# Patient Record
Sex: MALE | Race: WHITE | Employment: UNEMPLOYED | URBAN - METROPOLITAN AREA
[De-identification: names, ages, dates, MRNs, and addresses within clinical notes are randomized per-mention and may not be internally consistent; named-entity substitution may affect disease eponyms.]

---

## 2018-07-08 ENCOUNTER — APPOINTMENT (OUTPATIENT)
Dept: CT IMAGING | Age: 73
End: 2018-07-08
Payer: MEDICARE

## 2018-07-08 ENCOUNTER — HOSPITAL ENCOUNTER (EMERGENCY)
Age: 73
Discharge: HOME | End: 2018-07-08
Payer: MEDICARE

## 2018-07-08 VITALS
DIASTOLIC BLOOD PRESSURE: 75 MMHG | SYSTOLIC BLOOD PRESSURE: 159 MMHG | HEART RATE: 65 BPM | HEIGHT: 73 IN | BODY MASS INDEX: 35.12 KG/M2 | WEIGHT: 265 LBS | TEMPERATURE: 97.7 F | OXYGEN SATURATION: 97 % | RESPIRATION RATE: 16 BRPM

## 2018-07-08 DIAGNOSIS — K86.2 PANCREAS CYST: ICD-10-CM

## 2018-07-08 DIAGNOSIS — N20.0 KIDNEY STONE: Primary | ICD-10-CM

## 2018-07-08 LAB
ALBUMIN SERPL-MCNC: 4.5 G/DL (ref 3.9–4.9)
ALP BLD-CCNC: 85 U/L (ref 35–104)
ALT SERPL-CCNC: 11 U/L (ref 0–41)
ANION GAP SERPL CALCULATED.3IONS-SCNC: 10 MEQ/L (ref 7–13)
AST SERPL-CCNC: 10 U/L (ref 0–40)
BACTERIA: ABNORMAL /HPF
BASOPHILS ABSOLUTE: 0.1 K/UL (ref 0–0.2)
BASOPHILS RELATIVE PERCENT: 1 %
BILIRUB SERPL-MCNC: 0.5 MG/DL (ref 0–1.2)
BILIRUBIN URINE: NEGATIVE
BLOOD, URINE: ABNORMAL
BUN BLDV-MCNC: 14 MG/DL (ref 8–23)
CALCIUM SERPL-MCNC: 9.2 MG/DL (ref 8.6–10.2)
CHLORIDE BLD-SCNC: 105 MEQ/L (ref 98–107)
CLARITY: ABNORMAL
CO2: 27 MEQ/L (ref 22–29)
COLOR: YELLOW
CREAT SERPL-MCNC: 0.69 MG/DL (ref 0.7–1.2)
CRYSTALS, UA: ABNORMAL
EOSINOPHILS ABSOLUTE: 0.3 K/UL (ref 0–0.7)
EOSINOPHILS RELATIVE PERCENT: 4.4 %
EPITHELIAL CELLS, UA: ABNORMAL /HPF
GFR AFRICAN AMERICAN: >60
GFR NON-AFRICAN AMERICAN: >60
GLOBULIN: 3.3 G/DL (ref 2.3–3.5)
GLUCOSE BLD-MCNC: 153 MG/DL (ref 74–109)
GLUCOSE URINE: NEGATIVE MG/DL
HCT VFR BLD CALC: 42.6 % (ref 42–52)
HEMOGLOBIN: 14.5 G/DL (ref 14–18)
KETONES, URINE: NEGATIVE MG/DL
LACTIC ACID: 1 MMOL/L (ref 0.5–2.2)
LEUKOCYTE ESTERASE, URINE: ABNORMAL
LIPASE: 73 U/L (ref 13–60)
LYMPHOCYTES ABSOLUTE: 1.7 K/UL (ref 1–4.8)
LYMPHOCYTES RELATIVE PERCENT: 29.8 %
MCH RBC QN AUTO: 30.5 PG (ref 27–31.3)
MCHC RBC AUTO-ENTMCNC: 33.9 % (ref 33–37)
MCV RBC AUTO: 90 FL (ref 80–100)
MONOCYTES ABSOLUTE: 0.4 K/UL (ref 0.2–0.8)
MONOCYTES RELATIVE PERCENT: 7.1 %
MUCUS: PRESENT
NEUTROPHILS ABSOLUTE: 3.4 K/UL (ref 1.4–6.5)
NEUTROPHILS RELATIVE PERCENT: 57.7 %
NITRITE, URINE: NEGATIVE
PDW BLD-RTO: 13.5 % (ref 11.5–14.5)
PH UA: 7 (ref 5–9)
PLATELET # BLD: 335 K/UL (ref 130–400)
POTASSIUM SERPL-SCNC: 4.3 MEQ/L (ref 3.5–5.1)
PROTEIN UA: NEGATIVE MG/DL
RBC # BLD: 4.73 M/UL (ref 4.7–6.1)
RBC UA: ABNORMAL /HPF (ref 0–2)
SODIUM BLD-SCNC: 142 MEQ/L (ref 132–144)
SPECIFIC GRAVITY UA: 1.01 (ref 1–1.03)
TOTAL PROTEIN: 7.8 G/DL (ref 6.4–8.1)
URINE REFLEX TO CULTURE: YES
UROBILINOGEN, URINE: 0.2 E.U./DL
WBC # BLD: 5.8 K/UL (ref 4.8–10.8)
WBC UA: ABNORMAL /HPF (ref 0–5)

## 2018-07-08 PROCEDURE — 36415 COLL VENOUS BLD VENIPUNCTURE: CPT

## 2018-07-08 PROCEDURE — 96375 TX/PRO/DX INJ NEW DRUG ADDON: CPT

## 2018-07-08 PROCEDURE — 83690 ASSAY OF LIPASE: CPT

## 2018-07-08 PROCEDURE — 74150 CT ABDOMEN W/O CONTRAST: CPT

## 2018-07-08 PROCEDURE — 96374 THER/PROPH/DIAG INJ IV PUSH: CPT

## 2018-07-08 PROCEDURE — 6360000002 HC RX W HCPCS: Performed by: PHYSICIAN ASSISTANT

## 2018-07-08 PROCEDURE — 85025 COMPLETE CBC W/AUTO DIFF WBC: CPT

## 2018-07-08 PROCEDURE — 80053 COMPREHEN METABOLIC PANEL: CPT

## 2018-07-08 PROCEDURE — 83605 ASSAY OF LACTIC ACID: CPT

## 2018-07-08 PROCEDURE — 81001 URINALYSIS AUTO W/SCOPE: CPT

## 2018-07-08 PROCEDURE — 96376 TX/PRO/DX INJ SAME DRUG ADON: CPT

## 2018-07-08 PROCEDURE — 2580000003 HC RX 258: Performed by: PHYSICIAN ASSISTANT

## 2018-07-08 PROCEDURE — 99284 EMERGENCY DEPT VISIT MOD MDM: CPT

## 2018-07-08 PROCEDURE — 87086 URINE CULTURE/COLONY COUNT: CPT

## 2018-07-08 RX ORDER — 0.9 % SODIUM CHLORIDE 0.9 %
1000 INTRAVENOUS SOLUTION INTRAVENOUS ONCE
Status: COMPLETED | OUTPATIENT
Start: 2018-07-08 | End: 2018-07-08

## 2018-07-08 RX ORDER — OXYCODONE HYDROCHLORIDE AND ACETAMINOPHEN 5; 325 MG/1; MG/1
1 TABLET ORAL EVERY 6 HOURS PRN
Qty: 12 TABLET | Refills: 0 | Status: SHIPPED | OUTPATIENT
Start: 2018-07-08 | End: 2018-07-11

## 2018-07-08 RX ORDER — ONDANSETRON 2 MG/ML
4 INJECTION INTRAMUSCULAR; INTRAVENOUS ONCE
Status: COMPLETED | OUTPATIENT
Start: 2018-07-08 | End: 2018-07-08

## 2018-07-08 RX ORDER — MORPHINE SULFATE 4 MG/ML
4 INJECTION, SOLUTION INTRAMUSCULAR; INTRAVENOUS ONCE
Status: COMPLETED | OUTPATIENT
Start: 2018-07-08 | End: 2018-07-08

## 2018-07-08 RX ORDER — KETOROLAC TROMETHAMINE 30 MG/ML
30 INJECTION, SOLUTION INTRAMUSCULAR; INTRAVENOUS ONCE
Status: COMPLETED | OUTPATIENT
Start: 2018-07-08 | End: 2018-07-08

## 2018-07-08 RX ORDER — ETODOLAC 400 MG/1
400 TABLET, FILM COATED ORAL 2 TIMES DAILY
Qty: 14 TABLET | Refills: 0 | Status: SHIPPED | OUTPATIENT
Start: 2018-07-08 | End: 2018-09-04

## 2018-07-08 RX ORDER — ONDANSETRON 4 MG/1
4 TABLET, ORALLY DISINTEGRATING ORAL EVERY 8 HOURS PRN
Qty: 20 TABLET | Refills: 0 | Status: SHIPPED | OUTPATIENT
Start: 2018-07-08 | End: 2018-09-04

## 2018-07-08 RX ADMIN — SODIUM CHLORIDE 1000 ML: 9 INJECTION, SOLUTION INTRAVENOUS at 15:05

## 2018-07-08 RX ADMIN — MORPHINE SULFATE 4 MG: 4 INJECTION, SOLUTION INTRAMUSCULAR; INTRAVENOUS at 16:14

## 2018-07-08 RX ADMIN — KETOROLAC TROMETHAMINE 30 MG: 30 INJECTION, SOLUTION INTRAMUSCULAR; INTRAVENOUS at 15:06

## 2018-07-08 RX ADMIN — ONDANSETRON 4 MG: 2 INJECTION, SOLUTION INTRAMUSCULAR; INTRAVENOUS at 15:06

## 2018-07-08 RX ADMIN — ONDANSETRON 4 MG: 2 INJECTION, SOLUTION INTRAMUSCULAR; INTRAVENOUS at 16:14

## 2018-07-08 ASSESSMENT — PAIN SCALES - GENERAL
PAINLEVEL_OUTOF10: 2
PAINLEVEL_OUTOF10: 8
PAINLEVEL_OUTOF10: 8

## 2018-07-08 ASSESSMENT — ENCOUNTER SYMPTOMS
VOMITING: 0
BLOOD IN STOOL: 0
ANAL BLEEDING: 0
WHEEZING: 0
SHORTNESS OF BREATH: 0
BACK PAIN: 1
RECTAL PAIN: 0
COUGH: 0
CHEST TIGHTNESS: 0
CONSTIPATION: 0
NAUSEA: 1
COLOR CHANGE: 0
DIARRHEA: 0
ABDOMINAL PAIN: 1

## 2018-07-08 ASSESSMENT — PAIN DESCRIPTION - LOCATION
LOCATION: ABDOMEN
LOCATION: ABDOMEN;FLANK

## 2018-07-08 ASSESSMENT — PAIN DESCRIPTION - ORIENTATION: ORIENTATION: LEFT

## 2018-07-08 ASSESSMENT — PAIN DESCRIPTION - PAIN TYPE: TYPE: ACUTE PAIN

## 2018-07-08 NOTE — ED NOTES
Pt medicated per orders. No s/sym of distress. No further questions or concerns. Tech called to transport pt to CT. Call light in reach. Will con't to monitor.       Linda Johnson, MIRTA  07/08/18 5650

## 2018-07-08 NOTE — ED PROVIDER NOTES
[facility] ED  eMERGENCY dEPARTMENT eNCOUnter      Pt Name: Magdalene Gu  MRN: [id number]  [place] 1945  Date of evaluation: 7/8/2018  Provider: Jyothi Villanueva PA-C    CHIEF COMPLAINT       Chief Complaint   Patient presents with    Abdominal Pain     left sided abd pain. started this am.  last bm this am and was normal.  hx of kidney stone years ago. hx of enlarged prostate         HISTORY OF PRESENT ILLNESS   (Location/Symptom, Timing/Onset, Context/Setting, Quality, Duration, Modifying Factors, Severity)  Note limiting factors. Magdalene Gu is a 67 y.o. male who presents to the emergency department     Mr. Nola Mcclendon is a 66 yo male, PMH not limited to kidney stone and enlarged prostate, presents with complaints of LLQ abdominal pain that started spontaneously about 7 hrs ago. He describes his pain as constant, progressively gotten worse, pressure-like, radiates to left lower back, no exacerbating factors, mild relief with walking, associated with nausea, abdominal bloating, and burping/belching. He stated that when he had his kidney stone 20 yrs ago, he passed the stone, never experienced it since then, and that the presentation was different compared to what he experiences today. Patient denies any CP, palpitations, SOB/EL, fever/chills, urinary/bowel changes, light headedness/syncope. Other than this complaint, patient feels well. Nursing Notes were reviewed. REVIEW OF SYSTEMS    (2-9 systems for level 4, 10 or more for level 5)     Review of Systems   Constitutional: Positive for activity change. Negative for appetite change, chills, diaphoresis, fatigue and fever. Eyes: Negative for visual disturbance. Respiratory: Negative for cough, chest tightness, shortness of breath and wheezing. Cardiovascular: Negative for chest pain and palpitations. Gastrointestinal: Positive for abdominal pain and nausea.  Negative for anal bleeding, blood in stool, constipation, diarrhea, Abdominal: Soft. Bowel sounds are normal. He exhibits no distension and no mass. There is tenderness (TTP left lower quadrant). There is no rebound and no guarding. Genitourinary:   Genitourinary Comments: No CVAT   Neurological: He is alert and oriented to person, place, and time. Skin: Skin is warm and dry. He is not diaphoretic. DIAGNOSTIC RESULTS     EKG: All EKG's are interpreted by the Emergency Department Physician who either signs or Co-signs this chart in the absence of a cardiologist.      RADIOLOGY:   Non-plain film images such as CT, Ultrasound and MRI are read by the radiologist. Plain radiographic images are visualized and preliminarily interpreted by the emergency physician with the below findings:    3-4mm left ureteral stone, multiple organ cysts    Interpretation per the Radiologist below, if available at the time of this note:    [address]   Final Result   1. THERE IS MILD TO MODERATE LEFT-SIDED HYDRONEPHROSIS AND HYDROURETER TO THE MIDDLE THIRD OF THE LEFT URETER WHERE THERE IS A CALCULI MEASURING APPROXIMATELY 3 TO 4 MM. 2. THERE IS BILATERAL NEPHROLITHIASIS. 3. THERE ARE SEVERAL BLADDER CALCULI AS DESCRIBED ABOVE. 4. THERE IS A CYSTIC MASS MEASURING 2.2 CM AT THE POSTERIOR ASPECT OF THE HEAD UNCINATE PROCESS OF THE PANCREAS. UNDERLYING MALIGNANCY IS NOT EXCLUDED. WILL NEED FURTHER EVALUATION. 4. THERE IS AREAS LOW-ATTENUATION BOTH KIDNEYS. THESE ARE NOT FULLY CHARACTERIZED IN THIS NONCONTRAST STUDY. CONSIDER FOLLOW-UP CT WITH CONTRAST OR ULTRASOUND TO FURTHER EVALUATE. 5. THERE IS AN AREA OF LOW-ATTENUATION IN THE RIGHT LOBE OF LIVER AND A PARTIALLY VISUALIZED CYSTIC AREA IN THE LEFT LOBE. Urbano Constantino NOT FULLY CHARACTERIZED IN THIS NONCONTRAST STUDY.  WILL NEED FURTHER EVALUATION FOLLOW-UP.      OTHER FINDINGS DETAILED ABOVE         All CT scans at this facility use dose modulation, iterative reconstruction, and/or weight based dosing when appropriate to reduce radiation

## 2018-07-10 LAB — URINE CULTURE, ROUTINE: NORMAL

## 2018-07-18 ENCOUNTER — OFFICE VISIT (OUTPATIENT)
Dept: UROLOGY | Age: 73
End: 2018-07-18
Payer: MEDICARE

## 2018-07-18 ENCOUNTER — HOSPITAL ENCOUNTER (OUTPATIENT)
Dept: GENERAL RADIOLOGY | Age: 73
Discharge: HOME | End: 2018-07-20
Payer: OTHER GOVERNMENT

## 2018-07-18 ENCOUNTER — TELEPHONE (OUTPATIENT)
Dept: UROLOGY | Age: 73
End: 2018-07-18

## 2018-07-18 VITALS
HEIGHT: 73 IN | HEART RATE: 84 BPM | SYSTOLIC BLOOD PRESSURE: 138 MMHG | WEIGHT: 255 LBS | DIASTOLIC BLOOD PRESSURE: 66 MMHG | BODY MASS INDEX: 33.8 KG/M2

## 2018-07-18 DIAGNOSIS — N20.0 RENAL CALCULI: ICD-10-CM

## 2018-07-18 DIAGNOSIS — N20.0 KIDNEY STONE: Primary | ICD-10-CM

## 2018-07-18 DIAGNOSIS — N20.0 KIDNEY STONE: ICD-10-CM

## 2018-07-18 DIAGNOSIS — Q45.3 PANCREATIC ABNORMALITY: Primary | ICD-10-CM

## 2018-07-18 PROCEDURE — G8427 DOCREV CUR MEDS BY ELIG CLIN: HCPCS | Performed by: UROLOGY

## 2018-07-18 PROCEDURE — 1123F ACP DISCUSS/DSCN MKR DOCD: CPT | Performed by: UROLOGY

## 2018-07-18 PROCEDURE — 74018 RADEX ABDOMEN 1 VIEW: CPT

## 2018-07-18 PROCEDURE — G8417 CALC BMI ABV UP PARAM F/U: HCPCS | Performed by: UROLOGY

## 2018-07-18 PROCEDURE — 1036F TOBACCO NON-USER: CPT | Performed by: UROLOGY

## 2018-07-18 PROCEDURE — 4040F PNEUMOC VAC/ADMIN/RCVD: CPT | Performed by: UROLOGY

## 2018-07-18 PROCEDURE — 1101F PT FALLS ASSESS-DOCD LE1/YR: CPT | Performed by: UROLOGY

## 2018-07-18 PROCEDURE — 99203 OFFICE O/P NEW LOW 30 MIN: CPT | Performed by: UROLOGY

## 2018-07-18 PROCEDURE — 3017F COLORECTAL CA SCREEN DOC REV: CPT | Performed by: UROLOGY

## 2018-07-18 RX ORDER — AMLODIPINE BESYLATE 10 MG/1
TABLET ORAL
Refills: 3 | COMMUNITY
Start: 2018-07-04

## 2018-07-18 RX ORDER — DUTASTERIDE 0.5 MG/1
0.5 CAPSULE, LIQUID FILLED ORAL DAILY
COMMUNITY
Start: 2012-10-22

## 2018-07-18 RX ORDER — TAMSULOSIN HYDROCHLORIDE 0.4 MG/1
CAPSULE ORAL
Refills: 3 | COMMUNITY
Start: 2018-05-01

## 2018-07-18 RX ORDER — ALBUTEROL SULFATE 90 UG/1
AEROSOL, METERED RESPIRATORY (INHALATION)
COMMUNITY
Start: 2012-09-10

## 2018-07-18 RX ORDER — CARVEDILOL PHOSPHATE 40 MG/1
CAPSULE, EXTENDED RELEASE ORAL
Refills: 3 | COMMUNITY
Start: 2018-07-04

## 2018-09-04 ENCOUNTER — HOSPITAL ENCOUNTER (OUTPATIENT)
Dept: GENERAL RADIOLOGY | Age: 73
Discharge: HOME | End: 2018-09-06
Payer: MEDICARE

## 2018-09-04 ENCOUNTER — OFFICE VISIT (OUTPATIENT)
Dept: UROLOGY | Age: 73
End: 2018-09-04
Payer: MEDICARE

## 2018-09-04 VITALS
HEART RATE: 77 BPM | HEIGHT: 73 IN | SYSTOLIC BLOOD PRESSURE: 146 MMHG | WEIGHT: 250 LBS | DIASTOLIC BLOOD PRESSURE: 62 MMHG | BODY MASS INDEX: 33.13 KG/M2

## 2018-09-04 DIAGNOSIS — Q45.3 PANCREATIC ABNORMALITY: ICD-10-CM

## 2018-09-04 DIAGNOSIS — N20.0 KIDNEY STONE: Primary | ICD-10-CM

## 2018-09-04 PROCEDURE — 1101F PT FALLS ASSESS-DOCD LE1/YR: CPT | Performed by: UROLOGY

## 2018-09-04 PROCEDURE — G8417 CALC BMI ABV UP PARAM F/U: HCPCS | Performed by: UROLOGY

## 2018-09-04 PROCEDURE — 99213 OFFICE O/P EST LOW 20 MIN: CPT | Performed by: UROLOGY

## 2018-09-04 PROCEDURE — 3017F COLORECTAL CA SCREEN DOC REV: CPT | Performed by: UROLOGY

## 2018-09-04 PROCEDURE — 1036F TOBACCO NON-USER: CPT | Performed by: UROLOGY

## 2018-09-04 PROCEDURE — 4040F PNEUMOC VAC/ADMIN/RCVD: CPT | Performed by: UROLOGY

## 2018-09-04 PROCEDURE — 1123F ACP DISCUSS/DSCN MKR DOCD: CPT | Performed by: UROLOGY

## 2018-09-04 PROCEDURE — G8427 DOCREV CUR MEDS BY ELIG CLIN: HCPCS | Performed by: UROLOGY

## 2018-09-04 PROCEDURE — 74018 RADEX ABDOMEN 1 VIEW: CPT

## 2018-09-04 NOTE — PROGRESS NOTES
reviewed. KUB reviewed. No other findings . Musculoskeletal: Normal range of motion. Normal strength. Extremities: No edema   Neurological: No deficits   Skin: Skin is warm and dry. No lesions. No rashes   Psychiatric:  Normal affect. Assessment/Medical Necessity-Decision Making  Bladder stones multiple  Left ureteral calculus which is passed  Left lower pole calculus stable and in position  No further renal colic  Pancreatic mass being worked up  Plan  Follow up after patient sees gastroenterology to arrange for cystolitholapaxy  Gastroenterologist contacted earlier appointment made  Greater than 50% of 20 minutes spent consulting patient face-to-face  Orders Placed This Encounter   Procedures    [address]     No orders of the defined types were placed in this encounter. Tequila Santoro MD       Please note this report has been partially produced using speech recognition software  And may cause contain errors related to that system including grammar, punctuation and spelling as well as words and phrases that may seem inappropriate. If there are questions or concerns please feel free to contact me to clarify.

## 2018-09-24 ENCOUNTER — OFFICE VISIT (OUTPATIENT)
Dept: GASTROENTEROLOGY | Age: 73
End: 2018-09-24
Payer: MEDICARE

## 2018-09-24 VITALS
HEIGHT: 73 IN | BODY MASS INDEX: 34.85 KG/M2 | OXYGEN SATURATION: 97 % | TEMPERATURE: 97.9 F | HEART RATE: 64 BPM | WEIGHT: 263 LBS

## 2018-09-24 DIAGNOSIS — K86.2 CYSTIC MASS OF PANCREAS: Primary | ICD-10-CM

## 2018-09-24 PROCEDURE — G8427 DOCREV CUR MEDS BY ELIG CLIN: HCPCS | Performed by: INTERNAL MEDICINE

## 2018-09-24 PROCEDURE — 1123F ACP DISCUSS/DSCN MKR DOCD: CPT | Performed by: INTERNAL MEDICINE

## 2018-09-24 PROCEDURE — 1036F TOBACCO NON-USER: CPT | Performed by: INTERNAL MEDICINE

## 2018-09-24 PROCEDURE — 4040F PNEUMOC VAC/ADMIN/RCVD: CPT | Performed by: INTERNAL MEDICINE

## 2018-09-24 PROCEDURE — 99204 OFFICE O/P NEW MOD 45 MIN: CPT | Performed by: INTERNAL MEDICINE

## 2018-09-24 PROCEDURE — G8417 CALC BMI ABV UP PARAM F/U: HCPCS | Performed by: INTERNAL MEDICINE

## 2018-09-24 PROCEDURE — 1101F PT FALLS ASSESS-DOCD LE1/YR: CPT | Performed by: INTERNAL MEDICINE

## 2018-09-24 PROCEDURE — 3017F COLORECTAL CA SCREEN DOC REV: CPT | Performed by: INTERNAL MEDICINE

## 2018-10-04 ENCOUNTER — TELEPHONE (OUTPATIENT)
Dept: GASTROENTEROLOGY | Age: 73
End: 2018-10-04

## 2018-10-04 NOTE — TELEPHONE ENCOUNTER
CT images reviewed with radiologist.  The lesion is not in the pancreas and very likely a retroperitoneal lesion, cystis in nature, it has benign appearance and an incidental finding. Will cancel the scheduled EUS, as the EUS is going to be unhelpful and not able to access the lesion, being retroperitoneal.    Given benign nature and appearance of the lesion, the consensus is that we could follow this up with a CAT scan in 12 months from now. I have called the patient and informed him of this, patient also not keen on EUS.     Ramirez Sanchez

## 2018-10-19 ENCOUNTER — OFFICE VISIT (OUTPATIENT)
Dept: UROLOGY | Age: 73
End: 2018-10-19
Payer: MEDICARE

## 2018-10-19 VITALS
SYSTOLIC BLOOD PRESSURE: 138 MMHG | HEART RATE: 69 BPM | DIASTOLIC BLOOD PRESSURE: 68 MMHG | WEIGHT: 253 LBS | BODY MASS INDEX: 33.53 KG/M2 | HEIGHT: 73 IN

## 2018-10-19 DIAGNOSIS — N20.0 KIDNEY STONE: Primary | ICD-10-CM

## 2018-10-19 PROCEDURE — G8427 DOCREV CUR MEDS BY ELIG CLIN: HCPCS | Performed by: UROLOGY

## 2018-10-19 PROCEDURE — G8484 FLU IMMUNIZE NO ADMIN: HCPCS | Performed by: UROLOGY

## 2018-10-19 PROCEDURE — 1101F PT FALLS ASSESS-DOCD LE1/YR: CPT | Performed by: UROLOGY

## 2018-10-19 PROCEDURE — 1036F TOBACCO NON-USER: CPT | Performed by: UROLOGY

## 2018-10-19 PROCEDURE — 99213 OFFICE O/P EST LOW 20 MIN: CPT | Performed by: UROLOGY

## 2018-10-19 PROCEDURE — 4040F PNEUMOC VAC/ADMIN/RCVD: CPT | Performed by: UROLOGY

## 2018-10-19 PROCEDURE — G8417 CALC BMI ABV UP PARAM F/U: HCPCS | Performed by: UROLOGY

## 2018-10-19 PROCEDURE — 3017F COLORECTAL CA SCREEN DOC REV: CPT | Performed by: UROLOGY

## 2018-10-19 PROCEDURE — 1123F ACP DISCUSS/DSCN MKR DOCD: CPT | Performed by: UROLOGY

## 2018-12-10 ENCOUNTER — HOSPITAL ENCOUNTER (OUTPATIENT)
Dept: PREADMISSION TESTING | Age: 73
Discharge: HOME | End: 2018-12-14
Payer: MEDICARE

## 2018-12-10 VITALS
BODY MASS INDEX: 35.09 KG/M2 | HEART RATE: 69 BPM | SYSTOLIC BLOOD PRESSURE: 161 MMHG | RESPIRATION RATE: 16 BRPM | TEMPERATURE: 97.3 F | HEIGHT: 73 IN | WEIGHT: 264.8 LBS | OXYGEN SATURATION: 98 % | DIASTOLIC BLOOD PRESSURE: 75 MMHG

## 2018-12-10 DIAGNOSIS — N21.0 BLADDER STONES: ICD-10-CM

## 2018-12-10 LAB
ANION GAP SERPL CALCULATED.3IONS-SCNC: 14 MEQ/L (ref 7–13)
BUN BLDV-MCNC: 14 MG/DL (ref 8–23)
CALCIUM SERPL-MCNC: 8.7 MG/DL (ref 8.6–10.2)
CHLORIDE BLD-SCNC: 104 MEQ/L (ref 98–107)
CO2: 23 MEQ/L (ref 22–29)
CREAT SERPL-MCNC: 0.81 MG/DL (ref 0.7–1.2)
EKG ATRIAL RATE: 65 BPM
EKG P AXIS: 37 DEGREES
EKG P-R INTERVAL: 192 MS
EKG Q-T INTERVAL: 398 MS
EKG QRS DURATION: 100 MS
EKG QTC CALCULATION (BAZETT): 413 MS
EKG R AXIS: -16 DEGREES
EKG T AXIS: 22 DEGREES
EKG VENTRICULAR RATE: 65 BPM
GFR AFRICAN AMERICAN: >60
GFR NON-AFRICAN AMERICAN: >60
GLUCOSE BLD-MCNC: 156 MG/DL (ref 74–109)
HCT VFR BLD CALC: 39.9 % (ref 42–52)
HEMOGLOBIN: 13.9 G/DL (ref 14–18)
MCH RBC QN AUTO: 31.8 PG (ref 27–31.3)
MCHC RBC AUTO-ENTMCNC: 34.8 % (ref 33–37)
MCV RBC AUTO: 91.2 FL (ref 80–100)
PDW BLD-RTO: 13.7 % (ref 11.5–14.5)
PLATELET # BLD: 301 K/UL (ref 130–400)
POTASSIUM SERPL-SCNC: 3.6 MEQ/L (ref 3.5–5.1)
RBC # BLD: 4.37 M/UL (ref 4.7–6.1)
SODIUM BLD-SCNC: 141 MEQ/L (ref 132–144)
WBC # BLD: 4.5 K/UL (ref 4.8–10.8)

## 2018-12-10 PROCEDURE — 86301 IMMUNOASSAY TUMOR CA 19-9: CPT

## 2018-12-10 PROCEDURE — 93010 ELECTROCARDIOGRAM REPORT: CPT | Performed by: INTERNAL MEDICINE

## 2018-12-10 PROCEDURE — 85027 COMPLETE CBC AUTOMATED: CPT

## 2018-12-10 PROCEDURE — 80048 BASIC METABOLIC PNL TOTAL CA: CPT

## 2018-12-10 PROCEDURE — 93005 ELECTROCARDIOGRAM TRACING: CPT

## 2018-12-10 RX ORDER — SODIUM CHLORIDE 0.9 % (FLUSH) 0.9 %
10 SYRINGE (ML) INJECTION EVERY 12 HOURS SCHEDULED
Status: CANCELLED | OUTPATIENT
Start: 2018-12-10

## 2018-12-10 RX ORDER — SODIUM CHLORIDE 0.9 % (FLUSH) 0.9 %
10 SYRINGE (ML) INJECTION PRN
Status: CANCELLED | OUTPATIENT
Start: 2018-12-10

## 2018-12-10 RX ORDER — LIDOCAINE HYDROCHLORIDE 10 MG/ML
1 INJECTION, SOLUTION EPIDURAL; INFILTRATION; INTRACAUDAL; PERINEURAL
Status: CANCELLED | OUTPATIENT
Start: 2018-12-10 | End: 2018-12-10

## 2018-12-10 RX ORDER — SODIUM CHLORIDE, SODIUM LACTATE, POTASSIUM CHLORIDE, CALCIUM CHLORIDE 600; 310; 30; 20 MG/100ML; MG/100ML; MG/100ML; MG/100ML
INJECTION, SOLUTION INTRAVENOUS CONTINUOUS
Status: CANCELLED | OUTPATIENT
Start: 2018-12-10

## 2018-12-10 ASSESSMENT — ENCOUNTER SYMPTOMS
EYE PAIN: 0
NAUSEA: 0
VOMITING: 0
ABDOMINAL PAIN: 0
SORE THROAT: 0
BLOOD IN STOOL: 0
SINUS PRESSURE: 0
EYE DISCHARGE: 0
SHORTNESS OF BREATH: 0
CONSTIPATION: 0
WHEEZING: 0
COUGH: 0
DIARRHEA: 0
RHINORRHEA: 0
SINUS PAIN: 0

## 2018-12-12 ENCOUNTER — ANESTHESIA EVENT (OUTPATIENT)
Dept: OPERATING ROOM | Age: 73
End: 2018-12-12
Payer: MEDICARE

## 2018-12-12 ENCOUNTER — HOSPITAL ENCOUNTER (OUTPATIENT)
Age: 73
Setting detail: OUTPATIENT SURGERY
Discharge: HOME | End: 2018-12-12
Attending: UROLOGY | Admitting: UROLOGY
Payer: MEDICARE

## 2018-12-12 ENCOUNTER — ANESTHESIA (OUTPATIENT)
Dept: OPERATING ROOM | Age: 73
End: 2018-12-12
Payer: MEDICARE

## 2018-12-12 VITALS
RESPIRATION RATE: 14 BRPM | DIASTOLIC BLOOD PRESSURE: 67 MMHG | HEART RATE: 50 BPM | SYSTOLIC BLOOD PRESSURE: 146 MMHG | OXYGEN SATURATION: 98 % | TEMPERATURE: 98 F

## 2018-12-12 VITALS — DIASTOLIC BLOOD PRESSURE: 56 MMHG | TEMPERATURE: 96.8 F | SYSTOLIC BLOOD PRESSURE: 101 MMHG | OXYGEN SATURATION: 99 %

## 2018-12-12 DIAGNOSIS — Z48.816 SURGICAL AFTERCARE, GENITOURINARY SYSTEM: Primary | ICD-10-CM

## 2018-12-12 LAB
GLUCOSE BLD-MCNC: 127 MG/DL (ref 60–115)
GLUCOSE BLD-MCNC: 154 MG/DL (ref 60–115)
PERFORMED ON: ABNORMAL
PERFORMED ON: ABNORMAL

## 2018-12-12 PROCEDURE — 6360000002 HC RX W HCPCS: Performed by: NURSE ANESTHETIST, CERTIFIED REGISTERED

## 2018-12-12 PROCEDURE — 2500000003 HC RX 250 WO HCPCS: Performed by: NURSE ANESTHETIST, CERTIFIED REGISTERED

## 2018-12-12 PROCEDURE — 52318 REMOVE BLADDER STONE: CPT | Performed by: UROLOGY

## 2018-12-12 PROCEDURE — 7100000011 HC PHASE II RECOVERY - ADDTL 15 MIN: Performed by: UROLOGY

## 2018-12-12 PROCEDURE — 2500000003 HC RX 250 WO HCPCS: Performed by: NURSE PRACTITIONER

## 2018-12-12 PROCEDURE — 3700000001 HC ADD 15 MINUTES (ANESTHESIA): Performed by: UROLOGY

## 2018-12-12 PROCEDURE — 7100000001 HC PACU RECOVERY - ADDTL 15 MIN: Performed by: UROLOGY

## 2018-12-12 PROCEDURE — 6360000002 HC RX W HCPCS: Performed by: NURSE PRACTITIONER

## 2018-12-12 PROCEDURE — 3600000014 HC SURGERY LEVEL 4 ADDTL 15MIN: Performed by: UROLOGY

## 2018-12-12 PROCEDURE — 82365 CALCULUS SPECTROSCOPY: CPT

## 2018-12-12 PROCEDURE — 7100000010 HC PHASE II RECOVERY - FIRST 15 MIN: Performed by: UROLOGY

## 2018-12-12 PROCEDURE — 3700000000 HC ANESTHESIA ATTENDED CARE: Performed by: UROLOGY

## 2018-12-12 PROCEDURE — C1758 CATHETER, URETERAL: HCPCS | Performed by: UROLOGY

## 2018-12-12 PROCEDURE — 3600000004 HC SURGERY LEVEL 4 BASE: Performed by: UROLOGY

## 2018-12-12 PROCEDURE — 2720000010 HC SURG SUPPLY STERILE: Performed by: UROLOGY

## 2018-12-12 PROCEDURE — 7100000000 HC PACU RECOVERY - FIRST 15 MIN: Performed by: UROLOGY

## 2018-12-12 PROCEDURE — 2709999900 HC NON-CHARGEABLE SUPPLY: Performed by: UROLOGY

## 2018-12-12 PROCEDURE — 2580000003 HC RX 258: Performed by: NURSE PRACTITIONER

## 2018-12-12 PROCEDURE — 52214 CYSTOSCOPY AND TREATMENT: CPT | Performed by: UROLOGY

## 2018-12-12 PROCEDURE — C1769 GUIDE WIRE: HCPCS | Performed by: UROLOGY

## 2018-12-12 PROCEDURE — 6370000000 HC RX 637 (ALT 250 FOR IP): Performed by: UROLOGY

## 2018-12-12 PROCEDURE — 2580000003 HC RX 258: Performed by: UROLOGY

## 2018-12-12 PROCEDURE — 2580000003 HC RX 258: Performed by: NURSE ANESTHETIST, CERTIFIED REGISTERED

## 2018-12-12 RX ORDER — DIPHENHYDRAMINE HYDROCHLORIDE 50 MG/ML
12.5 INJECTION INTRAMUSCULAR; INTRAVENOUS
Status: DISCONTINUED | OUTPATIENT
Start: 2018-12-12 | End: 2018-12-12 | Stop reason: HOSPADM

## 2018-12-12 RX ORDER — ONDANSETRON 2 MG/ML
4 INJECTION INTRAMUSCULAR; INTRAVENOUS
Status: DISCONTINUED | OUTPATIENT
Start: 2018-12-12 | End: 2018-12-12 | Stop reason: HOSPADM

## 2018-12-12 RX ORDER — METOCLOPRAMIDE HYDROCHLORIDE 5 MG/ML
10 INJECTION INTRAMUSCULAR; INTRAVENOUS
Status: DISCONTINUED | OUTPATIENT
Start: 2018-12-12 | End: 2018-12-12 | Stop reason: HOSPADM

## 2018-12-12 RX ORDER — CHLORHEXIDINE GLUCONATE 4 G/100ML
SOLUTION TOPICAL PRN
Status: DISCONTINUED | OUTPATIENT
Start: 2018-12-12 | End: 2018-12-12 | Stop reason: HOSPADM

## 2018-12-12 RX ORDER — LIDOCAINE HYDROCHLORIDE 20 MG/ML
INJECTION, SOLUTION EPIDURAL; INFILTRATION; INTRACAUDAL; PERINEURAL PRN
Status: DISCONTINUED | OUTPATIENT
Start: 2018-12-12 | End: 2018-12-12 | Stop reason: SDUPTHER

## 2018-12-12 RX ORDER — SODIUM CHLORIDE, SODIUM LACTATE, POTASSIUM CHLORIDE, CALCIUM CHLORIDE 600; 310; 30; 20 MG/100ML; MG/100ML; MG/100ML; MG/100ML
INJECTION, SOLUTION INTRAVENOUS CONTINUOUS
Status: DISCONTINUED | OUTPATIENT
Start: 2018-12-12 | End: 2018-12-12 | Stop reason: HOSPADM

## 2018-12-12 RX ORDER — ATROPA BELLADONNA AND OPIUM 16.2; 6 MG/1; MG/1
SUPPOSITORY RECTAL PRN
Status: DISCONTINUED | OUTPATIENT
Start: 2018-12-12 | End: 2018-12-12 | Stop reason: HOSPADM

## 2018-12-12 RX ORDER — HYDROCODONE BITARTRATE AND ACETAMINOPHEN 5; 325 MG/1; MG/1
1 TABLET ORAL EVERY 6 HOURS PRN
Qty: 12 TABLET | Refills: 0 | Status: SHIPPED | OUTPATIENT
Start: 2018-12-12 | End: 2018-12-15

## 2018-12-12 RX ORDER — CEPHALEXIN 500 MG/1
500 CAPSULE ORAL EVERY 12 HOURS
Qty: 14 CAPSULE | Refills: 0 | Status: SHIPPED | OUTPATIENT
Start: 2018-12-12 | End: 2019-06-25

## 2018-12-12 RX ORDER — MIDAZOLAM HYDROCHLORIDE 1 MG/ML
INJECTION INTRAMUSCULAR; INTRAVENOUS PRN
Status: DISCONTINUED | OUTPATIENT
Start: 2018-12-12 | End: 2018-12-12 | Stop reason: SDUPTHER

## 2018-12-12 RX ORDER — SODIUM CHLORIDE, SODIUM LACTATE, POTASSIUM CHLORIDE, CALCIUM CHLORIDE 600; 310; 30; 20 MG/100ML; MG/100ML; MG/100ML; MG/100ML
INJECTION, SOLUTION INTRAVENOUS
Status: COMPLETED
Start: 2018-12-12 | End: 2018-12-12

## 2018-12-12 RX ORDER — HYDROCODONE BITARTRATE AND ACETAMINOPHEN 5; 325 MG/1; MG/1
2 TABLET ORAL PRN
Status: DISCONTINUED | OUTPATIENT
Start: 2018-12-12 | End: 2018-12-12 | Stop reason: HOSPADM

## 2018-12-12 RX ORDER — SODIUM CHLORIDE 0.9 % (FLUSH) 0.9 %
10 SYRINGE (ML) INJECTION EVERY 12 HOURS SCHEDULED
Status: DISCONTINUED | OUTPATIENT
Start: 2018-12-12 | End: 2018-12-12 | Stop reason: HOSPADM

## 2018-12-12 RX ORDER — SODIUM CHLORIDE 0.9 % (FLUSH) 0.9 %
10 SYRINGE (ML) INJECTION PRN
Status: DISCONTINUED | OUTPATIENT
Start: 2018-12-12 | End: 2018-12-12 | Stop reason: HOSPADM

## 2018-12-12 RX ORDER — FENTANYL CITRATE 50 UG/ML
INJECTION, SOLUTION INTRAMUSCULAR; INTRAVENOUS PRN
Status: DISCONTINUED | OUTPATIENT
Start: 2018-12-12 | End: 2018-12-12 | Stop reason: SDUPTHER

## 2018-12-12 RX ORDER — ONDANSETRON 2 MG/ML
INJECTION INTRAMUSCULAR; INTRAVENOUS PRN
Status: DISCONTINUED | OUTPATIENT
Start: 2018-12-12 | End: 2018-12-12 | Stop reason: SDUPTHER

## 2018-12-12 RX ORDER — HYDROCODONE BITARTRATE AND ACETAMINOPHEN 5; 325 MG/1; MG/1
1 TABLET ORAL PRN
Status: DISCONTINUED | OUTPATIENT
Start: 2018-12-12 | End: 2018-12-12 | Stop reason: HOSPADM

## 2018-12-12 RX ORDER — LIDOCAINE HYDROCHLORIDE 10 MG/ML
1 INJECTION, SOLUTION EPIDURAL; INFILTRATION; INTRACAUDAL; PERINEURAL
Status: COMPLETED | OUTPATIENT
Start: 2018-12-12 | End: 2018-12-12

## 2018-12-12 RX ORDER — MEPERIDINE HYDROCHLORIDE 25 MG/ML
12.5 INJECTION INTRAMUSCULAR; INTRAVENOUS; SUBCUTANEOUS EVERY 5 MIN PRN
Status: DISCONTINUED | OUTPATIENT
Start: 2018-12-12 | End: 2018-12-12 | Stop reason: HOSPADM

## 2018-12-12 RX ORDER — LIDOCAINE HYDROCHLORIDE 10 MG/ML
INJECTION, SOLUTION EPIDURAL; INFILTRATION; INTRACAUDAL; PERINEURAL
Status: DISCONTINUED
Start: 2018-12-12 | End: 2018-12-12 | Stop reason: HOSPADM

## 2018-12-12 RX ORDER — SODIUM CHLORIDE, SODIUM LACTATE, POTASSIUM CHLORIDE, CALCIUM CHLORIDE 600; 310; 30; 20 MG/100ML; MG/100ML; MG/100ML; MG/100ML
INJECTION, SOLUTION INTRAVENOUS CONTINUOUS PRN
Status: DISCONTINUED | OUTPATIENT
Start: 2018-12-12 | End: 2018-12-12 | Stop reason: SDUPTHER

## 2018-12-12 RX ORDER — PROPOFOL 10 MG/ML
INJECTION, EMULSION INTRAVENOUS PRN
Status: DISCONTINUED | OUTPATIENT
Start: 2018-12-12 | End: 2018-12-12 | Stop reason: SDUPTHER

## 2018-12-12 RX ORDER — MAGNESIUM HYDROXIDE 1200 MG/15ML
LIQUID ORAL PRN
Status: DISCONTINUED | OUTPATIENT
Start: 2018-12-12 | End: 2018-12-12 | Stop reason: HOSPADM

## 2018-12-12 RX ORDER — FENTANYL CITRATE 50 UG/ML
50 INJECTION, SOLUTION INTRAMUSCULAR; INTRAVENOUS EVERY 10 MIN PRN
Status: DISCONTINUED | OUTPATIENT
Start: 2018-12-12 | End: 2018-12-12 | Stop reason: HOSPADM

## 2018-12-12 RX ADMIN — LIDOCAINE HYDROCHLORIDE 0.1 ML: 10 INJECTION, SOLUTION EPIDURAL; INFILTRATION; INTRACAUDAL; PERINEURAL at 08:05

## 2018-12-12 RX ADMIN — FENTANYL CITRATE 50 MCG: 50 INJECTION, SOLUTION INTRAMUSCULAR; INTRAVENOUS at 09:55

## 2018-12-12 RX ADMIN — PROPOFOL 200 MG: 10 INJECTION, EMULSION INTRAVENOUS at 09:21

## 2018-12-12 RX ADMIN — FENTANYL CITRATE 50 MCG: 50 INJECTION, SOLUTION INTRAMUSCULAR; INTRAVENOUS at 10:40

## 2018-12-12 RX ADMIN — SODIUM CHLORIDE, POTASSIUM CHLORIDE, SODIUM LACTATE AND CALCIUM CHLORIDE 125 ML/HR: 600; 310; 30; 20 INJECTION, SOLUTION INTRAVENOUS at 08:06

## 2018-12-12 RX ADMIN — ONDANSETRON 4 MG: 2 INJECTION INTRAMUSCULAR; INTRAVENOUS at 09:27

## 2018-12-12 RX ADMIN — LIDOCAINE HYDROCHLORIDE 50 MG: 20 INJECTION, SOLUTION EPIDURAL; INFILTRATION; INTRACAUDAL; PERINEURAL at 09:21

## 2018-12-12 RX ADMIN — FENTANYL CITRATE 50 MCG: 50 INJECTION, SOLUTION INTRAMUSCULAR; INTRAVENOUS at 10:05

## 2018-12-12 RX ADMIN — GENTAMICIN SULFATE 120 MG: 40 INJECTION, SOLUTION INTRAMUSCULAR; INTRAVENOUS at 09:18

## 2018-12-12 RX ADMIN — FENTANYL CITRATE 50 MCG: 50 INJECTION, SOLUTION INTRAMUSCULAR; INTRAVENOUS at 09:21

## 2018-12-12 RX ADMIN — MIDAZOLAM HYDROCHLORIDE 2 MG: 1 INJECTION, SOLUTION INTRAMUSCULAR; INTRAVENOUS at 09:16

## 2018-12-12 RX ADMIN — SODIUM CHLORIDE, POTASSIUM CHLORIDE, SODIUM LACTATE AND CALCIUM CHLORIDE: 600; 310; 30; 20 INJECTION, SOLUTION INTRAVENOUS at 08:56

## 2018-12-12 RX ADMIN — SODIUM CHLORIDE, POTASSIUM CHLORIDE, SODIUM LACTATE AND CALCIUM CHLORIDE: 600; 310; 30; 20 INJECTION, SOLUTION INTRAVENOUS at 09:50

## 2018-12-12 RX ADMIN — FENTANYL CITRATE 50 MCG: 50 INJECTION, SOLUTION INTRAMUSCULAR; INTRAVENOUS at 09:45

## 2018-12-12 ASSESSMENT — PULMONARY FUNCTION TESTS
PIF_VALUE: 0
PIF_VALUE: 3
PIF_VALUE: 2
PIF_VALUE: 2
PIF_VALUE: 4
PIF_VALUE: 3
PIF_VALUE: 3
PIF_VALUE: 0
PIF_VALUE: 3
PIF_VALUE: 0
PIF_VALUE: 2
PIF_VALUE: 2
PIF_VALUE: 3
PIF_VALUE: 3
PIF_VALUE: 2
PIF_VALUE: 3
PIF_VALUE: 3
PIF_VALUE: 2
PIF_VALUE: 3
PIF_VALUE: 2
PIF_VALUE: 0
PIF_VALUE: 2
PIF_VALUE: 3
PIF_VALUE: 2
PIF_VALUE: 3
PIF_VALUE: 2
PIF_VALUE: 3
PIF_VALUE: 2
PIF_VALUE: 3
PIF_VALUE: 2
PIF_VALUE: 3
PIF_VALUE: 3
PIF_VALUE: 4
PIF_VALUE: 3
PIF_VALUE: 1
PIF_VALUE: 2
PIF_VALUE: 3
PIF_VALUE: 1
PIF_VALUE: 3
PIF_VALUE: 2
PIF_VALUE: 2
PIF_VALUE: 3
PIF_VALUE: 2
PIF_VALUE: 0
PIF_VALUE: 3
PIF_VALUE: 3
PIF_VALUE: 2
PIF_VALUE: 3
PIF_VALUE: 3
PIF_VALUE: 2
PIF_VALUE: 3
PIF_VALUE: 2
PIF_VALUE: 2
PIF_VALUE: 3
PIF_VALUE: 2
PIF_VALUE: 3
PIF_VALUE: 3
PIF_VALUE: 2
PIF_VALUE: 3
PIF_VALUE: 3
PIF_VALUE: 2
PIF_VALUE: 3
PIF_VALUE: 3
PIF_VALUE: 0
PIF_VALUE: 3
PIF_VALUE: 3
PIF_VALUE: 2
PIF_VALUE: 3
PIF_VALUE: 2
PIF_VALUE: 3

## 2018-12-12 ASSESSMENT — PAIN - FUNCTIONAL ASSESSMENT: PAIN_FUNCTIONAL_ASSESSMENT: 0-10

## 2018-12-12 NOTE — BRIEF OP NOTE
Brief Postoperative Note  ______________________________________________________________    Patient: Nolan Peabody  YOB: 1945  MRN: [id number]  Date of Procedure: 12/12/2018    Pre-Op Diagnosis: BLADDER STONES    Post-Op Diagnosis: Same       Procedure(s):  CYSTOSCOPY LITHOLAPAXY OF BLADDER STONES / AND FULGERATION  WITH HOLMIUM LASER    Anesthesia: General    Surgeon(s):  Sergei Strange MD    Assistant:       Estimated Blood Loss (mL): less than 50     Complications: None    Specimens:   ID Type Source Tests Collected by Time Destination   1 : BLADDER STONES Stone (Calculus) Bladder STONE ANALYSIS Sergei Strange MD 12/12/2018 0060        Implants:  * No implants in log *      Drains:   Urethral Catheter Straight-tip 20 fr (Active)       Findings: large bladder stones    Sergei Strange MD  Date: 12/12/2018  Time: 10:51 AM

## 2018-12-12 NOTE — ANESTHESIA PRE PROCEDURE
Department of Anesthesiology  Preprocedure Note       Name:  Bhavin Prince   Age:  75 y.o.  :  1945                                          MRN:  [id number]         Date:  2018      Surgeon: Ute Meneses):  Francisca Mcnulty MD    Procedure: CYSTOSCOPY LITHOLAPAXY OF BLADDER STONES (N/A )  WITH HOLMIUM LASER  REQUESTS BABATUNDE/JULEE/ ERIC WILHELM  TO BE LATEX FREE (N/A )    Medications prior to admission:   Prior to Admission medications    Medication Sig Start Date End Date Taking? Authorizing Provider   carvedilol (COREG CR) 40 MG CP24 extended release capsule TAKE ONE CAPSULE BY MOUTH EVERY DAY 18  Yes Historical Provider, MD   dutasteride (AVODART) 0.5 MG capsule 0.5 mg daily  10/22/12  Yes Historical Provider, MD   amLODIPine (NORVASC) 10 MG tablet TABLET BY MOUTH EVERY DAY 18  Yes Historical Provider, MD   tamsulosin (FLOMAX) 0.4 MG capsule TAKE ONE CAPSULE BY MOUTH EVERY DAY 18  Yes Historical Provider, MD   albuterol sulfate HFA (PROAIR HFA) 108 (90 Base) MCG/ACT inhaler  9/10/12   Historical Provider, MD   metFORMIN (GLUCOPHAGE) 500 MG tablet TAKE 1 TABLET BY MOUTH TWICE A DAY WITH FOOD 18   Historical Provider, MD       Current medications:    Current Facility-Administered Medications   Medication Dose Route Frequency Provider Last Rate Last Dose    lactated ringers infusion   Intravenous Continuous HAI Bal - CNP        lidocaine PF 1 % injection 1 mL  1 mL Intradermal Once PRN Jamaica Davis APRN - CNP        sodium chloride flush 0.9 % injection 10 mL  10 mL Intravenous 2 times per day SANJAY DodsonN - CNP        sodium chloride flush 0.9 % injection 10 mL  10 mL Intravenous PRN Jamaica Davis APRN - CNP        gentamicin (GARAMYCIN) 120 mg in dextrose 5 % 100 mL IVPB  120 mg Intravenous Once DeniseuseSANJAY CollinsN - CNP        lidocaine PF 1 % injection             lactated ringers infusion                Allergies:     Allergies

## 2018-12-13 LAB — CA 19-9: 11 U/ML (ref 0–35)

## 2018-12-14 ENCOUNTER — OFFICE VISIT (OUTPATIENT)
Dept: UROLOGY | Age: 73
End: 2018-12-14

## 2018-12-14 VITALS
SYSTOLIC BLOOD PRESSURE: 138 MMHG | HEART RATE: 72 BPM | DIASTOLIC BLOOD PRESSURE: 64 MMHG | HEIGHT: 73 IN | BODY MASS INDEX: 34.46 KG/M2 | WEIGHT: 260 LBS

## 2018-12-14 DIAGNOSIS — N21.0 BLADDER STONES: Primary | ICD-10-CM

## 2018-12-14 PROCEDURE — 99024 POSTOP FOLLOW-UP VISIT: CPT | Performed by: UROLOGY

## 2018-12-18 ENCOUNTER — OFFICE VISIT (OUTPATIENT)
Dept: UROLOGY | Age: 73
End: 2018-12-18
Payer: MEDICARE

## 2018-12-18 VITALS
HEART RATE: 73 BPM | HEIGHT: 73 IN | BODY MASS INDEX: 33.8 KG/M2 | DIASTOLIC BLOOD PRESSURE: 76 MMHG | SYSTOLIC BLOOD PRESSURE: 134 MMHG | WEIGHT: 255 LBS

## 2018-12-18 DIAGNOSIS — R31.9 HEMATURIA, UNSPECIFIED TYPE: ICD-10-CM

## 2018-12-18 DIAGNOSIS — R33.9 URINARY RETENTION: Primary | ICD-10-CM

## 2018-12-18 LAB
BILIRUBIN, POC: ABNORMAL
BLOOD URINE, POC: ABNORMAL
CALCULI COMPOSITION: NORMAL
CLARITY, POC: CLEAR
COLOR, POC: YELLOW
GLUCOSE URINE, POC: ABNORMAL
KETONES, POC: ABNORMAL
LEUKOCYTE EST, POC: ABNORMAL
MASS: NORMAL MG
NITRITE, POC: ABNORMAL
PH, POC: 6
POST VOID RESIDUAL (PVR): NORMAL ML
PROTEIN, POC: ABNORMAL
SPECIFIC GRAVITY, POC: >=1.03
STONE DESCRIPTION: NORMAL
STONE NUMBER: NORMAL
STONE SIZE: NORMAL MM
UROBILINOGEN, POC: 0.2

## 2018-12-18 PROCEDURE — 81003 URINALYSIS AUTO W/O SCOPE: CPT | Performed by: UROLOGY

## 2018-12-18 PROCEDURE — 51798 US URINE CAPACITY MEASURE: CPT | Performed by: UROLOGY

## 2018-12-18 PROCEDURE — 99024 POSTOP FOLLOW-UP VISIT: CPT | Performed by: UROLOGY

## 2018-12-20 LAB — URINE CULTURE, ROUTINE: NORMAL

## 2019-06-19 ENCOUNTER — TELEPHONE (OUTPATIENT)
Dept: UROLOGY | Age: 74
End: 2019-06-19

## 2019-06-19 DIAGNOSIS — N13.8 BPH WITH URINARY OBSTRUCTION: Primary | ICD-10-CM

## 2019-06-19 DIAGNOSIS — N40.1 BPH WITH URINARY OBSTRUCTION: Primary | ICD-10-CM

## 2019-06-21 DIAGNOSIS — N13.8 BPH WITH URINARY OBSTRUCTION: ICD-10-CM

## 2019-06-21 DIAGNOSIS — N40.1 BPH WITH URINARY OBSTRUCTION: ICD-10-CM

## 2019-06-21 LAB — PROSTATE SPECIFIC ANTIGEN: 2.34 NG/ML (ref 0–6.22)

## 2019-06-25 ENCOUNTER — OFFICE VISIT (OUTPATIENT)
Dept: UROLOGY | Age: 74
End: 2019-06-25
Payer: MEDICARE

## 2019-06-25 VITALS
HEART RATE: 81 BPM | HEIGHT: 73 IN | SYSTOLIC BLOOD PRESSURE: 138 MMHG | DIASTOLIC BLOOD PRESSURE: 70 MMHG | WEIGHT: 260 LBS | BODY MASS INDEX: 34.46 KG/M2

## 2019-06-25 DIAGNOSIS — N13.8 BPH WITH URINARY OBSTRUCTION: ICD-10-CM

## 2019-06-25 DIAGNOSIS — N40.1 BPH WITH URINARY OBSTRUCTION: ICD-10-CM

## 2019-06-25 DIAGNOSIS — R33.9 URINARY RETENTION: Primary | ICD-10-CM

## 2019-06-25 LAB
BILIRUBIN, POC: ABNORMAL
BLOOD URINE, POC: ABNORMAL
CLARITY, POC: CLEAR
COLOR, POC: YELLOW
GLUCOSE URINE, POC: ABNORMAL
KETONES, POC: ABNORMAL
LEUKOCYTE EST, POC: ABNORMAL
NITRITE, POC: ABNORMAL
PH, POC: 6.5
POST VOID RESIDUAL (PVR): 28 ML
PROTEIN, POC: ABNORMAL
SPECIFIC GRAVITY, POC: 1.01
UROBILINOGEN, POC: 0.2

## 2019-06-25 PROCEDURE — 3017F COLORECTAL CA SCREEN DOC REV: CPT | Performed by: UROLOGY

## 2019-06-25 PROCEDURE — G8417 CALC BMI ABV UP PARAM F/U: HCPCS | Performed by: UROLOGY

## 2019-06-25 PROCEDURE — 99213 OFFICE O/P EST LOW 20 MIN: CPT | Performed by: UROLOGY

## 2019-06-25 PROCEDURE — 4040F PNEUMOC VAC/ADMIN/RCVD: CPT | Performed by: UROLOGY

## 2019-06-25 PROCEDURE — 81003 URINALYSIS AUTO W/O SCOPE: CPT | Performed by: UROLOGY

## 2019-06-25 PROCEDURE — 1036F TOBACCO NON-USER: CPT | Performed by: UROLOGY

## 2019-06-25 PROCEDURE — 1123F ACP DISCUSS/DSCN MKR DOCD: CPT | Performed by: UROLOGY

## 2019-06-25 PROCEDURE — 51798 US URINE CAPACITY MEASURE: CPT | Performed by: UROLOGY

## 2019-06-25 PROCEDURE — G8427 DOCREV CUR MEDS BY ELIG CLIN: HCPCS | Performed by: UROLOGY

## 2019-06-25 NOTE — PROGRESS NOTES
[facility] EVALUATION NOTE                                                 H&P                                                                                                                                                 Reason for Visit  Urinary stasis secondary to BPH with bladder stone      History of Present Illness  Patient underwent cystoscopy cystolitholapaxy with fulguration of bladder neck for prostatic varices and was started on tamsulosin and finasteride with excellent control of voiding symptoms  Patient denies any further bleeding and/or obstructive voiding symptoms      Urologic Review of Systems/Symptoms  Denies hematuria  Denies dysuria  Denies incontinence  Denies flank pain  Other Urologic: Nocturia 1 time per night    Review of Systems  Head and neck: No issues/reviewed  Cardiac: No recent issues/reviewed  Pulmonary: No issues/reviewed  Gastrointestinal: No issues/reviewed  Neurologic: No recent issues/reviewed  Extremities: No issues/reviewed  Lymphatics: No lymphadenopathy no change  Genitourinary: See above  Skin: No issues/reviewed  Hospitalization: None recent  Denies hematuria  All 14 categories of Review of Systems otherwise reviewed no other findings reported.     Past Medical History:   Diagnosis Date    Asthma     seasonally, uses inhaler as needed    Charcot ankle, left     wears brace    Diabetes mellitus ([address]     takes metformin    Hypertension     on meds > 10 yrs     Past Surgical History:   Procedure Laterality Date    COLONOSCOPY      > 30 yrs ago    LITHOTRIPSY N/A 12/12/2018    WITH HOLMIUM LASER performed by Mela Floyd MD at [address] [place] [address] History     Socioeconomic History    Marital status:      Spouse name: None    Number of children: None    Years of education: None    Highest education level: None   Occupational History    None   Social Needs    Financial resource strain: None    Food insecurity:     Worry: None     Inability: None  Transportation needs:     Medical: None     Non-medical: None   Tobacco Use    Smoking status: Former Smoker     Last attempt to quit: 1990     Years since quittin.4    Smokeless tobacco: Never Used   Substance and Sexual Activity    Alcohol use: Yes     Comment: weekly    Drug use: No    Sexual activity: None   Lifestyle    Physical activity:     Days per week: None     Minutes per session: None    Stress: None   Relationships    Social connections:     Talks on phone: None     Gets together: None     Attends Scientologist service: None     Active member of club or organization: None     Attends meetings of clubs or organizations: None     Relationship status: None    Intimate partner violence:     Fear of current or ex partner: None     Emotionally abused: None     Physically abused: None     Forced sexual activity: None   Other Topics Concern    None   Social History Narrative    None     Family History   Problem Relation Age of Onset    No Known Problems Mother     Heart Disease Father     No Known Problems Sister     Other Brother         enlarged prostate, cataracts    Diabetes Brother         borderline     Current Outpatient Medications   Medication Sig Dispense Refill    carvedilol (COREG CR) 40 MG CP24 extended release capsule TAKE ONE CAPSULE BY MOUTH EVERY DAY  3    dutasteride (AVODART) 0.5 MG capsule 0.5 mg daily       albuterol sulfate HFA (PROAIR HFA) 108 (90 Base) MCG/ACT inhaler       amLODIPine (NORVASC) 10 MG tablet TABLET BY MOUTH EVERY DAY  3    metFORMIN (GLUCOPHAGE) 500 MG tablet TAKE 1 TABLET BY MOUTH TWICE A DAY WITH FOOD  3    tamsulosin (FLOMAX) 0.4 MG capsule TAKE ONE CAPSULE BY MOUTH EVERY DAY  3     No current facility-administered medications for this visit.       Celecoxib and Naproxen  All reviewed and verified by Dr Roverto Goldman on today's visit    PSA   Date Value Ref Range Status   2019 2.34 0.00 - 6.22 ng/mL Final   2018 2.60 0.00 - 6.22 ng/mL Final   02/12/2016 1.82 0.00 - 6.22 ng/mL Final     Comment:     When the Total PSA is between 3.00 and 10.00 ng/mL, consider  requesting a Free PSA to aid in diagnosis. 03/06/2014 1.56 0.00 - 6.22 ng/mL Final     Comment:     When the Total PSA is between 3.00 and 10.00 ng/mL, consider  requesting a Free PSA to aid in diagnosis. 02/17/2012 1.65 0.00 - 4.00 ng/mL Final     Results for POC orders placed in visit on 06/25/19   POCT Urinalysis No Micro (Auto)   Result Value Ref Range    Color, UA yellow     Clarity, UA clear     Glucose, UA POC neg     Bilirubin, UA neg     Ketones, UA neg     Spec Grav, UA 1.010     Blood, UA POC trace-trace     pH, UA 6.5     Protein, UA POC neg     Urobilinogen, UA 0.2     Leukocytes, UA small     Nitrite, UA neg    poct post void residual   Result Value Ref Range    post void residual 28 ml    Narrative    A point of care test   Post Void Residual was completed by performing  ultrasound scan of the bladder and  reviewed by Dr Rosita Monsalve       Physical Exam  Vitals:    06/25/19 0810 06/25/19 0833   BP: (!) 148/68 138/70   Pulse: 81    Weight: 260 lb (117.9 kg)    Height: 6' 1\" (1.854 m)      Constitutional: patient is oriented to person, place, and time. patient appears well-developed. Not in distress. Ears: Adequate hearing/no hearing loss  Head: Normocephalic. Atraumatic  Neck: Normal range of motion. Cardiovascular: Normal rate, BP reviewed. Normal rhythm  Pulmonary/Chest: Normal respiratory effort No shortness of breath  Abdominal: Not distended. No suprapubic distention  Urologic Exam  Urinalysis clear. Postvoid residual 20 cc. Prostate exam not indicated. PSA 2.34  Musculoskeletal: Normal range of motion. Normal strength. Extremities: No edema  Neurological: Intact   Skin: Skin is warm and dry. No lesions. No rashes   Psychiatric: Normal affect.   Assessment/Medical Necessity-Decision Making  Stable urologically  No further symptoms of stones  Continue tamsulosin

## 2019-12-11 DIAGNOSIS — N40.1 BPH WITH URINARY OBSTRUCTION: ICD-10-CM

## 2019-12-11 DIAGNOSIS — N13.8 BPH WITH URINARY OBSTRUCTION: ICD-10-CM

## 2019-12-11 LAB — PROSTATE SPECIFIC ANTIGEN: 2.75 NG/ML (ref 0–6.22)

## 2019-12-13 ENCOUNTER — TELEPHONE (OUTPATIENT)
Dept: UROLOGY | Age: 74
End: 2019-12-13

## 2019-12-13 DIAGNOSIS — N40.1 BPH WITH URINARY OBSTRUCTION: Primary | ICD-10-CM

## 2019-12-13 DIAGNOSIS — N13.8 BPH WITH URINARY OBSTRUCTION: Primary | ICD-10-CM

## 2020-11-30 ENCOUNTER — NURSE ONLY (OUTPATIENT)
Dept: PRIMARY CARE CLINIC | Age: 75
End: 2020-11-30

## 2020-12-03 LAB
SARS-COV-2: NOT DETECTED
SOURCE: NORMAL

## 2022-02-09 ENCOUNTER — OFFICE VISIT (OUTPATIENT)
Dept: PULMONOLOGY | Age: 77
End: 2022-02-09
Payer: MEDICARE

## 2022-02-09 VITALS
WEIGHT: 250.8 LBS | HEIGHT: 73 IN | DIASTOLIC BLOOD PRESSURE: 86 MMHG | SYSTOLIC BLOOD PRESSURE: 138 MMHG | HEART RATE: 76 BPM | RESPIRATION RATE: 18 BRPM | BODY MASS INDEX: 33.24 KG/M2 | OXYGEN SATURATION: 98 %

## 2022-02-09 DIAGNOSIS — R09.81 NASAL CONGESTION: ICD-10-CM

## 2022-02-09 DIAGNOSIS — F17.218 CIGARETTE NICOTINE DEPENDENCE WITH OTHER NICOTINE-INDUCED DISORDER: ICD-10-CM

## 2022-02-09 DIAGNOSIS — R06.02 SOB (SHORTNESS OF BREATH): Primary | ICD-10-CM

## 2022-02-09 PROCEDURE — G8484 FLU IMMUNIZE NO ADMIN: HCPCS | Performed by: INTERNAL MEDICINE

## 2022-02-09 PROCEDURE — 4004F PT TOBACCO SCREEN RCVD TLK: CPT | Performed by: INTERNAL MEDICINE

## 2022-02-09 PROCEDURE — 99204 OFFICE O/P NEW MOD 45 MIN: CPT | Performed by: INTERNAL MEDICINE

## 2022-02-09 PROCEDURE — G8427 DOCREV CUR MEDS BY ELIG CLIN: HCPCS | Performed by: INTERNAL MEDICINE

## 2022-02-09 PROCEDURE — G8417 CALC BMI ABV UP PARAM F/U: HCPCS | Performed by: INTERNAL MEDICINE

## 2022-02-09 PROCEDURE — 1123F ACP DISCUSS/DSCN MKR DOCD: CPT | Performed by: INTERNAL MEDICINE

## 2022-02-09 PROCEDURE — 4040F PNEUMOC VAC/ADMIN/RCVD: CPT | Performed by: INTERNAL MEDICINE

## 2022-02-09 RX ORDER — AZELASTINE HCL 205.5 UG/1
1 SPRAY NASAL 2 TIMES DAILY
Qty: 30 ML | Refills: 2 | Status: SHIPPED | OUTPATIENT
Start: 2022-02-09

## 2022-02-09 NOTE — PROGRESS NOTES
Subjective:     Kar Bishop is a 67 y.o. male who complains today of:     Chief Complaint   Patient presents with    New Patient     Jadeo ref for Bronchio Spasms        HPI  Patient presents for shortness of breath and bronchospasm      Patient presents for evaluation of shortness of breath, symptoms started almost 1 year ago, he has multiple episodes of bronchospasm with congestion, and wheezing, and dyspnea with exertion. Patient reports also nasal congestion and postnasal drip, this is frequent, he wakes up in the morning congested, he  uses tap water for irrigation every morning, he was started on Flonase but this did not help, he is also on albuterol as needed which is helping in regard to his symptoms of congestion and bronchospasm, no chest pain, no fever no chills, no lower extremity edema, he has no history of snoring or choking while asleep, no daytime sleepiness or tiredness. He smoked from age 15 up until age 47, up to 2 packs/day, he did office work mainly, with no significant occupational exposure. No family history of asthma or COPD, no family history of sleep apnea.             Allergies:  Celecoxib and Naproxen  Past Medical History:   Diagnosis Date    Asthma     seasonally, uses inhaler as needed    Charcot ankle, left     wears brace    Diabetes mellitus ([address]     takes metformin    Hypertension     on meds > 10 yrs     Past Surgical History:   Procedure Laterality Date    COLONOSCOPY      > 30 yrs ago    LITHOTRIPSY N/A 12/12/2018    WITH HOLMIUM LASER performed by [facility]j [facility]     Family History   Problem Relation Age of Onset    No Known Problems Mother     Heart Disease Father     No Known Problems Sister     Other Brother         enlarged prostate, cataracts    Diabetes Brother         borderline     Social History     Socioeconomic History    Marital status:      Spouse name: Not on file    Number of children: Not on file    Years of education: Not on file    Highest education level: Not on file   Occupational History    Not on file   Tobacco Use    Smoking status: Former Smoker     Quit date:      Years since quittin.1    Smokeless tobacco: Never Used   Vaping Use    Vaping Use: Never used   Substance and Sexual Activity    Alcohol use: Yes     Comment: weekly    Drug use: No    Sexual activity: Not on file   Other Topics Concern    Not on file   Social History Narrative    Not on file     Social Determinants of Health     Financial Resource Strain:     Difficulty of Paying Living Expenses: Not on file   Food Insecurity:     Worried About [address] [employer] in the Last Year: Not on file    James of Food in the Last Year: Not on file   Transportation Needs:     Lack of Transportation (Medical): Not on file    Lack of Transportation (Non-Medical):  Not on file   Physical Activity:     Days of Exercise per Week: Not on file    Minutes of Exercise per Session: Not on file   Stress:     Feeling of Stress : Not on file   Social Connections:     Frequency of Communication with Friends and Family: Not on file    Frequency of Social Gatherings with Friends and Family: Not on file    Attends Church Services: Not on file    Active Member of [address] [place] [address] or Organizations: Not on file    Attends Club or Organization Meetings: Not on file    Marital Status: Not on file   Intimate Partner Violence:     Fear of Current or Ex-Partner: Not on file    Emotionally Abused: Not on file    Physically Abused: Not on file    Sexually Abused: Not on file   Housing Stability:     Unable to Pay for Housing in the Last Year: Not on file    Number of [place] in the Last Year: Not on file    Unstable Housing in the Last Year: Not on file         Review of Systems      ROS: 10 organs review of system is done including general, psychological, ENT, hematological, endocrine, respiratory, cardiovascular, gastrointestinal,musculoskeletal, neurological,  allergy and Immunology is done and is otherwise negative. Current Outpatient Medications   Medication Sig Dispense Refill    azelastine HCl 0.15 % SOLN 1 spray by Nasal route 2 times daily 30 mL 2    carvedilol (COREG CR) 40 MG CP24 extended release capsule TAKE ONE CAPSULE BY MOUTH EVERY DAY  3    dutasteride (AVODART) 0.5 MG capsule 0.5 mg daily       albuterol sulfate HFA (PROAIR HFA) 108 (90 Base) MCG/ACT inhaler       amLODIPine (NORVASC) 10 MG tablet TABLET BY MOUTH EVERY DAY  3    metFORMIN (GLUCOPHAGE) 500 MG tablet TAKE 1 TABLET BY MOUTH TWICE A DAY WITH FOOD  3    tamsulosin (FLOMAX) 0.4 MG capsule TAKE ONE CAPSULE BY MOUTH EVERY DAY  3     No current facility-administered medications for this visit. Objective:     Vitals:    02/09/22 0919   BP: 138/86   Site: Left Upper Arm   Position: Sitting   Cuff Size: Medium Adult   Pulse: 76   Resp: 18   SpO2: 98%   Weight: 250 lb 12.8 oz (113.8 kg)   Height: 6' 1\" (1.854 m)         Physical Exam  Constitutional:       Appearance: He is well-developed. HENT:      Head: Normocephalic and atraumatic. Eyes:      General:         Left eye: No discharge. Conjunctiva/sclera: Conjunctivae normal.      Pupils: Pupils are equal, round, and reactive to light. Neck:      Vascular: No JVD. Cardiovascular:      Rate and Rhythm: Normal rate and regular rhythm. Heart sounds: Normal heart sounds. No murmur heard. No friction rub. No gallop. Pulmonary:      Effort: Pulmonary effort is normal. No respiratory distress. Breath sounds: Wheezing (mainly on forced exhalation) present. No rales. Chest:      Chest wall: No tenderness. Abdominal:      General: Bowel sounds are normal.      Palpations: Abdomen is soft. Musculoskeletal:         General: No tenderness or deformity. Cervical back: Normal range of motion and neck supple. Right lower leg: No edema. Left lower leg: No edema.    Skin:     General: Skin is warm and dry. Neurological:      Mental Status: He is alert and oriented to person, place, and time. Psychiatric:         Judgment: Judgment normal.         Imaging studies reviewed by me CT kidney 2018 base of the lung shows no mass or nodule  Lab results reviewed in chart  PFT none  ECHO: None    Assessment and Plan       Diagnosis Orders   1. SOB (shortness of breath)  Full PFT Study With Bronchodilator   2. Cigarette nicotine dependence with other nicotine-induced disorder  CT LUNG SCREENING   3. Nasal congestion  azelastine HCl 0.15 % SOLN     · Shortness of breath, wheezing and chest tightness, likely asthma versus asthma COPD overlap syndrome in light of patient history of smoking. Will obtain PFT, and evaluate on follow-up. Will need imaging studies patient would benefit from CT lung cancer screening if approved this will help significantly for lung cancer screening and lung parenchyma evaluation. If not then will obtain chest x-ray. Will discuss treatment plan on follow-up after patient is done with above testing  · Nasal congestion, continue Flonase, start azelastine, also patient instructed to start over-the-counter Claritin I will reevaluate on follow-up. Orders Placed This Encounter   Procedures    CT LUNG SCREENING     Standing Status:   Future     Standing Expiration Date:   2/9/2023     Order Specific Question:   Is there documentation of shared decision making? Answer:   Yes     Order Specific Question:   Does the patient show any signs or symptoms of lung cancer? Answer:   No     Order Specific Question:   Is this the first (baseline) CT or an annual exam?     Answer:   Baseline [1]     Order Specific Question:   Is this a low dose CT or a routine CT? Answer:   Low Dose CT [1]     Order Specific Question:   Smoking Status? Answer:    Former Smoker [4]     Order Specific Question:   Date quit smoking? (must be within 15 years)     Answer:   1/1/1990     Order Specific Question:   Smoking packs per day? Answer:   2     Order Specific Question:   Years smoking? Answer:   35    Full PFT Study With Bronchodilator     Standing Status:   Future     Standing Expiration Date:   2023     Orders Placed This Encounter   Medications    azelastine HCl 0.15 % SOLN     Si spray by Nasal route 2 times daily     Dispense:  30 mL     Refill:  2            Discussed with patient the importance of exercise and weight control and  overall health and well-being. Reviewed with the patient: current clinical status, medications, activities and diet. Side effects, adverse effects of the medication prescribed today, as well as treatment plan and result expectations have been discussed with the patient who expresses understanding and desires to proceed. Return in about 4 weeks (around 3/9/2022).       David Siddiqui MD

## 2022-02-17 ENCOUNTER — TELEPHONE (OUTPATIENT)
Dept: CASE MANAGEMENT | Age: 77
End: 2022-02-17

## 2022-02-17 NOTE — TELEPHONE ENCOUNTER
According to CMS Guidelines, the patient does not meet the requirements for a CT Lung Screening. Waiting for insurance authorization to see if patient will be covered for CT Lung Screening.      Pt quit smoking ov er 15 years ago

## 2022-03-03 ENCOUNTER — TELEPHONE (OUTPATIENT)
Dept: CASE MANAGEMENT | Age: 77
End: 2022-03-03

## 2022-03-03 NOTE — TELEPHONE ENCOUNTER
According to CMS Guidelines, the patient does not meet the requirements for a CT Lung Screening. Waiting for insurance authorization to see if patient will be covered for CT Lung Screening. Pt quit smoking over 15 years ago.

## 2022-03-15 ENCOUNTER — HOSPITAL ENCOUNTER (OUTPATIENT)
Dept: PULMONOLOGY | Age: 77
Discharge: HOME | End: 2022-03-15
Payer: MEDICARE

## 2022-03-15 ENCOUNTER — HOSPITAL ENCOUNTER (OUTPATIENT)
Dept: CT IMAGING | Age: 77
Discharge: HOME | End: 2022-03-17
Payer: MEDICARE

## 2022-03-15 ENCOUNTER — OFFICE VISIT (OUTPATIENT)
Dept: PULMONOLOGY | Age: 77
End: 2022-03-15
Payer: MEDICARE

## 2022-03-15 VITALS
HEART RATE: 70 BPM | WEIGHT: 261.2 LBS | OXYGEN SATURATION: 98 % | SYSTOLIC BLOOD PRESSURE: 138 MMHG | HEIGHT: 73 IN | BODY MASS INDEX: 34.62 KG/M2 | TEMPERATURE: 97.2 F | RESPIRATION RATE: 16 BRPM | DIASTOLIC BLOOD PRESSURE: 72 MMHG

## 2022-03-15 DIAGNOSIS — I71.20 THORACIC AORTIC ANEURYSM WITHOUT RUPTURE: ICD-10-CM

## 2022-03-15 DIAGNOSIS — F17.218 CIGARETTE NICOTINE DEPENDENCE WITH OTHER NICOTINE-INDUCED DISORDER: ICD-10-CM

## 2022-03-15 DIAGNOSIS — R06.02 SOB (SHORTNESS OF BREATH): ICD-10-CM

## 2022-03-15 DIAGNOSIS — R09.81 NASAL CONGESTION: ICD-10-CM

## 2022-03-15 DIAGNOSIS — J44.9 ASTHMA-COPD OVERLAP SYNDROME (HCC): ICD-10-CM

## 2022-03-15 DIAGNOSIS — J44.9 CHRONIC OBSTRUCTIVE PULMONARY DISEASE, UNSPECIFIED COPD TYPE (HCC): Primary | ICD-10-CM

## 2022-03-15 DIAGNOSIS — Z87.891 HISTORY OF SMOKING: ICD-10-CM

## 2022-03-15 DIAGNOSIS — E66.09 CLASS 1 OBESITY DUE TO EXCESS CALORIES WITHOUT SERIOUS COMORBIDITY WITH BODY MASS INDEX (BMI) OF 34.0 TO 34.9 IN ADULT: ICD-10-CM

## 2022-03-15 PROCEDURE — 3023F SPIROM DOC REV: CPT | Performed by: INTERNAL MEDICINE

## 2022-03-15 PROCEDURE — 99214 OFFICE O/P EST MOD 30 MIN: CPT | Performed by: INTERNAL MEDICINE

## 2022-03-15 PROCEDURE — 71271 CT THORAX LUNG CANCER SCR C-: CPT

## 2022-03-15 PROCEDURE — G8427 DOCREV CUR MEDS BY ELIG CLIN: HCPCS | Performed by: INTERNAL MEDICINE

## 2022-03-15 PROCEDURE — G8484 FLU IMMUNIZE NO ADMIN: HCPCS | Performed by: INTERNAL MEDICINE

## 2022-03-15 PROCEDURE — 94729 DIFFUSING CAPACITY: CPT

## 2022-03-15 PROCEDURE — G8417 CALC BMI ABV UP PARAM F/U: HCPCS | Performed by: INTERNAL MEDICINE

## 2022-03-15 PROCEDURE — 94726 PLETHYSMOGRAPHY LUNG VOLUMES: CPT

## 2022-03-15 PROCEDURE — 94060 EVALUATION OF WHEEZING: CPT

## 2022-03-15 PROCEDURE — 1036F TOBACCO NON-USER: CPT | Performed by: INTERNAL MEDICINE

## 2022-03-15 PROCEDURE — 4040F PNEUMOC VAC/ADMIN/RCVD: CPT | Performed by: INTERNAL MEDICINE

## 2022-03-15 PROCEDURE — 6360000002 HC RX W HCPCS: Performed by: INTERNAL MEDICINE

## 2022-03-15 PROCEDURE — 1123F ACP DISCUSS/DSCN MKR DOCD: CPT | Performed by: INTERNAL MEDICINE

## 2022-03-15 RX ORDER — ALBUTEROL SULFATE 2.5 MG/3ML
2.5 SOLUTION RESPIRATORY (INHALATION) ONCE
Status: COMPLETED | OUTPATIENT
Start: 2022-03-15 | End: 2022-03-15

## 2022-03-15 RX ORDER — BUDESONIDE AND FORMOTEROL FUMARATE DIHYDRATE 160; 4.5 UG/1; UG/1
2 AEROSOL RESPIRATORY (INHALATION) 2 TIMES DAILY
Qty: 10.2 G | Refills: 3 | Status: SHIPPED | OUTPATIENT
Start: 2022-03-15 | End: 2022-05-19 | Stop reason: SDUPTHER

## 2022-03-15 RX ADMIN — ALBUTEROL SULFATE 2.5 MG: 2.5 SOLUTION RESPIRATORY (INHALATION) at 09:16

## 2022-03-15 NOTE — PROGRESS NOTES
Subjective:     Kar Bishop is a 67 y.o. male who complains today of:     Chief Complaint   Patient presents with    Follow-up     4 Week F/U for Shortness of Breath and Cigarette nicotine dependence     Results     PFT and CT Lung Screening       HPI  Patient presents for shortness of breath      Patient continues to have dyspnea on minimal exertion, with wheezing, cough mainly at night, with clear phlegm,, no chest pain, no lower extremity edema, no fever no chills, his weight is stable, no nasal congestion or postnasal drip and no heartburn. No snoring while asleep, no choking or witnessed apnea.             Allergies:  Celecoxib and Naproxen  Past Medical History:   Diagnosis Date    Asthma     seasonally, uses inhaler as needed    Charcot ankle, left     wears brace    Diabetes mellitus ([address]     takes metformin    Hypertension     on meds > 10 yrs     Past Surgical History:   Procedure Laterality Date    COLONOSCOPY      > 30 yrs ago    LITHOTRIPSY N/A 2018    WITH HOLMIUM LASER performed by Ej Gamboa MD at [address] History   Problem Relation Age of Onset    No Known Problems Mother     Heart Disease Father     No Known Problems Sister     Other Brother         enlarged prostate, cataracts    Diabetes Brother         borderline     Social History     Socioeconomic History    Marital status:      Spouse name: Not on file    Number of children: Not on file    Years of education: Not on file    Highest education level: Not on file   Occupational History    Not on file   Tobacco Use    Smoking status: Former Smoker     Quit date:      Years since quittin.2    Smokeless tobacco: Never Used   Vaping Use    Vaping Use: Never used   Substance and Sexual Activity    Alcohol use: Yes     Comment: weekly    Drug use: No    Sexual activity: Not on file   Other Topics Concern    Not on file   Social History Narrative    Not on file     Social Determinants of Health     Financial Resource Strain:     Difficulty of Paying Living Expenses: Not on file   Food Insecurity:     Worried About Running Out of Food in the Last Year: Not on file    James of Food in the Last Year: Not on file   Transportation Needs:     Lack of Transportation (Medical): Not on file    Lack of Transportation (Non-Medical): Not on file   Physical Activity:     Days of Exercise per Week: Not on file    Minutes of Exercise per Session: Not on file   Stress:     Feeling of Stress : Not on file   Social Connections:     Frequency of Communication with Friends and Family: Not on file    Frequency of Social Gatherings with Friends and Family: Not on file    Attends Mu-ism Services: Not on file    Active Member of [address] [place] [address] [employer] or Organizations: Not on file    Attends Club or Organization Meetings: Not on file    Marital Status: Not on file   Intimate Partner Violence:     Fear of Current or Ex-Partner: Not on file    Emotionally Abused: Not on file    Physically Abused: Not on file    Sexually Abused: Not on file   Housing Stability:     Unable to Pay for Housing in the Last Year: Not on file    Number of [place] in the Last Year: Not on file    Unstable Housing in the Last Year: Not on file         Review of Systems      ROS: 10 organs review of system is done including general, psychological, ENT, hematological, endocrine, respiratory, cardiovascular, gastrointestinal,musculoskeletal, neurological,  allergy and Immunology is done and is otherwise negative.     Current Outpatient Medications   Medication Sig Dispense Refill    budesonide-formoterol (SYMBICORT) 160-4.5 MCG/ACT AERO Inhale 2 puffs into the lungs 2 times daily 10.2 g 3    azelastine HCl 0.15 % SOLN 1 spray by Nasal route 2 times daily 30 mL 2    carvedilol (COREG CR) 40 MG CP24 extended release capsule TAKE ONE CAPSULE BY MOUTH EVERY DAY  3    dutasteride (AVODART) 0.5 MG capsule 0.5 mg daily       albuterol sulfate HFA (PROAIR HFA) 108 (90 Base) MCG/ACT inhaler       amLODIPine (NORVASC) 10 MG tablet TABLET BY MOUTH EVERY DAY  3    metFORMIN (GLUCOPHAGE) 500 MG tablet TAKE 1 TABLET BY MOUTH TWICE A DAY WITH FOOD  3    tamsulosin (FLOMAX) 0.4 MG capsule TAKE ONE CAPSULE BY MOUTH EVERY DAY  3     No current facility-administered medications for this visit. Objective:     Vitals:    03/15/22 1316   BP: 138/72   Site: Right Upper Arm   Position: Sitting   Cuff Size: Large Adult   Pulse: 70   Resp: 16   Temp: 97.2 °F (36.2 °C)   TempSrc: Infrared   SpO2: 98%   Weight: 261 lb 3.2 oz (118.5 kg)   Height: 6' 1\" (1.854 m)         Physical Exam  Constitutional:       Appearance: He is well-developed. HENT:      Head: Normocephalic and atraumatic. Eyes:      General:         Left eye: No discharge. Conjunctiva/sclera: Conjunctivae normal.      Pupils: Pupils are equal, round, and reactive to light. Neck:      Vascular: No JVD. Cardiovascular:      Rate and Rhythm: Normal rate and regular rhythm. Heart sounds: Normal heart sounds. No murmur heard. No friction rub. No gallop. Pulmonary:      Effort: Pulmonary effort is normal. No respiratory distress. Breath sounds: Wheezing present. No rales. Chest:      Chest wall: No tenderness. Abdominal:      General: Bowel sounds are normal. There is no distension. Palpations: Abdomen is soft. Tenderness: There is no abdominal tenderness. There is no rebound. Musculoskeletal:         General: No deformity. Cervical back: Normal range of motion and neck supple. Right lower leg: No edema. Left lower leg: No edema. Skin:     General: Skin is warm and dry. Neurological:      Mental Status: He is alert and oriented to person, place, and time.    Psychiatric:         Judgment: Judgment normal.         Imaging studies reviewed by me CT chest done today, shows no mass or nodule, mild centrilobular emphysema, ascending aortic aneurysm 4.3 cm  Lab results reviewed in chart  PFT done today, shows FEV1 39%, FEV1/FVC 0.56 indicating severe obstructive airway disease with positive response to bronchodilator, lung volumes showed air trapping, diffusion capacity normal       Assessment and Plan       Diagnosis Orders   1. Chronic obstructive pulmonary disease, unspecified COPD type (HCC)  budesonide-formoterol (SYMBICORT) 160-4.5 MCG/ACT AERO   2. Asthma-COPD overlap syndrome (HCC)  budesonide-formoterol (SYMBICORT) 160-4.5 MCG/ACT AERO   3. Nasal congestion     4. Class 1 obesity due to excess calories without serious comorbidity with body mass index (BMI) of 34.0 to 34.9 in adult     5. History of smoking     6. Thoracic aortic aneurysm without rupture (HCC)       · Severe COPD, possible asthma COPD overlap syndrome, will start patient on Symbicort, patient instructed to gargle swish and spit after each use of inhaled corticosteroid, continue as needed albuterol, and will reevaluate on follow-up. · Nasal congestion, symptoms controlled, azelastine as needed  · Weight loss is recommended  · History of smoking, will plan CT lung cancer screening on yearly basis  · Ascending aorta aneurysm, patient declined referral to cardiology, he would like to discuss with his primary care physician, will defer follow-up to primary care physician. He will call me if he changes mind regarding cardiology referral.  Patient will discuss with his primary regarding this finding. No orders of the defined types were placed in this encounter. Orders Placed This Encounter   Medications    budesonide-formoterol (SYMBICORT) 160-4.5 MCG/ACT AERO     Sig: Inhale 2 puffs into the lungs 2 times daily     Dispense:  10.2 g     Refill:  3            Discussed with patient the importance of exercise and weight control and  overall health and well-being. Reviewed with the patient: current clinical status, medications, activities and diet.       Side effects, adverse effects of the medication prescribed today, as well as treatment plan and result expectations have been discussed with the patient who expresses understanding and desires to proceed. Return in about 2 months (around 5/15/2022).       Shamika Mireles MD

## 2022-03-19 NOTE — PROCEDURES
[address]                      [address], [address] [facility]                               PULMONARY FUNCTION    PATIENT NAME: Harry James                     :        1945  MED REC NO:   [id number]                            ROOM:  ACCOUNT NO:   [de-identified]                           ADMIT DATE: 03/15/2022  PROVIDER:     Jennifer Polo MD    DATE OF PROCEDURE:  03/15/2022    REQUESTING PHYSICIAN:  Jese Horton MD    INTERPRETING PHYSICIAN:  Leo Weiss MD    REASON FOR STUDY:  Shortness of breath, cough, wheezing. INTERPRETATION:  FVC is 2.38, 51% of predicted. FEV1 is 1.35, 39% of  predicted. FEV1/FVC is 56%. FEF 25-75% is 0.74, 40% of predicted. Postbronchodilator study shows FVC is 2.90, 22% of improvement; FEV1 is  1.63, 21% of improvement. Midflow FEF 25-75% is 1.06, 43% of  improvement. Lung volume study shows residual volume is 4.23, 153% of  predicted; TLC is 6.84, 89% of predicted; RV to TLC ratio is 61.84, 163%  of predicted; diffusion capacity is 26.31, 111% of predicted; airway  resistance 2.05, 141% of predicted. SUMMARY:  Severe obstructive pulmonary disease. There is significant  response to bronchodilator. Static lung volume study suggests  significant air trapping and hyperinflation. Diffusion capacity is  within normal range. Airway resistance increased. Clinical correlation  is requested.         Thais Polk MD    D: 2022 99:79:40       T: 2022 0:14:52     [email or web address]  Job#: [id number]     Doc#: [id number]    CC:

## 2022-03-21 PROCEDURE — 94726 PLETHYSMOGRAPHY LUNG VOLUMES: CPT | Performed by: INTERNAL MEDICINE

## 2022-03-21 PROCEDURE — 94729 DIFFUSING CAPACITY: CPT | Performed by: INTERNAL MEDICINE

## 2022-03-21 PROCEDURE — 94060 EVALUATION OF WHEEZING: CPT | Performed by: INTERNAL MEDICINE

## 2022-05-17 ENCOUNTER — OFFICE VISIT (OUTPATIENT)
Dept: PULMONOLOGY | Age: 77
End: 2022-05-17
Payer: MEDICARE

## 2022-05-17 VITALS
WEIGHT: 263.4 LBS | DIASTOLIC BLOOD PRESSURE: 80 MMHG | BODY MASS INDEX: 34.91 KG/M2 | OXYGEN SATURATION: 98 % | SYSTOLIC BLOOD PRESSURE: 146 MMHG | HEART RATE: 57 BPM | TEMPERATURE: 97.1 F | HEIGHT: 73 IN | RESPIRATION RATE: 16 BRPM

## 2022-05-17 DIAGNOSIS — E66.09 CLASS 1 OBESITY DUE TO EXCESS CALORIES WITHOUT SERIOUS COMORBIDITY WITH BODY MASS INDEX (BMI) OF 34.0 TO 34.9 IN ADULT: ICD-10-CM

## 2022-05-17 DIAGNOSIS — J44.9 ASTHMA-COPD OVERLAP SYNDROME (HCC): Primary | ICD-10-CM

## 2022-05-17 DIAGNOSIS — Z87.891 HISTORY OF SMOKING: ICD-10-CM

## 2022-05-17 DIAGNOSIS — F17.211 CIGARETTE NICOTINE DEPENDENCE IN REMISSION: ICD-10-CM

## 2022-05-17 DIAGNOSIS — R09.81 NASAL CONGESTION: ICD-10-CM

## 2022-05-17 PROCEDURE — 4040F PNEUMOC VAC/ADMIN/RCVD: CPT | Performed by: INTERNAL MEDICINE

## 2022-05-17 PROCEDURE — G8417 CALC BMI ABV UP PARAM F/U: HCPCS | Performed by: INTERNAL MEDICINE

## 2022-05-17 PROCEDURE — G8427 DOCREV CUR MEDS BY ELIG CLIN: HCPCS | Performed by: INTERNAL MEDICINE

## 2022-05-17 PROCEDURE — 3023F SPIROM DOC REV: CPT | Performed by: INTERNAL MEDICINE

## 2022-05-17 PROCEDURE — 99213 OFFICE O/P EST LOW 20 MIN: CPT | Performed by: INTERNAL MEDICINE

## 2022-05-17 PROCEDURE — 1036F TOBACCO NON-USER: CPT | Performed by: INTERNAL MEDICINE

## 2022-05-17 PROCEDURE — 1123F ACP DISCUSS/DSCN MKR DOCD: CPT | Performed by: INTERNAL MEDICINE

## 2022-05-17 NOTE — PROGRESS NOTES
Subjective:     Beth Larsen is a 67 y.o. male who complains today of:     Chief Complaint   Patient presents with    Follow-up     2 Month F/U for COPD       HPI  Patient presents for COPD      Doing well, significantly improved, he is asymptomatic since he was started on Symbicort, no coughing, no shortness of breath, sleeps well at night, no chest pain, no nasal congestion or postnasal drip, no heartburn, his weight is stable, no lower extremity edema.             Allergies:  Celecoxib and Naproxen  Past Medical History:   Diagnosis Date    Asthma     seasonally, uses inhaler as needed    Charcot ankle, left     wears brace    Diabetes mellitus ([address]     takes metformin    Hypertension     on meds > 10 yrs     Past Surgical History:   Procedure Laterality Date    COLONOSCOPY      > 30 yrs ago    LITHOTRIPSY N/A 2018    WITH HOLMIUM LASER performed by Raquel Rapp MD at [address] History   Problem Relation Age of Onset    No Known Problems Mother     Heart Disease Father     No Known Problems Sister     Other Brother         enlarged prostate, cataracts    Diabetes Brother         borderline     Social History     Socioeconomic History    Marital status:      Spouse name: Not on file    Number of children: Not on file    Years of education: Not on file    Highest education level: Not on file   Occupational History    Not on file   Tobacco Use    Smoking status: Former Smoker     Quit date:      Years since quittin.3    Smokeless tobacco: Never Used   Vaping Use    Vaping Use: Never used   Substance and Sexual Activity    Alcohol use: Yes     Comment: weekly    Drug use: No    Sexual activity: Not on file   Other Topics Concern    Not on file   Social History Narrative    Not on file     Social Determinants of Health     Financial Resource Strain:     Difficulty of Paying Living Expenses: Not on file   Food Insecurity:     Worried About Running Out of Food in the Last Year: Not on file    Ran Out of Food in the Last Year: Not on file   Transportation Needs:     Lack of Transportation (Medical): Not on file    Lack of Transportation (Non-Medical): Not on file   Physical Activity:     Days of Exercise per Week: Not on file    Minutes of Exercise per Session: Not on file   Stress:     Feeling of Stress : Not on file   Social Connections:     Frequency of Communication with Friends and Family: Not on file    Frequency of Social Gatherings with Friends and Family: Not on file    Attends Restoration[employer] Services: Not on file    Active Member of [address] [place] [address] or Organizations: Not on file    Attends Club or Organization Meetings: Not on file    Marital Status: Not on file   Intimate Partner Violence:     Fear of Current or Ex-Partner: Not on file    Emotionally Abused: Not on file    Physically Abused: Not on file    Sexually Abused: Not on file   Housing Stability:     Unable to Pay for Housing in the Last Year: Not on file    Number of [place] in the Last Year: Not on file    Unstable Housing in the Last Year: Not on file         Review of Systems      ROS: 10 organs review of system is done including general, psychological, ENT, hematological, endocrine, respiratory, cardiovascular, gastrointestinal,musculoskeletal, neurological,  allergy and Immunology is done and is otherwise negative.     Current Outpatient Medications   Medication Sig Dispense Refill    budesonide-formoterol (SYMBICORT) 160-4.5 MCG/ACT AERO Inhale 2 puffs into the lungs 2 times daily 10.2 g 3    azelastine HCl 0.15 % SOLN 1 spray by Nasal route 2 times daily 30 mL 2    carvedilol (COREG CR) 40 MG CP24 extended release capsule TAKE ONE CAPSULE BY MOUTH EVERY DAY  3    dutasteride (AVODART) 0.5 MG capsule 0.5 mg daily       albuterol sulfate HFA (PROAIR HFA) 108 (90 Base) MCG/ACT inhaler       amLODIPine (NORVASC) 10 MG tablet TABLET BY MOUTH EVERY DAY  3    metFORMIN (GLUCOPHAGE) 500 MG tablet TAKE 1 TABLET BY MOUTH TWICE A DAY WITH FOOD  3    tamsulosin (FLOMAX) 0.4 MG capsule TAKE ONE CAPSULE BY MOUTH EVERY DAY  3     No current facility-administered medications for this visit. Objective:     Vitals:    05/17/22 1422 05/17/22 1428   BP: (!) 146/80 (!) 146/80   Site: Left Upper Arm Right Upper Arm   Position: Sitting Sitting   Cuff Size: Large Adult Large Adult   Pulse: 57    Resp: 16    Temp: 97.1 °F (36.2 °C)    TempSrc: Infrared    SpO2: 98%    Weight: 263 lb 6.4 oz (119.5 kg)    Height: 6' 1\" (1.854 m)          Physical Exam  Constitutional:       Appearance: He is well-developed. HENT:      Head: Normocephalic and atraumatic. Eyes:      General:         Left eye: No discharge. Conjunctiva/sclera: Conjunctivae normal.      Pupils: Pupils are equal, round, and reactive to light. Neck:      Vascular: No JVD. Cardiovascular:      Rate and Rhythm: Normal rate and regular rhythm. Heart sounds: Normal heart sounds. No murmur heard. No friction rub. No gallop. Pulmonary:      Effort: Pulmonary effort is normal. No respiratory distress. Breath sounds: Normal breath sounds. No wheezing or rales. Chest:      Chest wall: No tenderness. Abdominal:      General: Bowel sounds are normal.      Palpations: Abdomen is soft. Musculoskeletal:         General: No tenderness or deformity. Cervical back: Normal range of motion and neck supple. Right lower leg: No edema. Left lower leg: No edema. Skin:     General: Skin is warm and dry. Neurological:      Mental Status: He is alert and oriented to person, place, and time. Psychiatric:         Judgment: Judgment normal.         Imaging studies reviewed by me CT lung cancer screening March 2022, no mass or nodule  Lab results reviewed in chart  PFT March 2022, FEV1 39%, FEV1/FVC 0.56, with positive response to bronchodilator       Assessment and Plan       Diagnosis Orders   1.  Asthma-COPD overlap syndrome ([facility]     2. Cigarette nicotine dependence in remission  CT LUNG SCREENING   3. Nasal congestion     4. Class 1 obesity due to excess calories without serious comorbidity with body mass index (BMI) of 34.0 to 34.9 in adult     5. History of smoking       · Continue Symbicort, symptoms controlled  · Yearly CT lung cancer screening if insurance allow  · Continue azelastine  · Weight loss is recommended      Orders Placed This Encounter   Procedures    CT LUNG SCREENING     Standing Status:   Future     Standing Expiration Date:   5/17/2023     Order Specific Question:   Is there documentation of shared decision making? Answer:   Yes     Order Specific Question:   Does the patient show any signs or symptoms of lung cancer? Answer:   No     Order Specific Question:   Is this the first (baseline) CT or an annual exam?     Answer: Annual [2]     Order Specific Question:   Is this a low dose CT or a routine CT? Answer:   Low Dose CT [1]     Order Specific Question:   Smoking Status? Answer: Former Smoker [4]     Order Specific Question:   Date quit smoking? (must be within 15 years)     Answer:   1/1/1990     Order Specific Question:   Smoking packs per day? Answer:   2     Order Specific Question:   Years smoking? Answer:   20     No orders of the defined types were placed in this encounter. Discussed with patient the importance of exercise and weight control and  overall health and well-being. Reviewed with the patient: current clinical status, medications, activities and diet. Side effects, adverse effects of the medication prescribed today, as well as treatment plan and result expectations have been discussed with the patient who expresses understanding and desires to proceed. Return in about 11 months (around 4/17/2023).       Maykel Stearns MD

## 2022-05-19 DIAGNOSIS — J44.9 ASTHMA-COPD OVERLAP SYNDROME (HCC): ICD-10-CM

## 2022-05-19 DIAGNOSIS — J44.9 CHRONIC OBSTRUCTIVE PULMONARY DISEASE, UNSPECIFIED COPD TYPE (HCC): ICD-10-CM

## 2022-05-19 RX ORDER — BUDESONIDE AND FORMOTEROL FUMARATE DIHYDRATE 160; 4.5 UG/1; UG/1
2 AEROSOL RESPIRATORY (INHALATION) 2 TIMES DAILY
Qty: 10.2 G | Refills: 3 | Status: SHIPPED | OUTPATIENT
Start: 2022-05-19 | End: 2022-09-23

## 2022-09-22 ENCOUNTER — HOSPITAL ENCOUNTER (OUTPATIENT)
Dept: NON INVASIVE DIAGNOSTICS | Age: 77
Discharge: HOME | End: 2022-09-22
Payer: MEDICARE

## 2022-09-22 DIAGNOSIS — J44.9 CHRONIC OBSTRUCTIVE PULMONARY DISEASE, UNSPECIFIED COPD TYPE (HCC): Primary | ICD-10-CM

## 2022-09-22 DIAGNOSIS — J44.9 ASTHMA-COPD OVERLAP SYNDROME (HCC): ICD-10-CM

## 2022-09-22 DIAGNOSIS — I49.9 VENTRICULAR ARRHYTHMIA: ICD-10-CM

## 2022-09-22 PROCEDURE — 93005 ELECTROCARDIOGRAM TRACING: CPT

## 2022-09-22 NOTE — TELEPHONE ENCOUNTER
Please approve or deny this request.    Rx requested:  Requested Prescriptions     Pending Prescriptions Disp Refills    budesonide-formoterol (SYMBICORT) 160-4.5 MCG/ACT AERO [Pharmacy Med Name: BUDESONIDE-FORMOTEROL 160-4.5] 30.6 each 1     Sig: Inhale 2 puffs into the lungs twice a day         Last Office Visit:   5/17/2022      Next Visit Date:  Future Appointments   Date Time Provider Ravi Lindsay   4/17/2023  1:00 PM Calixto Terrell MD [address] [place] [address]

## 2022-09-23 LAB
EKG ATRIAL RATE: 76 BPM
EKG P AXIS: 54 DEGREES
EKG P-R INTERVAL: 172 MS
EKG Q-T INTERVAL: 380 MS
EKG QRS DURATION: 96 MS
EKG QTC CALCULATION (BAZETT): 427 MS
EKG R AXIS: -33 DEGREES
EKG T AXIS: 18 DEGREES
EKG VENTRICULAR RATE: 76 BPM

## 2022-09-23 RX ORDER — BUDESONIDE AND FORMOTEROL FUMARATE DIHYDRATE 160; 4.5 UG/1; UG/1
AEROSOL RESPIRATORY (INHALATION)
Qty: 30.6 EACH | Refills: 1 | Status: SHIPPED | OUTPATIENT
Start: 2022-09-23

## 2024-03-11 ENCOUNTER — HOSPITAL ENCOUNTER (INPATIENT)
Age: 79
LOS: 1 days | Discharge: HOME | DRG: 065 | End: 2024-03-12
Attending: INTERNAL MEDICINE | Admitting: INTERNAL MEDICINE
Payer: MEDICARE

## 2024-03-11 ENCOUNTER — APPOINTMENT (OUTPATIENT)
Dept: CT IMAGING | Age: 79
DRG: 065 | End: 2024-03-11
Payer: MEDICARE

## 2024-03-11 ENCOUNTER — APPOINTMENT (OUTPATIENT)
Dept: GENERAL RADIOLOGY | Age: 79
DRG: 065 | End: 2024-03-11
Payer: MEDICARE

## 2024-03-11 DIAGNOSIS — N30.00 ACUTE CYSTITIS WITHOUT HEMATURIA: ICD-10-CM

## 2024-03-11 DIAGNOSIS — H53.2 MONOCULAR DIPLOPIA OF LEFT EYE: ICD-10-CM

## 2024-03-11 DIAGNOSIS — R68.89 FORGETFULNESS: ICD-10-CM

## 2024-03-11 DIAGNOSIS — I16.1 HYPERTENSIVE EMERGENCY: ICD-10-CM

## 2024-03-11 DIAGNOSIS — I61.0 THALAMIC HEMORRHAGE (HCC): ICD-10-CM
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
## 2024-03-11 PROBLEM — I61.9 BRAIN BLEED (HCC): Status: ACTIVE | Noted: 2024-03-11

## 2024-03-11 LAB
ALBUMIN SERPL-MCNC: 4.1 G/DL (ref 3.5–4.6)
ALP SERPL-CCNC: 76 U/L (ref 35–104)
ALT SERPL-CCNC: 8 U/L (ref 0–41)
AMPHET UR QL SCN: NORMAL
ANION GAP SERPL CALCULATED.3IONS-SCNC: 9 MEQ/L (ref 9–15)
APTT PPP: 32.1 SEC (ref 24.4–36.8)
AST SERPL-CCNC: 12 U/L (ref 0–40)
BARBITURATES UR QL SCN: NORMAL
BASOPHILS # BLD: 0 K/UL (ref 0–0.2)
BASOPHILS NFR BLD: 0.4 %
BENZODIAZ UR QL SCN: NORMAL
BILIRUB SERPL-MCNC: 0.5 MG/DL (ref 0.2–0.7)
BUN SERPL-MCNC: 13 MG/DL (ref 8–23)
CALCIUM SERPL-MCNC: 9.1 MG/DL (ref 8.5–9.9)
CANNABINOIDS UR QL SCN: NORMAL
CHLORIDE SERPL-SCNC: 102 MEQ/L (ref 95–107)
CO2 SERPL-SCNC: 27 MEQ/L (ref 20–31)
COCAINE UR QL SCN: NORMAL
CREAT SERPL-MCNC: 0.98 MG/DL (ref 0.7–1.2)
DRUG SCREEN COMMENT UR-IMP: NORMAL
EOSINOPHIL # BLD: 0.1 K/UL (ref 0–0.7)
EOSINOPHIL NFR BLD: 1.6 %
ERYTHROCYTE [DISTWIDTH] IN BLOOD BY AUTOMATED COUNT: 12.6 % (ref 11.5–14.5)
ETHANOL PERCENT: NORMAL G/DL
ETHANOLAMINE SERPL-MCNC: <10 MG/DL (ref 0–0.08)
FENTANYL SCREEN, URINE: NORMAL
GLOBULIN SER CALC-MCNC: 2.9 G/DL (ref 2.3–3.5)
GLUCOSE SERPL-MCNC: 155 MG/DL (ref 70–99)
HCT VFR BLD AUTO: 39.7 % (ref 42–52)
HGB BLD-MCNC: 13.2 G/DL (ref 14–18)
INR PPP: 1.1
LYMPHOCYTES # BLD: 1.2 K/UL (ref 1–4.8)
LYMPHOCYTES NFR BLD: 16.2 %
MAGNESIUM SERPL-MCNC: 2.1 MG/DL (ref 1.7–2.4)
MCH RBC QN AUTO: 29.9 PG (ref 27–31.3)
MCHC RBC AUTO-ENTMCNC: 33.2 % (ref 33–37)
MCV RBC AUTO: 90 FL (ref 79–92.2)
METHADONE UR QL SCN: NORMAL
MONOCYTES # BLD: 0.4 K/UL (ref 0.2–0.8)
MONOCYTES NFR BLD: 5.5 %
NEUTROPHILS # BLD: 5.6 K/UL (ref 1.4–6.5)
NEUTS SEG NFR BLD: 75.9 %
OPIATES UR QL SCN: NORMAL
OXYCODONE UR QL SCN: NORMAL
PCP UR QL SCN: NORMAL
PLATELET # BLD AUTO: 370 K/UL (ref 130–400)
POTASSIUM SERPL-SCNC: 4.4 MEQ/L (ref 3.4–4.9)
PROPOXYPH UR QL SCN: NORMAL
PROT SERPL-MCNC: 7 G/DL (ref 6.3–8)
PROTHROMBIN TIME: 14.6 SEC (ref 12.3–14.9)
RBC # BLD AUTO: 4.41 M/UL (ref 4.7–6.1)
REASON FOR REJECTION: NORMAL
REJECTED TEST: NORMAL
SODIUM SERPL-SCNC: 138 MEQ/L (ref 135–144)
TROPONIN, HIGH SENSITIVITY: 21 NG/L (ref 0–19)
TROPONIN, HIGH SENSITIVITY: 23 NG/L (ref 0–19)
WBC # BLD AUTO: 7.3 K/UL (ref 4.8–10.8)

## 2024-03-11 PROCEDURE — 6830039000 HC L3 TRAUMA ALERT

## 2024-03-11 PROCEDURE — 99285 EMERGENCY DEPT VISIT HI MDM: CPT

## 2024-03-11 PROCEDURE — 2580000003 HC RX 258: Performed by: INTERNAL MEDICINE

## 2024-03-11 PROCEDURE — 6360000002 HC RX W HCPCS: Performed by: PERSONAL EMERGENCY RESPONSE ATTENDANT

## 2024-03-11 PROCEDURE — 72125 CT NECK SPINE W/O DYE: CPT

## 2024-03-11 PROCEDURE — 85025 COMPLETE CBC W/AUTO DIFF WBC: CPT

## 2024-03-11 PROCEDURE — 85730 THROMBOPLASTIN TIME PARTIAL: CPT

## 2024-03-11 PROCEDURE — 93005 ELECTROCARDIOGRAM TRACING: CPT

## 2024-03-11 PROCEDURE — 80053 COMPREHEN METABOLIC PANEL: CPT

## 2024-03-11 PROCEDURE — 6360000002 HC RX W HCPCS: Performed by: INTERNAL MEDICINE

## 2024-03-11 PROCEDURE — 2580000003 HC RX 258: Performed by: PERSONAL EMERGENCY RESPONSE ATTENDANT

## 2024-03-11 PROCEDURE — 85610 PROTHROMBIN TIME: CPT

## 2024-03-11 PROCEDURE — 70450 CT HEAD/BRAIN W/O DYE: CPT

## 2024-03-11 PROCEDURE — 82077 ASSAY SPEC XCP UR&BREATH IA: CPT

## 2024-03-11 PROCEDURE — 2580000003 HC RX 258

## 2024-03-11 PROCEDURE — 36415 COLL VENOUS BLD VENIPUNCTURE: CPT

## 2024-03-11 PROCEDURE — 73521 X-RAY EXAM HIPS BI 2 VIEWS: CPT

## 2024-03-11 PROCEDURE — 96360 HYDRATION IV INFUSION INIT: CPT

## 2024-03-11 PROCEDURE — 83735 ASSAY OF MAGNESIUM: CPT

## 2024-03-11 PROCEDURE — 81001 URINALYSIS AUTO W/SCOPE: CPT

## 2024-03-11 PROCEDURE — 2000000000 HC ICU R&B

## 2024-03-11 PROCEDURE — 80307 DRUG TEST PRSMV CHEM ANLYZR: CPT

## 2024-03-11 PROCEDURE — 71045 X-RAY EXAM CHEST 1 VIEW: CPT

## 2024-03-11 PROCEDURE — 84484 ASSAY OF TROPONIN QUANT: CPT

## 2024-03-11 RX ORDER — MAGNESIUM SULFATE IN WATER 40 MG/ML
2000 INJECTION, SOLUTION INTRAVENOUS PRN
Status: DISCONTINUED | OUTPATIENT
Start: 2024-03-11 | End: 2024-03-12 | Stop reason: HOSPADM

## 2024-03-11 RX ORDER — 0.9 % SODIUM CHLORIDE 0.9 %
1000 INTRAVENOUS SOLUTION INTRAVENOUS ONCE
Status: COMPLETED | OUTPATIENT
Start: 2024-03-11 | End: 2024-03-11

## 2024-03-11 RX ORDER — ACETAMINOPHEN 650 MG/1
650 SUPPOSITORY RECTAL EVERY 6 HOURS PRN
Status: DISCONTINUED | OUTPATIENT
Start: 2024-03-11 | End: 2024-03-12 | Stop reason: HOSPADM

## 2024-03-11 RX ORDER — LABETALOL HYDROCHLORIDE 5 MG/ML
10 INJECTION, SOLUTION INTRAVENOUS ONCE
Status: COMPLETED | OUTPATIENT
Start: 2024-03-11 | End: 2024-03-11

## 2024-03-11 RX ORDER — SODIUM CHLORIDE 0.9 % (FLUSH) 0.9 %
5-40 SYRINGE (ML) INJECTION PRN
Status: DISCONTINUED | OUTPATIENT
Start: 2024-03-11 | End: 2024-03-12 | Stop reason: HOSPADM

## 2024-03-11 RX ORDER — ACETAMINOPHEN 325 MG/1
650 TABLET ORAL EVERY 6 HOURS PRN
Status: DISCONTINUED | OUTPATIENT
Start: 2024-03-11 | End: 2024-03-12 | Stop reason: HOSPADM

## 2024-03-11 RX ORDER — SODIUM CHLORIDE 9 MG/ML
INJECTION, SOLUTION INTRAVENOUS PRN
Status: DISCONTINUED | OUTPATIENT
Start: 2024-03-11 | End: 2024-03-12 | Stop reason: HOSPADM

## 2024-03-11 RX ORDER — ONDANSETRON 4 MG/1
4 TABLET, ORALLY DISINTEGRATING ORAL EVERY 8 HOURS PRN
Status: DISCONTINUED | OUTPATIENT
Start: 2024-03-11 | End: 2024-03-12 | Stop reason: HOSPADM

## 2024-03-11 RX ORDER — SODIUM CHLORIDE 0.9 % (FLUSH) 0.9 %
5-40 SYRINGE (ML) INJECTION EVERY 12 HOURS SCHEDULED
Status: DISCONTINUED | OUTPATIENT
Start: 2024-03-11 | End: 2024-03-12 | Stop reason: HOSPADM

## 2024-03-11 RX ORDER — POLYETHYLENE GLYCOL 3350 17 G/17G
17 POWDER, FOR SOLUTION ORAL DAILY PRN
Status: DISCONTINUED | OUTPATIENT
Start: 2024-03-11 | End: 2024-03-12 | Stop reason: HOSPADM

## 2024-03-11 RX ORDER — ONDANSETRON 2 MG/ML
4 INJECTION INTRAMUSCULAR; INTRAVENOUS EVERY 6 HOURS PRN
Status: DISCONTINUED | OUTPATIENT
Start: 2024-03-11 | End: 2024-03-12 | Stop reason: HOSPADM

## 2024-03-11 RX ADMIN — SODIUM CHLORIDE, PRESERVATIVE FREE 10 ML: 5 INJECTION INTRAVENOUS at 22:51

## 2024-03-11 RX ADMIN — SODIUM CHLORIDE 1000 ML: 9 INJECTION, SOLUTION INTRAVENOUS at 18:39

## 2024-03-11 RX ADMIN — NICARDIPINE HYDROCHLORIDE 5 MG/HR: 25 INJECTION, SOLUTION INTRAVENOUS at 20:53

## 2024-03-11 RX ADMIN — CEFTRIAXONE SODIUM 1000 MG: 1 INJECTION, POWDER, FOR SOLUTION INTRAMUSCULAR; INTRAVENOUS at 20:48

## 2024-03-11 RX ADMIN — LABETALOL HYDROCHLORIDE 10 MG: 5 INJECTION, SOLUTION INTRAVENOUS at 22:51

## 2024-03-11 ASSESSMENT — PAIN - FUNCTIONAL ASSESSMENT
PAIN_FUNCTIONAL_ASSESSMENT: NONE - DENIES PAIN

## 2024-03-11 ASSESSMENT — LIFESTYLE VARIABLES
HOW OFTEN DO YOU HAVE A DRINK CONTAINING ALCOHOL: 2-3 TIMES A WEEK
HOW MANY STANDARD DRINKS CONTAINING ALCOHOL DO YOU HAVE ON A TYPICAL DAY: 1 OR 2

## 2024-03-11 ASSESSMENT — PAIN SCALES - GENERAL
PAINLEVEL_OUTOF10: 0
PAINLEVEL_OUTOF10: 0

## 2024-03-11 NOTE — ED PROVIDER NOTES
[facility] ED  EMERGENCY DEPARTMENT ENCOUNTER      Pt Name: Km Garcia  MRN: [id number]  Birthdate 1945  Date of evaluation: 3/11/2024  Provider: MARGARETH BRANCH  5:18 PM EDT    CHIEF COMPLAINT       Chief Complaint   Patient presents with    Fall     Wife heard a thump and found him on the floor in the pantry about 1 1/2 hours ago.          HISTORY OF PRESENT ILLNESS   (Location/Symptom, Timing/Onset, Context/Setting, Quality, Duration, Modifying Factors, Severity)  Note limiting factors.   Km Garcia is a 78 y.o. male whom per chart review has a PMHx of asthma, hypertension, type II DM presents to ED via EMS for evaluation following fall.  Per EMS, patient's wife found him on the floor in their coat closet around 1550 today.  States that patient was banging his cane up against the door.  Patient states that he does not remember why he went into the coat closet, however does endorse that he has felt confused over the last 2 weeks.  Patient denies head injury, LOC, anticoagulation.  States that he has noted urinary frequency/urgency over the last week.  Patient verbalizes no additional complaints.  Patient denies chest pain, shortness of breath, N/V/D, fever, chills.    Pt states for the past 2 weeks having intermittent confusion/forgetfulness of short term memories. Past couple days having generalized weakness, headaches and left eye double vision. States his legs felt weak today which caused him to fall.     HPI    Nursing Notes were reviewed.    REVIEW OF SYSTEMS    (2-9 systems for level 4, 10 or more for level 5)     Review of Systems   Genitourinary:  Positive for frequency and urgency.   Psychiatric/Behavioral:  Positive for confusion.    All other systems reviewed and are negative.      Except as noted above the remainder of the review of systems was reviewed and negative.       PAST MEDICAL HISTORY     Past Medical History:   Diagnosis Date    Asthma     seasonally, uses inhaler as  hypertensive. I spoke to Dr. Martinez neurosurgery who believes this is hypertensive bleed.  Recommend ICU placement, systolic blood pressure control less than 140, repeat CT head at 5 AM or if there is a change in patient's neurological status, and he will be on consult.  I also consulted neurology who agrees with this plan.  Patient admitted to the hospitalist.  Wife at bedside made aware that if mass effect worsens and hydrocephalus develops, the patient may have to be transferred to another facility for shunting.  Patient started on nicardipine drip    FINAL IMPRESSION      1. Fall, initial encounter    2. Forgetfulness    3. Monocular diplopia of left eye    4. Acute cystitis without hematuria    5. Thalamic hemorrhage (HCC)    6. Hypertensive emergency          DISPOSITION/PLAN   DISPOSITION Decision To Admit 03/11/2024 07:44:19 PM      PATIENT REFERRED TO:  No follow-up provider specified.    DISCHARGE MEDICATIONS:  New Prescriptions    No medications on file     Controlled Substances Monitoring:          No data to display                (Please note that portions of this note were completed with a voice recognition program.  Efforts were made to edit the dictations but occasionally words are mis-transcribed.)    MARGARETH BRANCH (electronically signed)    Supervising Attending Physician: Dr. Hutchinson.     Tere Tucker PA  03/11/24 2002       Tere Tucker PA  03/11/24 2003

## 2024-03-11 NOTE — ED TRIAGE NOTES
Pt arrived via [facility]. Skin pink warm and dry. Per wife, she heard him banging his cane, she went to look and found him on the floor in the coat closet around 1515 today. Pt was unable to tell his wife why he went into the closet to begin with. Wife tried helping him up for about 45 min but couldn't get him up so finally he agreed to call 911. Pt states he's been intermittently confused over the last few weeks but wife denies knowing anything about that. Pt has brace noted to left foot. All clothing removed. Pt complains that his right hip hurt earlier but not now. No shortening or rotation noted. Head to toe exam negative. Abdomen soft and nontender. Pelvis stable

## 2024-03-12 VITALS
BODY MASS INDEX: 32.08 KG/M2 | RESPIRATION RATE: 24 BRPM | HEIGHT: 74 IN | WEIGHT: 250 LBS | SYSTOLIC BLOOD PRESSURE: 170 MMHG | DIASTOLIC BLOOD PRESSURE: 108 MMHG | OXYGEN SATURATION: 98 % | HEART RATE: 104 BPM | TEMPERATURE: 98.1 F

## 2024-03-12 LAB
BACTERIA URNS QL MICRO: NEGATIVE /HPF
BILIRUB UR QL STRIP: NEGATIVE
CLARITY UR: CLEAR
COLOR UR: YELLOW
CRYSTALS URNS MICRO: ABNORMAL /HPF
EPI CELLS #/AREA URNS AUTO: ABNORMAL /HPF (ref 0–5)
GLUCOSE BLD-MCNC: 159 MG/DL (ref 70–99)
GLUCOSE UR STRIP-MCNC: NEGATIVE MG/DL
HGB UR QL STRIP: NEGATIVE
HYALINE CASTS #/AREA URNS AUTO: ABNORMAL /HPF (ref 0–5)
KETONES UR STRIP-MCNC: ABNORMAL MG/DL
LEUKOCYTE ESTERASE UR QL STRIP: ABNORMAL
NITRITE UR QL STRIP: NEGATIVE
PERFORMED ON: ABNORMAL
PH UR STRIP: 7.5 [PH] (ref 5–9)
PROT UR STRIP-MCNC: NEGATIVE MG/DL
RBC #/AREA URNS AUTO: ABNORMAL /HPF (ref 0–5)
SP GR UR STRIP: 1.01 (ref 1–1.03)
URINE REFLEX TO CULTURE: ABNORMAL
UROBILINOGEN UR STRIP-ACNC: 0.2 E.U./DL
WBC #/AREA URNS AUTO: ABNORMAL /HPF (ref 0–5)

## 2024-03-12 PROCEDURE — 6360000002 HC RX W HCPCS: Performed by: PERSONAL EMERGENCY RESPONSE ATTENDANT

## 2024-03-12 PROCEDURE — 2580000003 HC RX 258: Performed by: PERSONAL EMERGENCY RESPONSE ATTENDANT

## 2024-03-12 NOTE — PROGRESS NOTES
Patient has had confusion and a fall today.  CT head reveals 2 cm hyperdensity medial thalamus/upper midbrain with effacement of 3rd ventricle, there is moderate ventriculomegaly of lateral ventricles.  I d/w Dr. Wolfe and feel that patient is at risk for progressive hydrocephalus and neurologic deterioration and due to equipment and care needs, the patient requires higher level of care at tertiary center. I recommend emergent transfer, he agrees and we are initiating.  Alejo Martinez MD

## 2024-03-12 NOTE — PLAN OF CARE
Problem: Discharge Planning  Goal: Discharge to home or other facility with appropriate resources  Outcome: Progressing  Flowsheets (Taken 3/11/2024 2218 by Alireza Espinoza, RN)  Discharge to home or other facility with appropriate resources:   Identify barriers to discharge with patient and caregiver   Identify discharge learning needs (meds, wound care, etc)     Problem: Chronic Conditions and Co-morbidities  Goal: Patient's chronic conditions and co-morbidity symptoms are monitored and maintained or improved  Outcome: Progressing     Problem: Safety - Adult  Goal: Free from fall injury  Outcome: Progressing     Problem: ABCDS Injury Assessment  Goal: Absence of physical injury  Outcome: Progressing   Continue plan of care

## 2024-03-12 NOTE — H&P
Hospitalist Group   History and Physical      CHIEF COMPLAINT: Fall, forgetfulness    History of Present Illness:  78 y.o. male with a history of asthma, diabetes, hypertension presents with fall and forgetfulness.  Wife found the patient on the floor next to the closet.  He does not remember what happened to his been having pain in his knees and probably weakness.  He thinks that his knees gave out and he fell on the floor.  He was unable to get up.  His wife was unable to get him up.  Therefore, she called EMS.  He has been having increased fatigue and tiredness in the past couple of days.  Today he was having more pain in his knees than usual.  He usually uses a cane to walk.  Denied any neurological symptoms currently.  However, he reported double vision in the ER.  Patient is not on any antiplatelets or anticoagulation according to the wife.  His blood pressure is usually better controlled and never above 150 systolic according to the wife.  He has been taking all his medication.      REVIEW OF SYSTEMS:  no fevers, chills, cp, sob, n/v, ha, vision/hearing changes, wt changes, hot/cold flashes, other open skin lesions, diarrhea, constipation, dysuria/hematuria unless noted in HPI. Complete ROS performed with the patient and is otherwise negative.      PMH:  Past Medical History:   Diagnosis Date    Asthma     seasonally, uses inhaler as needed    Charcot ankle, left     wears brace    Diabetes mellitus (HCC)     takes metformin    Hypertension     on meds > 10 yrs       Surgical History:  Past Surgical History:   Procedure Laterality Date    COLONOSCOPY      > 30 yrs ago    LITHOTRIPSY N/A 12/12/2018    WITH HOLMIUM LASER performed by Antonio Petit MD at [facility] OR       Medications Prior to Admission:    Prior to Admission medications    Medication Sig Start Date End Date Taking? Authorizing Provider   azelastine HCl 0.15 % SOLN 1 spray by Nasal route 2 times daily 2/9/22   Contreras Doll MD   carvedilol  (COREG CR) 40 MG CP24 extended release capsule TAKE ONE CAPSULE BY MOUTH EVERY DAY 7/4/18   Weston Briseno MD   dutasteride (AVODART) 0.5 MG capsule 0.5 mg daily  10/22/12   Weston Briseno MD   albuterol sulfate HFA (PROAIR HFA) 108 (90 Base) MCG/ACT inhaler  9/10/12   Weston Briseno MD   amLODIPine (NORVASC) 10 MG tablet TABLET BY MOUTH EVERY DAY 7/4/18   Weston Briseno MD   metFORMIN (GLUCOPHAGE) 500 MG tablet TAKE 1 TABLET BY MOUTH TWICE A DAY WITH FOOD 6/28/18   Weston Briseno MD   tamsulosin (FLOMAX) 0.4 MG capsule TAKE ONE CAPSULE BY MOUTH EVERY DAY 5/1/18   Weston Briseno MD       Allergies:    Celecoxib and Naproxen    Social History:    reports that he quit smoking about 34 years ago. His smoking use included cigarettes. He has never used smokeless tobacco. He reports current alcohol use. He reports that he does not use drugs.    Family History:        Problem Relation Age of Onset    No Known Problems Mother     Heart Disease Father     No Known Problems Sister     Other Brother         enlarged prostate, cataracts    Diabetes Brother         borderline       PHYSICAL EXAM:  Vitals:  BP (!) 168/98   Pulse (!) 113   Temp 98 °F (36.7 °C) (Oral)   Resp 29   Ht 1.88 m (6' 2\")   Wt 113.4 kg (250 lb)   SpO2 97%   BMI 32.10 kg/m²   General Appearance: alert and oriented to person, place and time, well developed and well- nourished, in no acute distress  Pulmonary/Chest: clear to auscultation bilaterally- no wheezes   Cardiovascular: normal rate, regular rhythm, normal S1 and S2, no murmurs   Abdomen: soft, non-tender, non-distended, normal bowel sounds, no masses or organomegaly  Neurologic: reflexes normal and symmetric, no cranial nerve deficit, no weakness or sensory deficit noted, no slurred speech    LABS:  Recent Labs     03/11/24  1735      K 4.4      CO2 27   BUN 13   CREATININE 0.98   GLUCOSE 155*   CALCIUM 9.1       Recent Labs

## 2024-03-12 NOTE — DISCHARGE SUMMARY
Internal Medicine  Discharge Summary Note      Patient ID:  Km Garcia  [id number]  78 y.o.  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    Consults: Neurosurgery      I spent 31 min discharging the patient.    Wayne Wolfe MD   3/12/24  2:18 AM EDT

## 2024-03-12 NOTE — PROGRESS NOTES
2210: Pt arrived to ICU bed 4 without incident. All monitoring equipment placed onto pt. Admission assessments and med rec completed, see flowsheets. Dr Wayne Wolfe MD at bedside to assess pt. Pt wife, Jesus, at bedside, is pt POA, paperwork in EPIC.     Pt A&OX4 but forgetful, impulsive at times. Pt encouraged to use urinal and bedpan due to weakness and brain bleed dx/fall at home, pt verbalized understanding however requires frequent redirection/re-education from both RN and wife regarding this.    Oriented to room and call light. Medications administered per MAR. Call light within reach    2225: Dr Wolfe at bedside to inform pt family of tx initiation to tertiary care. Dr Wolfe informed of pt BP >140 despite cardene being titrated to nearly max. Verbal orders given for labetalol 10mg IV x1, see MAR for admin details.    0000: Pt increasingly more forgetful of situation, needing more redirection, A&OX3.    0010: Wife, JESUS, called and informed of pt being accepted to [facility].    0018: [facility] called to inform of bed assignment/report number: pt accepted to [facility], [address] [place] [address], Neuro CCU room 404. Report # [id number].    0030: [facility] here to take pt to [facility]. All belongings with pt. New cardene bag sent with pt. Pt left floor at 0038.    0049: Zoraida Li RN from [facility] given report via telephone at this time.    0056: Dr Alejo Martinez MD [facility].

## 2024-03-14 LAB
EKG ATRIAL RATE: 63 BPM
EKG P AXIS: 38 DEGREES
EKG P-R INTERVAL: 184 MS
EKG Q-T INTERVAL: 422 MS
EKG QRS DURATION: 96 MS
EKG QTC CALCULATION (BAZETT): 431 MS
EKG R AXIS: -20 DEGREES
EKG T AXIS: -43 DEGREES
EKG VENTRICULAR RATE: 63 BPM

## 2024-03-14 PROCEDURE — 93010 ELECTROCARDIOGRAM REPORT: CPT | Performed by: INTERNAL MEDICINE

## 2024-04-06 ENCOUNTER — HOSPITAL ENCOUNTER (EMERGENCY)
Age: 79
Discharge: ANOTHER HOSPITAL | End: 2024-04-06
Attending: EMERGENCY MEDICINE
Payer: MEDICARE

## 2024-04-06 ENCOUNTER — APPOINTMENT (OUTPATIENT)
Dept: CT IMAGING | Age: 79
End: 2024-04-06
Payer: MEDICARE

## 2024-04-06 ENCOUNTER — APPOINTMENT (OUTPATIENT)
Dept: GENERAL RADIOLOGY | Age: 79
End: 2024-04-06
Payer: MEDICARE

## 2024-04-06 VITALS
HEIGHT: 74 IN | WEIGHT: 245 LBS | SYSTOLIC BLOOD PRESSURE: 118 MMHG | DIASTOLIC BLOOD PRESSURE: 54 MMHG | RESPIRATION RATE: 18 BRPM | TEMPERATURE: 98 F | BODY MASS INDEX: 31.44 KG/M2 | OXYGEN SATURATION: 97 % | HEART RATE: 78 BPM

## 2024-04-06 DIAGNOSIS — C85.10 B-CELL LYMPHOMA, UNSPECIFIED B-CELL LYMPHOMA TYPE, UNSPECIFIED BODY REGION (HCC): ICD-10-CM

## 2024-04-06 DIAGNOSIS — R55 SYNCOPE AND COLLAPSE: ICD-10-CM

## 2024-04-06 DIAGNOSIS — S06.5XAA SUBDURAL HEMATOMA (HCC): Primary | ICD-10-CM

## 2024-04-06 LAB
ALBUMIN SERPL-MCNC: 3.5 G/DL (ref 3.5–4.6)
ALP SERPL-CCNC: 66 U/L (ref 35–104)
ALT SERPL-CCNC: 62 U/L (ref 0–41)
ANION GAP SERPL CALCULATED.3IONS-SCNC: 11 MEQ/L (ref 9–15)
APTT PPP: 36.7 SEC (ref 24.4–36.8)
AST SERPL-CCNC: 21 U/L (ref 0–40)
BACTERIA URNS QL MICRO: NEGATIVE /HPF
BASOPHILS # BLD: 0 K/UL (ref 0–0.2)
BASOPHILS NFR BLD: 0.3 %
BILIRUB SERPL-MCNC: 0.5 MG/DL (ref 0.2–0.7)
BILIRUB UR QL STRIP: NEGATIVE
BUN SERPL-MCNC: 19 MG/DL (ref 8–23)
CALCIUM SERPL-MCNC: 9 MG/DL (ref 8.5–9.9)
CHLORIDE SERPL-SCNC: 101 MEQ/L (ref 95–107)
CK SERPL-CCNC: 326 U/L (ref 0–190)
CLARITY UR: ABNORMAL
CO2 SERPL-SCNC: 25 MEQ/L (ref 20–31)
COLOR UR: ABNORMAL
CREAT SERPL-MCNC: 0.98 MG/DL (ref 0.7–1.2)
EOSINOPHIL # BLD: 0.1 K/UL (ref 0–0.7)
EOSINOPHIL NFR BLD: 1.6 %
EPI CELLS #/AREA URNS AUTO: ABNORMAL /HPF (ref 0–5)
ERYTHROCYTE [DISTWIDTH] IN BLOOD BY AUTOMATED COUNT: 13.1 % (ref 11.5–14.5)
GLOBULIN SER CALC-MCNC: 3.1 G/DL (ref 2.3–3.5)
GLUCOSE SERPL-MCNC: 197 MG/DL (ref 70–99)
GLUCOSE UR STRIP-MCNC: NEGATIVE MG/DL
HCT VFR BLD AUTO: 33.3 % (ref 42–52)
HGB BLD-MCNC: 11.1 G/DL (ref 14–18)
HGB UR QL STRIP: ABNORMAL
HYALINE CASTS #/AREA URNS LPF: ABNORMAL /LPF (ref 0–5)
INR PPP: 1.5
KETONES UR STRIP-MCNC: ABNORMAL MG/DL
LACTATE BLDV-SCNC: 1.8 MMOL/L (ref 0.5–2.2)
LEUKOCYTE ESTERASE UR QL STRIP: ABNORMAL
LYMPHOCYTES # BLD: 0.7 K/UL (ref 1–4.8)
LYMPHOCYTES NFR BLD: 9.5 %
MAGNESIUM SERPL-MCNC: 1.9 MG/DL (ref 1.7–2.4)
MCH RBC QN AUTO: 30.7 PG (ref 27–31.3)
MCHC RBC AUTO-ENTMCNC: 33.3 % (ref 33–37)
MCV RBC AUTO: 92 FL (ref 79–92.2)
MONOCYTES # BLD: 0.7 K/UL (ref 0.2–0.8)
MONOCYTES NFR BLD: 9.4 %
NEUTROPHILS # BLD: 5.9 K/UL (ref 1.4–6.5)
NEUTS SEG NFR BLD: 78.8 %
NITRITE UR QL STRIP: NEGATIVE
PH UR STRIP: 6 [PH] (ref 5–9)
PLATELET # BLD AUTO: 344 K/UL (ref 130–400)
POTASSIUM SERPL-SCNC: 4 MEQ/L (ref 3.4–4.9)
PROT SERPL-MCNC: 6.6 G/DL (ref 6.3–8)
PROT UR STRIP-MCNC: >=300 MG/DL
PROTHROMBIN TIME: 18.7 SEC (ref 12.3–14.9)
RBC # BLD AUTO: 3.62 M/UL (ref 4.7–6.1)
RBC #/AREA URNS AUTO: >100 /HPF (ref 0–5)
SODIUM SERPL-SCNC: 137 MEQ/L (ref 135–144)
SP GR UR STRIP: 1.02 (ref 1–1.03)
TROPONIN, HIGH SENSITIVITY: 28 NG/L (ref 0–19)
UROBILINOGEN UR STRIP-ACNC: 1 E.U./DL
WBC # BLD AUTO: 7.5 K/UL (ref 4.8–10.8)
WBC #/AREA URNS AUTO: ABNORMAL /HPF (ref 0–5)

## 2024-04-06 PROCEDURE — 85610 PROTHROMBIN TIME: CPT

## 2024-04-06 PROCEDURE — 85025 COMPLETE CBC W/AUTO DIFF WBC: CPT

## 2024-04-06 PROCEDURE — 99285 EMERGENCY DEPT VISIT HI MDM: CPT

## 2024-04-06 PROCEDURE — 82550 ASSAY OF CK (CPK): CPT

## 2024-04-06 PROCEDURE — 36415 COLL VENOUS BLD VENIPUNCTURE: CPT

## 2024-04-06 PROCEDURE — 2580000003 HC RX 258: Performed by: EMERGENCY MEDICINE

## 2024-04-06 PROCEDURE — 84484 ASSAY OF TROPONIN QUANT: CPT

## 2024-04-06 PROCEDURE — 80053 COMPREHEN METABOLIC PANEL: CPT

## 2024-04-06 PROCEDURE — 85730 THROMBOPLASTIN TIME PARTIAL: CPT

## 2024-04-06 PROCEDURE — 70450 CT HEAD/BRAIN W/O DYE: CPT

## 2024-04-06 PROCEDURE — 81001 URINALYSIS AUTO W/SCOPE: CPT

## 2024-04-06 PROCEDURE — 96361 HYDRATE IV INFUSION ADD-ON: CPT

## 2024-04-06 PROCEDURE — 87040 BLOOD CULTURE FOR BACTERIA: CPT

## 2024-04-06 PROCEDURE — 83605 ASSAY OF LACTIC ACID: CPT

## 2024-04-06 PROCEDURE — 83735 ASSAY OF MAGNESIUM: CPT

## 2024-04-06 PROCEDURE — 71045 X-RAY EXAM CHEST 1 VIEW: CPT

## 2024-04-06 PROCEDURE — 96360 HYDRATION IV INFUSION INIT: CPT

## 2024-04-06 RX ORDER — 0.9 % SODIUM CHLORIDE 0.9 %
1000 INTRAVENOUS SOLUTION INTRAVENOUS ONCE
Status: COMPLETED | OUTPATIENT
Start: 2024-04-06 | End: 2024-04-06

## 2024-04-06 RX ADMIN — SODIUM CHLORIDE 1000 ML: 9 INJECTION, SOLUTION INTRAVENOUS at 12:47

## 2024-04-06 ASSESSMENT — PAIN SCALES - GENERAL: PAINLEVEL_OUTOF10: 0

## 2024-04-06 ASSESSMENT — PAIN - FUNCTIONAL ASSESSMENT: PAIN_FUNCTIONAL_ASSESSMENT: 0-10

## 2024-04-06 ASSESSMENT — LIFESTYLE VARIABLES
HOW OFTEN DO YOU HAVE A DRINK CONTAINING ALCOHOL: NEVER
HOW MANY STANDARD DRINKS CONTAINING ALCOHOL DO YOU HAVE ON A TYPICAL DAY: PATIENT DOES NOT DRINK

## 2024-04-06 NOTE — ED TRIAGE NOTES
Pt arrived via EMS from [facility] with co syncope.  Pt states he did not pass out by has been constipated and was trying to have a bm.  Pt has no co pain or discomfort at this time.

## 2024-04-06 NOTE — PROGRESS NOTES
Pt report called to Basilia KIRBY at [phone].  Pt and family updated on plan of care.   Pt is resting in bed calm even unlabored respirations.

## 2024-04-06 NOTE — ED NOTES
Called [facility] to initiate transfer to [facility]

## 2024-04-06 NOTE — ED NOTES
Called to [facility] to check the status. Was told that it was initiated and they will call [facility] again.

## 2024-04-06 NOTE — ED PROVIDER NOTES
[facility] ED  eMERGENCY dEPARTMENT eNCOUnter      Pt Name: Km Garcia  MRN: [id number]  Birthdate 1945  Date of evaluation: 4/6/2024  Provider: Jeromy Kan MD    CHIEF COMPLAINT       Chief Complaint   Patient presents with    Loss of Consciousness     Per NH was in the bathroom for a long period of time and NH staff thinks that he was passed out on the toilet, patient remembers being in the bathroom and says he was in their for a while trying to have a BM         HISTORY OF PRESENT ILLNESS   (Location/Symptom, Timing/Onset,Context/Setting, Quality, Duration, Modifying Factors, Severity)  Note limiting factors.   Km Garcia is a 78 y.o. male who presents to the emergency department with complaint of possible syncopal episode in bathroom.  Patient recently underwent craniotomy by report with what sounds to be a ventriculostomy for occlusive mass effect that was preventing CSF drainage.    While patient undergoing this procedure, on approximately March 19, patient did have biopsy of lesions in the brain.  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  Spouse noted to be what appears to be B-cell type.    Patient subsequently has begun rehab and recovery.  Patient was transferred to local skilled nursing facility for ongoing chemotherapy and strength conditioning.  Today patient was found to be on the commode, with minimal activity.    Follow-up today and fatigue patient remains easily arousable and answers questions appropriately.    There have been no fevers or chills, nausea or vomiting by report.  There have been no recent trauma or falls.  Patient's intracranial procedure was on March 19.    This is a history obtained from significant other at bedside.    HPI    NursingNotes were reviewed.    REVIEW OF SYSTEMS    (2-9 systems for level 4, 10 or more for level 5)     Review of Systems   Constitutional:  Positive for activity change, appetite change and fatigue. Negative for chills  Lymphocytes Absolute 0.7 (*)     All other components within normal limits   COMPREHENSIVE METABOLIC PANEL - Abnormal; Notable for the following components:    Glucose 197 (*)     ALT 62 (*)     All other components within normal limits   URINALYSIS - Abnormal; Notable for the following components:    Color, UA DARK YELLOW (*)     Clarity, UA CLOUDY (*)     Ketones, Urine TRACE (*)     Blood, Urine LARGE (*)     Protein, UA >=300 (*)     Leukocyte Esterase, Urine SMALL (*)     All other components within normal limits   TROPONIN - Abnormal; Notable for the following components:    Troponin, High Sensitivity 28 (*)     All other components within normal limits   PROTIME-INR - Abnormal; Notable for the following components:    Protime 18.7 (*)     All other components within normal limits   CK - Abnormal; Notable for the following components:    Total  (*)     All other components within normal limits   MICROSCOPIC URINALYSIS - Abnormal; Notable for the following components:    WBC, UA 20-50 (*)     RBC, UA >100 (*)     All other components within normal limits   CULTURE, BLOOD 1   CULTURE, BLOOD 2   MAGNESIUM   LACTIC ACID   APTT       All other labs were within normal range or not returned as of this dictation.    EMERGENCY DEPARTMENT COURSE and DIFFERENTIAL DIAGNOSIS/MDM:   Vitals:    Vitals:    04/06/24 1530 04/06/24 1545 04/06/24 1600 04/06/24 1615   BP: (!) 138/57  (!) 118/54    Pulse: 74 76 93 78   Resp: 17 20 19 18   Temp:       TempSrc:       SpO2: 98% 98%  97%   Weight:       Height:           Patient with multiple findings at this time.  Patient remains generally neurologically intact, however sedate and fatigue.  Reviewed the CAT scan by myself and with radiologist indicates mixed areas of acute hyperacute bleed frontal subdural hematomas bilaterally.  There is additional bleeding noted, please see radiologist dictation.  There remains some air in the ventricular spaces as well.  This approximately 3

## 2024-04-07 LAB
BACTERIA BLD CULT ORG #2: NORMAL
BACTERIA BLD CULT: NORMAL

## 2024-04-11 LAB
BACTERIA BLD CULT ORG #2: NORMAL
BACTERIA BLD CULT: NORMAL

## 2024-04-12 LAB
EKG ATRIAL RATE: 76 BPM
EKG Q-T INTERVAL: 398 MS
EKG QRS DURATION: 94 MS
EKG QTC CALCULATION (BAZETT): 464 MS
EKG R AXIS: -38 DEGREES
EKG T AXIS: -63 DEGREES
EKG VENTRICULAR RATE: 82 BPM

## 2024-07-15 ENCOUNTER — APPOINTMENT (OUTPATIENT)
Dept: CT IMAGING | Age: 79
DRG: 698 | End: 2024-07-15
Payer: MEDICARE

## 2024-07-15 ENCOUNTER — HOSPITAL ENCOUNTER (INPATIENT)
Age: 79
DRG: 698 | End: 2024-07-15
Attending: INTERNAL MEDICINE | Admitting: INTERNAL MEDICINE
Payer: MEDICARE

## 2024-07-15 ENCOUNTER — APPOINTMENT (OUTPATIENT)
Dept: GENERAL RADIOLOGY | Age: 79
DRG: 698 | End: 2024-07-15
Payer: MEDICARE

## 2024-07-15 DIAGNOSIS — R19.7 DIARRHEA, UNSPECIFIED TYPE: ICD-10-CM

## 2024-07-15 DIAGNOSIS — Z86.718 HISTORY OF DVT (DEEP VEIN THROMBOSIS): ICD-10-CM

## 2024-07-15 DIAGNOSIS — Z79.01 ANTICOAGULATED: ICD-10-CM

## 2024-07-15 DIAGNOSIS — R55 SYNCOPE, UNSPECIFIED SYNCOPE TYPE: Primary | ICD-10-CM

## 2024-07-15 DIAGNOSIS — R60.0 PERIPHERAL EDEMA: ICD-10-CM

## 2024-07-15 DIAGNOSIS — R53.1 GENERAL WEAKNESS: ICD-10-CM

## 2024-07-15 DIAGNOSIS — I50.9 CONGESTIVE HEART FAILURE, UNSPECIFIED HF CHRONICITY, UNSPECIFIED HEART FAILURE TYPE (HCC): ICD-10-CM

## 2024-07-15 DIAGNOSIS — N28.9 RENAL INSUFFICIENCY: ICD-10-CM

## 2024-07-15 LAB
ALBUMIN SERPL-MCNC: 3 G/DL (ref 3.5–4.6)
ALP SERPL-CCNC: 52 U/L (ref 35–104)
ALT SERPL-CCNC: 6 U/L (ref 0–41)
ANION GAP SERPL CALCULATED.3IONS-SCNC: 12 MEQ/L (ref 9–15)
APTT PPP: 36.6 SEC (ref 24.4–36.8)
AST SERPL-CCNC: <5 U/L (ref 0–40)
BACTERIA URNS QL MICRO: ABNORMAL /HPF
BILIRUB SERPL-MCNC: 0.6 MG/DL (ref 0.2–0.7)
BILIRUB UR QL STRIP: NEGATIVE
BNP BLD-MCNC: 1887 PG/ML
BUN SERPL-MCNC: 16 MG/DL (ref 8–23)
CALCIUM SERPL-MCNC: 7.9 MG/DL (ref 8.5–9.9)
CHLORIDE SERPL-SCNC: 103 MEQ/L (ref 95–107)
CLARITY UR: ABNORMAL
CO2 SERPL-SCNC: 23 MEQ/L (ref 20–31)
COLOR UR: YELLOW
CREAT SERPL-MCNC: 1.23 MG/DL (ref 0.7–1.2)
EPI CELLS #/AREA URNS AUTO: ABNORMAL /HPF (ref 0–5)
GLOBULIN SER CALC-MCNC: 2.7 G/DL (ref 2.3–3.5)
GLUCOSE SERPL-MCNC: 223 MG/DL (ref 70–99)
GLUCOSE UR STRIP-MCNC: >=1000 MG/DL
HGB UR QL STRIP: ABNORMAL
HYALINE CASTS #/AREA URNS AUTO: ABNORMAL /HPF (ref 0–5)
INR PPP: 1.3
KETONES UR STRIP-MCNC: NEGATIVE MG/DL
LEUKOCYTE ESTERASE UR QL STRIP: ABNORMAL
MAGNESIUM SERPL-MCNC: 1.9 MG/DL (ref 1.7–2.4)
NITRITE UR QL STRIP: NEGATIVE
PH UR STRIP: 6 [PH] (ref 5–9)
PHOSPHATE SERPL-MCNC: 2.9 MG/DL (ref 2.3–4.8)
POTASSIUM SERPL-SCNC: 3.6 MEQ/L (ref 3.4–4.9)
PROT SERPL-MCNC: 5.7 G/DL (ref 6.3–8)
PROT UR STRIP-MCNC: >=300 MG/DL
PROTHROMBIN TIME: 16.4 SEC (ref 12.3–14.9)
RBC #/AREA URNS HPF: ABNORMAL /HPF (ref 0–2)
SODIUM SERPL-SCNC: 138 MEQ/L (ref 135–144)
SP GR UR STRIP: 1.03 (ref 1–1.03)
TROPONIN, HIGH SENSITIVITY: 49 NG/L (ref 0–19)
TROPONIN, HIGH SENSITIVITY: 51 NG/L (ref 0–19)
TSH SERPL-MCNC: 1.3 UIU/ML (ref 0.44–3.86)
URINE REFLEX TO CULTURE: YES
UROBILINOGEN UR STRIP-ACNC: 1 E.U./DL
WBC #/AREA URNS AUTO: >100 /HPF (ref 0–5)

## 2024-07-15 PROCEDURE — 96374 THER/PROPH/DIAG INJ IV PUSH: CPT

## 2024-07-15 PROCEDURE — 83880 ASSAY OF NATRIURETIC PEPTIDE: CPT

## 2024-07-15 PROCEDURE — 87086 URINE CULTURE/COLONY COUNT: CPT

## 2024-07-15 PROCEDURE — 36415 COLL VENOUS BLD VENIPUNCTURE: CPT

## 2024-07-15 PROCEDURE — 71045 X-RAY EXAM CHEST 1 VIEW: CPT

## 2024-07-15 PROCEDURE — 6360000002 HC RX W HCPCS: Performed by: PHYSICIAN ASSISTANT

## 2024-07-15 PROCEDURE — 85025 COMPLETE CBC W/AUTO DIFF WBC: CPT

## 2024-07-15 PROCEDURE — 80053 COMPREHEN METABOLIC PANEL: CPT

## 2024-07-15 PROCEDURE — 84100 ASSAY OF PHOSPHORUS: CPT

## 2024-07-15 PROCEDURE — 70450 CT HEAD/BRAIN W/O DYE: CPT

## 2024-07-15 PROCEDURE — 84443 ASSAY THYROID STIM HORMONE: CPT

## 2024-07-15 PROCEDURE — 99285 EMERGENCY DEPT VISIT HI MDM: CPT

## 2024-07-15 PROCEDURE — 87186 SC STD MICRODIL/AGAR DIL: CPT

## 2024-07-15 PROCEDURE — 84484 ASSAY OF TROPONIN QUANT: CPT

## 2024-07-15 PROCEDURE — 85730 THROMBOPLASTIN TIME PARTIAL: CPT

## 2024-07-15 PROCEDURE — 87077 CULTURE AEROBIC IDENTIFY: CPT

## 2024-07-15 PROCEDURE — 81001 URINALYSIS AUTO W/SCOPE: CPT

## 2024-07-15 PROCEDURE — 85610 PROTHROMBIN TIME: CPT

## 2024-07-15 PROCEDURE — 93005 ELECTROCARDIOGRAM TRACING: CPT | Performed by: PHYSICIAN ASSISTANT

## 2024-07-15 PROCEDURE — 83735 ASSAY OF MAGNESIUM: CPT

## 2024-07-15 RX ORDER — FUROSEMIDE 10 MG/ML
20 INJECTION INTRAMUSCULAR; INTRAVENOUS ONCE
Status: COMPLETED | OUTPATIENT
Start: 2024-07-15 | End: 2024-07-15

## 2024-07-15 RX ADMIN — FUROSEMIDE 20 MG: 10 INJECTION, SOLUTION INTRAMUSCULAR; INTRAVENOUS at 20:31

## 2024-07-15 ASSESSMENT — LIFESTYLE VARIABLES
HOW MANY STANDARD DRINKS CONTAINING ALCOHOL DO YOU HAVE ON A TYPICAL DAY: PATIENT DOES NOT DRINK
HOW OFTEN DO YOU HAVE A DRINK CONTAINING ALCOHOL: NEVER

## 2024-07-15 ASSESSMENT — PAIN DESCRIPTION - ORIENTATION: ORIENTATION: RIGHT;LEFT

## 2024-07-15 ASSESSMENT — PAIN DESCRIPTION - LOCATION: LOCATION: KNEE

## 2024-07-15 ASSESSMENT — PAIN SCALES - GENERAL: PAINLEVEL_OUTOF10: 6

## 2024-07-15 ASSESSMENT — PAIN - FUNCTIONAL ASSESSMENT: PAIN_FUNCTIONAL_ASSESSMENT: 0-10

## 2024-07-15 NOTE — ED TRIAGE NOTES
Pt to ed from home via ems with reports of near syncopal episode. PT denies head pain, reports neck pain, ew and chronic bilateral knee pain. Skin pale. Per ems, pt hypotensive 90s systolic. ON arrival, pt Aox3, calm and cooperative

## 2024-07-15 NOTE — ED PROVIDER NOTES
[facility]  EMERGENCY DEPARTMENT ENCOUNTER      Pt Name: Km Garcia  MRN: [id number]  Birthdate 1945  Date of evaluation: 7/15/2024  Provider: Derrick Stone PA-C  7:44 PM EDT    CHIEF COMPLAINT       Chief Complaint   Patient presents with    Loss of Consciousness     While on the toilet, hx brain CA, new CHF,          HISTORY OF PRESENT ILLNESS   (Location/Symptom, Timing/Onset, Context/Setting, Quality, Duration, Modifying Factors, Severity)  Note limiting factors.   Km Garcia is a 78 y.o. male who presents to the emergency department patient presents with 4-second episode of syncope on toilet today patient does not remember event wife at bedside states he started a new medication today Entresto concerned about lower blood pressures.  Patient has chronic low diastolic blood pressure he was also to start taking Lasix but will not arrive until tomorrow.  Patient has increasingly swollen lower extremities.  He does take Lovenox injections 120 mg twice daily.  Patient does have some neck pain had MRI of brain a week ago which showed only 2 small tumors patient is on chemotherapy for these.  His oncologist is satisfied patient has history of lymphoma.  Symptoms moderate severity nothing improves symptoms only worsen symptoms patient denies any back pain chest pain abdominal pain.  He does have indwelling Conterras.    HPI    Nursing Notes were reviewed.    REVIEW OF SYSTEMS    (2-9 systems for level 4, 10 or more for level 5)     Review of Systems   Constitutional:  Negative for chills and fever.   HENT:  Negative for ear discharge and nosebleeds.    Eyes:  Negative for discharge.   Respiratory:  Negative for apnea.    Cardiovascular:  Positive for leg swelling. Negative for chest pain.   Gastrointestinal:  Negative for abdominal distention, abdominal pain, anal bleeding, nausea and vomiting.   Musculoskeletal:  Positive for neck pain. Negative for joint swelling.   Skin:  Positive for color  (Non-Medical): No   Housing Stability: Low Risk  (7/16/2024)    Housing Stability Vital Sign     Unable to Pay for Housing in the Last Year: No     Number of Places Lived in the Last Year: 1     Unstable Housing in the Last Year: No       SCREENINGS         Isaiah Coma Scale  Eye Opening: Spontaneous  Best Verbal Response: Oriented  Best Motor Response: Obeys commands  Isaiah Coma Scale Score: 15                     CIWA Assessment  BP: (!) 137/52  Pulse: 76                 PHYSICAL EXAM    (up to 7 for level 4, 8 or more for level 5)     ED Triage Vitals [07/15/24 1907]   BP Temp Temp Source Pulse Respirations SpO2 Height Weight - Scale   (!) 113/59 98.7 °F (37.1 °C) Oral 58 18 99 % 1.854 m (6' 1\") 111.1 kg (245 lb)       Physical Exam  Vitals and nursing note reviewed.   Constitutional:       General: He is not in acute distress.     Appearance: He is well-developed.   HENT:      Head: Normocephalic and atraumatic.      Right Ear: External ear normal.      Left Ear: External ear normal.      Nose: Nose normal.      Mouth/Throat:      Mouth: Mucous membranes are moist.   Eyes:      General:         Right eye: No discharge.         Left eye: No discharge.      Pupils: Pupils are equal, round, and reactive to light.   Cardiovascular:      Rate and Rhythm: Normal rate and regular rhythm.      Pulses: Normal pulses.      Heart sounds: Normal heart sounds.   Pulmonary:      Effort: Pulmonary effort is normal. No respiratory distress.      Breath sounds: Normal breath sounds. No stridor.   Abdominal:      General: Bowel sounds are normal. There is no distension.      Palpations: Abdomen is soft.      Tenderness: There is no abdominal tenderness.   Musculoskeletal:         General: Normal range of motion.      Cervical back: Normal range of motion and neck supple.      Right lower leg: Edema present.      Left lower leg: Edema present.      Comments: +2 bilateral pitting   Skin:     General: Skin is warm.      Findings:

## 2024-07-16 ENCOUNTER — APPOINTMENT (OUTPATIENT)
Age: 79
DRG: 698 | End: 2024-07-16
Attending: INTERNAL MEDICINE
Payer: MEDICARE

## 2024-07-16 PROBLEM — R55 SYNCOPE AND COLLAPSE: Status: ACTIVE | Noted: 2024-07-16

## 2024-07-16 LAB
ALBUMIN SERPL-MCNC: 2.8 G/DL (ref 3.5–4.6)
ALP SERPL-CCNC: 50 U/L (ref 35–104)
ALT SERPL-CCNC: 6 U/L (ref 0–41)
ANION GAP SERPL CALCULATED.3IONS-SCNC: 12 MEQ/L (ref 9–15)
AST SERPL-CCNC: 6 U/L (ref 0–40)
BASOPHILS # BLD: 0.1 K/UL (ref 0–0.2)
BASOPHILS NFR BLD: 2.8 %
BILIRUB SERPL-MCNC: 0.5 MG/DL (ref 0.2–0.7)
BUN SERPL-MCNC: 16 MG/DL (ref 8–23)
CALCIUM SERPL-MCNC: 8.1 MG/DL (ref 8.5–9.9)
CHLORIDE SERPL-SCNC: 103 MEQ/L (ref 95–107)
CO2 SERPL-SCNC: 23 MEQ/L (ref 20–31)
CREAT SERPL-MCNC: 1.27 MG/DL (ref 0.7–1.2)
ECHO AV AREA PEAK VELOCITY: 1.9 CM2
ECHO AV AREA VTI: 1.7 CM2
ECHO AV AREA/BSA PEAK VELOCITY: 0.8 CM2/M2
ECHO AV AREA/BSA VTI: 0.7 CM2/M2
ECHO AV CUSP MM: 1.8 CM
ECHO AV MEAN GRADIENT: 10 MMHG
ECHO AV MEAN VELOCITY: 1.5 M/S
ECHO AV PEAK GRADIENT: 17 MMHG
ECHO AV PEAK VELOCITY: 2.2 M/S
ECHO AV VELOCITY RATIO: 0.59
ECHO AV VTI: 44.6 CM
ECHO BSA: 2.45 M2
ECHO EST RA PRESSURE: 3 MMHG
ECHO LA DIAMETER INDEX: 1.92 CM/M2
ECHO LA DIAMETER: 4.6 CM
ECHO LA VOL A-L A2C: 88 ML (ref 18–58)
ECHO LA VOL A-L A4C: 96 ML (ref 18–58)
ECHO LA VOL MOD A2C: 85 ML (ref 18–58)
ECHO LA VOL MOD A4C: 91 ML (ref 18–58)
ECHO LA VOLUME AREA LENGTH: 94 ML
ECHO LA VOLUME INDEX A-L A2C: 37 ML/M2 (ref 16–34)
ECHO LA VOLUME INDEX A-L A4C: 40 ML/M2 (ref 16–34)
ECHO LA VOLUME INDEX AREA LENGTH: 39 ML/M2 (ref 16–34)
ECHO LA VOLUME INDEX MOD A2C: 36 ML/M2 (ref 16–34)
ECHO LA VOLUME INDEX MOD A4C: 38 ML/M2 (ref 16–34)
ECHO LV E' LATERAL VELOCITY: 9 CM/S
ECHO LV E' SEPTAL VELOCITY: 9 CM/S
ECHO LV EDV A2C: 124 ML
ECHO LV EDV A4C: 181 ML
ECHO LV EDV BP: 158 ML (ref 67–155)
ECHO LV EDV INDEX A4C: 76 ML/M2
ECHO LV EDV INDEX BP: 66 ML/M2
ECHO LV EDV NDEX A2C: 52 ML/M2
ECHO LV EJECTION FRACTION A2C: 65 %
ECHO LV EJECTION FRACTION A4C: 62 %
ECHO LV EJECTION FRACTION BIPLANE: 65 % (ref 55–100)
ECHO LV ESV A2C: 44 ML
ECHO LV ESV A4C: 68 ML
ECHO LV ESV BP: 55 ML (ref 22–58)
ECHO LV ESV INDEX A2C: 18 ML/M2
ECHO LV ESV INDEX A4C: 28 ML/M2
ECHO LV ESV INDEX BP: 23 ML/M2
ECHO LV FRACTIONAL SHORTENING: 29 % (ref 28–44)
ECHO LV INTERNAL DIMENSION DIASTOLE INDEX: 2.3 CM/M2
ECHO LV INTERNAL DIMENSION DIASTOLIC: 5.5 CM (ref 4.2–5.9)
ECHO LV INTERNAL DIMENSION SYSTOLIC INDEX: 1.63 CM/M2
ECHO LV INTERNAL DIMENSION SYSTOLIC: 3.9 CM
ECHO LV IVSD: 1.3 CM (ref 0.6–1)
ECHO LV IVSS: 1.8 CM
ECHO LV MASS 2D: 288.2 G (ref 88–224)
ECHO LV MASS INDEX 2D: 120.6 G/M2 (ref 49–115)
ECHO LV POSTERIOR WALL DIASTOLIC: 1.2 CM (ref 0.6–1)
ECHO LV POSTERIOR WALL SYSTOLIC: 1.9 CM
ECHO LV RELATIVE WALL THICKNESS RATIO: 0.44
ECHO LVOT AREA: 3.1 CM2
ECHO LVOT AV VTI INDEX: 0.53
ECHO LVOT DIAM: 2 CM
ECHO LVOT MEAN GRADIENT: 4 MMHG
ECHO LVOT PEAK GRADIENT: 6 MMHG
ECHO LVOT PEAK VELOCITY: 1.3 M/S
ECHO LVOT STROKE VOLUME INDEX: 31.3 ML/M2
ECHO LVOT SV: 74.7 ML
ECHO LVOT VTI: 23.8 CM
ECHO MV A VELOCITY: 1 M/S
ECHO MV AREA VTI: 2.5 CM2
ECHO MV E DECELERATION TIME (DT): 176.2 MS
ECHO MV E VELOCITY: 0.54 M/S
ECHO MV E/A RATIO: 0.54
ECHO MV E/E' LATERAL: 6
ECHO MV E/E' RATIO (AVERAGED): 6
ECHO MV E/E' SEPTAL: 6
ECHO MV LVOT VTI INDEX: 1.26
ECHO MV MAX VELOCITY: 1.1 M/S
ECHO MV MEAN GRADIENT: 1 MMHG
ECHO MV MEAN VELOCITY: 0.5 M/S
ECHO MV PEAK GRADIENT: 5 MMHG
ECHO MV REGURGITANT PEAK GRADIENT: 38 MMHG
ECHO MV REGURGITANT PEAK VELOCITY: 3.1 M/S
ECHO MV VTI: 30 CM
ECHO PV MAX VELOCITY: 1.4 M/S
ECHO PV PEAK GRADIENT: 8 MMHG
ECHO RIGHT VENTRICULAR SYSTOLIC PRESSURE (RVSP): 25 MMHG
ECHO RV INTERNAL DIMENSION: 3.1 CM
ECHO RV TAPSE: 2.3 CM (ref 1.7–?)
ECHO TV REGURGITANT MAX VELOCITY: 2.37 M/S
ECHO TV REGURGITANT PEAK GRADIENT: 22 MMHG
EKG ATRIAL RATE: 61 BPM
EKG P AXIS: 76 DEGREES
EKG P-R INTERVAL: 192 MS
EKG Q-T INTERVAL: 364 MS
EKG QRS DURATION: 98 MS
EKG QTC CALCULATION (BAZETT): 387 MS
EKG R AXIS: -31 DEGREES
EKG T AXIS: -71 DEGREES
EKG VENTRICULAR RATE: 68 BPM
EOSINOPHIL # BLD: 0.2 K/UL (ref 0–0.7)
EOSINOPHIL NFR BLD: 5.7 %
ERYTHROCYTE [DISTWIDTH] IN BLOOD BY AUTOMATED COUNT: 13.1 % (ref 11.5–14.5)
ESTIMATED AVERAGE GLUCOSE: 82 MG/DL
GLOBULIN SER CALC-MCNC: 2.7 G/DL (ref 2.3–3.5)
GLUCOSE BLD-MCNC: 212 MG/DL (ref 70–99)
GLUCOSE BLD-MCNC: 214 MG/DL (ref 70–99)
GLUCOSE BLD-MCNC: 222 MG/DL (ref 70–99)
GLUCOSE BLD-MCNC: 229 MG/DL (ref 70–99)
GLUCOSE SERPL-MCNC: 156 MG/DL (ref 70–99)
HBA1C MFR BLD: 4.5 % (ref 4–6)
HCT VFR BLD AUTO: 31.4 % (ref 42–52)
HGB BLD-MCNC: 10.8 G/DL (ref 14–18)
LYMPHOCYTES # BLD: 0.8 K/UL (ref 1–4.8)
LYMPHOCYTES NFR BLD: 29.3 %
MAGNESIUM SERPL-MCNC: 1.8 MG/DL (ref 1.7–2.4)
MCH RBC QN AUTO: 32.1 PG (ref 27–31.3)
MCHC RBC AUTO-ENTMCNC: 34.4 % (ref 33–37)
MCV RBC AUTO: 93.5 FL (ref 79–92.2)
MONOCYTES # BLD: 0.7 K/UL (ref 0.2–0.8)
MONOCYTES NFR BLD: 24 %
NEUTROPHILS # BLD: 1 K/UL (ref 1.4–6.5)
NEUTS SEG NFR BLD: 36.1 %
PERFORMED ON: ABNORMAL
PLATELET # BLD AUTO: 216 K/UL (ref 130–400)
POTASSIUM SERPL-SCNC: 3.1 MEQ/L (ref 3.4–4.9)
PROT SERPL-MCNC: 5.5 G/DL (ref 6.3–8)
RBC # BLD AUTO: 3.36 M/UL (ref 4.7–6.1)
SODIUM SERPL-SCNC: 138 MEQ/L (ref 135–144)
WBC # BLD AUTO: 2.8 K/UL (ref 4.8–10.8)

## 2024-07-16 PROCEDURE — 93010 ELECTROCARDIOGRAM REPORT: CPT | Performed by: INTERNAL MEDICINE

## 2024-07-16 PROCEDURE — 80053 COMPREHEN METABOLIC PANEL: CPT

## 2024-07-16 PROCEDURE — 83036 HEMOGLOBIN GLYCOSYLATED A1C: CPT

## 2024-07-16 PROCEDURE — 6370000000 HC RX 637 (ALT 250 FOR IP): Performed by: INTERNAL MEDICINE

## 2024-07-16 PROCEDURE — 99221 1ST HOSP IP/OBS SF/LOW 40: CPT | Performed by: INTERNAL MEDICINE

## 2024-07-16 PROCEDURE — 6360000002 HC RX W HCPCS: Performed by: INTERNAL MEDICINE

## 2024-07-16 PROCEDURE — 93306 TTE W/DOPPLER COMPLETE: CPT | Performed by: INTERNAL MEDICINE

## 2024-07-16 PROCEDURE — G0378 HOSPITAL OBSERVATION PER HR: HCPCS

## 2024-07-16 PROCEDURE — 2580000003 HC RX 258: Performed by: INTERNAL MEDICINE

## 2024-07-16 PROCEDURE — 83735 ASSAY OF MAGNESIUM: CPT

## 2024-07-16 PROCEDURE — 96372 THER/PROPH/DIAG INJ SC/IM: CPT

## 2024-07-16 PROCEDURE — 36415 COLL VENOUS BLD VENIPUNCTURE: CPT

## 2024-07-16 PROCEDURE — 85025 COMPLETE CBC W/AUTO DIFF WBC: CPT

## 2024-07-16 PROCEDURE — 97162 PT EVAL MOD COMPLEX 30 MIN: CPT

## 2024-07-16 PROCEDURE — 93306 TTE W/DOPPLER COMPLETE: CPT

## 2024-07-16 PROCEDURE — 97166 OT EVAL MOD COMPLEX 45 MIN: CPT

## 2024-07-16 RX ORDER — ACETAMINOPHEN 80 MG
TABLET,CHEWABLE ORAL ONCE
Status: COMPLETED | OUTPATIENT
Start: 2024-07-16 | End: 2024-07-16

## 2024-07-16 RX ORDER — ENOXAPARIN SODIUM 150 MG/ML
INJECTION SUBCUTANEOUS 2 TIMES DAILY
Status: ON HOLD | COMMUNITY

## 2024-07-16 RX ORDER — SODIUM CHLORIDE 9 MG/ML
INJECTION, SOLUTION INTRAVENOUS PRN
Status: DISCONTINUED | OUTPATIENT
Start: 2024-07-16 | End: 2024-07-25 | Stop reason: HOSPADM

## 2024-07-16 RX ORDER — DEXTROSE MONOHYDRATE 100 MG/ML
INJECTION, SOLUTION INTRAVENOUS CONTINUOUS PRN
Status: DISCONTINUED | OUTPATIENT
Start: 2024-07-16 | End: 2024-07-25 | Stop reason: HOSPADM

## 2024-07-16 RX ORDER — ONDANSETRON 2 MG/ML
4 INJECTION INTRAMUSCULAR; INTRAVENOUS EVERY 6 HOURS PRN
Status: DISCONTINUED | OUTPATIENT
Start: 2024-07-16 | End: 2024-07-25 | Stop reason: HOSPADM

## 2024-07-16 RX ORDER — GLUCAGON 1 MG/ML
1 KIT INJECTION PRN
Status: DISCONTINUED | OUTPATIENT
Start: 2024-07-16 | End: 2024-07-25 | Stop reason: HOSPADM

## 2024-07-16 RX ORDER — LENALIDOMIDE 10 MG/1
10 CAPSULE ORAL DAILY
Status: ON HOLD | COMMUNITY

## 2024-07-16 RX ORDER — ACETAMINOPHEN 325 MG/1
650 TABLET ORAL EVERY 6 HOURS PRN
Status: DISCONTINUED | OUTPATIENT
Start: 2024-07-16 | End: 2024-07-25 | Stop reason: HOSPADM

## 2024-07-16 RX ORDER — INSULIN LISPRO 100 [IU]/ML
0-4 INJECTION, SOLUTION INTRAVENOUS; SUBCUTANEOUS NIGHTLY
Status: DISCONTINUED | OUTPATIENT
Start: 2024-07-16 | End: 2024-07-20

## 2024-07-16 RX ORDER — POTASSIUM CHLORIDE 20 MEQ/1
40 TABLET, EXTENDED RELEASE ORAL PRN
Status: DISCONTINUED | OUTPATIENT
Start: 2024-07-16 | End: 2024-07-25 | Stop reason: HOSPADM

## 2024-07-16 RX ORDER — VALACYCLOVIR HYDROCHLORIDE 1 G/1
500 TABLET, FILM COATED ORAL 2 TIMES DAILY
Status: DISCONTINUED | OUTPATIENT
Start: 2024-07-16 | End: 2024-07-19

## 2024-07-16 RX ORDER — IBRUTINIB 280 MG/1
560 TABLET, FILM COATED ORAL DAILY
Status: ON HOLD | COMMUNITY

## 2024-07-16 RX ORDER — SODIUM CHLORIDE 0.9 % (FLUSH) 0.9 %
5-40 SYRINGE (ML) INJECTION EVERY 12 HOURS SCHEDULED
Status: DISCONTINUED | OUTPATIENT
Start: 2024-07-16 | End: 2024-07-25 | Stop reason: HOSPADM

## 2024-07-16 RX ORDER — HYDROPHILIC CREAM
PASTE (GRAM) TOPICAL DAILY
Status: ON HOLD | COMMUNITY

## 2024-07-16 RX ORDER — HYDRALAZINE HYDROCHLORIDE 25 MG/1
25 TABLET, FILM COATED ORAL PRN
Status: ON HOLD | COMMUNITY

## 2024-07-16 RX ORDER — SACUBITRIL AND VALSARTAN 49; 51 MG/1; MG/1
1 TABLET, FILM COATED ORAL 2 TIMES DAILY
Status: ON HOLD | COMMUNITY

## 2024-07-16 RX ORDER — SODIUM CHLORIDE 0.9 % (FLUSH) 0.9 %
5-40 SYRINGE (ML) INJECTION PRN
Status: DISCONTINUED | OUTPATIENT
Start: 2024-07-16 | End: 2024-07-25 | Stop reason: HOSPADM

## 2024-07-16 RX ORDER — MAGNESIUM SULFATE IN WATER 40 MG/ML
2000 INJECTION, SOLUTION INTRAVENOUS PRN
Status: DISCONTINUED | OUTPATIENT
Start: 2024-07-16 | End: 2024-07-25 | Stop reason: HOSPADM

## 2024-07-16 RX ORDER — ENOXAPARIN SODIUM 150 MG/ML
1 INJECTION SUBCUTANEOUS 2 TIMES DAILY
Status: DISCONTINUED | OUTPATIENT
Start: 2024-07-16 | End: 2024-07-23

## 2024-07-16 RX ORDER — DYCLONINE HCL 2 MG
1 LOZENGE MUCOUS MEMBRANE
Status: ON HOLD | COMMUNITY

## 2024-07-16 RX ORDER — ALBUTEROL SULFATE 2.5 MG/3ML
2.5 SOLUTION RESPIRATORY (INHALATION) PRN
Status: ON HOLD | COMMUNITY

## 2024-07-16 RX ORDER — POLYETHYLENE GLYCOL 3350 17 G/17G
17 POWDER, FOR SOLUTION ORAL DAILY PRN
Status: DISCONTINUED | OUTPATIENT
Start: 2024-07-16 | End: 2024-07-25 | Stop reason: HOSPADM

## 2024-07-16 RX ORDER — ACETAMINOPHEN 650 MG/1
650 SUPPOSITORY RECTAL EVERY 6 HOURS PRN
Status: DISCONTINUED | OUTPATIENT
Start: 2024-07-16 | End: 2024-07-25 | Stop reason: HOSPADM

## 2024-07-16 RX ORDER — ZINC OXIDE
OINTMENT (GRAM) TOPICAL PRN
Status: ON HOLD | COMMUNITY

## 2024-07-16 RX ORDER — INSULIN LISPRO 100 [IU]/ML
0-8 INJECTION, SOLUTION INTRAVENOUS; SUBCUTANEOUS
Status: DISCONTINUED | OUTPATIENT
Start: 2024-07-16 | End: 2024-07-20

## 2024-07-16 RX ORDER — CARVEDILOL 25 MG/1
25 TABLET ORAL 2 TIMES DAILY WITH MEALS
Status: ON HOLD | COMMUNITY

## 2024-07-16 RX ORDER — ERGOCALCIFEROL 1.25 MG/1
50000 CAPSULE ORAL WEEKLY
Status: ON HOLD | COMMUNITY

## 2024-07-16 RX ORDER — FUROSEMIDE 20 MG/1
20 TABLET ORAL DAILY
Status: ON HOLD | COMMUNITY
Start: 2024-07-24

## 2024-07-16 RX ORDER — FUROSEMIDE 10 MG/ML
20 INJECTION INTRAMUSCULAR; INTRAVENOUS 2 TIMES DAILY
Status: DISCONTINUED | OUTPATIENT
Start: 2024-07-16 | End: 2024-07-25 | Stop reason: HOSPADM

## 2024-07-16 RX ORDER — ONDANSETRON 4 MG/1
4 TABLET, ORALLY DISINTEGRATING ORAL EVERY 8 HOURS PRN
Status: DISCONTINUED | OUTPATIENT
Start: 2024-07-16 | End: 2024-07-25 | Stop reason: HOSPADM

## 2024-07-16 RX ORDER — LANOLIN ALCOHOL/MO/W.PET/CERES
3 CREAM (GRAM) TOPICAL NIGHTLY PRN
Status: DISCONTINUED | OUTPATIENT
Start: 2024-07-16 | End: 2024-07-25 | Stop reason: HOSPADM

## 2024-07-16 RX ORDER — VALACYCLOVIR HYDROCHLORIDE 500 MG/1
500 TABLET, FILM COATED ORAL 2 TIMES DAILY
Status: ON HOLD | COMMUNITY

## 2024-07-16 RX ORDER — ACETAMINOPHEN 500 MG
500 TABLET ORAL PRN
Status: ON HOLD | COMMUNITY

## 2024-07-16 RX ORDER — ONDANSETRON 4 MG/1
4 TABLET, ORALLY DISINTEGRATING ORAL EVERY 12 HOURS PRN
Status: ON HOLD | COMMUNITY

## 2024-07-16 RX ORDER — CARVEDILOL 25 MG/1
25 TABLET ORAL 2 TIMES DAILY WITH MEALS
Status: DISCONTINUED | OUTPATIENT
Start: 2024-07-16 | End: 2024-07-18

## 2024-07-16 RX ORDER — TAMSULOSIN HYDROCHLORIDE 0.4 MG/1
0.4 CAPSULE ORAL DAILY
Status: DISCONTINUED | OUTPATIENT
Start: 2024-07-16 | End: 2024-07-25 | Stop reason: HOSPADM

## 2024-07-16 RX ORDER — POTASSIUM CHLORIDE 7.45 MG/ML
10 INJECTION INTRAVENOUS PRN
Status: DISCONTINUED | OUTPATIENT
Start: 2024-07-16 | End: 2024-07-25 | Stop reason: HOSPADM

## 2024-07-16 RX ORDER — FUROSEMIDE 20 MG/1
40 TABLET ORAL DAILY
COMMUNITY
Start: 2024-07-16 | End: 2024-07-23

## 2024-07-16 RX ADMIN — Medication: at 03:30

## 2024-07-16 RX ADMIN — VALACYCLOVIR 500 MG: 1 TABLET, FILM COATED ORAL at 09:37

## 2024-07-16 RX ADMIN — Medication 10 ML: at 20:40

## 2024-07-16 RX ADMIN — Medication 10 ML: at 09:33

## 2024-07-16 RX ADMIN — CARVEDILOL 25 MG: 25 TABLET, FILM COATED ORAL at 09:31

## 2024-07-16 RX ADMIN — TAMSULOSIN HYDROCHLORIDE 0.4 MG: 0.4 CAPSULE ORAL at 09:30

## 2024-07-16 RX ADMIN — POTASSIUM CHLORIDE 40 MEQ: 1500 TABLET, EXTENDED RELEASE ORAL at 09:31

## 2024-07-16 RX ADMIN — INSULIN LISPRO 2 UNITS: 100 INJECTION, SOLUTION INTRAVENOUS; SUBCUTANEOUS at 16:59

## 2024-07-16 RX ADMIN — SACUBITRIL AND VALSARTAN 1 TABLET: 49; 51 TABLET, FILM COATED ORAL at 09:31

## 2024-07-16 RX ADMIN — FUROSEMIDE 20 MG: 10 INJECTION, SOLUTION INTRAMUSCULAR; INTRAVENOUS at 15:30

## 2024-07-16 RX ADMIN — VALACYCLOVIR 500 MG: 1 TABLET, FILM COATED ORAL at 20:33

## 2024-07-16 RX ADMIN — INSULIN LISPRO 2 UNITS: 100 INJECTION, SOLUTION INTRAVENOUS; SUBCUTANEOUS at 11:53

## 2024-07-16 RX ADMIN — SACUBITRIL AND VALSARTAN 1 TABLET: 49; 51 TABLET, FILM COATED ORAL at 20:33

## 2024-07-16 RX ADMIN — ENOXAPARIN SODIUM 120 MG: 150 INJECTION SUBCUTANEOUS at 04:19

## 2024-07-16 RX ADMIN — CARVEDILOL 25 MG: 25 TABLET, FILM COATED ORAL at 16:59

## 2024-07-16 RX ADMIN — CEFTRIAXONE SODIUM 1000 MG: 1 INJECTION, POWDER, FOR SOLUTION INTRAMUSCULAR; INTRAVENOUS at 04:22

## 2024-07-16 RX ADMIN — ENOXAPARIN SODIUM 120 MG: 150 INJECTION SUBCUTANEOUS at 20:32

## 2024-07-16 ASSESSMENT — PAIN SCALES - GENERAL
PAINLEVEL_OUTOF10: 0

## 2024-07-16 ASSESSMENT — ENCOUNTER SYMPTOMS
ANAL BLEEDING: 0
ABDOMINAL DISTENTION: 0
EYE DISCHARGE: 0
VOMITING: 0
NAUSEA: 0
ABDOMINAL PAIN: 0
COLOR CHANGE: 1
APNEA: 0

## 2024-07-16 NOTE — CARE COORDINATION
Case Management Assessment  Initial Evaluation    Date/Time of Evaluation: 7/16/2024 11:54 AM  Assessment Completed by: Arpita Sumner RN    If patient is discharged prior to next notation, then this note serves as note for discharge by case management.    Patient Name: Km Garcia                   YOB: 1945  Diagnosis: Syncope and collapse [R55]  Peripheral edema [R60.0]  Renal insufficiency [N28.9]  General weakness [R53.1]  Anticoagulated [Z79.01]  Syncope, unspecified syncope type [R55]  Diarrhea, unspecified type [R19.7]                   Date / Time: 7/15/2024  7:06 PM    Patient Admission Status: Observation   Readmission Risk (Low < 19, Mod (19-27), High > 27): No data recorded  Current PCP: Dawood Darby MD  PCP verified by ? Yes    Chart Reviewed: Yes      History Provided by: Patient  Patient Orientation: Alert and Oriented, Person, Place, Situation    Patient Cognition: Alert    Hospitalization in the last 30 days (Readmission):  No    If yes, Readmission Assessment in  Navigator will be completed.    Advance Directives:      Code Status: Full Code   Patient's Primary Decision Maker is: Legal Next of Kin    Primary Decision Maker: Coby Hummel - Spouse - [phone]    Discharge Planning:    Patient lives with: Spouse/Significant Other Type of Home: House  Primary Care Giver: Self  Patient Support Systems include: Spouse/Significant Other, Family Members, Friends/Neighbors, Home Care Staff   Current services prior to admission: Durable Medical Equipment, Home Care            Current DME: Walker            Type of Home Care services:  PT    ADLS  Prior functional level: Independent in ADLs/IADLs, Assistance with the following:, Mobility, Bathing  Current functional level: Independent in ADLs/IADLs, Assistance with the following:, Mobility, Shopping, Housework, Cooking, Bathing    PT AM-PAC: 8 /24  OT AM-PAC: 12 /24    Family can provide assistance at DC: Yes (WIFE)  Would  you like Case Management to discuss the discharge plan with any other family members/significant others, and if so, who? Yes (WIFE)  Plans to Return to Present Housing: Yes  Other Identified Issues/Barriers to RETURNING to current housing: WORK UP  Potential Assistance needed at discharge: Outpatient PT/OT            Potential DME:    Patient expects to discharge to: REHAB  Plan for transportation at discharge:      Financial    Payor: MEDICARE / Plan: MEDICARE PART A AND B / Product Type: *No Product type* /     Does insurance require precert for SNF: No    Potential assistance Purchasing Medications:    Meds-to-Beds request: Yes      [id number] - [place], [place] - [address] -  [phone] - [phone]  [facility] [address]  Phone: [phone] Fax: [phone]    [facility]    Notes:    Factors facilitating achievement of predicted outcomes: Family support, Caregiver support, Motivated, Cooperative, and Pleasant    Barriers to discharge: Medical complications and Medication managment    Additional Case Management Notes: MET WITH PATIENT AND WIFE, PT IS FROM HOME WITH SPOUSE, WAS USING A WALKER AT HOME.  WIFE DRIVES.  NO O2, VET BUT NOT VA CONNECTED.  PT HAS IS CURRENT WITH [facility] FOR SN AND PT AND SP.  PT HAS A JOHN THAT WAS REPLACED IN DRS OFFICE.  PT IS CURRENT ON CHEMO EVERY OTHER WEEK, NEEDS 3 MORE DOSES.  MEDS ARE DELIVERED THRU [facility].  PT WAS AT [facility]UTE REH[facility] AFTER HIS BRAIN BLEED AND FEELS HE MAY NEED REHAB AGAIN.  PT AND WIFE WOULD LIKE [facility] REHAB AS IT IS CLOSER TO HOME.  MESSAGE SENT TO DR DENIS WITH REQUEST.      The Plan for Transition of Care is related to the following treatment goals of Syncope and collapse [R55]  Peripheral edema [R60.0]  Renal insufficiency [N28.9]  General weakness [R53.1]  Anticoagulated [Z79.01]  Syncope, unspecified syncope type [R55]  Diarrhea, unspecified type [R19.7]    IF APPLICABLE: The Patient and/or

## 2024-07-16 NOTE — CONSULTS
Inpatient consult to Cardiology  Consult performed by: Ralph Smith MD  Consult ordered by: Vishal Fox MD      Patient Name: Km Garcia  Admit Date: 7/15/2024  7:06 PM  MR #: [id number]  : 1945    Attending Physician: Vishal Fox MD  Reason for consult: Syncope    History of Presenting Illness:      Km Garcia is a 78 y.o. male on hospital day 0 with a history of cardiomyopathy EF 35% by TTE 2024 (cardiomyopathy thought to be related to chemotherapy induced), diabetes, paroxysmal atrial tachycardia, hypertension, hyperlipidemia, large B cell CNS lymphoma diagnosed 2024 initially treated with CHOP, converted to converted to Linalidomide and Ibrutinib after he developed nephrotoxicity to Methotrexate.  Right lower extremity DVT and bilateral PE May 16, 2024 on Lovenox, left charcoaled foot, who came to the hospital after sustaining a syncopal episode.  History Obtained From:  patient, electronic medical record    Per patient, he was sitting in the toilet when he passed out wife witness event  No prodrome like symptoms  Patient was having diarrhea before syncopal episode  Denies chest pain or shortness of breath  EKG sinus rhythm with nonspecific T wave abnormality lateral leads, LVH, PACs,  Orthostatics positive    History:      Past Medical History:   Diagnosis Date    AMD (age related macular degeneration)     left eye only, gets steroid shots    Asthma     seasonally, uses inhaler as needed    Charcot ankle, left     wears brace    Diabetes mellitus (HCC)     takes metformin    Hypertension     on meds > 10 yrs     Past Surgical History:   Procedure Laterality Date    COLONOSCOPY      > 30 yrs ago    LITHOTRIPSY N/A 2018    WITH HOLMIUM LASER performed by Antonio Petit MD at [facility] OR     Family History  Family History   Problem Relation Age of Onset    No Known Problems Mother     Heart Disease Father     No Known Problems Sister     Other Brother         enlarged  chloride **OR** potassium alternative oral replacement **OR** potassium chloride, magnesium sulfate, ondansetron **OR** ondansetron, melatonin, polyethylene glycol, acetaminophen **OR** acetaminophen, glucose, dextrose bolus **OR** dextrose bolus, glucagon (rDNA), dextrose   sodium chloride      dextrose        Allergies:     Allergies   Allergen Reactions    Celecoxib      Other reaction(s): GI Upset    Naproxen      Other reaction(s): GI Upset      Review of Systems:       [x] CV, Resp, Neuro, , and all other systems reviewed and negative other than listed in HPI.       Objective Findings:     Vitals:   Vitals:    07/16/24 0210 07/16/24 0320 07/16/24 0326 07/16/24 0711   BP: (!) 160/63  (!) 116/96 (!) 165/50   Pulse: 55  83 85   Resp: 16 16 17 18   Temp: 98.5 °F (36.9 °C)   98.8 °F (37.1 °C)   TempSrc: Oral   Oral   SpO2: 99%  98% 99%   Weight: 116.7 kg (257 lb 4.4 oz)      Height: 1.854 m (6' 0.99\")           Physical Examination:  General: Alert oriented x4 no acute distress  HEENT: Normocephalic, No scleral icterus.  Neck: No JVD.  Heart: Regular, no murmur, no rub/gallop. No RV heave.  Lungs: Clear to ascultation, no rales/wheezing/rhonchi. Good chest wall excursion.  Abdomen: Soft non tender non distended   extremities: No clubbing/cyanosis, 1+ pitting edema bilaterally  Skin: Warm, dry, normal turgor, no rash, no bruise, no petichiae.  Neuro: No myoclonus or tremor.   Psych: Normal affect    Results/ Medications reviewed 7/16/2024, 8:25 AM     Laboratory, Microbiology, Pathology, Radiology, Cardiology, Medications and Transcriptions reviewed  Scheduled Meds:   carvedilol  25 mg Oral BID WC    enoxaparin  1 mg/kg SubCUTAneous BID    sacubitril-valsartan  1 tablet Oral BID    tamsulosin  0.4 mg Oral Daily    valACYclovir  500 mg Oral BID    sodium chloride flush  5-40 mL IntraVENous 2 times per day    insulin lispro  0-8 Units SubCUTAneous TID WC    insulin lispro  0-4 Units SubCUTAneous Nightly     cefTRIAXone (ROCEPHIN) IV  1,000 mg IntraVENous Q24H     Continuous Infusions:   sodium chloride      dextrose         Recent Labs     07/15/24  1930 07/16/24  0507   WBC 2.8* 2.8*   HGB 10.0* 10.8*   HCT 30.1* 31.4*   MCV 95.3* 93.5*    216     Recent Labs     07/15/24  1930 07/16/24  0507    138   K 3.6 3.1*    103   CO2 23 23   PHOS 2.9  --    BUN 16 16   CREATININE 1.23* 1.27*     Recent Labs     07/15/24  1930   INR 1.3     No results found for this or any previous visit.     Impression:   Syncope.  Unknown etiology, however orthostatics positive this admission  Cardiomyopathy EF 35% by TTE 5/2024 at [facility] [place].  Condition thought to be related to chemotherapy induced  Diabetes  Hypertension  Hyperlipidemia   large B cell CNS lymphoma diagnosed March 2024 initially treated with CHOP, converted to converted to Linalidomide and Ibrutinib after he developed nephrotoxicity to Methotrexate.    Right lower extremity DVT and bilateral PE May 16, 2024 on Lovenox,   left charcoaled foot,  Plan:   Continue telemetry  Perform orthostatics twice daily  Continue Coreg and Entresto for cardiomyopathy  Continue Jardiance  Follow-up echocardiogram  Continue Lasix  Please keep potassium between 4 and 5 magnesium above 2  Please keep hemoglobin above 8 and place above 50  Comments:     On this date 7/16/2024 I have spent >30 minutes reviewing previous notes, test results and face to face with the patient discussing the diagnoses and importance of compliance with the treatment plan as well as documenting on the day of the visit.      Thank you for allowing us to participate in the care of this patient.     Will continue to follow.    Please call if questions or concerns arise.    Electronically signed by Ralph Smith MD on 7/16/2024 at 8:25 AM    Please note this report has been partially produced using speech recognition software and may cause contain errors related to that system including grammar, punctuation

## 2024-07-16 NOTE — PROGRESS NOTES
Physical Therapy Med Surg Initial Assessment  Facility/Department: [facility] 1W TELEMETRY  Room: [place] [address]       NAME: Km Garcia  : 1945 (78 y.o.)  MRN: [id number]  CODE STATUS: Full Code    Date of Service: 2024    Patient Diagnosis(es): Syncope and collapse [R55]  Peripheral edema [R60.0]  Renal insufficiency [N28.9]  General weakness [R53.1]  Anticoagulated [Z79.01]  Syncope, unspecified syncope type [R55]  Diarrhea, unspecified type [R19.7]   Chief Complaint   Patient presents with    Loss of Consciousness     While on the toilet, hx brain CA, new CHF,      Patient Active Problem List    Diagnosis Date Noted    Syncope and collapse 2024    Brain bleed (HCC) 2024    SOB (shortness of breath)     Surgical aftercare, genitourinary system     Gross hematuria     Bladder stones 12/10/2018        Past Medical History:   Diagnosis Date    AMD (age related macular degeneration)     left eye only, gets steroid shots    Asthma     seasonally, uses inhaler as needed    Charcot ankle, left     wears brace    Diabetes mellitus (HCC)     takes metformin    Hypertension     on meds > 10 yrs     Past Surgical History:   Procedure Laterality Date    COLONOSCOPY      > 30 yrs ago    LITHOTRIPSY N/A 2018    WITH HOLMIUM LASER performed by Antonio Petit MD at [facility] OR       Patient assessed for rehabilitation services?: Yes  Family / Caregiver Present: No    Restrictions:  Restrictions/Precautions: Fall Risk     SUBJECTIVE:   Pain   B knee pain which is chronic per pt report- 5-6/10- RN made aware    Prior Level of Function:  Social/Functional History  Lives With: Spouse (wife is able to help pt at home)  Type of Home: House  Home Layout: Two level, Able to Live on Main level with bedroom/bathroom  Home Access: Stairs to enter with rails  Entrance Stairs - Number of Steps: 2-3  Entrance Stairs - Rails: Left  Bathroom Shower/Tub: Walk-in shower  Bathroom Equipment: Shower chair, Grab bars in shower,

## 2024-07-16 NOTE — PROGRESS NOTES
[facility] OCCUPATIONAL THERAPY EVALUATION - ACUTE     NAME: Km Garcia  : 1945 (78 y.o.)  MRN: [id number]  CODE STATUS: Full Code  Room: [place] [address]    Date of Service: 2024    Patient Diagnosis(es): Syncope and collapse [R55]  Peripheral edema [R60.0]  Renal insufficiency [N28.9]  General weakness [R53.1]  Anticoagulated [Z79.01]  Syncope, unspecified syncope type [R55]  Diarrhea, unspecified type [R19.7]   Patient Active Problem List    Diagnosis Date Noted    Syncope and collapse 2024    Brain bleed (HCC) 2024    SOB (shortness of breath)     Surgical aftercare, genitourinary system     Gross hematuria     Bladder stones 12/10/2018        Past Medical History:   Diagnosis Date    AMD (age related macular degeneration)     left eye only, gets steroid shots    Asthma     seasonally, uses inhaler as needed    Charcot ankle, left     wears brace    Diabetes mellitus (HCC)     takes metformin    Hypertension     on meds > 10 yrs     Past Surgical History:   Procedure Laterality Date    COLONOSCOPY      > 30 yrs ago    LITHOTRIPSY N/A 2018    WITH HOLMIUM LASER performed [place]it [place] [facility] [place]        Restrictions  Restrictions/Precautions: Fall Risk              Safety Devices: Safety Devices  Type of Devices: All fall risk precautions in place;Call light within reach;Chair alarm in place;Left in chair     Patient's date of birth confirmed: Yes    General:  Patient assessed for rehabilitation services?: Yes    Subjective          Pain at start of treatment: Yes: 6/10    Pain at end of treatment: Yes: 6/10    Location: B knees  Description: chronic, ache  Nursing notified: Yes  RN: Priscilla  Intervention: Repositioned    Prior Level of Function:  Social/Functional History  Lives With: Spouse (wife is able to help pt at home)  Type of Home: House  Home Layout: Two level, Able to Live on Main level with bedroom/bathroom  Home Access: Stairs to enter with rails  Entrance Stairs  ADL tasks completed/simulated as above stated. Pt limited by pain, decreased endurance and decreased strength on this date.  Toilet Transfers  Toilet Transfer: Unable to assess  Toilet Transfers Comments: anticipate 2 person max A    Functional Mobility:    Transfers  Stand Pivot Transfers: 2 Person assistance, Maximum assistance  Sit to stand: 2 Person assistance, Maximum assistance  Stand to sit: 2 Person assistance, Maximum assistance  Transfer Comments: max x 2 provided for stand pivot transfer and height of bed elevated to increase ease d/t pt height    Patient pivoted only to bedside chair  with WW at Max A x 2 level. Pt requires most assist with elevating from surface.    Bed Mobility  Bed mobility  Supine to Sit: Maximum assistance;2 Person assistance  Sit to Supine:  (NT- pt left up in bedside chair to encourage OOB activity)  Bed Mobility Comments: HOB elevated; increased time and effort    Seated and Standing Balance:  Balance  Sitting:  (SUP)  Standing:  (max x 2 to achieve stand - 2 person present and providing min A in stand)    Functional Endurance:  Activity Tolerance  Activity Tolerance: Patient limited by pain    D/C Recommendations:  OT D/C RECOMMENDATIONS  REQUIRES OT FOLLOW-UP: Yes    Equipment Recommendations:  OT Equipment Recommendations  Equipment Needed:  (TBD)  Other: continue to assess    OT Education:   Patient Education  Education Given To: Patient  Education Provided: Role of Therapy;Plan of Care  Education Method: Verbal  Barriers to Learning: None  Education Outcome: Continued education needed    OT Follow Up:   OT D/C RECOMMENDATIONS  REQUIRES OT FOLLOW-UP: Yes       Assessment/Discharge Disposition:  Assessment: Pt is a 79yo male presenting with the above stated deficits. Pt receieves assist from wife at home and may be close to baseline function however could benefit from additional services to increase safety and IND with daily living tasks prior to home going.  Performance  deficits / Impairments: Decreased functional mobility , Decreased high-level IADLs, Decreased ADL status, Decreased endurance, Decreased balance, Decreased strength  Prognosis: Fair  Discharge Recommendations: Continue to assess pending progress  Decision Making: Medium Complexity     History: med  Exam: 6 performance deficits  Assistance / Modification: mod-max A    AMPAC (Six Click) Self care Score   How much help is needed for putting on and taking off regular lower body clothing?: Total  How much help is needed for bathing (which includes washing, rinsing, drying)?: A Lot  How much help is needed for toileting (which includes using toilet, bedpan, or urinal)?: Total  How much help is needed for putting on and taking off regular upper body clothing?: A Lot  How much help is needed for taking care of personal grooming?: A Little  How much help for eating meals?: A Little  AM-[facility] Inpatient Daily Activity Raw Score: 12  AM-[facility] Inpatient ADL T-Scale Score : 30.6  ADL Inpatient CMS 0-100% Score: 66.57    Therapy key for assistance levels -   Independent/Mod I = Pt. is able to perform task with no assistance but may require a device   Stand by assistance = Pt. does not perform task at an independent level but does not need physical assistance, requires verbal cues  Minimal, Moderate, Maximal Assistance = Pt. requires physical assistance (25%, 50%, 75% assist from helper) for task but is able to actively participate in task   Dependent = Pt. requires total assistance with task and is not able to actively participate with task completion     Plan:  Occupational Therapy Plan  Times Per Week: 1-4x/wk  Therapy Duration:  (length of acute stay)  Current Treatment Recommendations: Balance training, Strengthening, Functional mobility training, Endurance training, Patient/Caregiver education & training, Self-Care / ADL, Safety education & training, Home management training, Equipment evaluation, education, & procurement,

## 2024-07-16 NOTE — PLAN OF CARE
Problem: Discharge Planning  Goal: Discharge to home or other facility with appropriate resources  Outcome: Progressing  Flowsheets  Taken 7/16/2024 0342  Discharge to home or other facility with appropriate resources:   Identify barriers to discharge with patient and caregiver   Arrange for needed discharge resources and transportation as appropriate   Identify discharge learning needs (meds, wound care, etc)  Taken 7/16/2024 0210  Discharge to home or other facility with appropriate resources:   Identify barriers to discharge with patient and caregiver   Arrange for needed discharge resources and transportation as appropriate   Identify discharge learning needs (meds, wound care, etc)     Problem: Pain  Goal: Verbalizes/displays adequate comfort level or baseline comfort level  Outcome: Progressing  Flowsheets (Taken 7/16/2024 0210)  Verbalizes/displays adequate comfort level or baseline comfort level: Encourage patient to monitor pain and request assistance     Problem: Safety - Adult  Goal: Free from fall injury  Outcome: Progressing  Flowsheets (Taken 7/16/2024 0255)  Free From Fall Injury: Instruct family/caregiver on patient safety     Problem: Skin/Tissue Integrity  Goal: Absence of new skin breakdown  Description: 1.  Monitor for areas of redness and/or skin breakdown  2.  Assess vascular access sites hourly  3.  Every 4-6 hours minimum:  Change oxygen saturation probe site  4.  Every 4-6 hours:  If on nasal continuous positive airway pressure, respiratory therapy assess nares and determine need for appliance change or resting period.  Outcome: Progressing     Problem: ABCDS Injury Assessment  Goal: Absence of physical injury  Outcome: Progressing

## 2024-07-16 NOTE — H&P
Hospitalist Group   History and Physical      CHIEF COMPLAINT: Syncope    History of Present Illness:  78 y.o. male with a history of diabetes, hypertension, heart failure with low ejection fraction, PE, CNS lymphoma on chemo, brain bleed, presents with syncope.  Patient was sitting on the toilet when he passed out.  According to his wife  he was out for few seconds.  He was unaware of what happened when he woke up.  He denied chest pain, shortness of breath, dizziness after.  Patient was started on Entresto and the first dose was few hours before he went to the bathroom.  Patient also was having diarrhea when he passed out that his wife described as soft stool moderate amount with no blood.  Patient had 2 more episodes of diarrhea in the ER after.  According to the wife, patient has been having increased lower extremity edema for the past couple of days.  His cardiologist prescribed Lasix for him but he did not get the medication to started.  She said that he usually ambulates and uses a walker when he walks.  He is usually more active but for the past 24 hours he has been less active because of increased weakness and because of the swelling in his legs.  Patient has been getting chemo for CNS lymphoma.  The tumors have been responding to the chemo.  Initially he was on different chemo regimen but he was unable to tolerate.  Therefore, they switched him to the current chemo.  Also patient had DVT and PE in May.  He was initially on Eliquis when he developed brain bleed and he was switched to Lovenox after.    REVIEW OF SYSTEMS:  no fevers, chills, cp, sob, n/v, ha, vision/hearing changes, wt changes, hot/cold flashes, other open skin lesions, diarrhea, constipation, dysuria/hematuria unless noted in HPI. Complete ROS performed with the patient and is otherwise negative.      PMH:  Past Medical History:   Diagnosis Date    AMD (age related macular degeneration)     left eye only, gets steroid shots    Asthma

## 2024-07-16 NOTE — ACP (ADVANCE CARE PLANNING)
Advance Care Planning   Healthcare Decision Maker:    Primary Decision Maker: Coby Hummel - [facility] - [phone]

## 2024-07-16 NOTE — ED NOTES
Rectal tube inserted with minimal discomfort. Small skin tear noted between buttocks. Wife reports this is new for pt. Pt also has a hemorrhoid. Hemoccult was negative.

## 2024-07-16 NOTE — FLOWSHEET NOTE
0930- PT in with patient. Patient was able to walk with a walker with 2x assist to chair.     0931- Patients K+ this morning 3.1 , potassium 40 mEq extended release tablet given     1435-  on floor. Would like orthostatic BP 2x daily. Lasix was also ordered for patient due to Edema present in BLE.     Electronically signed by Priscilla Shah RN on 7/16/2024 at 6:03 PM

## 2024-07-16 NOTE — ED NOTES
Pt incontinent of liquid brown stool. Pericare provided et whole bed sheets changed. Pt tolerated with slight discomfort.

## 2024-07-17 PROBLEM — Z87.820 HISTORY OF TRAUMATIC BRAIN INJURY: Status: ACTIVE | Noted: 2024-05-22

## 2024-07-17 PROBLEM — R33.9 RETENTION OF URINE, UNSPECIFIED: Status: ACTIVE | Noted: 2024-01-01

## 2024-07-17 PROBLEM — I25.10 CORONARY ARTERY DISEASE INVOLVING NATIVE CORONARY ARTERY OF NATIVE HEART WITHOUT ANGINA PECTORIS: Status: ACTIVE | Noted: 2024-03-19

## 2024-07-17 PROBLEM — G93.89 MASS OF BRAIN: Status: ACTIVE | Noted: 2024-03-12

## 2024-07-17 PROBLEM — R13.10 DYSPHAGIA, UNSPECIFIED: Status: ACTIVE | Noted: 2024-04-05

## 2024-07-17 PROBLEM — I47.19 PAT (PAROXYSMAL ATRIAL TACHYCARDIA) (HCC): Status: ACTIVE | Noted: 2024-03-14

## 2024-07-17 PROBLEM — Z91.81 HISTORY OF FALLING: Status: ACTIVE | Noted: 2024-01-01

## 2024-07-17 PROBLEM — I27.0 PRIMARY PULMONARY HYPERTENSION (HCC): Status: ACTIVE | Noted: 2024-04-05

## 2024-07-17 PROBLEM — C85.89 PRIMARY CNS LYMPHOMA (HCC): Status: ACTIVE | Noted: 2024-03-22

## 2024-07-17 PROBLEM — T45.1X5A: Status: ACTIVE | Noted: 2024-05-14

## 2024-07-17 PROBLEM — E87.6 HYPOKALEMIA: Status: ACTIVE | Noted: 2024-05-10

## 2024-07-17 PROBLEM — I42.9 CARDIOMYOPATHY (HCC): Status: ACTIVE | Noted: 2024-03-19

## 2024-07-17 PROBLEM — C83.390 PRIMARY CNS LYMPHOMA: Status: ACTIVE | Noted: 2024-03-22

## 2024-07-17 PROBLEM — N13.9 OBSTRUCTIVE AND REFLUX UROPATHY, UNSPECIFIED: Status: ACTIVE | Noted: 2024-04-05

## 2024-07-17 PROBLEM — S06.5XAA SUBDURAL HEMATOMA (HCC): Status: ACTIVE | Noted: 2024-04-06

## 2024-07-17 PROBLEM — S06.9XAS LATE EFFECT OF BRAIN INJURY: Status: ACTIVE | Noted: 2024-07-17

## 2024-07-17 PROBLEM — K86.2 PANCREATIC CYST: Status: ACTIVE | Noted: 2024-03-25

## 2024-07-17 PROBLEM — N14.19: Status: ACTIVE | Noted: 2024-05-14

## 2024-07-17 PROBLEM — Z74.09 IMPAIRED MOBILITY AND ADLS: Status: ACTIVE | Noted: 2024-05-22

## 2024-07-17 PROBLEM — M17.0 PRIMARY OSTEOARTHRITIS OF BOTH KNEES: Status: ACTIVE | Noted: 2024-04-17

## 2024-07-17 PROBLEM — Z78.9 IMPAIRED MOBILITY AND ADLS: Status: ACTIVE | Noted: 2024-05-22

## 2024-07-17 PROBLEM — T45.1X1A METHOTREXATE TOXICITY: Status: ACTIVE | Noted: 2024-05-10

## 2024-07-17 PROBLEM — H35.30 MACULAR DEGENERATION OF BOTH EYES: Status: ACTIVE | Noted: 2024-03-25

## 2024-07-17 PROBLEM — H21.81 INTRAOPERATIVE FLOPPY IRIS SYNDROME (IFIS): Status: ACTIVE | Noted: 2023-03-01

## 2024-07-17 PROBLEM — G93.5 COMPRESSION OF BRAIN (HCC): Status: ACTIVE | Noted: 2024-04-05

## 2024-07-17 PROBLEM — I10 PRIMARY HYPERTENSION: Status: ACTIVE | Noted: 2023-02-21

## 2024-07-17 PROBLEM — N17.9 AKI (ACUTE KIDNEY INJURY) (HCC): Status: ACTIVE | Noted: 2024-07-17

## 2024-07-17 PROBLEM — R13.12 DYSPHAGIA, OROPHARYNGEAL PHASE: Status: ACTIVE | Noted: 2024-04-05

## 2024-07-17 PROBLEM — N39.0 UTI (URINARY TRACT INFECTION): Status: ACTIVE | Noted: 2024-07-17

## 2024-07-17 PROBLEM — I25.10 ATHSCL HEART DISEASE OF NATIVE CORONARY ARTERY W/O ANG PCTRS: Status: ACTIVE | Noted: 2024-01-01

## 2024-07-17 PROBLEM — C83.30 DIFFUSE LARGE B-CELL LYMPHOMA, UNSPECIFIED SITE (HCC): Status: ACTIVE | Noted: 2024-04-05

## 2024-07-17 PROBLEM — S06.9X0S LATE EFFECT OF BRAIN INJURY (HCC): Status: ACTIVE | Noted: 2024-07-17

## 2024-07-17 PROBLEM — I63.9 CEREBROVASCULAR ACCIDENT (CVA) (HCC): Status: ACTIVE | Noted: 2024-04-11

## 2024-07-17 LAB
ALBUMIN SERPL-MCNC: 2.4 G/DL (ref 3.5–4.6)
ALP SERPL-CCNC: 42 U/L (ref 35–104)
ALT SERPL-CCNC: 6 U/L (ref 0–41)
ANION GAP SERPL CALCULATED.3IONS-SCNC: 13 MEQ/L (ref 9–15)
ANISOCYTOSIS BLD QL SMEAR: ABNORMAL
AST SERPL-CCNC: <5 U/L (ref 0–40)
BASOPHILS # BLD: 0.2 K/UL (ref 0–0.2)
BASOPHILS NFR BLD: 7 %
BILIRUB SERPL-MCNC: 0.4 MG/DL (ref 0.2–0.7)
BUN SERPL-MCNC: 29 MG/DL (ref 8–23)
CALCIUM SERPL-MCNC: 7.9 MG/DL (ref 8.5–9.9)
CHLORIDE SERPL-SCNC: 102 MEQ/L (ref 95–107)
CO2 SERPL-SCNC: 19 MEQ/L (ref 20–31)
CREAT SERPL-MCNC: 2.75 MG/DL (ref 0.7–1.2)
CREAT UR-MCNC: 64.6 MG/DL
CREAT UR-MCNC: 65.1 MG/DL
CREAT UR-MCNC: 65.2 MG/DL
EOSINOPHIL # BLD: 0.2 K/UL (ref 0–0.7)
EOSINOPHIL NFR BLD: 7 %
ERYTHROCYTE [DISTWIDTH] IN BLOOD BY AUTOMATED COUNT: 13.2 % (ref 11.5–14.5)
GLOBULIN SER CALC-MCNC: 2.6 G/DL (ref 2.3–3.5)
GLUCOSE BLD-MCNC: 243 MG/DL (ref 70–99)
GLUCOSE BLD-MCNC: 268 MG/DL (ref 70–99)
GLUCOSE SERPL-MCNC: 168 MG/DL (ref 70–99)
HCT VFR BLD AUTO: 31.9 % (ref 42–52)
HGB BLD-MCNC: 11 G/DL (ref 14–18)
LYMPHOCYTES # BLD: 1 K/UL (ref 1–4.8)
LYMPHOCYTES NFR BLD: 32 %
MAGNESIUM SERPL-MCNC: 1.7 MG/DL (ref 1.7–2.4)
MCH RBC QN AUTO: 32.1 PG (ref 27–31.3)
MCHC RBC AUTO-ENTMCNC: 34.5 % (ref 33–37)
MCV RBC AUTO: 93 FL (ref 79–92.2)
METAMYELOCYTES NFR BLD MANUAL: 8 %
MONOCYTES # BLD: 0.5 K/UL (ref 0.2–0.8)
MONOCYTES NFR BLD: 16.5 %
MYELOCYTES NFR BLD MANUAL: 7 %
NEUTROPHILS # BLD: 1 K/UL (ref 1.4–6.5)
NEUTS BAND NFR BLD MANUAL: 4 %
NEUTS SEG NFR BLD: 18 %
NRBC BLD-RTO: 2 /100 WBC
OVALOCYTES BLD QL SMEAR: ABNORMAL
PERFORMED ON: ABNORMAL
PERFORMED ON: ABNORMAL
PLATELET # BLD AUTO: 250 K/UL (ref 130–400)
PLATELET BLD QL SMEAR: ADEQUATE
POIKILOCYTOSIS BLD QL SMEAR: ABNORMAL
POTASSIUM SERPL-SCNC: 3.2 MEQ/L (ref 3.4–4.9)
PROT SERPL-MCNC: 5 G/DL (ref 6.3–8)
PROT UR-MCNC: 191 MG/DL
PROT/CREAT UR-RTO: 2.9 ML/ML
PROT/CREAT UR-RTO: 2.9 ML/ML (ref 0–0.2)
RBC # BLD AUTO: 3.43 M/UL (ref 4.7–6.1)
SLIDE REVIEW: ABNORMAL
SMUDGE CELLS BLD QL SMEAR: 16.5
SODIUM SERPL-SCNC: 134 MEQ/L (ref 135–144)
SODIUM UR-SCNC: 46 MEQ/L
VARIANT LYMPHS NFR BLD: 2 %
WBC # BLD AUTO: 2.8 K/UL (ref 4.8–10.8)

## 2024-07-17 PROCEDURE — 6370000000 HC RX 637 (ALT 250 FOR IP): Performed by: INTERNAL MEDICINE

## 2024-07-17 PROCEDURE — 80053 COMPREHEN METABOLIC PANEL: CPT

## 2024-07-17 PROCEDURE — 6360000002 HC RX W HCPCS: Performed by: INTERNAL MEDICINE

## 2024-07-17 PROCEDURE — 2580000003 HC RX 258: Performed by: INTERNAL MEDICINE

## 2024-07-17 PROCEDURE — 83735 ASSAY OF MAGNESIUM: CPT

## 2024-07-17 PROCEDURE — 85025 COMPLETE CBC W/AUTO DIFF WBC: CPT

## 2024-07-17 PROCEDURE — 99233 SBSQ HOSP IP/OBS HIGH 50: CPT | Performed by: INTERNAL MEDICINE

## 2024-07-17 PROCEDURE — 97535 SELF CARE MNGMENT TRAINING: CPT

## 2024-07-17 PROCEDURE — 97116 GAIT TRAINING THERAPY: CPT

## 2024-07-17 PROCEDURE — 84156 ASSAY OF PROTEIN URINE: CPT

## 2024-07-17 PROCEDURE — 99221 1ST HOSP IP/OBS SF/LOW 40: CPT | Performed by: PHYSICAL MEDICINE & REHABILITATION

## 2024-07-17 PROCEDURE — 1210000000 HC MED SURG R&B

## 2024-07-17 PROCEDURE — 84300 ASSAY OF URINE SODIUM: CPT

## 2024-07-17 PROCEDURE — 82570 ASSAY OF URINE CREATININE: CPT

## 2024-07-17 PROCEDURE — 36415 COLL VENOUS BLD VENIPUNCTURE: CPT

## 2024-07-17 RX ORDER — SODIUM CHLORIDE 9 MG/ML
INJECTION, SOLUTION INTRAVENOUS CONTINUOUS
Status: DISCONTINUED | OUTPATIENT
Start: 2024-07-17 | End: 2024-07-19

## 2024-07-17 RX ADMIN — Medication 10 ML: at 08:37

## 2024-07-17 RX ADMIN — TAMSULOSIN HYDROCHLORIDE 0.4 MG: 0.4 CAPSULE ORAL at 08:34

## 2024-07-17 RX ADMIN — AMPICILLIN SODIUM 1000 MG: 1 INJECTION, POWDER, FOR SOLUTION INTRAMUSCULAR; INTRAVENOUS at 14:22

## 2024-07-17 RX ADMIN — CARVEDILOL 25 MG: 25 TABLET, FILM COATED ORAL at 16:03

## 2024-07-17 RX ADMIN — AMPICILLIN SODIUM 1000 MG: 1 INJECTION, POWDER, FOR SOLUTION INTRAMUSCULAR; INTRAVENOUS at 23:23

## 2024-07-17 RX ADMIN — SACUBITRIL AND VALSARTAN 1 TABLET: 49; 51 TABLET, FILM COATED ORAL at 08:34

## 2024-07-17 RX ADMIN — Medication 10 ML: at 20:27

## 2024-07-17 RX ADMIN — CEFTRIAXONE SODIUM 1000 MG: 1 INJECTION, POWDER, FOR SOLUTION INTRAMUSCULAR; INTRAVENOUS at 03:00

## 2024-07-17 RX ADMIN — ACETAMINOPHEN 325MG 650 MG: 325 TABLET ORAL at 12:44

## 2024-07-17 RX ADMIN — VALACYCLOVIR 500 MG: 1 TABLET, FILM COATED ORAL at 20:27

## 2024-07-17 RX ADMIN — POTASSIUM CHLORIDE 40 MEQ: 1500 TABLET, EXTENDED RELEASE ORAL at 09:35

## 2024-07-17 RX ADMIN — SODIUM CHLORIDE: 9 INJECTION, SOLUTION INTRAVENOUS at 14:20

## 2024-07-17 RX ADMIN — ENOXAPARIN SODIUM 120 MG: 150 INJECTION SUBCUTANEOUS at 08:52

## 2024-07-17 RX ADMIN — CARVEDILOL 25 MG: 25 TABLET, FILM COATED ORAL at 08:36

## 2024-07-17 RX ADMIN — ENOXAPARIN SODIUM 120 MG: 150 INJECTION SUBCUTANEOUS at 20:27

## 2024-07-17 RX ADMIN — VALACYCLOVIR 500 MG: 1 TABLET, FILM COATED ORAL at 08:35

## 2024-07-17 ASSESSMENT — PAIN SCALES - GENERAL
PAINLEVEL_OUTOF10: 5
PAINLEVEL_OUTOF10: 0
PAINLEVEL_OUTOF10: 3
PAINLEVEL_OUTOF10: 0
PAINLEVEL_OUTOF10: 3
PAINLEVEL_OUTOF10: 0
PAINLEVEL_OUTOF10: 3

## 2024-07-17 ASSESSMENT — PAIN SCALES - WONG BAKER
WONGBAKER_NUMERICALRESPONSE: NO HURT

## 2024-07-17 ASSESSMENT — ENCOUNTER SYMPTOMS
DIARRHEA: 0
BLOOD IN STOOL: 0
CONSTIPATION: 0
TROUBLE SWALLOWING: 1
WHEEZING: 0
EYE REDNESS: 0
SHORTNESS OF BREATH: 0
RESPIRATORY NEGATIVE: 1
EYES NEGATIVE: 1
ABDOMINAL DISTENTION: 0
STRIDOR: 0
VOICE CHANGE: 1
EYE DISCHARGE: 0
PHOTOPHOBIA: 0
NAUSEA: 0

## 2024-07-17 ASSESSMENT — PAIN DESCRIPTION - LOCATION
LOCATION: GENERALIZED
LOCATION: GENERALIZED
LOCATION: KNEE

## 2024-07-17 ASSESSMENT — PAIN DESCRIPTION - ORIENTATION
ORIENTATION: LEFT
ORIENTATION: RIGHT;LEFT

## 2024-07-17 ASSESSMENT — PAIN - FUNCTIONAL ASSESSMENT: PAIN_FUNCTIONAL_ASSESSMENT: ACTIVITIES ARE NOT PREVENTED

## 2024-07-17 NOTE — CARE COORDINATION
[facility] Pre-Admission Screening Document      Patient Name: Km Garcia       MRN: [id number]    : 1945    Age: 78 y.o.  Gender: male   Payor: Payor: MEDICARE / Plan: MEDICARE PART A AND B / Product Type: *No Product type* /   MSSP: No    Admitted from: [facility] Floor: 1W  Attending Care Provider: Vishal Fox MD  Inpatient Rehab Referring Care Provider: Dr. Fox  Primary Care Provider: Dawood Darby MD  Inpatient Treatment Team including Consults: Treatment Team: Attending Provider: Vishal Fox MD; Consulting Physician: Shruthi Miles MD; Consulting Physician: Ralph Smith MD; Utilization Reviewer: Janina Mackenzie RN; Respiratory Therapist (Day): Mary Hudson RCP; Registered Nurse: Bernie Bella RN; : Vivian Frias RN; Patient Care Tech: Shantal Capellan; Consulting Physician: Durga Grewal MD    Reason for Hospitalization:   1. Syncope, unspecified syncope type    2. Peripheral edema    3. General weakness    4. Diarrhea, unspecified type    5. Renal insufficiency    6. Anticoagulated    7. Congestive heart failure, unspecified HF chronicity, unspecified heart failure type (HCC)      Chief Complaint   Patient presents with    Loss of Consciousness     While on the toilet, hx brain CA, new CHF,      Isolation:No active isolations    Hospital Course:  Admit Date: 7/15/2024  7:06 PM  Inpatient Rehab Referral Date: 2024  Narrative of hospital course/history of present illness: 78 y.o. male patient with recent complicated medical history who presented to [facility] ER 7/15 after experiencing a syncopal episode while on the toilet. Patient with recent initiation of Entresto and affected blood pressure. Additionally, patient with increased lower extremity edema. Patient admitted for additional medical management. Cardiology and Oncology consulted.     Cardiology:  Impression:   Syncope.  Unknown etiology,  (07/16/24 0958)  Quality of Gait: heavy UE support (07/16/24 0958)  Gait Deviations: Decreased step length;Decreased step height (07/16/24 0958)  Distance: pt took 5 steps from bed to bedside chair (07/16/24 0958)  Stairs:  Stairs/Curb  Stairs?: No (07/16/24 0958)  W/C mobility:         Occupational Therapy  Hand Dominance: Right  ADL  Feeding: Unable to assess (Comment) (07/16/24 1007)  Grooming: Setup;Supervision (07/16/24 1007)  UE Bathing: Moderate assistance (07/16/24 1007)  LE Bathing: Dependent/Total (07/16/24 1007)  UE Dressing: Moderate assistance (07/16/24 1007)  LE Dressing: Dependent/Total (07/16/24 1007)  Toileting: Dependent/Total (07/16/24 1007)  Additional Comments: ADL tasks completed/simulated as above stated. Pt limited by pain, decreased endurance and decreased strength on this date. (07/16/24 1007)  Toilet Transfers  Toilet Transfer: Unable to assess (07/16/24 1013)  Toilet Transfers Comments: anticipate 2 person max A (07/16/24 1013)     Shower Transfers  Shower Transfers: Not tested (07/16/24 1013)      Speech Language Pathology            Diet/Swallow:   Regular 4 carb choices                  Current Conditions Requiring Inpatient Rehabilitation  Bowel/Bladder Dysfunction: Yes  Intervention Required = Bowel program and Chronic Contreras care   Risk for Medical/Clinical Complications = high  Skin Healing/Breakdown Risk: Yes  Intervention Required = Side to side turns and monitor bruising to abdomen (on lovenox)  Risk for Medical/Clinical Complications = high  Nutrition/Hydration Deficiency: Yes  Intervention Required = Monitor I&Os, Check Labs, and Dietary Eval  Risk for Medical/Clinical Complications = high  Medical Comorbidities: Yes  Intervention Required = DVT risk, CAD, and SILVINA, HTN, CVA, SDH, PE  Risk for Medical/Clinical Complications = high    Rehab/Skilled Needs:   3 hours of Intensive Acute Rehab therapy daily, 5 days/week for a total of 900 minutes  PT Treatment Time:  1.5 hrs/day  OT

## 2024-07-17 NOTE — PROGRESS NOTES
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    Primary hypertension 02/21/2023    SOB (shortness of breath)     Surgical aftercare, genitourinary system     Gross hematuria     Bladder stones 12/10/2018        Past Medical History:   Diagnosis Date    AMD (age related macular degeneration)     left eye only, gets steroid shots    Asthma     seasonally, uses inhaler as needed    Athscl heart disease of native coronary artery w/o ang pctrs 1/1/2024    Cerebrovascular accident (CVA) (HCC) 4/11/2024    Charcot ankle, left     wears brace    Diabetes mellitus (HCC)     takes metformin    Hypertension     on meds > 10 yrs    Osteoarthritis      Past Surgical History:   Procedure Laterality Date    COLONOSCOPY      > 30 yrs ago    LITHOTRIPSY N/A 12/12/2018    WITH HOLMIUM LASER performed by Antonio Petit MD at [facility] OR       Chart Reviewed: Yes    Restrictions:  Restrictions/Precautions: Fall Risk    SUBJECTIVE:   Subjective: I have pain in my legs.    Pain  Pain: 8/10 Pre and post session B knee L>R; nursing notified.     OBJECTIVE:   Orientation  Overall Orientation Status: Within Functional Limits  Cognition  Overall Cognitive Status: WFL    Bed mobility  Rolling to Left: Minimal assistance  Rolling to Right: Minimal assistance  Supine to Sit: Maximum assistance;2 Person assistance  Sit to Supine: Maximum assistance;2 Person assistance  Bed Mobility Comments: HOB slightly elevated to simulate home environment. vc's for hand placement and sequencing. Increased time and effort to complete. Heavy trunk lifting assist. Pt able to hold midline balance    Transfers  Sit to Stand: Minimal Assistance;Moderate Assistance  Stand to Sit: Minimal Assistance;Moderate Assistance  Comment: vc's for hand placement and technique with WW. Good follow through. Pt had a BM and returned to supine for patient care.    Ambulation  Surface: Level tile  Device: Rolling  performs 25% of the activity; assistance is required to complete the activity  Dependent = pt requires total physical assistance to accomplish the task

## 2024-07-17 NOTE — CONSULTS
[facility] Nephrology  Consult Note           Reason for Consult:  silvina  Requesting Physician:  Dr. Fox    Chief Complaint:  sycope  History Obtained From:  patient, electronic medical record    History of Present Ilness:    78 y.o. year old male admitted with syncope yesterday.  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  Home meds include lasix, hydralazine, coreg, jardiance entresto and revilmid.  He had severe SILVINA at [facility] in May of this year that was thought to be due to methotrexate treated with IVF /hco3 and glucarpidase.  Had resolved SILVINA.  He has been ca dependant for several months.  Just recently received 5th cycle of chemo.  Took his first dose of entresto Monday and had low bp from this.  Cr was 1.3 yesterday.  Sig worse today.  Wife says pt on fluid restriction here and feels thirsty.  Has some swelling but better than before    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 1/1/2024    Cerebrovascular accident (CVA) (HCC) 4/11/2024    Charcot ankle, left     wears brace    Diabetes mellitus (HCC)     takes metformin    Hypertension     on meds > 10 yrs    Osteoarthritis        Past Surgical History:        Procedure Laterality Date    COLONOSCOPY      > 30 yrs ago    LITHOTRIPSY N/A 12/12/2018    WITH HOLMIUM LASER performed by Antonio Petit MD at [facility] OR       Home Medications:    No current facility-administered medications on file prior to encounter.     Current Outpatient Medications on File Prior to Encounter   Medication Sig Dispense Refill    acetaminophen (TYLENOL) 500 MG tablet Take 1 tablet by mouth as needed for Pain      albuterol (PROVENTIL) (2.5 MG/3ML) 0.083% nebulizer solution Take 3 mLs by nebulization as needed for Wheezing      carvedilol (COREG) 25 MG tablet  cranioplasty over the right frontal defect. There is gliosis and hemosiderin staining along the prior ventriculostomy catheter tract. 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 No new intracranial mass. Small amount of intrinsic T1 hyperintensity with associated susceptibility artifact in the bilateral thalami likely representing blood products or mineralization/calcification. The white matter is within normal limits of signal intensity for age. Ventricles and Sulci: Enlargement of the lateral and third ventricles, grossly unchanged from CT dated 5/31/2024. The fourth ventricle is not enlarged. Mild periventricular T2/flair hyperintensity. Skull Base: Hypothalamic and pituitary region are grossly normal. Craniocervical junction is normal. No marrow replacement process. Vasculature: Major intracranial vessels have normal flow voids suggesting patency. 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 Extracranial structures: There is mild paranasal sinus mucosal thickening. The mastoid air cells are clear. The orbits and extracranial soft tissues are unremarkable. IMPRESSION: 1.  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 No new intracranial mass. 2.  Persistent mild hydrocephalus. The ventricular caliber is grossly unchanged since 5/31/2024. 3.  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 MACRO: None      Assessment/  77 yo man with resolved SILVINA before that was thought to be due to methotrexate couple months ago at [facility].  Has DM, HTN, CHF EF 45-50%.  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  Currently on revilimid and imbruvica.  Has syncope after getting started on entresto.  SILVINA possible ATN now.      Plan/  1- jardiance on hold.  Will hold entresto as well  2- renal u/s  3- IVF.    4. On abx for UTI  5. Check imaging of kidneys  6. Quantify proteinuria    Thank you for the consultation.  Please do not hesitate to call with questions.    Durga

## 2024-07-17 NOTE — PROGRESS NOTES
Hospitalist Progress Note      Date of Admission: 7/15/2024  Chief Complaint:    Chief Complaint   Patient presents with    Loss of Consciousness     While on the toilet, hx brain CA, new CHF,      Subjective:  No new complaints.  No nausea, vomiting, chest pain, or headache      Medications:    Infusion Medications    sodium chloride      sodium chloride      dextrose       Scheduled Medications    ampicillin IV  1,000 mg IntraVENous 3 times per day    carvedilol  25 mg Oral BID WC    enoxaparin  1 mg/kg SubCUTAneous BID    [Held by provider] sacubitril-valsartan  1 tablet Oral BID    tamsulosin  0.4 mg Oral Daily    valACYclovir  500 mg Oral BID    sodium chloride flush  5-40 mL IntraVENous 2 times per day    insulin lispro  0-8 Units SubCUTAneous TID WC    insulin lispro  0-4 Units SubCUTAneous Nightly    [Held by provider] furosemide  20 mg IntraVENous BID     PRN Meds: sodium chloride flush, sodium chloride, potassium chloride **OR** potassium alternative oral replacement **OR** potassium chloride, magnesium sulfate, ondansetron **OR** ondansetron, melatonin, polyethylene glycol, acetaminophen **OR** acetaminophen, glucose, dextrose bolus **OR** dextrose bolus, glucagon (rDNA), dextrose    Intake/Output Summary (Last 24 hours) at 7/17/2024 1409  Last data filed at 7/17/2024 1315  Gross per 24 hour   Intake 840 ml   Output 550 ml   Net 290 ml     Exam:  BP (!) 124/48   Pulse 72   Temp 99.6 °F (37.6 °C) (Oral)   Resp 16   Ht 1.854 m (6' 0.99\")   Wt 116.6 kg (257 lb)   SpO2 99%   BMI 33.91 kg/m²   Head: Normocephalic, atraumatic  Sclera clear  Neck JVD flat  Lungs: normal effort of breathing    Labs:   Recent Labs     07/15/24  1930 07/16/24  0507 07/17/24  0508   WBC 2.8* 2.8* 2.8*   HGB 10.0* 10.8* 11.0*   HCT 30.1* 31.4* 31.9*    216 250     Recent Labs     07/15/24  1930 07/16/24  0507 07/17/24  0508    138 134*   K 3.6 3.1* 3.2*    103 102   CO2 23 23 19*   BUN 16 16 29*   CREATININE

## 2024-07-17 NOTE — CONSULTS
Hematology/Oncology Consult  Encounter Date: 2024 10:48 PM    Mr. Km Garcia is a 78 y.o. male  : 1945  MRN: [id number]  Acct Number: [id number]  Requesting Provider: Vishal Fox MD    Reason for request: CNS lymphoma      CONSULTANT: Shruthi Miles MD    HPI:     78 y.o. male with a history of diabetes, hypertension, heart failure with low ejection fraction, PE, CNS lymphoma on chemo, brain bleed, presents with syncope.      Pt has been receiving cancer treatment at [facility]. He has large B cell CNS lymphoma diagnosed 2024 initially treated with CHOP, converted to converted to Linalidomide and Ibrutinib after he developed nephrotoxicity to Methotrexate. His last treatment was about a week ago. Recent MRI brain showed DLBCL lesions are no longer available. Also patient had DVT and PE in May 2024. He was initially on Eliquis when he developed brain bleed and he was switched to Lovenox after.     REVIEW OF SYSTEMS:  no fevers, chills, cp, sob, n/v, ha, vision/hearing changes, wt changes, hot/cold flashes, other open skin lesions, diarrhea, constipation, dysuria/hematuria unless noted in HPI. Complete ROS performed with the patient and is otherwise negative.        PMH:  Past Medical History        Past Medical History:   Diagnosis Date    AMD (age related macular degeneration)       left eye only, gets steroid shots    Asthma       seasonally, uses inhaler as needed    Charcot ankle, left       wears brace    Diabetes mellitus (HCC)       takes metformin    Hypertension       on meds > 10 yrs            Surgical History:  Past Surgical History         Past Surgical History:   Procedure Laterality Date    COLONOSCOPY         > 30 yrs ago    LITHOTRIPSY N/A 2018     WITH HOLMIUM LASER performed by Antonio Petit MD at [facility] OR            Medications Prior to Admission:    Home Medications        Social History:    reports that he quit smoking about 34 years ago. His smoking use included

## 2024-07-17 NOTE — CARE COORDINATION
Inpatient Rehab referral received. Met with patient and wife at bedside to discuss [facility] Inpatient rehab. I reviewed and explained [facility] Inpatient Rehab program and requirements, including 3 hours of intense therapy daily, anticipated length of stay, weekly team meetings and goal of discharge to home. Wife able to provide recent extensive history in regard's to patient's health status over the last few months. Patient is from home with wife who is a retired RN. Overall, patient recently able to manage self and care with minimal exceptions and had been participating in home therapy from [facility]. Wife reports patient is very motivated to participate in therapy and hopeful meet necessary goals to return home with her support. Freedom of choice provided and patient and wife request admit to [facility] Re[facility]. Patient does have ca that was placed while patient was at [facility] during one his stays- it was last changed 6/28 and is to be changed monthly. Patient has had x3 prior voiding trials and ca had to be replaced each time. Wife additionally reports with recent start of entresto, a BMP was requested by patient's primary cardiologist to follow liver enzymes. Advised ca can be changed as well as lab work can be completed even while on rehab. Master bedroom upstairs but patient able to stay on main floor after enter four steps with HR to enter home. Bedroom and full shower/ bathroom set up on first floor.  Electronically signed by Cesia Lee on 7/17/2024 at 12:24 PM

## 2024-07-17 NOTE — CARE COORDINATION
[facility] Pre-Admission Screening Document ADDENDUM-See also PAS completed on 7/17/2024      Hospital Course: 78 y.o. male patient with recent complicated medical history who presented to [facility] ER 7/15 after experiencing a syncopal episode while on the toilet. Patient with recent initiation of Entresto and affected blood pressure. Additionally, patient with increased lower extremity edema. Patient admitted for additional medical management. Cardiology and Oncology consulted.   Medical status/changes: Transfer to ARU held d/t awaiting medical clearance    Cardiology:  Impression:   Syncope.  Unknown etiology, however orthostatics positive this admission  Newly found atrial fibrillation on 7/18/2024  Cardiomyopathy EF 35% by TTE 5/2024 at [facility], operated by [facility].  Condition thought to be related to chemotherapy induced, EF now improved to 45 to 50% by TTE 7/16/2024  Diabetes  Hypertension  Hyperlipidemia  large B cell CNS lymphoma diagnosed March 2024 initially treated with CHOP, converted to converted to Linalidomide and Ibrutinib after he developed nephrotoxicity to Methotrexate.    Right lower extremity DVT and bilateral PE May 16, 2024 on Lovenox,   left charcoaled foot,  Urinary retention  TTE 7/16/2024 EF 45 to 50% without major valvular disease  Plan:   Continue telemetry  Perform orthostatics twice daily  Switch Coreg to metoprolol due to borderline low blood pressure  Hold Jardiance and Entresto secondary to worsening kidney function  Diuresis as per nephrology  Please keep potassium between 4 and 5 magnesium above 2  Please keep hemoglobin above 8 and place above 50  Ischemic evaluation as an outpatient  Cardiac wise patient stable    7/17/2024  Patient laying in bed looks comfortable  Denies chest pain or shortness of breath  Telemetry sinus rhythm no arrhythmias  Yesterday's echocardiogram EF 45 to 50% no major valvular disease  Kidney function worsening  Per nursing staff  thickness. Normal wall motion.   Right Ventricle: Normal systolic function.   Tricuspid Valve: Mild regurgitation. Normal RVSP. The estimated RVSP is 25 mmHg.   Pericardium: No pericardial effusion.   Image quality is suboptimal.     CT Head W/O Contrast  Result Date: 7/15/2024  No acute intracranial abnormality.     XR CHEST PORTABLE  Result Date: 7/15/2024  No acute process.     MRI BRAIN W WO CONTRAST  Result Date: 7/8/2024  IMPRESSION: 1.  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 No new intracranial mass. 2.  Persistent mild hydrocephalus. The ventricular caliber is grossly unchanged since 5/31/2024. 3.  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 MACRO: None    US of kidneys:IMPRESSION:  Simple cyst on the kidneys bilaterally.  There is mild dilatation seen in the  renal pelvis sees bilaterally.  No evidence of renal stones.     Incidental sludge identified within the gallbladder    US of lower extremities:IMPRESSION:  1. No evidence of DVT in the bilateral lower extremities.  2. Complex Baker's cyst in the left popliteal fossa      Medications/IV's:  The patient is currently on lovenox for DVT prophylaxis.     Scheduled:    morphine, 2 mg, IntraVENous, Once    metoprolol tartrate, 50 mg, Oral, BID    ampicillin IV, 2,000 mg, IntraVENous, 2 times per day    enoxaparin, 1 mg/kg, SubCUTAneous, BID    [Held by provider] sacubitril-valsartan, 1 tablet, Oral, BID    tamsulosin, 0.4 mg, Oral, Daily    valACYclovir, 500 mg, Oral, BID    sodium chloride flush, 5-40 mL, IntraVENous, 2 times per day    insulin lispro, 0-8 Units, SubCUTAneous, TID WC    insulin lispro, 0-4 Units, SubCUTAneous, Nightly    [Held by provider] furosemide, 20 mg, IntraVENous, BID    PRN:  aluminum & magnesium hydroxide-simethicone, sodium chloride flush, sodium chloride, potassium chloride **OR** potassium alternative oral replacement **OR** potassium chloride, magnesium sulfate, ondansetron **OR** ondansetron,  melatonin, polyethylene glycol, acetaminophen **OR** acetaminophen, glucose, dextrose bolus **OR** dextrose bolus, glucagon (rDNA), dextrose    Allergies:  Allergies   Allergen Reactions    Celecoxib      Other reaction(s): GI Upset    Naproxen      Other reaction(s): GI Upset         Most Recent Vitals, Height and Weight  BP (!) 115/41   Pulse 87   Temp 97.2 °F (36.2 °C) (Oral)   Resp 17   Ht 1.854 m (6' 0.99\")   Wt 116.6 kg (257 lb)   SpO2 99%   BMI 33.91 kg/m²     Weight Bearing Restrictions: None       Current Diet Order: ADULT DIET; Regular; 4 carb choices (60 gm/meal)  ADULT ORAL NUTRITION SUPPLEMENT; Lunch, Dinner; Diabetic Oral Supplement    Skin: Bruising to abdomen/ Swelling to BLE   Wound Care Documentation:          Lungs:   Respiratory  Respiratory Pattern: Regular  Respiratory Depth: Normal  Respiratory Quality/Effort: Unlabored  Chest Assessment: Chest expansion symmetrical  L Breath Sounds: Clear, Diminished  R Breath Sounds: Clear, Diminished  Level of Activity/Mobility: Up with assist  Breath Sounds  Respiratory Pattern: Regular      Cognition and Behavior:  Language Preference (if other than English):      Alertness/Behavior  Neuro (WDL): Within Defined Limits  Level of Consciousness: Alert (0)  History of Falling: No      Short Term Memory Deficits     History of Falling: No    Safety          CURRENT FUNCTIONAL LEVEL:  Physical Therapy  Bed mobility:  Bed mobility  Rolling to Left: Minimal assistance (07/17/24 1420)  Rolling to Right: Minimal assistance (07/17/24 1420)  Supine to Sit: Maximum assistance;2 Person assistance (07/17/24 1420)  Sit to Supine: Maximum assistance;2 Person assistance (07/17/24 1420)  Bed Mobility Comments: HOB slightly elevated to simulate home environment. vc's for hand placement and sequencing. Increased time and effort to complete. Heavy trunk lifting assist. Pt able to hold midline balance (07/17/24 1420)  Transfers:  Transfers  Sit to Stand: Minimal

## 2024-07-17 NOTE — CONSULTS
Physical Medicine & Rehabilitation  Consult Note      Admitting Physician: Vishal Fox MD    Primary Care Provider: Dawood Darby MD     Reason for Consult:  Asses rehab needs, promote physical and mental function, analyze level of care to determine rehab needs, improve ability to actively participate in the rehabilitation process, and decrease likelihood of re-admit to the hospital after discharge.      History of Present Illness:    Km Garcia is a 78 y.o. male admitted to [facility] on 7/15/2024.     Patient was admitted to [facility] on 7/15/2024 after having a syncopal episode at home.  He was recently started on Entresto which may have caused some orthostasis.      He has a complicated medical history with a recent decline in function and medical status over the past 3 to 4 months.  Unfortunately he was diagnosed with lymphoma--with metastases to brain cancer he had surgery.  He was at [facility] rehab for 3 weeks.  He was discharged home in order to have his chemotherapy but and after the second round of chemo he went into kidney failure.  Care was also complicated by a PE in March and a spontaneous brain bleed.    He had presented to [facility] in [place] for the PE and spontaneous   brain bleed but was life flighted to [facility].    He had recovered from that was recovering at home when he fell.  Fortunately his wife is an RN and able to watch over him.  Also. fortunate for him but oncologist is saying that the lymphoma has likely reached remission.  The brain tumors however are still there and are inoperable.    Fatigue  This is a recurrent problem. The current episode started in the past 7 days. The problem occurs constantly. The problem has been unchanged. Associated symptoms include anorexia, arthralgias, fatigue, headaches, numbness and weakness. Pertinent negatives include no chills, congestion, diaphoresis, fever, joint swelling, myalgias or nausea.         I reviewed  consideration of speech and language pathology, recreational therapy, nutritional services, and a rehabilitation .  I feel that it is reasonable to plan for a discharge to home setting after acute rehab.         Specialized nursing care to focus on:  Bowel and bladder issues-Monitor for urinary retention-check PVRs, bladder scan--cath if no void.  Wound risk and management   -pressure relief protocols-side to side turns  IVF medication administration      Monitor endurance and if necessary spread therapy out over a 7-day window-adding scheduled rest breaks when needed.  Focus on energy conservation.  Monitor heart rate and   cardiac medications effects on heart rate and blood pressure before, during and after therapy.  Progress toward endurance training with pulse ox monitoring for saturation and heart rate.    monitoring for dysphagia-- Improve hydration and nutrition by adding Vitamin B12 shot times one, adding Protein supplements and push PO fluids.    Treat and monitor for higher level cognitive deficits, focus on difficulty with sequencing and problem-solving.    Focus on higher-level balance and falls risk issues focusing on balance training and monitoring for orthostasis.      Above recommendations are indicated to address medical complexity and need for appropriate rehab services.  Will tailor individual care and rehab plan per individuals needs re control pain consider trialing Ultram half dose for left knee pain.    Focus of today's plan-transition to acute rehab once cleared medically      Required Certification Data (potential inpatient rehabilitation facility patient's only)    Deficits:weakness, nutrition, mobility, impaired gait, high risk for falls, decreased endurance, deconditioning , debility, balance, ADL's, adjustment to disability, pain limiting function, and balance and righting reactions    Disability:mobility, locomotion, self care, and cognitive    Potential barriers to  progress/discharge:complex medical conditions and severe pain         It was my pleasure to evaluate Km Matosbishop today.  Please call [phone] with questions.    Estephania Mao DO     FAAPMR

## 2024-07-17 NOTE — PROGRESS NOTES
Cardiology progress note      Patient Name: Km Garcia  Admit Date: 7/15/2024  7:06 PM  MR #: [id number]  : 1945    Attending Physician: Vishal Fox MD  Reason for consult: Syncope    History of Presenting Illness:      Km Garcia is a 78 y.o. male on hospital day 0 with a history of cardiomyopathy EF 35% by TTE 2024 (cardiomyopathy thought to be related to chemotherapy induced), diabetes, paroxysmal atrial tachycardia, hypertension, hyperlipidemia, large B cell CNS lymphoma diagnosed 2024 initially treated with CHOP, converted to converted to Linalidomide and Ibrutinib after he developed nephrotoxicity to Methotrexate.  Right lower extremity DVT and bilateral PE May 16, 2024 on Lovenox, left charcoaled foot, who came to the hospital after sustaining a syncopal episode.  History Obtained From:  patient, electronic medical record    Per patient, he was sitting in the toilet when he passed out wife witness event  No prodrome like symptoms  Patient was having diarrhea before syncopal episode  Denies chest pain or shortness of breath  EKG sinus rhythm with nonspecific T wave abnormality lateral leads, LVH, PACs,  Orthostatics positive  ===============  Hospital course  2024  Patient laying in bed looks comfortable  Denies chest pain or shortness of breath  Telemetry sinus rhythm no arrhythmias  Yesterday's echocardiogram EF 45 to 50% no major valvular disease  Kidney function worsening  Per nursing staff patient noted with urinary retention  Plan discussed with a.mGerald nurse    History:      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    Cerebrovascular accident (CVA) (HCC) 2024    Charcot ankle, left     wears brace    Diabetes mellitus (HCC)     takes metformin    Hypertension     on meds  chemo capsule, Take 1 capsule by mouth daily  ondansetron (ZOFRAN-ODT) 4 MG disintegrating tablet, Take 1 tablet by mouth every 12 hours as needed for Nausea or Vomiting  sacubitril-valsartan (ENTRESTO) 49-51 MG per tablet, Take 1 tablet by mouth 2 times daily  Dyclonine HCl (SUCRETS SORE THROAT) 2 MG LOZG, Take 1 tablet by mouth every 2 hours as needed  valACYclovir (VALTREX) 500 MG tablet, Take 1 tablet by mouth 2 times daily  vitamin D (ERGOCALCIFEROL) 1.25 MG (73055 UT) CAPS capsule, Take 1 capsule by mouth once a week  zinc oxide (DESITIN) 40 % ointment, Apply topically as needed for Dry Skin Apply topically as needed.  albuterol sulfate HFA (PROAIR HFA) 108 (90 Base) MCG/ACT inhaler,   tamsulosin (FLOMAX) 0.4 MG capsule, TAKE ONE CAPSULE BY MOUTH EVERY DAY    Current Hospital Medications:   Scheduled Meds:   ampicillin IV  1,000 mg IntraVENous 3 times per day    carvedilol  25 mg Oral BID WC    enoxaparin  1 mg/kg SubCUTAneous BID    [Held by provider] sacubitril-valsartan  1 tablet Oral BID    tamsulosin  0.4 mg Oral Daily    valACYclovir  500 mg Oral BID    sodium chloride flush  5-40 mL IntraVENous 2 times per day    insulin lispro  0-8 Units SubCUTAneous TID WC    insulin lispro  0-4 Units SubCUTAneous Nightly    [Held by provider] furosemide  20 mg IntraVENous BID     Continuous Infusions:   sodium chloride      sodium chloride      dextrose       PRN Meds:.sodium chloride flush, sodium chloride, potassium chloride **OR** potassium alternative oral replacement **OR** potassium chloride, magnesium sulfate, ondansetron **OR** ondansetron, melatonin, polyethylene glycol, acetaminophen **OR** acetaminophen, glucose, dextrose bolus **OR** dextrose bolus, glucagon (rDNA), dextrose   sodium chloride      sodium chloride      dextrose        Allergies:     Allergies   Allergen Reactions    Celecoxib      Other reaction(s): GI Upset    Naproxen      Other reaction(s): GI Upset      Review of Systems:       [x] CV,  23 19*   PHOS 2.9  --   --    BUN 16 16 29*   CREATININE 1.23* 1.27* 2.75*       Recent Labs     07/15/24  1930   INR 1.3       No results found for this or any previous visit.     Impression:   Syncope.  Unknown etiology, however orthostatics positive this admission  Cardiomyopathy EF 35% by TTE 5/2024 at [facility].  Condition thought to be related to chemotherapy induced, EF now improved to 45 to 50% by TTE 7/16/2024  Diabetes  Hypertension  Hyperlipidemia  large B cell CNS lymphoma diagnosed March 2024 initially treated with CHOP, converted to converted to Linalidomide and Ibrutinib after he developed nephrotoxicity to Methotrexate.    Right lower extremity DVT and bilateral PE May 16, 2024 on Lovenox,   left charcoaled foot,  Urinary retention  Plan:   Continue telemetry  Perform orthostatics twice daily  Continue Coreg   Hold Jardiance and Entresto secondary to worsening kidney function  Diuresis as per nephrology  Please keep potassium between 4 and 5 magnesium above 2  Please keep hemoglobin above 8 and place above 50  Ischemic evaluation as an outpatient  Cardiac wise patient stable  Comments:     Thank you for allowing us to participate in the care of this patient.     Will continue to follow.    Please call if questions or concerns arise.    Electronically signed by Ralph Smith MD on 7/17/2024 at 2:16 PM    Please note this report has been partially produced using speech recognition software and may cause contain errors related to that system including grammar, punctuation and spelling as well as words and phrases that may seem inappropriate. If there are questions or concerns please feel free to contact me to clarify.

## 2024-07-18 ENCOUNTER — APPOINTMENT (OUTPATIENT)
Dept: ULTRASOUND IMAGING | Age: 79
DRG: 698 | End: 2024-07-18
Payer: MEDICARE

## 2024-07-18 LAB
ACANTHOCYTES BLD QL SMEAR: ABNORMAL
ALBUMIN SERPL-MCNC: 2.3 G/DL (ref 3.5–4.6)
ALP SERPL-CCNC: 44 U/L (ref 35–104)
ALT SERPL-CCNC: 14 U/L (ref 0–41)
ANION GAP SERPL CALCULATED.3IONS-SCNC: 16 MEQ/L (ref 9–15)
ANISOCYTOSIS BLD QL SMEAR: ABNORMAL
AST SERPL-CCNC: 11 U/L (ref 0–40)
BACTERIA UR CULT: ABNORMAL
BACTERIA UR CULT: ABNORMAL
BASOPHILS # BLD: 0.1 K/UL (ref 0–0.2)
BASOPHILS NFR BLD: 3 %
BILIRUB SERPL-MCNC: 0.3 MG/DL (ref 0.2–0.7)
BUN SERPL-MCNC: 44 MG/DL (ref 8–23)
CALCIUM SERPL-MCNC: 7.7 MG/DL (ref 8.5–9.9)
CHLORIDE SERPL-SCNC: 98 MEQ/L (ref 95–107)
CK SERPL-CCNC: 8 U/L (ref 0–190)
CO2 SERPL-SCNC: 17 MEQ/L (ref 20–31)
CREAT SERPL-MCNC: 4.68 MG/DL (ref 0.7–1.2)
EOSINOPHIL # BLD: 0.6 K/UL (ref 0–0.7)
EOSINOPHIL NFR BLD: 23 %
ERYTHROCYTE [DISTWIDTH] IN BLOOD BY AUTOMATED COUNT: 12.9 % (ref 11.5–14.5)
GLOBULIN SER CALC-MCNC: 2.7 G/DL (ref 2.3–3.5)
GLUCOSE BLD-MCNC: 161 MG/DL (ref 70–99)
GLUCOSE BLD-MCNC: 204 MG/DL (ref 70–99)
GLUCOSE BLD-MCNC: 221 MG/DL (ref 70–99)
GLUCOSE SERPL-MCNC: 153 MG/DL (ref 70–99)
HCT VFR BLD AUTO: 29.3 % (ref 42–52)
HGB BLD-MCNC: 9.9 G/DL (ref 14–18)
LYMPHOCYTES # BLD: 1 K/UL (ref 1–4.8)
LYMPHOCYTES NFR BLD: 34 %
MAGNESIUM SERPL-MCNC: 1.6 MG/DL (ref 1.7–2.4)
MCH RBC QN AUTO: 31.4 PG (ref 27–31.3)
MCHC RBC AUTO-ENTMCNC: 33.8 % (ref 33–37)
MCV RBC AUTO: 93 FL (ref 79–92.2)
METAMYELOCYTES NFR BLD MANUAL: 5 %
MONOCYTES # BLD: 0.4 K/UL (ref 0.2–0.8)
MONOCYTES NFR BLD: 16.2 %
MYELOCYTES NFR BLD MANUAL: 1 %
NEUTROPHILS # BLD: 0.5 K/UL (ref 1.4–6.5)
NEUTS BAND NFR BLD MANUAL: 1 %
NEUTS SEG NFR BLD: 12 %
ORGANISM: ABNORMAL
OVALOCYTES BLD QL SMEAR: ABNORMAL
PATH INTERP BLD-IMP: YES
PERFORMED ON: ABNORMAL
PLATELET # BLD AUTO: 239 K/UL (ref 130–400)
PLATELET BLD QL SMEAR: ADEQUATE
POIKILOCYTOSIS BLD QL SMEAR: ABNORMAL
POTASSIUM SERPL-SCNC: 3.5 MEQ/L (ref 3.4–4.9)
PROT SERPL-MCNC: 5 G/DL (ref 6.3–8)
RBC # BLD AUTO: 3.15 M/UL (ref 4.7–6.1)
SMUDGE CELLS BLD QL SMEAR: 19.2
SODIUM SERPL-SCNC: 131 MEQ/L (ref 135–144)
URATE SERPL-MCNC: 5.7 MG/DL (ref 3.4–7)
VARIANT LYMPHS NFR BLD: 4 %
WBC # BLD AUTO: 2.6 K/UL (ref 4.8–10.8)

## 2024-07-18 PROCEDURE — 99233 SBSQ HOSP IP/OBS HIGH 50: CPT | Performed by: INTERNAL MEDICINE

## 2024-07-18 PROCEDURE — 84550 ASSAY OF BLOOD/URIC ACID: CPT

## 2024-07-18 PROCEDURE — 6360000002 HC RX W HCPCS: Performed by: INTERNAL MEDICINE

## 2024-07-18 PROCEDURE — 36415 COLL VENOUS BLD VENIPUNCTURE: CPT

## 2024-07-18 PROCEDURE — 2580000003 HC RX 258: Performed by: INTERNAL MEDICINE

## 2024-07-18 PROCEDURE — 82550 ASSAY OF CK (CPK): CPT

## 2024-07-18 PROCEDURE — 86038 ANTINUCLEAR ANTIBODIES: CPT

## 2024-07-18 PROCEDURE — 86160 COMPLEMENT ANTIGEN: CPT

## 2024-07-18 PROCEDURE — 6370000000 HC RX 637 (ALT 250 FOR IP): Performed by: INTERNAL MEDICINE

## 2024-07-18 PROCEDURE — 83735 ASSAY OF MAGNESIUM: CPT

## 2024-07-18 PROCEDURE — 93970 EXTREMITY STUDY: CPT

## 2024-07-18 PROCEDURE — 86255 FLUORESCENT ANTIBODY SCREEN: CPT

## 2024-07-18 PROCEDURE — 85025 COMPLETE CBC W/AUTO DIFF WBC: CPT

## 2024-07-18 PROCEDURE — 76775 US EXAM ABDO BACK WALL LIM: CPT

## 2024-07-18 PROCEDURE — 1210000000 HC MED SURG R&B

## 2024-07-18 PROCEDURE — 99232 SBSQ HOSP IP/OBS MODERATE 35: CPT | Performed by: PHYSICAL MEDICINE & REHABILITATION

## 2024-07-18 PROCEDURE — 80053 COMPREHEN METABOLIC PANEL: CPT

## 2024-07-18 RX ORDER — METHYLPREDNISOLONE SODIUM SUCCINATE 125 MG/2ML
80 INJECTION, POWDER, LYOPHILIZED, FOR SOLUTION INTRAMUSCULAR; INTRAVENOUS ONCE
Status: COMPLETED | OUTPATIENT
Start: 2024-07-18 | End: 2024-07-18

## 2024-07-18 RX ORDER — MAGNESIUM HYDROXIDE/ALUMINUM HYDROXICE/SIMETHICONE 120; 1200; 1200 MG/30ML; MG/30ML; MG/30ML
30 SUSPENSION ORAL EVERY 6 HOURS PRN
Status: DISCONTINUED | OUTPATIENT
Start: 2024-07-18 | End: 2024-07-25 | Stop reason: HOSPADM

## 2024-07-18 RX ORDER — MORPHINE SULFATE 2 MG/ML
2 INJECTION, SOLUTION INTRAMUSCULAR; INTRAVENOUS ONCE
Status: DISCONTINUED | OUTPATIENT
Start: 2024-07-18 | End: 2024-07-23

## 2024-07-18 RX ORDER — METOPROLOL TARTRATE 50 MG/1
50 TABLET, FILM COATED ORAL 2 TIMES DAILY
Status: DISCONTINUED | OUTPATIENT
Start: 2024-07-18 | End: 2024-07-21

## 2024-07-18 RX ORDER — FUROSEMIDE 10 MG/ML
80 INJECTION INTRAMUSCULAR; INTRAVENOUS ONCE
Status: COMPLETED | OUTPATIENT
Start: 2024-07-18 | End: 2024-07-18

## 2024-07-18 RX ADMIN — TAMSULOSIN HYDROCHLORIDE 0.4 MG: 0.4 CAPSULE ORAL at 10:01

## 2024-07-18 RX ADMIN — Medication 10 ML: at 10:02

## 2024-07-18 RX ADMIN — ACETAMINOPHEN 325MG 650 MG: 325 TABLET ORAL at 10:01

## 2024-07-18 RX ADMIN — VALACYCLOVIR 500 MG: 1 TABLET, FILM COATED ORAL at 10:01

## 2024-07-18 RX ADMIN — INSULIN LISPRO 2 UNITS: 100 INJECTION, SOLUTION INTRAVENOUS; SUBCUTANEOUS at 17:09

## 2024-07-18 RX ADMIN — ENOXAPARIN SODIUM 120 MG: 150 INJECTION SUBCUTANEOUS at 10:01

## 2024-07-18 RX ADMIN — FUROSEMIDE 80 MG: 10 INJECTION, SOLUTION INTRAMUSCULAR; INTRAVENOUS at 17:04

## 2024-07-18 RX ADMIN — SODIUM CHLORIDE: 9 INJECTION, SOLUTION INTRAVENOUS at 04:18

## 2024-07-18 RX ADMIN — METOPROLOL TARTRATE 50 MG: 50 TABLET, FILM COATED ORAL at 21:53

## 2024-07-18 RX ADMIN — AMPICILLIN SODIUM 1000 MG: 1 INJECTION, POWDER, FOR SOLUTION INTRAMUSCULAR; INTRAVENOUS at 13:59

## 2024-07-18 RX ADMIN — ENOXAPARIN SODIUM 120 MG: 150 INJECTION SUBCUTANEOUS at 21:55

## 2024-07-18 RX ADMIN — METHYLPREDNISOLONE SODIUM SUCCINATE 80 MG: 125 INJECTION INTRAMUSCULAR; INTRAVENOUS at 21:55

## 2024-07-18 RX ADMIN — VALACYCLOVIR 500 MG: 1 TABLET, FILM COATED ORAL at 21:53

## 2024-07-18 RX ADMIN — Medication 10 ML: at 21:40

## 2024-07-18 RX ADMIN — AMPICILLIN SODIUM 1000 MG: 1 INJECTION, POWDER, FOR SOLUTION INTRAMUSCULAR; INTRAVENOUS at 05:46

## 2024-07-18 ASSESSMENT — PAIN SCALES - GENERAL
PAINLEVEL_OUTOF10: 0

## 2024-07-18 ASSESSMENT — PAIN SCALES - WONG BAKER

## 2024-07-18 NOTE — PROGRESS NOTES
Subjective:  The patient complains of severe acute on chronic progressive fatigue and EL, lower extremity pain and swelling partially relieved by rest, PT, OT and meds and IV fluids and exacerbated by exertion and recent treatment with Entresto and IV chemotherapy for lymphoma--> with recent renal failure.      Patient was admitted to [facility] on 7/15/2024 after having a syncopal episode at home.  He was recently started on Entresto which may have caused some orthostasis.       He has a complicated medical history with a recent decline in function and medical status over the past 3 to 4 months.  Unfortunately he was diagnosed with lymphoma--with metastases to brain cancer he had surgery.  He was at [facility] rehab for 3 weeks.  He was discharged home in order to have his chemotherapy but and after the second round of chemo he went into kidney failure.  Care was also complicated by a PE in March and a spontaneous brain bleed.     He had presented to [facility] in [place] for the PE and spontaneous   brain bleed but was life flighted to [facility].     He had recovered from that was recovering at home when he fell.  Fortunately his wife is an RN and able to watch over him.  Also. fortunate for him but oncologist is saying that the lymphoma has likely reached remission.  The brain tumors however are still there and are inoperable.    I am concerned about patient’s medical complexities including:  Principal Problem:    Syncope and collapse  Active Problems:    Dysphagia, oropharyngeal phase    Impaired mobility and activities of daily living    Late effect of brain injury (HCC)    SILVINA (acute kidney injury) (HCC)    UTI (urinary tract infection)  Resolved Problems:    * No resolved hospital problems. *      .    Reviewed recent nursing note and discussed current status and planned care with acute care providers, \"pt asleep in bed respirations even and unlabored. Report given to ultrasound tech, transport here to take  Total Bilirubin 0.3 0.2 - 0.7 mg/dL    Alkaline Phosphatase 44 35 - 104 U/L    ALT 14 0 - 41 U/L    AST 11 0 - 40 U/L    Globulin 2.7 2.3 - 3.5 g/dL   CBC auto differential    Collection Time: 07/18/24  5:28 AM   Result Value Ref Range    WBC 2.6 (L) 4.8 - 10.8 K/uL    RBC 3.15 (L) 4.70 - 6.10 M/uL    Hemoglobin 9.9 (L) 14.0 - 18.0 g/dL    Hematocrit 29.3 (L) 42.0 - 52.0 %    MCV 93.0 (H) 79.0 - 92.2 fL    MCH 31.4 (H) 27.0 - 31.3 pg    MCHC 33.8 33.0 - 37.0 %    RDW 12.9 11.5 - 14.5 %    Platelets 239 130 - 400 K/uL    PLATELET SLIDE REVIEW Adequate     Path Consult Yes     Neutrophils % 12.0 %    Lymphocytes % 34.0 %    Monocytes % 16.2 %    Eosinophils % 23.0 %    Basophils % 3.0 %    Neutrophils Absolute 0.5 (L) 1.4 - 6.5 K/uL    Lymphocytes Absolute 1.0 1.0 - 4.8 K/uL    Monocytes Absolute 0.4 0.2 - 0.8 K/uL    Eosinophils Absolute 0.6 0.0 - 0.7 K/uL    Basophils Absolute 0.1 0.0 - 0.2 K/uL    Bands Relative 1 %    Atypical Lymphocytes Relative 4 %    Metamyelocytes Relative 5 %    Myelocyte Percent 1 %    Smudge Cells 19.2     Anisocytosis 1+     Poikilocytes 3+     Acanthocytes 2+     Ovalocytes 1+    Magnesium    Collection Time: 07/18/24  5:28 AM   Result Value Ref Range    Magnesium 1.6 (L) 1.7 - 2.4 mg/dL   Vascular duplex lower extremity venous bilateral    Collection Time: 07/18/24  9:08 AM   Result Value Ref Range    Body Surface Area 2.45 m2     Previous extensive, complex labs, notes and diagnostics reviewed and analyzed.     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  Updated functional and medical status reassessed regarding patient’s ability to participate in therapies

## 2024-07-18 NOTE — PROGRESS NOTES
Physical Therapy Missed Treatment   Facility/Department: [facility] MED SURG W183/W183-01    NAME: Km Garcia  Patient Status:   : 1945 (78 y.o.)  MRN: [id number]  Account: [id number]  Gender: male        [] Patient Declines PT Treatment            [x] Patient Unavailable:     Pt currently in Ultrasound and is not available for PT tx.   Will attempt PT Treatment again at earliest convenience.        Electronically signed by Mei Sheikh PTA on 24 at 1:19 PM EDT     [FreeTextEntry1] : 75 M  H/o polycystic kidney disease s/p kidney transplant in 2007, CKD, HTN, HLD, DM2, Prostate cancer s/p radical prostatectomy 2015, DVT 2006 (in setting of pelvic fracture), and afib/atrial flutter s/p ablation in 1/2018, who is now off oral anticoagulation secondary to GI bleed s/p Amulet Device c/b pericardiocentesis, RTOR for sternotomy, washout, evacuation of pericardial clot 4/2023  ecg sr with LVH nsst changes   3/28/24 Echo EF normal Moderate MR Aorta 4.1 cm  ct chest -2/3/23 -aortic root 4.3

## 2024-07-18 NOTE — PROGRESS NOTES
Hematology/Oncology  Attending Progress Note        CHIEF COMPLAINT/HPI:  CNS lymphoma  Has worsening kidney function. More alert. Feels better today    REVIEW OF SYSTEMS:    Unremarkable except for symptoms mentioned in HPI.    Current Inpatient Medications:    Current Facility-Administered Medications: morphine (PF) injection 2 mg, 2 mg, IntraVENous, Once  metoprolol tartrate (LOPRESSOR) tablet 50 mg, 50 mg, Oral, BID  furosemide (LASIX) injection 80 mg, 80 mg, IntraVENous, Once  [START ON 7/19/2024] ampicillin (OMNIPEN) 2,000 mg in sodium chloride 0.9 % 100 mL IVPB (mini-bag), 2,000 mg, IntraVENous, 2 times per day  0.9 % sodium chloride infusion, , IntraVENous, Continuous  enoxaparin (LOVENOX) injection 120 mg, 1 mg/kg, SubCUTAneous, BID  [Held by provider] sacubitril-valsartan (ENTRESTO) 49-51 MG per tablet 1 tablet, 1 tablet, Oral, BID  tamsulosin (FLOMAX) capsule 0.4 mg, 0.4 mg, Oral, Daily  valACYclovir (VALTREX) tablet 500 mg, 500 mg, Oral, BID  sodium chloride flush 0.9 % injection 5-40 mL, 5-40 mL, IntraVENous, 2 times per day  sodium chloride flush 0.9 % injection 5-40 mL, 5-40 mL, IntraVENous, PRN  0.9 % sodium chloride infusion, , IntraVENous, PRN  potassium chloride (KLOR-CON M) extended release tablet 40 mEq, 40 mEq, Oral, PRN **OR** potassium bicarb-citric acid (EFFER-K) effervescent tablet 40 mEq, 40 mEq, Oral, PRN **OR** potassium chloride 10 mEq/100 mL IVPB (Peripheral Line), 10 mEq, IntraVENous, PRN  magnesium sulfate 2000 mg in 50 mL IVPB premix, 2,000 mg, IntraVENous, PRN  ondansetron (ZOFRAN-ODT) disintegrating tablet 4 mg, 4 mg, Oral, Q8H PRN **OR** ondansetron (ZOFRAN) injection 4 mg, 4 mg, IntraVENous, Q6H PRN  melatonin tablet 3 mg, 3 mg, Oral, Nightly PRN  polyethylene glycol (GLYCOLAX) packet 17 g, 17 g, Oral, Daily PRN  acetaminophen (TYLENOL) tablet 650 mg, 650 mg, Oral, Q6H PRN **OR** acetaminophen (TYLENOL) suppository 650 mg, 650 mg, Rectal, Q6H PRN  insulin lispro

## 2024-07-18 NOTE — PROGRESS NOTES
Hospitalist Progress Note      Date of Admission: 7/15/2024  Chief Complaint:    Chief Complaint   Patient presents with    Loss of Consciousness     While on the toilet, hx brain CA, new CHF,      Subjective:  No new complaints.  No nausea, vomiting, chest pain, or headache      Medications:    Infusion Medications    sodium chloride 75 mL/hr at 07/18/24 0418    sodium chloride      dextrose       Scheduled Medications    morphine  2 mg IntraVENous Once    metoprolol tartrate  50 mg Oral BID    ampicillin IV  1,000 mg IntraVENous 3 times per day    enoxaparin  1 mg/kg SubCUTAneous BID    [Held by provider] sacubitril-valsartan  1 tablet Oral BID    tamsulosin  0.4 mg Oral Daily    valACYclovir  500 mg Oral BID    sodium chloride flush  5-40 mL IntraVENous 2 times per day    insulin lispro  0-8 Units SubCUTAneous TID WC    insulin lispro  0-4 Units SubCUTAneous Nightly    [Held by provider] furosemide  20 mg IntraVENous BID     PRN Meds: sodium chloride flush, sodium chloride, potassium chloride **OR** potassium alternative oral replacement **OR** potassium chloride, magnesium sulfate, ondansetron **OR** ondansetron, melatonin, polyethylene glycol, acetaminophen **OR** acetaminophen, glucose, dextrose bolus **OR** dextrose bolus, glucagon (rDNA), dextrose    Intake/Output Summary (Last 24 hours) at 7/18/2024 1205  Last data filed at 7/18/2024 1001  Gross per 24 hour   Intake 970 ml   Output 200 ml   Net 770 ml       Exam:  BP (!) 112/54   Pulse 69   Temp 99.2 °F (37.3 °C) (Oral)   Resp 18   Ht 1.854 m (6' 0.99\")   Wt 116.6 kg (257 lb)   SpO2 100%   BMI 33.91 kg/m²   Head: Normocephalic, atraumatic  Sclera clear  Neck JVD flat  Lungs: normal effort of breathing    Labs:   Recent Labs     07/16/24  0507 07/17/24  0508 07/18/24  0528   WBC 2.8* 2.8* 2.6*   HGB 10.8* 11.0* 9.9*   HCT 31.4* 31.9* 29.3*    250 239       Recent Labs     07/15/24  1930 07/16/24  0507 07/17/24  0508 07/18/24  0528    138

## 2024-07-18 NOTE — PROGRESS NOTES
Assumed care of patient, pt asleep in bed respirations even and unlabored. Report given to ultrasound tech, transport here to take patient to testing.      1342- Patient back from ultrasound, cardiology at bedside

## 2024-07-18 NOTE — PROGRESS NOTES
Renal Adjustment Per Protocol:     Ampicillin 1000 mg Q8H changed to 2000 mg Q12H based on CrCl 10-30 ml/min    Recent Labs     07/18/24  0528   CREATININE 4.68*   Estimated Creatinine Clearance: 17 mL/min (A) (based on SCr of 4.68 mg/dL (H)).  .      Alejandra Quintana, LornaD, BCPS  7/18/2024 2:29 PM

## 2024-07-18 NOTE — PROGRESS NOTES
Physician Progress Note      PATIENT:               VLADIMIR LYNN  CSN #:                  [id number]  :                       1945  ADMIT DATE:       7/15/2024 7:06 PM  DISCH DATE:  RESPONDING  PROVIDER #:        Vishal Forrest MD          QUERY TEXT:    Patient admitted with UTI.  Patient  noted to have chronic indwelling urinary   catheter. If possible, please document in the progress notes and discharge   summary if you are evaluating and/or treating any of the following:    The medical record reflects the following:  Risk Factors: 78 yom, HTN< CHF, CNS lymphoma on chemotherapy,  Clinical Indicators: ED notes: \"he does have an indwelling ca catheter.\"   urine:  blood large,  leukocyte esterase small, bacteria  many,  WBC >100,    urine culture Enterococcus faecalis  >100,000 CFU/ML.  Treatment: Rocephin, IVF,  UA with urine cultures, labs    Thank you  Justice Hernandes BSN RN CDS   M-F 6am-2pm  Options provided:  -- UTI due to chronic indwelling urinary catheter present on admission.  -- UTI not due to indwelling urinary catheter  -- Other - I will add my own diagnosis  -- Disagree - Not applicable / Not valid  -- Disagree - Clinically unable to determine / Unknown  -- Refer to Clinical Documentation Reviewer    PROVIDER RESPONSE TEXT:    UTI is due to the chronic indwelling urinary catheter present on admission.    Query created by: Justice Hernandes on 2024 1:09 PM      Electronically signed by:  Vishal Forrest MD 2024 12:01 PM

## 2024-07-18 NOTE — CARE COORDINATION
CONTINUE CURRENT TREATMENT AND MONITORING RENAL FUNCTION. DC PLAN REMAINS [facility], VM LEFT WITH UPDATE.

## 2024-07-18 NOTE — PROGRESS NOTES
Renal Progress Note    Assessment and Plan:      79 yo man with resolved SILVINA before that was t due to methotrexate couple months ago at [facility].  Reviewed those records.  Methotrexate level was extremely high and treated with IVF and glucarpidase.  Has DM, HTN, CHF EF 45-50%.  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  Currently on revilimid and imbruvica.  Has syncope after getting started on entresto.  SILVINA possible ATN now.  Also possible is AIN from imbruvica.  Renal u/s with some mild dilation     Plan/  1- jardiance on hold.  Will hold entresto as well  2- will send uric acid, cpk and full serologies although that is low yield  3. Irrigate ca just to be sure not clogged due to dilitation on u/s  4. Would like to give steroids for possible AIN.  Will d/w oncology  5. May need hd if not better.  D/w patient and wife  6. 1 dose of lasix to assess uo      Patient Active Problem List:     Bladder stones     Surgical aftercare, genitourinary system     Gross hematuria     SOB (shortness of breath)     Brain bleed (HCC)     Syncope and collapse     Athscl heart disease of native coronary artery w/o ang pctrs     Compression of brain (HCC)     Cardiomyopathy (HCC)     Cerebrovascular accident (CVA) (HCC)     Coronary artery disease involving native coronary artery of native heart without angina pectoris     Diffuse large B-cell lymphoma, unspecified site (HCC)     Dysphagia, oropharyngeal phase     History of falling     History of traumatic brain injury     Hypokalemia     Impaired mobility and activities of daily living     Intraoperative floppy iris syndrome (IFIS)     Macular degeneration of both eyes     Mass of brain     Subdural hematoma (HCC)     Methotrexate renal toxicity     Methotrexate toxicity     Obstructive and reflux uropathy, unspecified     Pancreatic cyst     PAT (paroxysmal atrial tachycardia) (HCC)     Primary hypertension     Primary osteoarthritis of both knees     Primary CNS

## 2024-07-18 NOTE — PROGRESS NOTES
Cardiology progress note      Patient Name: Km Garcia  Admit Date: 7/15/2024  7:06 PM  MR #: [id number]  : 1945    Attending Physician: Vishal Fox MD  Reason for consult: Syncope    History of Presenting Illness:      Km Garcia is a 78 y.o. male on hospital day 1 with a history of cardiomyopathy EF 35% by TTE 2024 (cardiomyopathy thought to be related to chemotherapy induced), diabetes, paroxysmal atrial tachycardia, hypertension, hyperlipidemia, large B cell CNS lymphoma diagnosed 2024 initially treated with CHOP, converted to converted to Linalidomide and Ibrutinib after he developed nephrotoxicity to Methotrexate.  Right lower extremity DVT and bilateral PE May 16, 2024 on Lovenox, left charcoaled foot, who came to the hospital after sustaining a syncopal episode.  History Obtained From:  patient, electronic medical record    Per patient, he was sitting in the toilet when he passed out wife witness event  No prodrome like symptoms  Patient was having diarrhea before syncopal episode  Denies chest pain or shortness of breath  EKG sinus rhythm with nonspecific T wave abnormality lateral leads, LVH, PACs,  Orthostatics positive  ===============  Hospital course  2024  Patient laying in bed looks comfortable  Wife at bedside  Per patient he was not able to sleep well due to knee pain pain.  Patient and wife patient chronic knee pain related.  Currently patient denies knee or ankle pain  Telemetry sinus rhythm, patient was noted with paroxysmal atrial fibrillation overnight  Creatinine worsening 4.68 up from 2.75 yesterday and 1.27 on admission      2024  Patient laying in bed looks comfortable  Denies chest pain or shortness of breath  Telemetry sinus rhythm no arrhythmias  Yesterday's echocardiogram EF 45 to 50% no major valvular disease  Kidney function worsening  Per nursing staff patient noted with urinary retention  Plan discussed with a.m. nurse    History:      Past

## 2024-07-18 NOTE — PROGRESS NOTES
Came to see patient  Labs continue to worsen  Off floor in u/s  Will see in afternoon  No change for now

## 2024-07-18 NOTE — PROGRESS NOTES
Physical Therapy Missed Treatment   Facility/Department: [facility] MED SURG W183/W183-01    NAME: Km Garcia  Patient Status:   : 1945 (78 y.o.)  MRN: [id number]  Account: [id number]  Gender: male        [] Patient Declines PT Treatment            [x] Patient Unavailable:     Attempted to get pt up for tx and he said he had a bm. Notified staff and told pt we would return. Upon return pt said he was too fatigued at this time and would request pt to return tomorrow.   Will attempt PT Treatment again at earliest convenience.        Electronically signed by Mei Sheikh PTA on 24 at 2:52 PM EDT

## 2024-07-19 ENCOUNTER — APPOINTMENT (OUTPATIENT)
Dept: CT IMAGING | Age: 79
DRG: 698 | End: 2024-07-19
Payer: MEDICARE

## 2024-07-19 ENCOUNTER — APPOINTMENT (OUTPATIENT)
Dept: INTERVENTIONAL RADIOLOGY/VASCULAR | Age: 79
DRG: 698 | End: 2024-07-19
Payer: MEDICARE

## 2024-07-19 LAB
ALBUMIN SERPL-MCNC: 2.1 G/DL (ref 3.5–4.6)
ALP SERPL-CCNC: 50 U/L (ref 35–104)
ALT SERPL-CCNC: 23 U/L (ref 0–41)
ANION GAP SERPL CALCULATED.3IONS-SCNC: 17 MEQ/L (ref 9–15)
ANISOCYTOSIS BLD QL SMEAR: ABNORMAL
APTT PPP: 41.6 SEC (ref 24.4–36.8)
AST SERPL-CCNC: 12 U/L (ref 0–40)
BASOPHILS # BLD: 0 K/UL (ref 0–0.2)
BASOPHILS # BLD: 0.2 K/UL (ref 0–0.2)
BASOPHILS NFR BLD: 2 %
BASOPHILS NFR BLD: 8 %
BILIRUB SERPL-MCNC: <0.2 MG/DL (ref 0.2–0.7)
BUN SERPL-MCNC: 53 MG/DL (ref 8–23)
BURR CELLS: ABNORMAL
BURR CELLS: ABNORMAL
C3 SERPL-MCNC: 106 MG/DL (ref 90–180)
C4 SERPL-MCNC: 25 MG/DL (ref 10–40)
CALCIUM SERPL-MCNC: 7.7 MG/DL (ref 8.5–9.9)
CHLORIDE SERPL-SCNC: 98 MEQ/L (ref 95–107)
CO2 SERPL-SCNC: 14 MEQ/L (ref 20–31)
CREAT SERPL-MCNC: 6 MG/DL (ref 0.7–1.2)
DOHLE BOD BLD QL SMEAR: ABNORMAL
EOSINOPHIL # BLD: 0.1 K/UL (ref 0–0.7)
EOSINOPHIL # BLD: 0.1 K/UL (ref 0–0.7)
EOSINOPHIL NFR BLD: 3 %
EOSINOPHIL NFR BLD: 4 %
ERYTHROCYTE [DISTWIDTH] IN BLOOD BY AUTOMATED COUNT: 12.9 % (ref 11.5–14.5)
ERYTHROCYTE [DISTWIDTH] IN BLOOD BY AUTOMATED COUNT: 13.2 % (ref 11.5–14.5)
GLOBULIN SER CALC-MCNC: 2.7 G/DL (ref 2.3–3.5)
GLUCOSE BLD-MCNC: 241 MG/DL (ref 70–99)
GLUCOSE BLD-MCNC: 244 MG/DL (ref 70–99)
GLUCOSE BLD-MCNC: 321 MG/DL (ref 70–99)
GLUCOSE BLD-MCNC: 321 MG/DL (ref 70–99)
GLUCOSE SERPL-MCNC: 248 MG/DL (ref 70–99)
HBV SURFACE AB TITR SER: 8.08 MIU/ML
HBV SURFACE AG SERPL QL IA: NORMAL
HCT VFR BLD AUTO: 30.1 % (ref 42–52)
HCT VFR BLD AUTO: 30.5 % (ref 42–52)
HGB BLD-MCNC: 10 G/DL (ref 14–18)
HGB BLD-MCNC: 10.4 G/DL (ref 14–18)
INR PPP: 1.5
LYMPHOCYTES # BLD: 0.6 K/UL (ref 1–4.8)
LYMPHOCYTES # BLD: 0.8 K/UL (ref 1–4.8)
LYMPHOCYTES NFR BLD: 27 %
LYMPHOCYTES NFR BLD: 38 %
MCH RBC QN AUTO: 31.6 PG (ref 27–31.3)
MCH RBC QN AUTO: 31.9 PG (ref 27–31.3)
MCHC RBC AUTO-ENTMCNC: 33.2 % (ref 33–37)
MCHC RBC AUTO-ENTMCNC: 34.1 % (ref 33–37)
MCV RBC AUTO: 93.6 FL (ref 79–92.2)
MCV RBC AUTO: 95.3 FL (ref 79–92.2)
METAMYELOCYTES NFR BLD MANUAL: 2 %
METAMYELOCYTES NFR BLD MANUAL: 5 %
MONOCYTES # BLD: 0.2 K/UL (ref 0.2–0.8)
MONOCYTES # BLD: 0.4 K/UL (ref 0.2–0.8)
MONOCYTES NFR BLD: 10.9 %
MONOCYTES NFR BLD: 15.8 %
MYELOCYTES NFR BLD MANUAL: 3 %
NEUTROPHILS # BLD: 0.7 K/UL (ref 1.4–6.5)
NEUTROPHILS # BLD: 1.3 K/UL (ref 1.4–6.5)
NEUTS BAND NFR BLD MANUAL: 5 %
NEUTS BAND NFR BLD MANUAL: 8 %
NEUTS SEG NFR BLD: 33 %
NEUTS SEG NFR BLD: 35 %
NRBC BLD-RTO: 1 /100 WBC
OVALOCYTES BLD QL SMEAR: ABNORMAL
PATH INTERP BLD-IMP: NORMAL
PATH INTERP BLD-IMP: YES
PERFORMED ON: ABNORMAL
PLATELET # BLD AUTO: 207 K/UL (ref 130–400)
PLATELET # BLD AUTO: ABNORMAL K/UL (ref 130–400)
PLATELET BLD QL SMEAR: ADEQUATE
PLATELET BLD QL SMEAR: ADEQUATE
POIKILOCYTOSIS BLD QL SMEAR: ABNORMAL
POIKILOCYTOSIS BLD QL SMEAR: ABNORMAL
POTASSIUM SERPL-SCNC: 4.3 MEQ/L (ref 3.4–4.9)
PROT SERPL-MCNC: 4.8 G/DL (ref 6.3–8)
PROTHROMBIN TIME: 18.3 SEC (ref 12.3–14.9)
RBC # BLD AUTO: 3.16 M/UL (ref 4.7–6.1)
RBC # BLD AUTO: 3.26 M/UL (ref 4.7–6.1)
SLIDE REVIEW: ABNORMAL
SMUDGE CELLS BLD QL SMEAR: 12.9
SODIUM SERPL-SCNC: 129 MEQ/L (ref 135–144)
VARIANT LYMPHS NFR BLD: 2 %
WBC # BLD AUTO: 1.6 K/UL (ref 4.8–10.8)
WBC # BLD AUTO: 2.8 K/UL (ref 4.8–10.8)

## 2024-07-19 PROCEDURE — 77001 FLUOROGUIDE FOR VEIN DEVICE: CPT

## 2024-07-19 PROCEDURE — 2580000003 HC RX 258: Performed by: INTERNAL MEDICINE

## 2024-07-19 PROCEDURE — 6370000000 HC RX 637 (ALT 250 FOR IP): Performed by: INTERNAL MEDICINE

## 2024-07-19 PROCEDURE — 6360000002 HC RX W HCPCS: Performed by: INTERNAL MEDICINE

## 2024-07-19 PROCEDURE — 85610 PROTHROMBIN TIME: CPT

## 2024-07-19 PROCEDURE — 74176 CT ABD & PELVIS W/O CONTRAST: CPT

## 2024-07-19 PROCEDURE — 2500000003 HC RX 250 WO HCPCS: Performed by: INTERNAL MEDICINE

## 2024-07-19 PROCEDURE — 6360000002 HC RX W HCPCS: Performed by: RADIOLOGY

## 2024-07-19 PROCEDURE — 77001 FLUOROGUIDE FOR VEIN DEVICE: CPT | Performed by: RADIOLOGY

## 2024-07-19 PROCEDURE — 97535 SELF CARE MNGMENT TRAINING: CPT

## 2024-07-19 PROCEDURE — 76937 US GUIDE VASCULAR ACCESS: CPT

## 2024-07-19 PROCEDURE — 36556 INSERT NON-TUNNEL CV CATH: CPT

## 2024-07-19 PROCEDURE — 86706 HEP B SURFACE ANTIBODY: CPT

## 2024-07-19 PROCEDURE — 99232 SBSQ HOSP IP/OBS MODERATE 35: CPT | Performed by: INTERNAL MEDICINE

## 2024-07-19 PROCEDURE — 99231 SBSQ HOSP IP/OBS SF/LOW 25: CPT | Performed by: PHYSICAL MEDICINE & REHABILITATION

## 2024-07-19 PROCEDURE — 5A1D70Z PERFORMANCE OF URINARY FILTRATION, INTERMITTENT, LESS THAN 6 HOURS PER DAY: ICD-10-PCS | Performed by: INTERNAL MEDICINE

## 2024-07-19 PROCEDURE — 80053 COMPREHEN METABOLIC PANEL: CPT

## 2024-07-19 PROCEDURE — 2500000003 HC RX 250 WO HCPCS: Performed by: RADIOLOGY

## 2024-07-19 PROCEDURE — 36415 COLL VENOUS BLD VENIPUNCTURE: CPT

## 2024-07-19 PROCEDURE — 36556 INSERT NON-TUNNEL CV CATH: CPT | Performed by: RADIOLOGY

## 2024-07-19 PROCEDURE — 99221 1ST HOSP IP/OBS SF/LOW 40: CPT | Performed by: PHYSICIAN ASSISTANT

## 2024-07-19 PROCEDURE — 87340 HEPATITIS B SURFACE AG IA: CPT

## 2024-07-19 PROCEDURE — 76937 US GUIDE VASCULAR ACCESS: CPT | Performed by: RADIOLOGY

## 2024-07-19 PROCEDURE — 85730 THROMBOPLASTIN TIME PARTIAL: CPT

## 2024-07-19 PROCEDURE — 85025 COMPLETE CBC W/AUTO DIFF WBC: CPT

## 2024-07-19 PROCEDURE — 90935 HEMODIALYSIS ONE EVALUATION: CPT

## 2024-07-19 PROCEDURE — 02PYX3Z REMOVAL OF INFUSION DEVICE FROM GREAT VESSEL, EXTERNAL APPROACH: ICD-10-PCS | Performed by: RADIOLOGY

## 2024-07-19 PROCEDURE — C1769 GUIDE WIRE: HCPCS

## 2024-07-19 PROCEDURE — 1210000000 HC MED SURG R&B

## 2024-07-19 RX ORDER — HEPARIN SODIUM 1000 [USP'U]/ML
2000 INJECTION, SOLUTION INTRAVENOUS; SUBCUTANEOUS PRN
Status: DISCONTINUED | OUTPATIENT
Start: 2024-07-19 | End: 2024-07-25 | Stop reason: HOSPADM

## 2024-07-19 RX ORDER — ALBUMIN (HUMAN) 12.5 G/50ML
25 SOLUTION INTRAVENOUS DAILY PRN
Status: DISCONTINUED | OUTPATIENT
Start: 2024-07-19 | End: 2024-07-25 | Stop reason: HOSPADM

## 2024-07-19 RX ORDER — HEPARIN SODIUM 5000 [USP'U]/ML
5000 INJECTION, SOLUTION INTRAVENOUS; SUBCUTANEOUS EVERY 8 HOURS SCHEDULED
Status: DISPENSED | OUTPATIENT
Start: 2024-07-19 | End: 2024-07-25

## 2024-07-19 RX ORDER — HEPARIN SODIUM 1000 [USP'U]/ML
INJECTION, SOLUTION INTRAVENOUS; SUBCUTANEOUS PRN
Status: COMPLETED | OUTPATIENT
Start: 2024-07-19 | End: 2024-07-19

## 2024-07-19 RX ORDER — LIDOCAINE HYDROCHLORIDE 20 MG/ML
INJECTION, SOLUTION INFILTRATION; PERINEURAL PRN
Status: COMPLETED | OUTPATIENT
Start: 2024-07-19 | End: 2024-07-19

## 2024-07-19 RX ORDER — VALACYCLOVIR HYDROCHLORIDE 1 G/1
500 TABLET, FILM COATED ORAL DAILY
Status: DISCONTINUED | OUTPATIENT
Start: 2024-07-20 | End: 2024-07-25 | Stop reason: HOSPADM

## 2024-07-19 RX ADMIN — METHYLPREDNISOLONE SODIUM SUCCINATE 80 MG: 125 INJECTION, POWDER, LYOPHILIZED, FOR SOLUTION INTRAMUSCULAR; INTRAVENOUS at 09:34

## 2024-07-19 RX ADMIN — Medication 10 ML: at 21:11

## 2024-07-19 RX ADMIN — Medication 10 ML: at 08:12

## 2024-07-19 RX ADMIN — SODIUM CHLORIDE: 9 INJECTION, SOLUTION INTRAVENOUS at 08:01

## 2024-07-19 RX ADMIN — AMPICILLIN SODIUM 2000 MG: 2 INJECTION, POWDER, FOR SOLUTION INTRAMUSCULAR; INTRAVENOUS at 10:12

## 2024-07-19 RX ADMIN — HEPARIN SODIUM 5000 UNITS: 5000 INJECTION INTRAVENOUS; SUBCUTANEOUS at 21:11

## 2024-07-19 RX ADMIN — INSULIN LISPRO 4 UNITS: 100 INJECTION, SOLUTION INTRAVENOUS; SUBCUTANEOUS at 21:10

## 2024-07-19 RX ADMIN — HEPARIN SODIUM 2600 UNITS: 1000 INJECTION INTRAVENOUS; SUBCUTANEOUS at 13:23

## 2024-07-19 RX ADMIN — LIDOCAINE HYDROCHLORIDE 10 ML: 20 INJECTION, SOLUTION INFILTRATION; PERINEURAL at 13:18

## 2024-07-19 RX ADMIN — TAMSULOSIN HYDROCHLORIDE 0.4 MG: 0.4 CAPSULE ORAL at 08:04

## 2024-07-19 RX ADMIN — INSULIN LISPRO 2 UNITS: 100 INJECTION, SOLUTION INTRAVENOUS; SUBCUTANEOUS at 17:21

## 2024-07-19 RX ADMIN — VALACYCLOVIR 500 MG: 1 TABLET, FILM COATED ORAL at 08:03

## 2024-07-19 RX ADMIN — INSULIN LISPRO 2 UNITS: 100 INJECTION, SOLUTION INTRAVENOUS; SUBCUTANEOUS at 08:11

## 2024-07-19 RX ADMIN — ENOXAPARIN SODIUM 120 MG: 150 INJECTION SUBCUTANEOUS at 08:03

## 2024-07-19 RX ADMIN — METOPROLOL TARTRATE 50 MG: 50 TABLET, FILM COATED ORAL at 21:10

## 2024-07-19 RX ADMIN — INSULIN LISPRO 6 UNITS: 100 INJECTION, SOLUTION INTRAVENOUS; SUBCUTANEOUS at 11:35

## 2024-07-19 RX ADMIN — METOPROLOL TARTRATE 50 MG: 50 TABLET, FILM COATED ORAL at 08:04

## 2024-07-19 ASSESSMENT — PAIN SCALES - GENERAL
PAINLEVEL_OUTOF10: 0

## 2024-07-19 ASSESSMENT — PAIN SCALES - WONG BAKER
WONGBAKER_NUMERICALRESPONSE: NO HURT

## 2024-07-19 ASSESSMENT — ENCOUNTER SYMPTOMS: APNEA: 0

## 2024-07-19 NOTE — PROGRESS NOTES
[facility] MED SURG TREATMENT NOTE     Date: 2024  Patient Name: Km Garcia        MRN: [id number]  Account: [id number]   : 1945  (78 y.o.)  Room: [place] [address]    Chart Review:    Restrictions  Restrictions/Precautions  Restrictions/Precautions: Fall Risk     Safety:  Safety Devices  Type of Devices: All fall risk precautions in place;Call light within reach;Bed alarm in place;Left in bed    Patient's birthday verified: Yes    Subjective: \"I can't hear the music.\"             Pain at start of treatment: No    Pain at end of treatment: No    Location: B LE  Description: \"my legs only hurt when I move.\"   Nursing notified: No    Objective: Pt agreeable to sponge bath. After getting items set up, pt only washed face and neck area- declined to wash the rest of the body d/t fatigue and increased pain with moving. Pt completed additional grooming tasks while long sitting in bed.     ADL Status:  Grooming: Setup  Grooming Skilled Clinical Factors: Pt completed oral and hair care. CUES occasionally for sequencing with good carry over. No difficulty handling material. Increased time noted. Pt washed face with a soapy wash cloth.  UE Dressing: None  UE Dressing Skilled Clinical Factors: declined to change gown  LE Dressing: None  LE Dressing Skilled Clinical Factors: declined to change socks    Therapy key for assistance levels -   Independent/Mod I = Pt. is able to perform task with no assistance but may require a device   Stand by assistance = Pt. does not perform task at an independent level but does not need physical assistance, requires verbal cues  Minimal, Moderate, Maximal Assistance = Pt. requires physical assistance (25%, 50%, 75% assist from helper) for task but is able to actively participate in task   Dependent = Pt. requires total assistance with task and is not able to actively participate with task completion    Orientation Status:  Overall Orientation Status: Within

## 2024-07-19 NOTE — PROGRESS NOTES
Hospitalist Progress Note      Date of Admission: 7/15/2024  Chief Complaint:    Chief Complaint   Patient presents with    Loss of Consciousness     While on the toilet, hx brain CA, new CHF,      Subjective:  No new complaints.  No nausea, vomiting, chest pain, or headache      Medications:    Infusion Medications    sodium chloride      dextrose       Scheduled Medications    methylPREDNISolone  80 mg IntraVENous Daily    morphine  2 mg IntraVENous Once    metoprolol tartrate  50 mg Oral BID    ampicillin IV  2,000 mg IntraVENous 2 times per day    [Held by provider] enoxaparin  1 mg/kg SubCUTAneous BID    [Held by provider] sacubitril-valsartan  1 tablet Oral BID    tamsulosin  0.4 mg Oral Daily    valACYclovir  500 mg Oral BID    sodium chloride flush  5-40 mL IntraVENous 2 times per day    insulin lispro  0-8 Units SubCUTAneous TID WC    insulin lispro  0-4 Units SubCUTAneous Nightly    [Held by provider] furosemide  20 mg IntraVENous BID     PRN Meds: albumin human 25%, heparin (porcine), aluminum & magnesium hydroxide-simethicone, sodium chloride flush, sodium chloride, potassium chloride **OR** potassium alternative oral replacement **OR** potassium chloride, magnesium sulfate, ondansetron **OR** ondansetron, melatonin, polyethylene glycol, acetaminophen **OR** acetaminophen, glucose, dextrose bolus **OR** dextrose bolus, glucagon (rDNA), dextrose    Intake/Output Summary (Last 24 hours) at 7/19/2024 1222  Last data filed at 7/19/2024 0804  Gross per 24 hour   Intake 240 ml   Output 100 ml   Net 140 ml       Exam:  BP (!) 100/42   Pulse (!) 34   Temp 97.3 °F (36.3 °C) (Oral)   Resp 28   Ht 1.854 m (6' 0.99\")   Wt 116.6 kg (257 lb)   SpO2 98%   BMI 33.91 kg/m²   Head: Normocephalic, atraumatic  Sclera clear  Neck JVD flat  Lungs: normal effort of breathing    Labs:   Recent Labs     07/17/24  0508 07/18/24  0528 07/19/24  0602   WBC 2.8* 2.6* 1.6*   HGB 11.0* 9.9* 10.4*   HCT 31.9* 29.3* 30.5*   PLT

## 2024-07-19 NOTE — PROGRESS NOTES
Renal Progress Note    Assessment and Plan:      77 yo man with resolved SILVINA before that was t due to methotrexate couple months ago at [facility].  Reviewed those records.  Methotrexate level was extremely high and treated with IVF and glucarpidase.  Has DM, HTN, CHF EF 45-50%.  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  Currently on revilimid and imbruvica.  Has syncope after getting started on entresto.  SILVINA possible ATN now.  Also possible is AIN from imbruvica.  Renal u/s with some mild dilation     Plan/  1- jardiance on hold.  Will hold entresto as well  2- uric acid and cpk ok.  Other serologies are pending  3. Most likely ATN.  Could have AIN from imbruvica.  Less likely some other glomerular disesae  4. Hold lovenox.  Place HD catheter due to worsening SILVINA.  D/w oncology yesterday.  D/w Dr. Osborne today  5. HD today and tomorrow.  Will obtain consent and d/w wife  6. CT scan to be complete due to fullness seen on u/s      Patient Active Problem List:     Bladder stones     Surgical aftercare, genitourinary system     Gross hematuria     SOB (shortness of breath)     Brain bleed (HCC)     Syncope and collapse     Athscl heart disease of native coronary artery w/o ang pctrs     Compression of brain (HCC)     Cardiomyopathy (HCC)     Cerebrovascular accident (CVA) (HCC)     Coronary artery disease involving native coronary artery of native heart without angina pectoris     Diffuse large B-cell lymphoma, unspecified site (HCC)     Dysphagia, oropharyngeal phase     History of falling     History of traumatic brain injury     Hypokalemia     Impaired mobility and activities of daily living     Intraoperative floppy iris syndrome (IFIS)     Macular degeneration of both eyes     Mass of brain     Subdural hematoma (HCC)     Methotrexate renal toxicity     Methotrexate toxicity     Obstructive and reflux uropathy, unspecified     Pancreatic cyst     PAT (paroxysmal atrial tachycardia) (HCC)     Primary  results for input(s): \"FERRITIN\" in the last 72 hours.    Invalid input(s): \"IRONS\", \"LABIRONS\"  Urinalysis: No results for input(s): \"UA\" in the last 72 hours.    Objective:   Vitals: BP (!) 103/42   Pulse 88   Temp 97.5 °F (36.4 °C) (Axillary)   Resp 18   Ht 1.854 m (6' 0.99\")   Wt 116.6 kg (257 lb)   SpO2 99%   BMI 33.91 kg/m²    Wt Readings from Last 3 Encounters:   07/16/24 116.6 kg (257 lb)   04/06/24 111.1 kg (245 lb)   03/11/24 113.4 kg (250 lb)      24HR INTAKE/OUTPUT:    Intake/Output Summary (Last 24 hours) at 7/19/2024 0844  Last data filed at 7/19/2024 0100  Gross per 24 hour   Intake 10 ml   Output 100 ml   Net -90 ml       Admission weight: 111.1 kg (245 lb)     Constitutional:  Alert, awake, no apparent distress   Skin:normal, no rash  HEENT:sclera anicteric.  Head atraumatic normocephalic  Neck:supple with no thyromegally  Cardiovascular:  S1, S2 without m/r/g   Respiratory:  CTA B without w/r/r   Abdomen: +bs, soft, nt  Ext: 1+LE edema  Musculoskeletal:Intact  Neuro:Alert and oriented with no deficit      Electronically signed by Durga Grewal MD on 7/19/2024 at 8:44 AM

## 2024-07-19 NOTE — PROGRESS NOTES
Pharmacy Note - Antimicrobial Renal Dose Adjustment    Valacyclovir  ordered 500 mg every 12 hours. Per [facility] Renal Dose Adjustment Policy, this will be changed to 500 mg every 24 hours.    Estimated Creatinine Clearance: Estimated Creatinine Clearance: 13 mL/min (A) (based on SCr of 6 mg/dL (HH)).    Dialysis Status, SILVINA, CKD:  SILVINA on HD    BMI: Body mass index is 33.91 kg/m².    Rationale for Adjustment: Medication is renally eliminated. Dose adjusted per [facility] Renal Dosing Policy based on renal function & indication.    Pharmacy will monitor renal function daily and adjust dose as necessary.      Please call with any questions.    Thank you,  Alicia Salguero, PharmD

## 2024-07-19 NOTE — OR NURSING
NO SEDATION   1300 Patient assisted to IR table in supine position. Consent obtained for Temporary Hemodialysis Catheter. Patient on vitals monitor, VSS, pt appears to be in afib on monitor, some PVC's noted. Patient appears stable at this time.    1310 R internal jugular assessed for patency using Ultrasound guidance.  Site approved by Dr. Osborne, who along with IR staff offered reassurance to patient.     1311 R neck area cleaned generously with Chloroprep by AR tech.  After 3 minute dry time, full body drape applied in sterile fashion by  tech.     1316 Timeout performed.     1318 Using U/S, Dr. Osborne administered Lidocaine 2% to R neck area, see eMar.    1319 Using U/S guidance, 5F MP set used to gain access into vessel.    1320 Under fluoroscopy guidance, InQwire 0.035\"x80cm inserted to maintain access. No change in cardiac rhythm.     1321 Medcomp 11.5F x 15 cm Raulerson Duo-Flow IJ double lumen catheter (LOT# [id number],  exp 03/34/2027) placed.  Placement confirmed by fluoro imaging as well as both ports aspirating blood and flushing easily per MD.     1323  Red port instilled with 1.4 ml of heparin, see eMar.  Blue port instilled with 1.2 ml of heparin, see eMar. Catheter ready for use, order placed.    1326 Catheter sutured to skin with Prolene 2.0 suture.  Catheter insertion site dressed with Biopatch, gauze and large tegaderm by IR Tech.  Patient tolerated procedure well, verbal and tactile reassurance given throughout procedure. VSS.  Patient assisted back onto cart.  Report given to MIRTA Logan at 6961. Pt to go to dialysis at this time. Transport request placed.

## 2024-07-19 NOTE — PROGRESS NOTES
Pt is in bed resting comfortably. He is A/O/ x 4, forgetful, BR. He takes his po medications whole w/ water. Last BM on 07/19/2024, no c/o of constipation, no abd pain, but did have a Lrg loose BM. Bilat Lungs are clr/dim. POX on RA at 99%. No c/o SOB, or difficulties breathing. Bilat UE pulses are palpable at +2. Bilat LE pulses are at +1, Edema Bilat. LE at +3. Pt has a 20 ga IV that is clean, dry, and intact, running 0.09% NS at 75 ml/hr, in his Left AC. Call light is w/in reach.

## 2024-07-19 NOTE — PROGRESS NOTES
Renal Adjustment Per Protocol: patient started on HD. Ampicillin changed to 2000mg IV q24h based on    Recent Labs     07/19/24  0602   CREATININE 6.00*   Estimated Creatinine Clearance: 13 mL/min (A) (based on SCr of 6 mg/dL (HH)).  .      Jyoti Bella, [facility] PharmD

## 2024-07-19 NOTE — BRIEF OP NOTE
Preliminary  Procedure Note, Full Note To Follow in PACS  Vascular and Interventional Radiology      Km Garcia  1945  [id number]    Date of Procedure: 07/19/24    Physician: Addi Morales MD, DABR    Pre-Op Diagnosis: Renal failure    Post-Op Diagnosis: Same       Procedure: Temporary right IJ HD CVC. This is a temporary catheter that must be removed or exchanged for a tunneled catheter prior to discharge.     Estimated Blood Loss (mL): Minimal    Complications: None    Specimens: none    Implants: Duel lumen dialysis catheter    Drains: none    Findings: A right IJ temporary dual lumen dialysis catheter was placed and may be used for dialysis. This is a temporary catheter that must be removed or exchanged for a tunneled catheter prior to discharge.    Electronically signed by ADDI MORALES MD on 7/19/2024 at 1:54 PM

## 2024-07-19 NOTE — PROGRESS NOTES
Subjective:  The patient complains of severe acute on chronic progressive fatigue and EL, lower extremity pain and swelling partially relieved by rest, PT, OT and meds and IV fluids and exacerbated by exertion and recent treatment with Entresto and IV chemotherapy for lymphoma--> with recent renal failure.      Patient was admitted to [facility] on 7/15/2024 after having a syncopal episode at home.  He was recently started on Entresto which may have caused some orthostasis.       He has a complicated medical history with a recent decline in function and medical status over the past 3 to 4 months.  Unfortunately he was diagnosed with lymphoma--with metastases to brain cancer he had surgery.  He was at [facility] rehab for 3 weeks.  He was discharged home in order to have his chemotherapy but and after the second round of chemo he went into kidney failure.  Care was also complicated by a PE in March and a spontaneous brain bleed.     He had presented to [facility] in [place] for the PE and spontaneous   brain bleed but was life flighted to [facility].     He had recovered from that was recovering at home when he fell.  Fortunately his wife is an RN and able to watch over him.  Also. fortunate for him but oncologist is saying that the lymphoma has likely reached remission.  The brain tumors however are still there and are inoperable.    I am concerned about patient’s medical complexities including:  Principal Problem:    Syncope and collapse  Active Problems:    Dysphagia, oropharyngeal phase    Impaired mobility and activities of daily living    Late effect of brain injury (HCC)    SILVINA (acute kidney injury) (HCC)    UTI (urinary tract infection)  Resolved Problems:    * No resolved hospital problems. *      .    Reviewed recent nursing note and discussed current status and planned care with acute care providers, \"bed resting comfortably. He is A/O/ x 4, forgetful, BR. He takes his po medications whole w/ water. Last        acute intensive comprehensive inpatient rehabilitation program including PT/OT to improve balance, ambulation, ADL’s, and to improve the P/AROM.   It is my opinion that they will be able to tolerate 3 hours of therapy a day and benefit from it at an acute level. I again discussed acute rehab with the patient and verify that the patient is able and willing to participate in 3 hours of therapy a day.  Rehab and Acute Care Case Management has also reinforced this expectation.    Will continue to follow to attempt to get patient to the most efficient but most effective level of care will be in their best interest.  Continue to focus on energy conservation heart rate and blood pressure monitoring before during and after therapy endurance and consistency of function.      Bowel constipation and Bladder dysfunction severe urinary retention with Contreras catheter since March 2020  , neurogenic bladder:  frequent toileting, ambulate to bathroom with assistance, check post void residuals.  Check for C.difficile x1 if >2 loose stools in 24 hours, continue bowel & bladder program.  Monitor for UTI symptoms including lethargy and confusion    Severe back pain and generalized OA pain: reassess pain every shift and prior to and after each therapy session, give prn Tylenol consider half dose Oxy IR and consider scheduled Tylenol, modalities prn in therapy, consider Lidoderm, K-pad prn.  I advise palliative care consult    Skin healing breakdown   risk:  continue pressure relief program.  Daily skin exams and reports from nursing.    Severe fatigue due to immobility and nutritional deficits: monitoring for dysphagia   Add vitamin B12 vitamin D and CoQ10 titrate dosing and add protein supplementation with low carb content.    Complex discharge planning:   Discussed with care team-last 24 hour events noted.  I will continue to follow along and reassess functional and medical status as we strive to improve patient's functional and

## 2024-07-19 NOTE — CONSULTS
Urology Consult      Km Garcia  1945  [id number]    Date of Admission:  7/15/2024  7:06 PM  Date of Consultation:  7/19/2024    Consultant: Wero Dhillon PA-C  SupervisingPhysician: Dr. Hardin  PCP:  Dawood Darby MD       Reason for Consultation: urinary retention      C/C:   Chief Complaint   Patient presents with    Loss of Consciousness     While on the toilet, hx brain CA, new CHF,        History of Present Illness: Urinary Retention  Patient complains of urinary retention. Onset of retention was 1 day ago and was unknown in onset. Patient currently does have a urinary catheter in place.   ml of urine were drained when catheter was placed. Prior to this event voiding symptoms consisted of slow stream. Prior treatments include tamulosin. Recent medications that may have affected his voiding include none.    Allergies:  Allergies   Allergen Reactions    Celecoxib      Other reaction(s): GI Upset    Naproxen      Other reaction(s): GI Upset       PMH: Patient has a past medical history of AMD (age related macular degeneration), Asthma, Athscl heart disease of native coronary artery w/o ang pctrs, Cerebrovascular accident (CVA) (HCC), Charcot ankle, left, Diabetes mellitus (HCC), Hypertension, and Osteoarthritis.    PSH: Patient has a past surgical history that includes Colonoscopy; Lithotripsy (N/A, 12/12/2018); and IR NONTUNNELED VASCULAR CATHETER > 5 YEARS (Right, 07/19/2024).    Social History: Patient reports that he quit smoking about 34 years ago. His smoking use included cigarettes. He has never used smokeless tobacco. He reports current alcohol use. He reports that he does not use drugs.    Family History: Patientsfamily history includes Diabetes in his brother; Heart Disease in his father; No Known Problems in his mother and sister; Other in his brother.    ROS:  Review of Systems   Constitutional:  Negative for fever.   HENT:  Negative for congestion.    Respiratory:  Negative for apnea.

## 2024-07-19 NOTE — FLOWSHEET NOTE
Spoke with Desmond RN, pt on IR schedule at 1 pm for temp HD catheter.  Spoke with CT tech, CT exam will be done prior to IR procedure.   Spoke with HD RN, made aware of 1 pm temp dialysis catheter insertion.

## 2024-07-19 NOTE — PROGRESS NOTES
Physical Therapy Missed Treatment   Facility/Department: [facility] MED SURG W183/W183-01    NAME: Km Garcia    : 1945 (78 y.o.)  MRN: [id number]    Account: [id number]  Gender: male    Chart reviewed, attempted PT at 10:11. Patient unavailable 2° to:    [] Hold per nsg request    [x] Pt declined d/t kidney function and going to dialysis soon. Will check back time permitting.  2nd attempt - 13:12 - pt off floor for dialysis. Will resume tomorrow.     [] Nsg notified   [] Other notified    [] Pt.. off floor for test/procedure.     [] Pt. Unavailable        Will attempt PT treatment again at earliest convenience.      Electronically signed by Suze Silva PTA on 24 at 10:15 AM EDT

## 2024-07-19 NOTE — PROGRESS NOTES
0900: Morning assessment and med pass completed. Patient spouse at bedside and updated on plan of care of temp dialysis catheter placement and dialysis today. Will continue to  monitor.     1414:Dr. Grewal made aware of positive CT/abd results from radiology via secure message. No new orders.     1719: patient returned from diaylsis. 1050ml noted in ca bag. Spouse called and updated per request.

## 2024-07-19 NOTE — CARE COORDINATION
Met with pt and spouse at bedside. Dc plan remains [facility] when medically cleared. Pt to receive VAS Cath today and then Hemodialysis.

## 2024-07-19 NOTE — PROGRESS NOTES
Cardiology progress note      Patient Name: Km Garcia  Admit Date: 7/15/2024  7:06 PM  MR #: [id number]  : 1945    Attending Physician: Vishal Fox MD  Reason for consult: Syncope    History of Presenting Illness:      Km Garcia is a 78 y.o. male on hospital day 2 with a history of cardiomyopathy EF 35% by TTE 2024 (cardiomyopathy thought to be related to chemotherapy induced), diabetes, paroxysmal atrial tachycardia, hypertension, hyperlipidemia, large B cell CNS lymphoma diagnosed 2024 initially treated with CHOP, converted to converted to Linalidomide and Ibrutinib after he developed nephrotoxicity to Methotrexate.  Right lower extremity DVT and bilateral PE May 16, 2024 on Lovenox, left charcoaled foot, who came to the hospital after sustaining a syncopal episode.  History Obtained From:  patient, electronic medical record    Per patient, he was sitting in the toilet when he passed out wife witness event  No prodrome like symptoms  Patient was having diarrhea before syncopal episode  Denies chest pain or shortness of breath  EKG sinus rhythm with nonspecific T wave abnormality lateral leads, LVH, PACs,  Orthostatics positive  ===============  Hospital course  2024  Patient laying in bed looks comfortable  Denies chest pain or shortness of breath  Plan for initiation of hemodialysis  Telemetry atrial fibrillation 80s to 90s    2024  Patient laying in bed looks comfortable  Wife at bedside  Per patient he was not able to sleep well due to knee pain pain.  Patient and wife patient chronic knee pain related.  Currently patient denies knee or ankle pain  Telemetry sinus rhythm, patient was noted with paroxysmal atrial fibrillation overnight  Creatinine worsening 4.68 up from 2.75 yesterday and 1.27 on admission      2024  Patient laying in bed looks comfortable  Denies chest pain or shortness of breath  Telemetry sinus rhythm no arrhythmias  Yesterday's echocardiogram EF  and bilateral PE May 16, 2024 on Lovenox,   left charcoaled foot,  Urinary retention  CKD worsening  TTE 7/16/2024 EF 45 to 50% without major valvular disease  Plan:   -Syncope  Perform orthostatics twice daily  Unknown etiology, however orthostatics positive this admission, no major arrhythmias on telemetry aside from atrial fibrillation, no significant structural heart disease    Atrial fibrillation  Continue metoprolol 50 mg p.o. twice daily  Resume anticoagulation after hemodialysis catheter placement  Diuresis as per nephrology    Management of CKD as per nephrology and primary team  Please keep potassium between 4 and 5 magnesium above 2  Please keep hemoglobin above 8 and place above 50  Ischemic evaluation as an outpatient  Comments:     Thank you for allowing us to participate in the care of this patient.     Will continue to follow.    Please call if questions or concerns arise.    Electronically signed by Ralph Smith MD on 7/19/2024 at 6:37 PM    Please note this report has been partially produced using speech recognition software and may cause contain errors related to that system including grammar, punctuation and spelling as well as words and phrases that may seem inappropriate. If there are questions or concerns please feel free to contact me to clarify.

## 2024-07-20 ENCOUNTER — APPOINTMENT (OUTPATIENT)
Dept: ULTRASOUND IMAGING | Age: 79
DRG: 698 | End: 2024-07-20
Payer: MEDICARE

## 2024-07-20 PROBLEM — R55 SYNCOPE: Status: ACTIVE | Noted: 2024-07-20

## 2024-07-20 LAB
ALBUMIN SERPL-MCNC: 2 G/DL (ref 3.5–4.6)
ALP SERPL-CCNC: 43 U/L (ref 35–104)
ALT SERPL-CCNC: 17 U/L (ref 0–41)
ANION GAP SERPL CALCULATED.3IONS-SCNC: 14 MEQ/L (ref 9–15)
AST SERPL-CCNC: <5 U/L (ref 0–40)
BASOPHILS # BLD: 0 K/UL (ref 0–0.2)
BASOPHILS NFR BLD: 1 %
BILIRUB SERPL-MCNC: <0.2 MG/DL (ref 0.2–0.7)
BM IGG SER QL IF: NEGATIVE
BUN SERPL-MCNC: 56 MG/DL (ref 8–23)
CALCIUM SERPL-MCNC: 7.8 MG/DL (ref 8.5–9.9)
CHLORIDE SERPL-SCNC: 96 MEQ/L (ref 95–107)
CO2 SERPL-SCNC: 21 MEQ/L (ref 20–31)
CREAT SERPL-MCNC: 4.72 MG/DL (ref 0.7–1.2)
EOSINOPHIL # BLD: 0 K/UL (ref 0–0.7)
EOSINOPHIL NFR BLD: 2 %
ERYTHROCYTE [DISTWIDTH] IN BLOOD BY AUTOMATED COUNT: 12.9 % (ref 11.5–14.5)
GLOBULIN SER CALC-MCNC: 2.5 G/DL (ref 2.3–3.5)
GLUCOSE BLD-MCNC: 362 MG/DL (ref 70–99)
GLUCOSE BLD-MCNC: 380 MG/DL (ref 70–99)
GLUCOSE BLD-MCNC: 392 MG/DL (ref 70–99)
GLUCOSE BLD-MCNC: 398 MG/DL (ref 70–99)
GLUCOSE BLD-MCNC: 417 MG/DL (ref 70–99)
GLUCOSE BLD-MCNC: 418 MG/DL (ref 70–99)
GLUCOSE SERPL-MCNC: 392 MG/DL (ref 70–99)
HCT VFR BLD AUTO: 30.2 % (ref 42–52)
HGB BLD-MCNC: 10.5 G/DL (ref 14–18)
LYMPHOCYTES # BLD: 0.8 K/UL (ref 1–4.8)
LYMPHOCYTES NFR BLD: 42 %
MCH RBC QN AUTO: 31.7 PG (ref 27–31.3)
MCHC RBC AUTO-ENTMCNC: 34.8 % (ref 33–37)
MCV RBC AUTO: 91.2 FL (ref 79–92.2)
MONOCYTES # BLD: 0.3 K/UL (ref 0.2–0.8)
MONOCYTES NFR BLD: 17.3 %
MYELOCYTES NFR BLD MANUAL: 1 %
NEUTROPHILS # BLD: 0.7 K/UL (ref 1.4–6.5)
NEUTS BAND NFR BLD MANUAL: 5 %
NEUTS SEG NFR BLD: 31 %
NUCLEAR IGG SER QL IA: NORMAL
PERFORMED ON: ABNORMAL
PLATELET # BLD AUTO: 331 K/UL (ref 130–400)
PLATELET BLD QL SMEAR: ADEQUATE
POTASSIUM SERPL-SCNC: 4 MEQ/L (ref 3.4–4.9)
PROT SERPL-MCNC: 4.5 G/DL (ref 6.3–8)
RBC # BLD AUTO: 3.31 M/UL (ref 4.7–6.1)
RBC MORPH BLD: NORMAL
SODIUM SERPL-SCNC: 131 MEQ/L (ref 135–144)
VARIANT LYMPHS NFR BLD: 1 %
WBC # BLD AUTO: 1.8 K/UL (ref 4.8–10.8)

## 2024-07-20 PROCEDURE — 6370000000 HC RX 637 (ALT 250 FOR IP)

## 2024-07-20 PROCEDURE — 36415 COLL VENOUS BLD VENIPUNCTURE: CPT

## 2024-07-20 PROCEDURE — 99231 SBSQ HOSP IP/OBS SF/LOW 25: CPT | Performed by: PHYSICAL MEDICINE & REHABILITATION

## 2024-07-20 PROCEDURE — 99223 1ST HOSP IP/OBS HIGH 75: CPT | Performed by: RADIOLOGY

## 2024-07-20 PROCEDURE — 2580000003 HC RX 258: Performed by: INTERNAL MEDICINE

## 2024-07-20 PROCEDURE — 6370000000 HC RX 637 (ALT 250 FOR IP): Performed by: INTERNAL MEDICINE

## 2024-07-20 PROCEDURE — 80053 COMPREHEN METABOLIC PANEL: CPT

## 2024-07-20 PROCEDURE — 6360000002 HC RX W HCPCS: Performed by: INTERNAL MEDICINE

## 2024-07-20 PROCEDURE — 76705 ECHO EXAM OF ABDOMEN: CPT

## 2024-07-20 PROCEDURE — 2580000003 HC RX 258: Performed by: STUDENT IN AN ORGANIZED HEALTH CARE EDUCATION/TRAINING PROGRAM

## 2024-07-20 PROCEDURE — 1210000000 HC MED SURG R&B

## 2024-07-20 PROCEDURE — 6370000000 HC RX 637 (ALT 250 FOR IP): Performed by: STUDENT IN AN ORGANIZED HEALTH CARE EDUCATION/TRAINING PROGRAM

## 2024-07-20 PROCEDURE — 99232 SBSQ HOSP IP/OBS MODERATE 35: CPT | Performed by: INTERNAL MEDICINE

## 2024-07-20 PROCEDURE — 2500000003 HC RX 250 WO HCPCS: Performed by: INTERNAL MEDICINE

## 2024-07-20 PROCEDURE — 85025 COMPLETE CBC W/AUTO DIFF WBC: CPT

## 2024-07-20 RX ORDER — INSULIN LISPRO 100 [IU]/ML
0-16 INJECTION, SOLUTION INTRAVENOUS; SUBCUTANEOUS
Status: DISCONTINUED | OUTPATIENT
Start: 2024-07-20 | End: 2024-07-22

## 2024-07-20 RX ORDER — SODIUM CHLORIDE, SODIUM LACTATE, POTASSIUM CHLORIDE, AND CALCIUM CHLORIDE .6; .31; .03; .02 G/100ML; G/100ML; G/100ML; G/100ML
500 INJECTION, SOLUTION INTRAVENOUS ONCE
Status: COMPLETED | OUTPATIENT
Start: 2024-07-20 | End: 2024-07-20

## 2024-07-20 RX ORDER — MIDODRINE HYDROCHLORIDE 5 MG/1
5 TABLET ORAL
Status: DISCONTINUED | OUTPATIENT
Start: 2024-07-20 | End: 2024-07-23

## 2024-07-20 RX ORDER — FUROSEMIDE 10 MG/ML
80 INJECTION INTRAMUSCULAR; INTRAVENOUS ONCE
Status: COMPLETED | OUTPATIENT
Start: 2024-07-20 | End: 2024-07-20

## 2024-07-20 RX ORDER — INSULIN LISPRO 100 [IU]/ML
0-4 INJECTION, SOLUTION INTRAVENOUS; SUBCUTANEOUS NIGHTLY
Status: DISCONTINUED | OUTPATIENT
Start: 2024-07-20 | End: 2024-07-22

## 2024-07-20 RX ORDER — SODIUM CHLORIDE 450 MG/100ML
INJECTION, SOLUTION INTRAVENOUS CONTINUOUS
Status: DISCONTINUED | OUTPATIENT
Start: 2024-07-20 | End: 2024-07-22

## 2024-07-20 RX ORDER — INSULIN GLARGINE 100 [IU]/ML
15 INJECTION, SOLUTION SUBCUTANEOUS NIGHTLY
Status: DISCONTINUED | OUTPATIENT
Start: 2024-07-20 | End: 2024-07-21

## 2024-07-20 RX ORDER — CALCIUM CARBONATE 500 MG/1
500 TABLET, CHEWABLE ORAL 3 TIMES DAILY PRN
Status: DISCONTINUED | OUTPATIENT
Start: 2024-07-20 | End: 2024-07-25 | Stop reason: HOSPADM

## 2024-07-20 RX ADMIN — SODIUM CHLORIDE: 4.5 INJECTION, SOLUTION INTRAVENOUS at 07:47

## 2024-07-20 RX ADMIN — INSULIN LISPRO 16 UNITS: 100 INJECTION, SOLUTION INTRAVENOUS; SUBCUTANEOUS at 16:37

## 2024-07-20 RX ADMIN — HEPARIN SODIUM 5000 UNITS: 5000 INJECTION INTRAVENOUS; SUBCUTANEOUS at 13:33

## 2024-07-20 RX ADMIN — INSULIN LISPRO 16 UNITS: 100 INJECTION, SOLUTION INTRAVENOUS; SUBCUTANEOUS at 11:10

## 2024-07-20 RX ADMIN — SODIUM CHLORIDE, POTASSIUM CHLORIDE, SODIUM LACTATE AND CALCIUM CHLORIDE 500 ML: 600; 310; 30; 20 INJECTION, SOLUTION INTRAVENOUS at 13:38

## 2024-07-20 RX ADMIN — TAMSULOSIN HYDROCHLORIDE 0.4 MG: 0.4 CAPSULE ORAL at 07:58

## 2024-07-20 RX ADMIN — ALUMINUM HYDROXIDE, MAGNESIUM HYDROXIDE, AND SIMETHICONE 30 ML: 200; 200; 20 SUSPENSION ORAL at 12:13

## 2024-07-20 RX ADMIN — FUROSEMIDE 80 MG: 10 INJECTION, SOLUTION INTRAMUSCULAR; INTRAVENOUS at 07:44

## 2024-07-20 RX ADMIN — MIDODRINE HYDROCHLORIDE 5 MG: 5 TABLET ORAL at 15:09

## 2024-07-20 RX ADMIN — AMPICILLIN SODIUM 2000 MG: 2 INJECTION, POWDER, FOR SOLUTION INTRAMUSCULAR; INTRAVENOUS at 10:56

## 2024-07-20 RX ADMIN — VALACYCLOVIR 500 MG: 1 TABLET, FILM COATED ORAL at 08:53

## 2024-07-20 RX ADMIN — METOPROLOL TARTRATE 50 MG: 50 TABLET, FILM COATED ORAL at 20:14

## 2024-07-20 RX ADMIN — SODIUM CHLORIDE, POTASSIUM CHLORIDE, SODIUM LACTATE AND CALCIUM CHLORIDE 500 ML: 600; 310; 30; 20 INJECTION, SOLUTION INTRAVENOUS at 12:08

## 2024-07-20 RX ADMIN — INSULIN LISPRO 4 UNITS: 100 INJECTION, SOLUTION INTRAVENOUS; SUBCUTANEOUS at 20:11

## 2024-07-20 RX ADMIN — INSULIN GLARGINE 15 UNITS: 100 INJECTION, SOLUTION SUBCUTANEOUS at 20:10

## 2024-07-20 RX ADMIN — METOPROLOL TARTRATE 50 MG: 50 TABLET, FILM COATED ORAL at 07:58

## 2024-07-20 RX ADMIN — INSULIN LISPRO 8 UNITS: 100 INJECTION, SOLUTION INTRAVENOUS; SUBCUTANEOUS at 07:45

## 2024-07-20 RX ADMIN — ONDANSETRON 4 MG: 4 TABLET, ORALLY DISINTEGRATING ORAL at 08:55

## 2024-07-20 RX ADMIN — INSULIN HUMAN 8 UNITS: 100 INJECTION, SOLUTION PARENTERAL at 16:51

## 2024-07-20 RX ADMIN — METHYLPREDNISOLONE SODIUM SUCCINATE 80 MG: 125 INJECTION, POWDER, LYOPHILIZED, FOR SOLUTION INTRAMUSCULAR; INTRAVENOUS at 07:58

## 2024-07-20 RX ADMIN — HEPARIN SODIUM 5000 UNITS: 5000 INJECTION INTRAVENOUS; SUBCUTANEOUS at 06:58

## 2024-07-20 RX ADMIN — Medication 10 ML: at 07:59

## 2024-07-20 RX ADMIN — HEPARIN SODIUM 5000 UNITS: 5000 INJECTION INTRAVENOUS; SUBCUTANEOUS at 20:11

## 2024-07-20 RX ADMIN — ANTACID TABLETS 500 MG: 500 TABLET, CHEWABLE ORAL at 13:33

## 2024-07-20 ASSESSMENT — PAIN DESCRIPTION - DESCRIPTORS
DESCRIPTORS: DISCOMFORT;BURNING
DESCRIPTORS: DISCOMFORT;BURNING

## 2024-07-20 ASSESSMENT — PAIN DESCRIPTION - LOCATION
LOCATION: MOUTH
LOCATION: THROAT

## 2024-07-20 ASSESSMENT — PAIN DESCRIPTION - ORIENTATION
ORIENTATION: INNER
ORIENTATION: INNER

## 2024-07-20 ASSESSMENT — ENCOUNTER SYMPTOMS
SHORTNESS OF BREATH: 0
NAUSEA: 0
COUGH: 0
VOMITING: 0
DIARRHEA: 0
ABDOMINAL PAIN: 0
PHOTOPHOBIA: 0
CONSTIPATION: 0
WHEEZING: 0
EYES NEGATIVE: 1
BACK PAIN: 0
GASTROINTESTINAL NEGATIVE: 1
RESPIRATORY NEGATIVE: 1

## 2024-07-20 ASSESSMENT — PAIN DESCRIPTION - FREQUENCY
FREQUENCY: INTERMITTENT
FREQUENCY: INTERMITTENT

## 2024-07-20 ASSESSMENT — PAIN SCALES - GENERAL
PAINLEVEL_OUTOF10: 0
PAINLEVEL_OUTOF10: 0
PAINLEVEL_OUTOF10: 1

## 2024-07-20 ASSESSMENT — PAIN DESCRIPTION - PAIN TYPE
TYPE: ACUTE PAIN
TYPE: ACUTE PAIN

## 2024-07-20 ASSESSMENT — PAIN - FUNCTIONAL ASSESSMENT
PAIN_FUNCTIONAL_ASSESSMENT: ACTIVITIES ARE NOT PREVENTED
PAIN_FUNCTIONAL_ASSESSMENT: ACTIVITIES ARE NOT PREVENTED

## 2024-07-20 ASSESSMENT — PAIN DESCRIPTION - ONSET
ONSET: ON-GOING
ONSET: ON-GOING

## 2024-07-20 NOTE — PROGRESS NOTES
Patient Name: Km Garcia  Admit Date: 7/15/2024  7:06 PM  MR #: [id number]  : 1945     Attending Physician: Vishal Fox MD  Reason for consult: Syncope     History of Presenting Illness:       Km Garcia is a 78 y.o. male on hospital day 2 with a history of cardiomyopathy EF 35% by TTE 2024 (cardiomyopathy thought to be related to chemotherapy induced), diabetes, paroxysmal atrial tachycardia, hypertension, hyperlipidemia, large B cell CNS lymphoma diagnosed 2024 initially treated with CHOP, converted to converted to Linalidomide and Ibrutinib after he developed nephrotoxicity to Methotrexate.  Right lower extremity DVT and bilateral PE May 16, 2024 on Lovenox, left charcoaled foot, who came to the hospital after sustaining a syncopal episode.  History Obtained From:  patient, electronic medical record     Per patient, he was sitting in the toilet when he passed out wife witness event  No prodrome like symptoms  Patient was having diarrhea before syncopal episode  Denies chest pain or shortness of breath  EKG sinus rhythm with nonspecific T wave abnormality lateral leads, LVH, PACs,  Orthostatics positive  ===============  Hospital course    2024: Hemodynamically stable.  No new issues overnight.  No chest pain    2024  Patient laying in bed looks comfortable  Denies chest pain or shortness of breath  Plan for initiation of hemodialysis  Telemetry atrial fibrillation 80s to 90s     2024  Patient laying in bed looks comfortable  Wife at bedside  Per patient he was not able to sleep well due to knee pain pain.  Patient and wife patient chronic knee pain related.  Currently patient denies knee or ankle pain  Telemetry sinus rhythm, patient was noted with paroxysmal atrial fibrillation overnight  Creatinine worsening 4.68 up from 2.75 yesterday and 1.27 on admission        2024  Patient laying in bed looks comfortable  Denies chest pain or shortness of breath  Telemetry  no rub/gallop. No RV heave.  Lungs: Clear to ascultation, no rales/wheezing/rhonchi. Good chest wall excursion.  Abdomen: Soft non tender non distended   extremities: No clubbing/cyanosis, 1+ pitting edema bilaterally  Skin: Warm, dry, normal turgor, no rash, no bruise, no petichiae.  Neuro: No myoclonus or tremor.   Psych: Normal affect     Results/ Medications reviewed 7/19/2024, 6:37 PM      Laboratory, Microbiology, Pathology, Radiology, Cardiology, Medications and Transcriptions reviewed  Scheduled Meds:  Scheduled Medications    methylPREDNISolone  80 mg IntraVENous Daily    heparin (porcine)  5,000 Units SubCUTAneous 3 times per day    [START ON 7/20/2024] ampicillin IV  2,000 mg IntraVENous Q24H    [START ON 7/20/2024] valACYclovir  500 mg Oral Daily    morphine  2 mg IntraVENous Once    metoprolol tartrate  50 mg Oral BID    [Held by provider] enoxaparin  1 mg/kg SubCUTAneous BID    [Held by provider] sacubitril-valsartan  1 tablet Oral BID    tamsulosin  0.4 mg Oral Daily    sodium chloride flush  5-40 mL IntraVENous 2 times per day    insulin lispro  0-8 Units SubCUTAneous TID WC    insulin lispro  0-4 Units SubCUTAneous Nightly    [Held by provider] furosemide  20 mg IntraVENous BID         Continuous Infusions:  Infusions Meds    sodium chloride      dextrose                    Recent Labs     07/17/24  0508 07/18/24  0528 07/19/24  0602   WBC 2.8* 2.6* 1.6*   HGB 11.0* 9.9* 10.4*   HCT 31.9* 29.3* 30.5*   MCV 93.0* 93.0* 93.6*    239 CLUMPED            Recent Labs     07/17/24  0508 07/18/24  0528 07/19/24  0602   * 131* 129*   K 3.2* 3.5 4.3    98 98   CO2 19* 17* 14*   BUN 29* 44* 53*   CREATININE 2.75* 4.68* 6.00*          Recent Labs     07/19/24  0905   INR 1.5      No results found for this or any previous visit.     Impression:   Syncope.  Unknown etiology, however orthostatics positive this admission, no major arrhythmias on telemetry aside from atrial fibrillation, no

## 2024-07-20 NOTE — CARE COORDINATION
Spoke to Madelyn from [facility] and they can take pt today if medically cleared. Pt does not need insurance auth and she will call Desmond RN  to let him know.

## 2024-07-20 NOTE — PROGRESS NOTES
Physical Therapy Missed Treatment   Facility/Department: [facility] MED SURG W183/W183-01    NAME: Km Garcia    : 1945 (78 y.o.)  MRN: [id number]    Account: [id number]  Gender: male    Chart reviewed, attempted PT at 0848. Patient unavailable 2° to:        [x] Pt declined secondary to upset stomach/nausea.  Rated a 7/10 pain.  Pt agreeable to try later this AM or after lunch.  Nursing in the room.  Will attempt PT treatment again at earliest convenience.      Electronically signed by Arpita Durán PTA on 24 at 8:54 AM EDT

## 2024-07-20 NOTE — CONSULTS
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 Patient normally ambulates at home with her walker, though according to patient's wife he had been less active for the past 24 hours prior to syncope which brought him to the emergency room. Patient has also been having diarrhea. Upon admission patient was diagnosed with acute kidney failure requiring dialysis. Interventional radiology was consulted for urgent dialysis access.       Family History   Problem Relation Age of Onset    No Known Problems Mother     Heart Disease Father     No Known Problems Sister     Other Brother         enlarged prostate, cataracts    Diabetes Brother         borderline       Past Surgical History:   Procedure Laterality Date    COLONOSCOPY      > 30 yrs ago    IR NONTUNNELED VASCULAR CATHETER Right 07/19/2024    Medcomp 11.5F x 15 cm [employer] Duo-Flow IJ double lumen catheter (LOT# [id number],  exp 03/34/2027) performed by Dr. Osborne    LITHOTRIPSY N/A 12/12/2018    WITH HOLMIUM LASER performed by Antonio Petit MD at [facility] OR        Past Medical History:   Diagnosis Date    AMD (age related macular degeneration)     left eye only, gets steroid shots    Asthma     seasonally, uses inhaler as needed    Athscl heart disease of native coronary artery w/o ang pctrs 1/1/2024    Cerebrovascular accident (CVA) (HCC) 4/11/2024    Charcot ankle, left     wears brace    Diabetes mellitus (HCC)     takes metformin    Hypertension     on meds > 10 yrs    Osteoarthritis        Social History     Socioeconomic History    Marital status:      Spouse name:

## 2024-07-20 NOTE — PLAN OF CARE
Problem: Cardiovascular - Adult  Goal: Maintains optimal cardiac output and hemodynamic stability  Outcome: Progressing  Flowsheets (Taken 7/19/2024 2114)  Maintains optimal cardiac output and hemodynamic stability:   Monitor blood pressure and heart rate   Monitor urine output and notify Licensed Independent Practitioner for values outside of normal range   Assess for signs of decreased cardiac output     Problem: Chronic Conditions and Co-morbidities  Goal: Patient's chronic conditions and co-morbidity symptoms are monitored and maintained or improved  7/19/2024 2116 by Veena West RN  Outcome: Progressing  Flowsheets (Taken 7/19/2024 2114)  Care Plan - Patient's Chronic Conditions and Co-Morbidity Symptoms are Monitored and Maintained or Improved:   Monitor and assess patient's chronic conditions and comorbid symptoms for stability, deterioration, or improvement   Collaborate with multidisciplinary team to address chronic and comorbid conditions and prevent exacerbation or deterioration   Update acute care plan with appropriate goals if chronic or comorbid symptoms are exacerbated and prevent overall improvement and discharge  7/19/2024 1042 by Desmond Collazo RN  Outcome: Progressing  Flowsheets (Taken 7/19/2024 0800)  Care Plan - Patient's Chronic Conditions and Co-Morbidity Symptoms are Monitored and Maintained or Improved:   Monitor and assess patient's chronic conditions and comorbid symptoms for stability, deterioration, or improvement   Collaborate with multidisciplinary team to address chronic and comorbid conditions and prevent exacerbation or deterioration   Update acute care plan with appropriate goals if chronic or comorbid symptoms are exacerbated and prevent overall improvement and discharge     Problem: Discharge Planning  Goal: Discharge to home or other facility with appropriate resources  7/19/2024 2116 by Veena West RN  Outcome: Progressing  Flowsheets (Taken 7/19/2024 2114)  Discharge  to home or other facility with appropriate resources:   Identify barriers to discharge with patient and caregiver   Arrange for needed discharge resources and transportation as appropriate   Identify discharge learning needs (meds, wound care, etc)  7/19/2024 1042 by Desmond Collazo RN  Outcome: Progressing  Flowsheets (Taken 7/19/2024 0800)  Discharge to home or other facility with appropriate resources:   Identify barriers to discharge with patient and caregiver   Arrange for needed discharge resources and transportation as appropriate   Identify discharge learning needs (meds, wound care, etc)

## 2024-07-20 NOTE — PROGRESS NOTES
0942: patient medicated as ordered. Patient also medicated with PRN PO zofran for abd discomfort with hyperactive bowel sounds. Patient did have x1 large loose bowel movement, which per wife I getting better. Spouse and patient updated on plan of care. Will continue to monitor.     1008: Dr. Crum on unit and made aware of 380 morning glucose and need for 8 units with notify of provider.     1053: Dr. Crum made aware of 417 glucose.     1120: Secure message sent to Dr. Crum regarding BP 94/43 in right arm. 93/55 left arm denies dizziness at this time but is at rest. currently getting 50ml/hr of 0.45% NS.See new order.     1200: patient have continue need for heart burn and requesting tums. Secure message sent to Dr. Crum. See new order.     1305: Dr. Crum made aware of 93/36 BP. Patient asymptomatic.     1500: Dr. Crum made aware of 90systolic BP after 500ml LR. See new orders. Multiple attempts to reposition patient which he refused. Will continue to reattempts.     1553: patient wife called writer and concerned that the metoprolol might be causing his low BP and increase with blood glucose levels since hes a type 2 diabetic. Also concerned that he has increase in fatigue. Dr. Crum given phone number if he wanted to update. [phone].    1630: Dr. Crum made aware of glucose 398 and 16 units will be given per order.

## 2024-07-20 NOTE — PROGRESS NOTES
Nephrology Progress Note    Assessment:  SILVINA possible obstructive component serology all negative  Left Renal stone 1.2 cm  Hx CVA-poor historian  Hx Lymphoma  Nl GFR 6/21/2024   interstitial dx ?  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%  7/16/2024  Hematoma spleen 7 cm       Plan:hold dialysis today  follow over weekend  Once ca advanced yesterday 1200cc drained oliguria at present  Follow I&O  labs daily may need repeat dialysis Monday if GFR worsens    Patient Active Problem List:     Bladder stones     Surgical aftercare, genitourinary system     Gross hematuria     SOB (shortness of breath)     Brain bleed (HCC)     Syncope and collapse     Athscl heart disease of native coronary artery w/o ang pctrs     Compression of brain (HCC)     Cardiomyopathy (HCC)     Cerebrovascular accident (CVA) (HCC)     Coronary artery disease involving native coronary artery of native heart without angina pectoris     Diffuse large B-cell lymphoma, unspecified site (HCC)     Dysphagia, oropharyngeal phase     History of falling     History of traumatic brain injury     Hypokalemia     Impaired mobility and activities of daily living     Intraoperative floppy iris syndrome (IFIS)     Macular degeneration of both eyes     Mass of brain     Subdural hematoma (HCC)     Methotrexate renal toxicity     Methotrexate toxicity     Obstructive and reflux uropathy, unspecified     Pancreatic cyst     PAT (paroxysmal atrial tachycardia) (HCC)     Primary hypertension     Primary osteoarthritis of both knees     Primary CNS lymphoma (HCC)     Primary pulmonary hypertension (HCC)     Urinary retention     Late effect of brain injury (HCC)     SILVINA (acute kidney injury) (HCC)     UTI (urinary tract infection)      Subjective:  Admit Date: 7/15/2024    Interval History: no distress      Medications:  Scheduled Meds:   methylPREDNISolone  80 mg IntraVENous Daily    heparin (porcine)  5,000 Units SubCUTAneous 3 times per day    ampicillin IV  2,000 mg IntraVENous Q24H

## 2024-07-20 NOTE — PROGRESS NOTES
Hospitalist Progress Note      Date of Admission: 7/15/2024  Chief Complaint:    Chief Complaint   Patient presents with    Loss of Consciousness     While on the toilet, hx brain CA, new CHF,      Subjective:  No new complaints.  No nausea, vomiting, chest pain, or headache. Good UO overnight and this morning. Discussed with RN regarding hyperglycemia.       Medications:    Infusion Medications    sodium chloride 50 mL/hr at 07/20/24 1204    sodium chloride      dextrose       Scheduled Medications    insulin lispro  0-16 Units SubCUTAneous TID WC    insulin lispro  0-4 Units SubCUTAneous Nightly    methylPREDNISolone  80 mg IntraVENous Daily    heparin (porcine)  5,000 Units SubCUTAneous 3 times per day    ampicillin IV  2,000 mg IntraVENous Q24H    valACYclovir  500 mg Oral Daily    morphine  2 mg IntraVENous Once    metoprolol tartrate  50 mg Oral BID    [Held by provider] enoxaparin  1 mg/kg SubCUTAneous BID    [Held by provider] sacubitril-valsartan  1 tablet Oral BID    tamsulosin  0.4 mg Oral Daily    sodium chloride flush  5-40 mL IntraVENous 2 times per day    [Held by provider] furosemide  20 mg IntraVENous BID     PRN Meds: calcium carbonate, albumin human 25%, heparin (porcine), aluminum & magnesium hydroxide-simethicone, sodium chloride flush, sodium chloride, potassium chloride **OR** potassium alternative oral replacement **OR** potassium chloride, magnesium sulfate, ondansetron **OR** ondansetron, melatonin, polyethylene glycol, acetaminophen **OR** acetaminophen, glucose, dextrose bolus **OR** dextrose bolus, glucagon (rDNA), dextrose    Intake/Output Summary (Last 24 hours) at 7/20/2024 1243  Last data filed at 7/20/2024 1204  Gross per 24 hour   Intake 1522.7 ml   Output 3330 ml   Net -1807.3 ml     Exam:  BP (!) 93/55   Pulse 63   Temp 97.4 °F (36.3 °C) (Oral)   Resp 20   Ht 1.854 m (6' 0.99\")   Wt 116.6 kg (257 lb)   SpO2 99%   BMI 33.91 kg/m²   Head: Normocephalic, atraumatic  Sclera  bilaterally.  No evidence of renal stones.      Incidental sludge identified within the gallbladder.         CT Head W/O Contrast   Final Result   No acute intracranial abnormality.         XR CHEST PORTABLE   Final Result   No acute process.         IR NONTUNNELED VASCULAR CATHETER > 5 YEARS    (Results Pending)   IR ULTRASOUND GUIDANCE VASCULAR ACCESS    (Results Pending)   IR FLUORO GUIDED CVA DEVICE PLMT/REPLACE/REMOVAL    (Results Pending)   US ABDOMEN LIMITED    (Results Pending)     Assessment/Plan:    Syncope: Most likely secondary to orthostasis.  Possible hypovolemia. Continuing with IVF     SILVINA: likely multifactorial. Considering obstructive component vs ATN. HD cath placed yesterday, neph following for HD needs. Will need to know long term HD plan prior to dc     UTI, catheter assoc: Enterococcus faecalis, ampicillin.    T2DM w/ hyperglycemia: steroids likely contributing. Increasing SSI to high dose scale, may add Lantus tonight if remains elevated    History of CVA  History of lymphoma, CNS; holding all medications at this time as per hematology/oncology consult note dated 7/16  HTN: monitor BP, adjust meds as needed  Hx of vte - was on apixaban at one time during which time he had a spontaneous hemorrhage.  Risk vs benefit will opt for dvt proph with 5000 hep subq, q8h.      Davide Crum MD

## 2024-07-20 NOTE — PROGRESS NOTES
Physical Therapy Missed Treatment   Facility/Department: [facility] MED SURG W183/W183-01    NAME: Km Garcia    : 1945 (78 y.o.)  MRN: [id number]    Account: [id number]  Gender: male    Chart reviewed, attempted PT at 1130. Patient unavailable 2° to:    [x] Hold per nsg request secondary to low BP: pt on IV drip.  Pt reports stomach no longer bothering him.  Will check back later, if time, otherwise will try tomorrow.      Will attempt PT treatment again at earliest convenience.      Electronically signed by Arpita Durán PTA on 24 at 12:09 PM EDT

## 2024-07-20 NOTE — PROGRESS NOTES
Subjective:  The patient complains of severe acute on chronic progressive fatigue and EL, lower extremity pain and swelling partially relieved by rest, PT, OT and meds and IV fluids and exacerbated by exertion and recent treatment with Entresto and IV chemotherapy for lymphoma--> with recent renal failure.      Patient was admitted to [facility] on 7/15/2024 after having a syncopal episode at home.  He was recently started on Entresto which may have caused some orthostasis.       He has a complicated medical history with a recent decline in function and medical status over the past 3 to 4 months.  Unfortunately he was diagnosed with lymphoma--with metastases to brain cancer he had surgery.  He was at [facility] rehab for 3 weeks.  He was discharged home in order to have his chemotherapy but and after the second round of chemo he went into kidney failure.  Care was also complicated by a PE in March and a spontaneous brain bleed.     He had presented to [facility] in [place] for the PE and spontaneous   brain bleed but was life flighted to [facility].     He had recovered from that was recovering at home when he fell.  Fortunately his wife is an RN and able to watch over him.  Also. fortunate for him but oncologist is saying that the lymphoma has likely reached remission.  The brain tumors however are still there and are inoperable.    I am concerned about patient’s medical complexities including:  Principal Problem:    Syncope and collapse  Active Problems:    Dysphagia, oropharyngeal phase    Impaired mobility and activities of daily living    Urinary retention    Late effect of brain injury (HCC)    SILVINA (acute kidney injury) (HCC)    UTI (urinary tract infection)  Resolved Problems:    * No resolved hospital problems. *      .    Reviewed recent nursing note and discussed current status and planned care with acute care providers, \"bed resting comfortably. He is A/O/ x 4, forgetful, BR. He takes his po medications  whole w/ water. Last BM on 07/19/2024, no c/o of constipation, no abd pain, but did have a Lrg loose BM. Bilat Lungs are clr/dim. POX on RA at 99%. No c/o SOB, or difficulties breathing. Bilat UE pulses are palpable at +2. Bilat LE pulses are at +1, Edema Bilat. LE at +3. Pt has a 20 ga IV that is clean, dry, and intact, running 0.09% NS at 75 ml/hr, in his Left AC. Call light is w/in reach. \".    Patient is showing Enterococcus in his urine.  He is now on ampicillin.  His wife would like him to be on something stronger than Tylenol for pain.  I have suggested that they trial him on the half dose Percocet or Norco avoiding IV medications if possible avoiding morphine because of his medical complexities and risk for dropping his blood pressure even further.  I would also advise a palliative care consult.    He now has a HD cath and is HD dt renal failure.  Now getting IVFs as he was a bit dehydrated SP HD.    ROS x10:  The patient also complains of severely impaired mobility and activities of daily living.  Otherwise no new problems with vision, hearing, nose, mouth, throat, dermal, cardiovascular, GI, , pulmonary, musculoskeletal, psychiatric or neurological.        Vital signs:  BP (!) 109/54   Pulse 90   Temp 97.4 °F (36.3 °C) (Oral)   Resp 19   Ht 1.854 m (6' 0.99\")   Wt 116.6 kg (257 lb)   SpO2 100%   BMI 33.91 kg/m²   I/O:   PO/Intake:    fair PO intake, continue to monitor closely for dehydration    Bowel/Bladder:   continent, Contreras for recent severe retention  General:  Patient is well developed, adequately nourished, and    well kempt.     HEENT:    PERRLA, hearing intact to loud voice, external inspection of ear and nose benign.  Inspection of lips, tongue and gums benign  Musculoskeletal: No significant change in strength or tone.  All joints stable.      Inspection and palpation of digits and nails show no clubbing, cyanosis or inflammatory conditions.   Neuro/Psychiatric: Affect: flat-  Alert

## 2024-07-21 VITALS
TEMPERATURE: 98.4 F | HEART RATE: 92 BPM | SYSTOLIC BLOOD PRESSURE: 109 MMHG | RESPIRATION RATE: 20 BRPM | OXYGEN SATURATION: 100 % | DIASTOLIC BLOOD PRESSURE: 40 MMHG | BODY MASS INDEX: 34.06 KG/M2 | HEIGHT: 73 IN | WEIGHT: 257 LBS

## 2024-07-21 LAB
ALBUMIN SERPL-MCNC: 2.2 G/DL (ref 3.5–4.6)
ALP SERPL-CCNC: 42 U/L (ref 35–104)
ALT SERPL-CCNC: 17 U/L (ref 0–41)
ANION GAP SERPL CALCULATED.3IONS-SCNC: 18 MEQ/L (ref 9–15)
ANISOCYTOSIS BLD QL SMEAR: ABNORMAL
AST SERPL-CCNC: 8 U/L (ref 0–40)
BASOPHILS # BLD: 0 K/UL (ref 0–0.2)
BASOPHILS NFR BLD: 0 %
BILIRUB SERPL-MCNC: <0.2 MG/DL (ref 0.2–0.7)
BUN SERPL-MCNC: 79 MG/DL (ref 8–23)
CALCIUM SERPL-MCNC: 7.7 MG/DL (ref 8.5–9.9)
CHLORIDE SERPL-SCNC: 93 MEQ/L (ref 95–107)
CO2 SERPL-SCNC: 19 MEQ/L (ref 20–31)
CREAT SERPL-MCNC: 5.22 MG/DL (ref 0.7–1.2)
EOSINOPHIL # BLD: 0 K/UL (ref 0–0.7)
EOSINOPHIL NFR BLD: 0 %
ERYTHROCYTE [DISTWIDTH] IN BLOOD BY AUTOMATED COUNT: 12.6 % (ref 11.5–14.5)
GLOBULIN SER CALC-MCNC: 2.3 G/DL (ref 2.3–3.5)
GLUCOSE BLD-MCNC: 373 MG/DL (ref 70–99)
GLUCOSE BLD-MCNC: 390 MG/DL (ref 70–99)
GLUCOSE BLD-MCNC: 393 MG/DL (ref 70–99)
GLUCOSE BLD-MCNC: 414 MG/DL (ref 70–99)
GLUCOSE BLD-MCNC: 416 MG/DL (ref 70–99)
GLUCOSE SERPL-MCNC: 435 MG/DL (ref 70–99)
HCT VFR BLD AUTO: 33.4 % (ref 42–52)
HGB BLD-MCNC: 11.3 G/DL (ref 14–18)
LYMPHOCYTES # BLD: 1.1 K/UL (ref 1–4.8)
LYMPHOCYTES NFR BLD: 43 %
MCH RBC QN AUTO: 31 PG (ref 27–31.3)
MCHC RBC AUTO-ENTMCNC: 33.8 % (ref 33–37)
MCV RBC AUTO: 91.5 FL (ref 79–92.2)
METAMYELOCYTES NFR BLD MANUAL: 1 %
MICROCYTES BLD QL SMEAR: ABNORMAL
MONOCYTES # BLD: 0.6 K/UL (ref 0.2–0.8)
MONOCYTES NFR BLD: 23.7 %
MYELOCYTES NFR BLD MANUAL: 1 %
NEUTROPHILS # BLD: 0.9 K/UL (ref 1.4–6.5)
NEUTS BAND NFR BLD MANUAL: 2 %
NEUTS SEG NFR BLD: 30 %
PATH INTERP BLD-IMP: NO
PERFORMED ON: ABNORMAL
PLATELET # BLD AUTO: ABNORMAL K/UL (ref 130–400)
PLATELET BLD QL SMEAR: ABNORMAL
POTASSIUM SERPL-SCNC: 4.2 MEQ/L (ref 3.4–4.9)
POTASSIUM SERPL-SCNC: 4.2 MEQ/L (ref 3.4–4.9)
PROT SERPL-MCNC: 4.5 G/DL (ref 6.3–8)
RBC # BLD AUTO: 3.65 M/UL (ref 4.7–6.1)
SODIUM SERPL-SCNC: 130 MEQ/L (ref 135–144)
WBC # BLD AUTO: 2.6 K/UL (ref 4.8–10.8)

## 2024-07-21 PROCEDURE — 6370000000 HC RX 637 (ALT 250 FOR IP): Performed by: INTERNAL MEDICINE

## 2024-07-21 PROCEDURE — 36415 COLL VENOUS BLD VENIPUNCTURE: CPT

## 2024-07-21 PROCEDURE — 80053 COMPREHEN METABOLIC PANEL: CPT

## 2024-07-21 PROCEDURE — 6360000002 HC RX W HCPCS: Performed by: INTERNAL MEDICINE

## 2024-07-21 PROCEDURE — 85025 COMPLETE CBC W/AUTO DIFF WBC: CPT

## 2024-07-21 PROCEDURE — 6370000000 HC RX 637 (ALT 250 FOR IP)

## 2024-07-21 PROCEDURE — 2580000003 HC RX 258: Performed by: INTERNAL MEDICINE

## 2024-07-21 PROCEDURE — 2060000000 HC ICU INTERMEDIATE R&B

## 2024-07-21 PROCEDURE — 2580000003 HC RX 258: Performed by: STUDENT IN AN ORGANIZED HEALTH CARE EDUCATION/TRAINING PROGRAM

## 2024-07-21 PROCEDURE — 6370000000 HC RX 637 (ALT 250 FOR IP): Performed by: STUDENT IN AN ORGANIZED HEALTH CARE EDUCATION/TRAINING PROGRAM

## 2024-07-21 PROCEDURE — 99232 SBSQ HOSP IP/OBS MODERATE 35: CPT | Performed by: INTERNAL MEDICINE

## 2024-07-21 PROCEDURE — 2500000003 HC RX 250 WO HCPCS: Performed by: INTERNAL MEDICINE

## 2024-07-21 RX ORDER — INSULIN LISPRO 100 [IU]/ML
12 INJECTION, SOLUTION INTRAVENOUS; SUBCUTANEOUS ONCE
Status: COMPLETED | OUTPATIENT
Start: 2024-07-21 | End: 2024-07-21

## 2024-07-21 RX ORDER — FAMOTIDINE 20 MG/1
20 TABLET, FILM COATED ORAL DAILY
Status: DISCONTINUED | OUTPATIENT
Start: 2024-07-21 | End: 2024-07-23

## 2024-07-21 RX ORDER — CARVEDILOL 3.12 MG/1
3.12 TABLET ORAL 2 TIMES DAILY
Status: DISCONTINUED | OUTPATIENT
Start: 2024-07-21 | End: 2024-07-22

## 2024-07-21 RX ORDER — SODIUM CHLORIDE, SODIUM LACTATE, POTASSIUM CHLORIDE, AND CALCIUM CHLORIDE .6; .31; .03; .02 G/100ML; G/100ML; G/100ML; G/100ML
500 INJECTION, SOLUTION INTRAVENOUS ONCE
Status: COMPLETED | OUTPATIENT
Start: 2024-07-21 | End: 2024-07-21

## 2024-07-21 RX ADMIN — INSULIN LISPRO 4 UNITS: 100 INJECTION, SOLUTION INTRAVENOUS; SUBCUTANEOUS at 21:12

## 2024-07-21 RX ADMIN — MIDODRINE HYDROCHLORIDE 5 MG: 5 TABLET ORAL at 07:40

## 2024-07-21 RX ADMIN — METHYLPREDNISOLONE SODIUM SUCCINATE 80 MG: 125 INJECTION, POWDER, LYOPHILIZED, FOR SOLUTION INTRAMUSCULAR; INTRAVENOUS at 08:18

## 2024-07-21 RX ADMIN — HEPARIN SODIUM 5000 UNITS: 5000 INJECTION INTRAVENOUS; SUBCUTANEOUS at 14:36

## 2024-07-21 RX ADMIN — ONDANSETRON 4 MG: 4 TABLET, ORALLY DISINTEGRATING ORAL at 09:16

## 2024-07-21 RX ADMIN — INSULIN LISPRO 16 UNITS: 100 INJECTION, SOLUTION INTRAVENOUS; SUBCUTANEOUS at 11:06

## 2024-07-21 RX ADMIN — SODIUM CHLORIDE: 4.5 INJECTION, SOLUTION INTRAVENOUS at 03:16

## 2024-07-21 RX ADMIN — ANTACID TABLETS 500 MG: 500 TABLET, CHEWABLE ORAL at 07:36

## 2024-07-21 RX ADMIN — INSULIN LISPRO 16 UNITS: 100 INJECTION, SOLUTION INTRAVENOUS; SUBCUTANEOUS at 07:33

## 2024-07-21 RX ADMIN — INSULIN LISPRO 16 UNITS: 100 INJECTION, SOLUTION INTRAVENOUS; SUBCUTANEOUS at 16:28

## 2024-07-21 RX ADMIN — MIDODRINE HYDROCHLORIDE 5 MG: 5 TABLET ORAL at 16:28

## 2024-07-21 RX ADMIN — HEPARIN SODIUM 5000 UNITS: 5000 INJECTION INTRAVENOUS; SUBCUTANEOUS at 06:02

## 2024-07-21 RX ADMIN — INSULIN LISPRO 12 UNITS: 100 INJECTION, SOLUTION INTRAVENOUS; SUBCUTANEOUS at 16:28

## 2024-07-21 RX ADMIN — CARVEDILOL 3.12 MG: 3.12 TABLET, FILM COATED ORAL at 21:55

## 2024-07-21 RX ADMIN — VALACYCLOVIR 500 MG: 1 TABLET, FILM COATED ORAL at 16:28

## 2024-07-21 RX ADMIN — MIDODRINE HYDROCHLORIDE 5 MG: 5 TABLET ORAL at 11:06

## 2024-07-21 RX ADMIN — AMPICILLIN SODIUM 2000 MG: 2 INJECTION, POWDER, FOR SOLUTION INTRAMUSCULAR; INTRAVENOUS at 10:20

## 2024-07-21 RX ADMIN — METOPROLOL TARTRATE 50 MG: 50 TABLET, FILM COATED ORAL at 08:12

## 2024-07-21 RX ADMIN — INSULIN HUMAN 8 UNITS: 100 INJECTION, SOLUTION PARENTERAL at 07:04

## 2024-07-21 RX ADMIN — HEPARIN SODIUM 5000 UNITS: 5000 INJECTION INTRAVENOUS; SUBCUTANEOUS at 21:56

## 2024-07-21 RX ADMIN — Medication 10 ML: at 08:19

## 2024-07-21 RX ADMIN — FAMOTIDINE 20 MG: 20 TABLET, FILM COATED ORAL at 11:06

## 2024-07-21 RX ADMIN — TAMSULOSIN HYDROCHLORIDE 0.4 MG: 0.4 CAPSULE ORAL at 08:12

## 2024-07-21 RX ADMIN — INSULIN HUMAN 12 UNITS: 100 INJECTION, SOLUTION PARENTERAL at 11:54

## 2024-07-21 RX ADMIN — SODIUM CHLORIDE, POTASSIUM CHLORIDE, SODIUM LACTATE AND CALCIUM CHLORIDE 500 ML: 600; 310; 30; 20 INJECTION, SOLUTION INTRAVENOUS at 11:09

## 2024-07-21 ASSESSMENT — PAIN SCALES - WONG BAKER
WONGBAKER_NUMERICALRESPONSE: NO HURT

## 2024-07-21 ASSESSMENT — PAIN SCALES - GENERAL: PAINLEVEL_OUTOF10: 0

## 2024-07-21 NOTE — PLAN OF CARE
Problem: Cardiovascular - Adult  Goal: Maintains optimal cardiac output and hemodynamic stability  7/20/2024 2024 by Veena West RN  Outcome: Progressing  Flowsheets (Taken 7/20/2024 2015)  Maintains optimal cardiac output and hemodynamic stability:   Monitor blood pressure and heart rate   Monitor urine output and notify Licensed Independent Practitioner for values outside of normal range   Assess for signs of decreased cardiac output  7/20/2024 0943 by Desmond Collazo RN  Outcome: Progressing  Flowsheets (Taken 7/20/2024 0745)  Maintains optimal cardiac output and hemodynamic stability:   Monitor blood pressure and heart rate   Monitor urine output and notify Licensed Independent Practitioner for values outside of normal range   Assess for signs of decreased cardiac output     Problem: Cardiovascular - Adult  Goal: Absence of cardiac dysrhythmias or at baseline  7/20/2024 2024 by Veena West RN  Outcome: Progressing  Flowsheets (Taken 7/20/2024 2015)  Absence of cardiac dysrhythmias or at baseline:   Monitor cardiac rate and rhythm   Assess for signs of decreased cardiac output   Administer antiarrhythmia medication and electrolyte replacement as ordered  7/20/2024 0943 by Desmond Collazo RN  Outcome: Progressing  Flowsheets (Taken 7/20/2024 0745)  Absence of cardiac dysrhythmias or at baseline:   Monitor cardiac rate and rhythm   Assess for signs of decreased cardiac output   Administer antiarrhythmia medication and electrolyte replacement as ordered     Problem: Chronic Conditions and Co-morbidities  Goal: Patient's chronic conditions and co-morbidity symptoms are monitored and maintained or improved  7/20/2024 2024 by Veena West RN  Outcome: Progressing  Flowsheets (Taken 7/20/2024 2015)  Care Plan - Patient's Chronic Conditions and Co-Morbidity Symptoms are Monitored and Maintained or Improved:   Monitor and assess patient's chronic conditions and comorbid symptoms for stability, deterioration, or

## 2024-07-21 NOTE — PROGRESS NOTES
Renal Adjustment Per Protocol: Pepcid 20 mg PO BID changed to Pepcid 20 g PO Daily based on    Recent Labs     07/21/24  0536   CREATININE 5.22*   Estimated Creatinine Clearance: 15 mL/min (A) (based on SCr of 5.22 mg/dL (H)).Hemodialysis Intake (ml): 500 ml .    Esperanza Wood [facility]  Staff Pharmacist, [facility]  7/21/2024  10:48 AM

## 2024-07-21 NOTE — PROGRESS NOTES
24 hour   Intake 2353.5 ml   Output 200 ml   Net 2153.5 ml         General: alert, in no apparent distress  HEENT: normocephalic, atraumatic, anicteric  Lungs: non-labored respirations, clear to auscultation bilaterally  Heart: regular rate and rhythm, no murmurs or rubs  Abdomen: soft, non-tender, non-distended  Ext: no cyanosis, no peripheral edema  Neuro: alert, follows commands         Electronically signed by Hugh Robert MD

## 2024-07-21 NOTE — PROGRESS NOTES
to the skin with nylon suture, and sterile bandaging was placed.                  Electronically signed by Addi WATSON FLUORO GUIDED CVA DEVICE PLMT/REPLACE/REMOVAL   Final Result   1.    Successful placement of a temporary central venous dialysis catheter in   the right internal jugular vein for dialysis. Note that this is a temporary   dialysis catheter which must be removed or converted to a tunneled dialysis   catheter prior to patient discharge.      Radiation dose: 3.12 mGy      Additional clinical data:    Patient with acute kidney failure now requiring dialysis      Procedure:   1.   Ultrasound guidance for vascular access.   2.   Fluoroscopic guidance for placement of a central line.   3.   Placement of a central venous line using the right internal jugular vein.      Body of Report:   Pre-procedure evaluation confirmed that the patient was an appropriate candidate   for conscious sedation.  Adequate sedation was maintained during the entire   procedure.  Vital signs, pulse oximetry, and response to verbal commands were   monitored and recorded by the nurse throughout the procedure and the recovery   period.  The flow sheet was placed in the medical record including the   medications and dosages used.  The patient returned to baseline neurologic and   physiologic status prior to leaving the department.  No immediate sedation   related complications were noted.  Medication for conscious sedation was   administered via IV route.      Informed and written consent was obtained from the patient following discussion   of risks, benefits and alternatives to this procedure.  The was patient placed   supine on the angiographic table.  The patient's neck and chest were then   prepped and draped in normal sterile fashion.  A small amount of local lidocaine   anesthesia was injected subcutaneously.      Ultrasound was used to study the jugular vein we intended to use prior to   accessing it.  The vein was  patent.   Using ultrasound access, puncture was made   of the right internal jugular vein using a 21 GA needle.  A wire was advanced   into the IVC, a short incision was made at the puncture site and serial   dilatation performed. The catheter was then advanced over the wire. The tip of   the catheter lies at the SVC/right atrial junction.  The line flushes and   aspirates appropriately.  The catheter lines were both flushed with 10 cc of   normal saline, and then blocked with 100 units/cc heparin. The catheter was   sutured to the skin with nylon suture, and sterile bandaging was placed.                  Electronically signed by Addi Osborne      CT ABDOMEN PELVIS WO CONTRAST Additional Contrast? None   Final Result   1. Contreras catheter within the prostatic urethra.  Recommend advancement   2. Bilateral pelviectasis with mild bilateral hydroureter. No distal   obstructing etiology identified.   3. Nonobstructive caliceal stones on the left, measuring up to 1.2 cm.   4. 6.9 x 6 x 6 cm large lesion in the anterior aspect of the spleen.  This   has enlarged since 2018.  It is not further characterize, however.   Statistically, vascular lesions are more likely involving the spleen.   Consider splenic ultrasound for further delineation.   5. Slight fullness of the lower left rectus sheath.  Mild hematoma formation   is possible.   6. Bilobar hepatic fluid density lesions, not further characterize the   resembling cysts.   7. Cardiomegaly with bilateral pleural effusions and small pericardial   effusion   The findings were sent to the Radiology Results Communication Center at 1:59   pm on 7/19/2024 to be communicated to a licensed caregiver.         Vascular duplex lower extremity venous bilateral   Final Result   1. No evidence of DVT in the bilateral lower extremities.   2. Complex Baker's cyst in the left popliteal fossa.         US RETROPERITONEAL LIMITED   Final Result   Simple cyst on the kidneys bilaterally.

## 2024-07-21 NOTE — PROGRESS NOTES
Patient Name: Km Garcia  Admit Date: 7/15/2024  7:06 PM  MR #: [id number]  : 1945     Attending Physician: Vishal Fox MD  Reason for consult: Syncope     History of Presenting Illness:       Km Garcia is a 78 y.o. male on hospital day 2 with a history of cardiomyopathy EF 35% by TTE 2024 (cardiomyopathy thought to be related to chemotherapy induced), diabetes, paroxysmal atrial tachycardia, hypertension, hyperlipidemia, large B cell CNS lymphoma diagnosed 2024 initially treated with CHOP, converted to converted to Linalidomide and Ibrutinib after he developed nephrotoxicity to Methotrexate.  Right lower extremity DVT and bilateral PE May 16, 2024 on Lovenox, left charcoaled foot, who came to the hospital after sustaining a syncopal episode.  History Obtained From:  patient, electronic medical record     Per patient, he was sitting in the toilet when he passed out wife witness event  No prodrome like symptoms  Patient was having diarrhea before syncopal episode  Denies chest pain or shortness of breath  EKG sinus rhythm with nonspecific T wave abnormality lateral leads, LVH, PACs,  Orthostatics positive  ===============  Hospital course    2024: Wife apparently concerned regarding metoprolol causing side effects.  She would like him to be switched back to Coreg discussed with nursing staff.  Patient with marginal blood pressure.  Denies any chest pain or shortness of breath    2024: Hemodynamically stable.  No new issues overnight.  No chest pain    2024  Patient laying in bed looks comfortable  Denies chest pain or shortness of breath  Plan for initiation of hemodialysis  Telemetry atrial fibrillation 80s to 90s     2024  Patient laying in bed looks comfortable  Wife at bedside  Per patient he was not able to sleep well due to knee pain pain.  Patient and wife patient chronic knee pain related.  Currently patient denies knee or ankle pain  Telemetry sinus rhythm,  patient was noted with paroxysmal atrial fibrillation overnight  Creatinine worsening 4.68 up from 2.75 yesterday and 1.27 on admission        7/17/2024  Patient laying in bed looks comfortable  Denies chest pain or shortness of breath  Telemetry sinus rhythm no arrhythmias  Yesterday's echocardiogram EF 45 to 50% no major valvular disease  Kidney function worsening  Per nursing staff patient noted with urinary retention  Plan discussed with wendy nurse     History:       Past Medical History        Past Medical History:   Diagnosis Date    AMD (age related macular degeneration)       left eye only, gets steroid shots    Asthma       seasonally, uses inhaler as needed    Athscl heart disease of native coronary artery w/o ang pctrs 1/1/2024    Cerebrovascular accident (CVA) (HCC) 4/11/2024    Charcot ankle, left       wears brace    Diabetes mellitus (HCC)       takes metformin    Hypertension       on meds > 10 yrs    Osteoarthritis           Past Surgical History         Past Surgical History:   Procedure Laterality Date    COLONOSCOPY         > 30 yrs ago    IR NONTUNNELED VASCULAR CATHETER Right 07/19/2024     Medcomp 11.5F x 15 cm Raulerson Duo-Flow IJ double lumen catheter (LOT# [id number],  exp 03/34/2027) performed by Dr. Osborne    LITHOTRIPSY N/A 12/12/2018     WITH HOLMIUM LASER performed by Antonio Petit MD at [facility] OR         Family History  Family History         Family History   Problem Relation Age of Onset    No Known Problems Mother      Heart Disease Father      No Known Problems Sister      Other Brother           enlarged prostate, cataracts    Diabetes Brother           borderline         [] Unable to obtain due to ventilated and/ or neurologic status  Social History               Socioeconomic History    Marital status:        Spouse name: Not on file    Number of children: Not on file    Years of education: Not on file    Highest education level: Not on file   Occupational History    Not on

## 2024-07-21 NOTE — PROGRESS NOTES
0745: morning assessment completed. Will make Dr. Crum aware of glucose 416 with 16 units of insulin given. Patient had complaints of heartburn and requesting tums which he was medicated will. Will continue to monitor.     0916: patient medicated with ODT zofran per request of upset stomach/abd burning. Will continue to monitor.     0947: Dr. Crum made aware of patients BPs with midodrine, abd burning/heart burn along with glucose levels. Awaiting response.     1056: Dr. Crum made aware of glucose 416.     1615: Dr. Crum made aware of 390 glucose.

## 2024-07-21 NOTE — PROGRESS NOTES
to use blanket coil due to the anterior part of the head coil not fitting due to head position and LMA. COMPARISON: CT HEAD W/O CONTRAST 5/31/2024, 3:16 PM MR HEAD W/+W/O 3/14/2024, 12:18 PM TECHNIQUE: Patient questionnaire was completed, and was reviewed by MRI personnel prior to the patient entering the scanner. Multiplanar, multisequence MR imaging of the head was performed with and without intravenous contrast. INTRA-PROCEDURE MEDS: Gadoterate Meglumine (DOTAREM) 10 MMOL/20ML solution 20 mL Route: Intravenous Push FINDINGS: Images degraded by motion artifact. Brain Parenchyma: Postsurgical changes to prior right frontal ventriculostomy catheter placement with cranioplasty over the right frontal defect. There is gliosis and hemosiderin staining along the prior ventriculostomy catheter tract. 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 No new intracranial mass. Small amount of intrinsic T1 hyperintensity with associated susceptibility artifact in the bilateral thalami likely representing blood products or mineralization/calcification. The white matter is within normal limits of signal intensity for age. Ventricles and Sulci: Enlargement of the lateral and third ventricles, grossly unchanged from CT dated 5/31/2024. The fourth ventricle is not enlarged. Mild periventricular T2/flair hyperintensity. Skull Base: Hypothalamic and pituitary region are grossly normal. Craniocervical junction is normal. No marrow replacement process. Vasculature: Major intracranial vessels have normal flow voids suggesting patency. 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 Extracranial structures: There is mild paranasal sinus mucosal thickening. The mastoid air cells are clear. The orbits and extracranial soft tissues are unremarkable. IMPRESSION: 1.  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 No new intracranial mass. 2.  Persistent mild hydrocephalus. The ventricular caliber is grossly

## 2024-07-21 NOTE — PROGRESS NOTES
Physical Therapy Missed Treatment   Facility/Department: [facility] MED SURG W183/W183-01    NAME: Km Garcia    : 1945 (78 y.o.)  MRN: [id number]    Account: [id number]  Gender: male    Chart reviewed, attempted PT at 1040. Patient unavailable 2° to:    [x] Hold due to decreased BP 91/45mmHg with increased fatigue. Nsg aware.       Will attempt PT treatment again at earliest convenience.      Electronically signed by Lisa Garcia PTA on 24 at 10:42 AM EDT

## 2024-07-21 NOTE — PROGRESS NOTES
Subjective:  The patient complains of severe acute on chronic progressive fatigue and EL, lower extremity pain and swelling partially relieved by rest, PT, OT and meds and IV fluids and exacerbated by exertion and recent treatment with Entresto and IV chemotherapy for lymphoma--> with recent renal failure.      Patient was admitted to [facility] on 7/15/2024 after having a syncopal episode at home.  He was recently started on Entresto which may have caused some orthostasis.       He has a complicated medical history with a recent decline in function and medical status over the past 3 to 4 months.  Unfortunately he was diagnosed with lymphoma--with metastases to brain cancer he had surgery.  He was at [facility] rehab for 3 weeks.  He was discharged home in order to have his chemotherapy but and after the second round of chemo he went into kidney failure.  Care was also complicated by a PE in March and a spontaneous brain bleed.     He had presented to [facility] in [place] for the PE and spontaneous   brain bleed but was life flighted to [facility].     He had recovered from that was recovering at home when he fell.  Fortunately his wife is an RN and able to watch over him.  Also. fortunate for him but oncologist is saying that the lymphoma has likely reached remission.  The brain tumors however are still there and are inoperable.    I am concerned about patient’s medical complexities including:  Principal Problem:    Syncope and collapse  Active Problems:    Syncope    Dysphagia, oropharyngeal phase    Impaired mobility and activities of daily living    Urinary retention    Late effect of brain injury (HCC)    SILVINA (acute kidney injury) (HCC)    UTI (urinary tract infection)  Resolved Problems:    * No resolved hospital problems. *      .    Reviewed recent nursing note and discussed current status and planned care with acute care providers, \"bed resting comfortably. He is A/O/ x 4, forgetful, BR. He takes his po

## 2024-07-22 PROBLEM — E11.65 POORLY CONTROLLED DIABETES MELLITUS (HCC): Status: ACTIVE | Noted: 2024-07-22

## 2024-07-22 PROBLEM — I48.91 ATRIAL FIBRILLATION (HCC): Status: ACTIVE | Noted: 2024-07-22

## 2024-07-22 PROBLEM — I50.20 HFREF (HEART FAILURE WITH REDUCED EJECTION FRACTION) (HCC): Status: ACTIVE | Noted: 2024-07-22

## 2024-07-22 LAB
ALBUMIN SERPL-MCNC: 2.2 G/DL (ref 3.5–4.6)
ALP SERPL-CCNC: 39 U/L (ref 35–104)
ALT SERPL-CCNC: 14 U/L (ref 0–41)
ANION GAP SERPL CALCULATED.3IONS-SCNC: 17 MEQ/L (ref 9–15)
AST SERPL-CCNC: 7 U/L (ref 0–40)
BASOPHILS # BLD: 0 K/UL (ref 0–0.2)
BASOPHILS NFR BLD: 0.3 %
BILIRUB SERPL-MCNC: <0.2 MG/DL (ref 0.2–0.7)
BUN SERPL-MCNC: 92 MG/DL (ref 8–23)
CALCIUM SERPL-MCNC: 7.7 MG/DL (ref 8.5–9.9)
CHLORIDE SERPL-SCNC: 90 MEQ/L (ref 95–107)
CO2 SERPL-SCNC: 16 MEQ/L (ref 20–31)
CREAT SERPL-MCNC: 5.9 MG/DL (ref 0.7–1.2)
EOSINOPHIL # BLD: 0 K/UL (ref 0–0.7)
EOSINOPHIL NFR BLD: 0 %
ERYTHROCYTE [DISTWIDTH] IN BLOOD BY AUTOMATED COUNT: 12.7 % (ref 11.5–14.5)
GLOBULIN SER CALC-MCNC: 2.1 G/DL (ref 2.3–3.5)
GLUCOSE BLD-MCNC: 265 MG/DL (ref 70–99)
GLUCOSE BLD-MCNC: 271 MG/DL (ref 70–99)
GLUCOSE BLD-MCNC: 311 MG/DL (ref 70–99)
GLUCOSE BLD-MCNC: 413 MG/DL (ref 70–99)
GLUCOSE BLD-MCNC: 427 MG/DL (ref 70–99)
GLUCOSE SERPL-MCNC: 489 MG/DL (ref 70–99)
HCT VFR BLD AUTO: 30.3 % (ref 42–52)
HGB BLD-MCNC: 10.5 G/DL (ref 14–18)
LYMPHOCYTES # BLD: 0.8 K/UL (ref 1–4.8)
LYMPHOCYTES NFR BLD: 26.2 %
MCH RBC QN AUTO: 31.3 PG (ref 27–31.3)
MCHC RBC AUTO-ENTMCNC: 34.7 % (ref 33–37)
MCV RBC AUTO: 90.4 FL (ref 79–92.2)
MONOCYTES # BLD: 0.8 K/UL (ref 0.2–0.8)
MONOCYTES NFR BLD: 24.9 %
NEUTROPHILS # BLD: 1 K/UL (ref 1.4–6.5)
NEUTS SEG NFR BLD: 32.1 %
PATH INTERP BLD-IMP: NORMAL
PERFORMED ON: ABNORMAL
PLATELET # BLD AUTO: 371 K/UL (ref 130–400)
POTASSIUM SERPL-SCNC: 4.4 MEQ/L (ref 3.4–4.9)
POTASSIUM SERPL-SCNC: 4.4 MEQ/L (ref 3.4–4.9)
PROT SERPL-MCNC: 4.3 G/DL (ref 6.3–8)
RBC # BLD AUTO: 3.35 M/UL (ref 4.7–6.1)
SODIUM SERPL-SCNC: 123 MEQ/L (ref 135–144)
WBC # BLD AUTO: 3.1 K/UL (ref 4.8–10.8)

## 2024-07-22 PROCEDURE — 2580000003 HC RX 258: Performed by: INTERNAL MEDICINE

## 2024-07-22 PROCEDURE — 99231 SBSQ HOSP IP/OBS SF/LOW 25: CPT | Performed by: PHYSICIAN ASSISTANT

## 2024-07-22 PROCEDURE — 6370000000 HC RX 637 (ALT 250 FOR IP): Performed by: PHYSICIAN ASSISTANT

## 2024-07-22 PROCEDURE — 36415 COLL VENOUS BLD VENIPUNCTURE: CPT

## 2024-07-22 PROCEDURE — APPSS30 APP SPLIT SHARED TIME 16-30 MINUTES: Performed by: PHYSICIAN ASSISTANT

## 2024-07-22 PROCEDURE — 97535 SELF CARE MNGMENT TRAINING: CPT

## 2024-07-22 PROCEDURE — 6370000000 HC RX 637 (ALT 250 FOR IP): Performed by: INTERNAL MEDICINE

## 2024-07-22 PROCEDURE — 2060000000 HC ICU INTERMEDIATE R&B

## 2024-07-22 PROCEDURE — 6360000002 HC RX W HCPCS: Performed by: INTERNAL MEDICINE

## 2024-07-22 PROCEDURE — 8010000000 HC HEMODIALYSIS ACUTE INPT

## 2024-07-22 PROCEDURE — 99231 SBSQ HOSP IP/OBS SF/LOW 25: CPT | Performed by: PHYSICAL MEDICINE & REHABILITATION

## 2024-07-22 PROCEDURE — 99232 SBSQ HOSP IP/OBS MODERATE 35: CPT | Performed by: INTERNAL MEDICINE

## 2024-07-22 PROCEDURE — 99222 1ST HOSP IP/OBS MODERATE 55: CPT | Performed by: INTERNAL MEDICINE

## 2024-07-22 PROCEDURE — 6370000000 HC RX 637 (ALT 250 FOR IP): Performed by: STUDENT IN AN ORGANIZED HEALTH CARE EDUCATION/TRAINING PROGRAM

## 2024-07-22 PROCEDURE — 6370000000 HC RX 637 (ALT 250 FOR IP)

## 2024-07-22 PROCEDURE — 85025 COMPLETE CBC W/AUTO DIFF WBC: CPT

## 2024-07-22 PROCEDURE — 80053 COMPREHEN METABOLIC PANEL: CPT

## 2024-07-22 RX ORDER — INSULIN LISPRO 100 [IU]/ML
6 INJECTION, SOLUTION INTRAVENOUS; SUBCUTANEOUS
Status: DISCONTINUED | OUTPATIENT
Start: 2024-07-22 | End: 2024-07-23

## 2024-07-22 RX ORDER — INSULIN GLARGINE 100 [IU]/ML
10 INJECTION, SOLUTION SUBCUTANEOUS 2 TIMES DAILY
Status: DISCONTINUED | OUTPATIENT
Start: 2024-07-22 | End: 2024-07-22

## 2024-07-22 RX ORDER — INSULIN LISPRO 100 [IU]/ML
0-16 INJECTION, SOLUTION INTRAVENOUS; SUBCUTANEOUS
Status: DISCONTINUED | OUTPATIENT
Start: 2024-07-23 | End: 2024-07-23

## 2024-07-22 RX ORDER — INSULIN LISPRO 100 [IU]/ML
0-4 INJECTION, SOLUTION INTRAVENOUS; SUBCUTANEOUS NIGHTLY
Status: DISCONTINUED | OUTPATIENT
Start: 2024-07-22 | End: 2024-07-23

## 2024-07-22 RX ORDER — INSULIN GLARGINE 100 [IU]/ML
25 INJECTION, SOLUTION SUBCUTANEOUS 2 TIMES DAILY
Status: DISCONTINUED | OUTPATIENT
Start: 2024-07-22 | End: 2024-07-23

## 2024-07-22 RX ORDER — INSULIN LISPRO 100 [IU]/ML
10 INJECTION, SOLUTION INTRAVENOUS; SUBCUTANEOUS ONCE
Status: COMPLETED | OUTPATIENT
Start: 2024-07-22 | End: 2024-07-22

## 2024-07-22 RX ADMIN — HEPARIN SODIUM 5000 UNITS: 5000 INJECTION INTRAVENOUS; SUBCUTANEOUS at 14:44

## 2024-07-22 RX ADMIN — INSULIN LISPRO 12 UNITS: 100 INJECTION, SOLUTION INTRAVENOUS; SUBCUTANEOUS at 18:02

## 2024-07-22 RX ADMIN — INSULIN LISPRO 10 UNITS: 100 INJECTION, SOLUTION INTRAVENOUS; SUBCUTANEOUS at 06:35

## 2024-07-22 RX ADMIN — HEPARIN SODIUM 5000 UNITS: 5000 INJECTION INTRAVENOUS; SUBCUTANEOUS at 21:28

## 2024-07-22 RX ADMIN — METOPROLOL TARTRATE 25 MG: 25 TABLET, FILM COATED ORAL at 21:24

## 2024-07-22 RX ADMIN — INSULIN GLARGINE 25 UNITS: 100 INJECTION, SOLUTION SUBCUTANEOUS at 21:27

## 2024-07-22 RX ADMIN — VALACYCLOVIR 500 MG: 1 TABLET, FILM COATED ORAL at 18:01

## 2024-07-22 RX ADMIN — ACETAMINOPHEN 325MG 650 MG: 325 TABLET ORAL at 16:33

## 2024-07-22 RX ADMIN — MIDODRINE HYDROCHLORIDE 5 MG: 5 TABLET ORAL at 08:28

## 2024-07-22 RX ADMIN — HEPARIN SODIUM 5000 UNITS: 5000 INJECTION INTRAVENOUS; SUBCUTANEOUS at 06:35

## 2024-07-22 RX ADMIN — FAMOTIDINE 20 MG: 20 TABLET, FILM COATED ORAL at 14:44

## 2024-07-22 RX ADMIN — INSULIN LISPRO 6 UNITS: 100 INJECTION, SOLUTION INTRAVENOUS; SUBCUTANEOUS at 18:02

## 2024-07-22 RX ADMIN — INSULIN LISPRO 16 UNITS: 100 INJECTION, SOLUTION INTRAVENOUS; SUBCUTANEOUS at 08:23

## 2024-07-22 RX ADMIN — MIDODRINE HYDROCHLORIDE 5 MG: 5 TABLET ORAL at 14:44

## 2024-07-22 RX ADMIN — HEPARIN SODIUM 2000 UNITS: 1000 INJECTION INTRAVENOUS; SUBCUTANEOUS at 12:04

## 2024-07-22 RX ADMIN — SODIUM CHLORIDE: 4.5 INJECTION, SOLUTION INTRAVENOUS at 00:18

## 2024-07-22 RX ADMIN — INSULIN GLARGINE 10 UNITS: 100 INJECTION, SOLUTION SUBCUTANEOUS at 06:35

## 2024-07-22 RX ADMIN — MIDODRINE HYDROCHLORIDE 5 MG: 5 TABLET ORAL at 18:01

## 2024-07-22 RX ADMIN — TAMSULOSIN HYDROCHLORIDE 0.4 MG: 0.4 CAPSULE ORAL at 14:43

## 2024-07-22 RX ADMIN — ANTACID TABLETS 500 MG: 500 TABLET, CHEWABLE ORAL at 16:33

## 2024-07-22 RX ADMIN — Medication 10 ML: at 21:25

## 2024-07-22 ASSESSMENT — ENCOUNTER SYMPTOMS
SHORTNESS OF BREATH: 0
COLOR CHANGE: 0
CHEST TIGHTNESS: 0
VOMITING: 0
NAUSEA: 0
APNEA: 0

## 2024-07-22 ASSESSMENT — PAIN DESCRIPTION - LOCATION: LOCATION: NECK

## 2024-07-22 ASSESSMENT — PAIN DESCRIPTION - DESCRIPTORS: DESCRIPTORS: ACHING

## 2024-07-22 NOTE — PLAN OF CARE
Problem: Metabolic/Fluid and Electrolytes - Adult  Goal: Glucose maintained within prescribed range  7/21/2024 2119 by Veena West RN  Outcome: Not Progressing  Flowsheets (Taken 7/21/2024 2108)  Glucose maintained within prescribed range:   Monitor blood glucose as ordered   Assess for signs and symptoms of hyperglycemia and hypoglycemia   Administer ordered medications to maintain glucose within target range   Assess barriers to adequate nutritional intake and initiate nutrition consult as needed     Problem: Metabolic/Fluid and Electrolytes - Adult  Goal: Hemodynamic stability and optimal renal function maintained  Outcome: Progressing  Flowsheets (Taken 7/21/2024 2108)  Hemodynamic stability and optimal renal function maintained:   Monitor labs and assess for signs and symptoms of volume excess or deficit   Monitor intake, output and patient weight     Problem: Metabolic/Fluid and Electrolytes - Adult  Goal: Electrolytes maintained within normal limits  Outcome: Progressing  Flowsheets (Taken 7/21/2024 2108)  Electrolytes maintained within normal limits: Monitor labs and assess patient for signs and symptoms of electrolyte imbalances        Problem: Cardiovascular - Adult  Goal: Maintains optimal cardiac output and hemodynamic stability  7/21/2024 2118 by Veena West RN  Outcome: Progressing  Flowsheets (Taken 7/21/2024 2108)  Maintains optimal cardiac output and hemodynamic stability:   Monitor blood pressure and heart rate   Monitor urine output and notify Licensed Independent Practitioner for values outside of normal range   Assess for signs of decreased cardiac output  7/21/2024 0948 by Desmond Collazo RN  Outcome: Progressing  Flowsheets (Taken 7/21/2024 0742)  Maintains optimal cardiac output and hemodynamic stability:   Monitor blood pressure and heart rate   Monitor urine output and notify Licensed Independent Practitioner for values outside of normal range   Assess for signs of decreased cardiac

## 2024-07-22 NOTE — PROGRESS NOTES
Nephrology Progress Note    Assessment:  SILVINA possible obstructive component (CT showed non obstructive caliceal stones w/ bilateral pelviectasis w/ mild bilateral hydroureter), serology all negative  Left Renal stone 1.2 cm  Hx CVA  Hx Lymphoma  Nl GFR 6/21/2024   interstitial dx ?  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%  7/16/2024  Hematoma spleen 7 cm     Plan:  - continue IHD for now, next rx Wednesday   - monitor UOP   - will start pt on torsemide 100 mg daily once BP more stable     Patient Active Problem List:     Bladder stones     Surgical aftercare, genitourinary system     Gross hematuria     SOB (shortness of breath)     Brain bleed (HCC)     Syncope and collapse     Athscl heart disease of native coronary artery w/o ang pctrs     Compression of brain (HCC)     Cardiomyopathy (HCC)     Cerebrovascular accident (CVA) (HCC)     Coronary artery disease involving native coronary artery of native heart without angina pectoris     Diffuse large B-cell lymphoma, unspecified site (HCC)     Dysphagia, oropharyngeal phase     History of falling     History of traumatic brain injury     Hypokalemia     Impaired mobility and activities of daily living     Intraoperative floppy iris syndrome (IFIS)     Macular degeneration of both eyes     Mass of brain     Subdural hematoma (HCC)     Methotrexate renal toxicity     Methotrexate toxicity     Obstructive and reflux uropathy, unspecified     Pancreatic cyst     PAT (paroxysmal atrial tachycardia) (HCC)     Primary hypertension     Primary osteoarthritis of both knees     Primary CNS lymphoma (HCC)     Primary pulmonary hypertension (HCC)     Urinary retention     Late effect of brain injury (HCC)     SILVINA (acute kidney injury) (HCC)     UTI (urinary tract infection)      Subjective:  Admit Date: 7/15/2024    Interval History: still having solute rise, 350 ml recorded yesterday     Medications:  Scheduled Meds:   insulin glargine  25 Units SubCUTAneous BID    insulin lispro  6 Units

## 2024-07-22 NOTE — PROGRESS NOTES
mg  20 mg Oral Daily Davide Crum MD   20 mg at 07/21/24 1106    insulin lispro (HUMALOG,ADMELOG) injection vial 0-16 Units  0-16 Units SubCUTAneous TID  Davide Crum MD   16 Units at 07/22/24 0823    insulin lispro (HUMALOG,ADMELOG) injection vial 0-4 Units  0-4 Units SubCUTAneous Nightly Davide Crum MD   4 Units at 07/21/24 2112    calcium carbonate (TUMS) chewable tablet 500 mg  500 mg Oral TID PRN Davide Crum MD   500 mg at 07/21/24 0736    midodrine (PROAMATINE) tablet 5 mg  5 mg Oral TID  Davide Crum MD   5 mg at 07/22/24 0828    albumin human 25% IV solution 25 g  25 g IntraVENous Daily PRN Durga Grewal MD        heparin (porcine) injection 2,000 Units  2,000 Units IntraVENous PRN Durga Grewal MD        heparin (porcine) injection 5,000 Units  5,000 Units SubCUTAneous 3 times per day Vishal Fox MD   5,000 Units at 07/22/24 0635    valACYclovir (VALTREX) tablet 500 mg  500 mg Oral Daily Alicia Salguero, RPH   500 mg at 07/21/24 1628    morphine (PF) injection 2 mg  2 mg IntraVENous Once Ralph Smith MD        aluminum & magnesium hydroxide-simethicone (MAALOX) 200-200-20 MG/5ML suspension 30 mL  30 mL Oral Q6H PRN Lisa Lei APRN - CNP   30 mL at 07/20/24 1213    [Held by provider] enoxaparin (LOVENOX) injection 120 mg  1 mg/kg SubCUTAneous BID Wayne Wolfe MD   120 mg at 07/19/24 0803    [Held by provider] sacubitril-valsartan (ENTRESTO) 49-51 MG per tablet 1 tablet  1 tablet Oral BID Wayne Wolfe MD   1 tablet at 07/17/24 0834    tamsulosin (FLOMAX) capsule 0.4 mg  0.4 mg Oral Daily Wayne Wolfe MD   0.4 mg at 07/21/24 0812    sodium chloride flush 0.9 % injection 5-40 mL  5-40 mL IntraVENous 2 times per day Wayne Wolfe MD   10 mL at 07/21/24 0819    sodium chloride flush 0.9 % injection 5-40 mL  5-40 mL IntraVENous PRN Wayne Wolfe MD        0.9 % sodium chloride infusion   IntraVENous PRN Wayne Wolfe MD        potassium    Neurological:      Mental Status: He is alert and oriented to person, place, and time.          Assessment:      78 year old male who's ca catheter is draining clear yellow urine. He voices no other urological complaints at this time.         Plan:      Maintain ca catheter  Continue flomax        Wero Dhillon PA-C

## 2024-07-22 NOTE — CARE COORDINATION
(07/17/24 1424)  Stairs:  Stairs/Curb  Stairs?: No (07/16/24 0958)  W/C mobility:         Occupational Therapy  Hand Dominance: Right  ADL  Feeding: Unable to assess (Comment) (07/16/24 1007)  Grooming: Setup (07/19/24 0857)  Grooming Skilled Clinical Factors: Pt completed oral and hair care. CUES occasionally for sequencing with good carry over. No difficulty handling material. Increased time noted. Pt washed face with a soapy wash cloth. (07/19/24 0857)  UE Bathing: Moderate assistance (07/16/24 1007)  LE Bathing: Dependent/Total (07/16/24 1007)  UE Dressing: None (07/19/24 0857)  UE Dressing Skilled Clinical Factors: declined to change gown (07/19/24 0857)  LE Dressing: None (07/19/24 0857)  LE Dressing Skilled Clinical Factors: declined to change socks (07/19/24 0857)  Toileting: Dependent/Total (07/16/24 1007)  Additional Comments: ADL tasks completed/simulated as above stated. Pt limited by pain, decreased endurance and decreased strength on this date. (07/16/24 1007)  Toilet Transfers  Toilet Transfer: Unable to assess (07/16/24 1013)  Toilet Transfers Comments: anticipate 2 person max A (07/16/24 1013)     Shower Transfers  Shower Transfers: Not tested (07/16/24 1013)      Speech Language Pathology            Diet/Swallow:                       Current Conditions Requiring Inpatient Rehabilitation  Bowel/Bladder Dysfunction: Yes  Intervention Required = Frequent toileting, Bowel program, Wean Contreras, and Check post void residual  Risk for Medical/Clinical Complications = high  Skin Healing/Breakdown Risk: Yes  Intervention Required = Side to side turns  Risk for Medical/Clinical Complications = moderate  Nutrition/Hydration Deficiency: Yes  Intervention Required = Monitor I&Os, Check Labs, and Dietary Eval  Risk for Medical/Clinical Complications = moderate  Medical Comorbidities: Yes  Intervention Required = DVT risk, CAD, and Renal disease,HTN, CVA, SDH, PE  Risk for Medical/Clinical Complications =     Rehab Admitting Doctor: Dr. Estephania Mao DO

## 2024-07-22 NOTE — PROGRESS NOTES
@2150 Patient moved to room 195 to be close to nurse station because he was found trying to climb out of bed, and was resistant to staff getting him back in the bed at that time. Patient was confused, stating he was going to go upstairs to go to bed. Attempted to reorient him several times, but it was not successful.     @2228 Bedside sitter is now available and at the bedside in order to maintain safety.            Veena IGNACION, RN, [facility]  7/22/2024

## 2024-07-22 NOTE — PROGRESS NOTES
Physical Therapy Med Surg Daily Treatment Note  Facility/Department: [address] TELEMETRY  Room: [place] [address]       NAME: Km Garcia  : 1945 (78 y.o.)  MRN: [id number]  CODE STATUS: Full Code    Date of Service: 2024    Patient Diagnosis(es): Syncope and collapse [R55]  Peripheral edema [R60.0]  Renal insufficiency [N28.9]  General weakness [R53.1]  Anticoagulated [Z79.01]  Syncope, unspecified syncope type [R55]  Diarrhea, unspecified type [R19.7]  SILVINA (acute kidney injury) ([facility]) [N17.9]   Chief Complaint   Patient presents with    Loss of Consciousness     While on the toilet, hx brain CA, new CHF,      Patient Active Problem List    Diagnosis Date Noted    Syncope 2024    HFrEF (heart failure with reduced ejection fraction) ([facility]) 2024    Atrial fibrillation ([facility]) 2024    Late effect of brain injury ([facility]) 2024    SILVINA (acute kidney injury) ([facility]) 2024    UTI (urinary tract infection) 2024    Syncope and collapse 2024    History of traumatic brain injury 2024    Impaired mobility and activities of daily living 2024    Methotrexate renal toxicity 2024    Hypokalemia 05/10/2024    Methotrexate toxicity 05/10/2024    Primary osteoarthritis of both knees 2024    Cerebrovascular accident (CVA) ([facility]) 2024    Subdural hematoma ([facility]) 2024    Compression of brain ([facility]) 2024    Diffuse large B-cell lymphoma, unspecified site ([facility]) 2024    Dysphagia, oropharyngeal phase 2024    Obstructive and reflux uropathy, unspecified 2024    Primary pulmonary hypertension ([facility]) 2024    Macular degeneration of both eyes 2024    Pancreatic cyst 2024    Primary CNS lymphoma ([facility]) 2024    Cardiomyopathy ([facility]) 2024    Coronary artery disease involving native coronary artery of native heart without angina pectoris 2024    PAT (paroxysmal atrial tachycardia) ([facility]) 2024    Mass of brain  assistance = pt requires verbal cues or instructions from another person, close to but not touching, to perform the activity  Minimal assistance= pt performs 75% or more of the activity; assistance is required to complete the activity  Moderate assistance= pt performs 50% of the activity; assistance is required to complete the activity  Maximal assistance = pt performs 25% of the activity; assistance is required to complete the activity  Dependent = pt requires total physical assistance to accomplish the task

## 2024-07-22 NOTE — PROGRESS NOTES
4.9 mEq/L    Chloride 90 (L) 95 - 107 mEq/L    CO2 16 (L) 20 - 31 mEq/L    Anion Gap 17 (H) 9 - 15 mEq/L    Glucose 489 (HH) 70 - 99 mg/dL    BUN 92 (HH) 8 - 23 mg/dL    Creatinine 5.90 (H) 0.70 - 1.20 mg/dL    Est, Glom Filt Rate 9.0 (L) >60    Calcium 7.7 (L) 8.5 - 9.9 mg/dL    Total Protein 4.3 (L) 6.3 - 8.0 g/dL    Albumin 2.2 (L) 3.5 - 4.6 g/dL    Total Bilirubin <0.2 0.2 - 0.7 mg/dL    Alkaline Phosphatase 39 35 - 104 U/L    ALT 14 0 - 41 U/L    AST 7 0 - 40 U/L    Globulin 2.1 (L) 2.3 - 3.5 g/dL   CBC auto differential    Collection Time: 07/22/24  4:48 AM   Result Value Ref Range    WBC 3.1 (L) 4.8 - 10.8 K/uL    RBC 3.35 (L) 4.70 - 6.10 M/uL    Hemoglobin 10.5 (L) 14.0 - 18.0 g/dL    Hematocrit 30.3 (L) 42.0 - 52.0 %    MCV 90.4 79.0 - 92.2 fL    MCH 31.3 27.0 - 31.3 pg    MCHC 34.7 33.0 - 37.0 %    RDW 12.7 11.5 - 14.5 %    Platelets 371 130 - 400 K/uL    Neutrophils % 32.1 %    Lymphocytes % 26.2 %    Monocytes % 24.9 %    Eosinophils % 0.0 %    Basophils % 0.3 %    Neutrophils Absolute 1.0 (L) 1.4 - 6.5 K/uL    Lymphocytes Absolute 0.8 (L) 1.0 - 4.8 K/uL    Monocytes Absolute 0.8 0.2 - 0.8 K/uL    Eosinophils Absolute 0.0 0.0 - 0.7 K/uL    Basophils Absolute 0.0 0.0 - 0.2 K/uL   Basic Metabolic Panel    Collection Time: 07/22/24  4:48 AM   Result Value Ref Range    Potassium 4.4 3.4 - 4.9 mEq/L   POCT Glucose    Collection Time: 07/22/24  6:34 AM   Result Value Ref Range    POC Glucose 413 (HH) 70 - 99 mg/dl    Performed on ACCU-CHEK    POCT Glucose    Collection Time: 07/22/24  8:23 AM   Result Value Ref Range    POC Glucose 427 (HH) 70 - 99 mg/dl    Performed on ACCU-CHEK    POCT Glucose    Collection Time: 07/22/24  1:00 PM   Result Value Ref Range    POC Glucose 265 (H) 70 - 99 mg/dl    Performed on ACCU-CHEK      Previous extensive, complex labs, notes and diagnostics reviewed and analyzed.     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  Updated functional and medical status reassessed regarding patient’s ability to participate in therapies and patient found to be able to participate in:       acute intensive comprehensive inpatient rehabilitation program including PT/OT to improve balance, ambulation, ADL’s, and to improve the P/AROM.   It is my opinion that they will be able to tolerate 3 hours of therapy a day and benefit from it at an acute level. I again discussed acute rehab with the patient and verify that the patient is able and willing to participate in 3 hours of therapy a day.  Rehab and Acute Care Case Management has also reinforced this expectation.    Will continue to follow to attempt to get patient to the most efficient but most effective level of care will be in their best interest.  Continue to focus on energy conservation heart rate and blood pressure monitoring before during and after therapy endurance and consistency of function.      Bowel constipation and Bladder dysfunction severe urinary retention with Contreras catheter since March 2020  , neurogenic bladder:  frequent toileting, ambulate to bathroom with assistance, check post void residuals.  Check for C.difficile x1 if >2 loose stools in 24 hours, continue bowel & bladder program.  Monitor for UTI symptoms including lethargy and confusion    Severe back pain and generalized OA pain: reassess pain every shift and prior to and after each therapy session, give prn Tylenol consider half dose Oxy IR and consider scheduled Tylenol, modalities prn in therapy, consider Lidoderm, K-pad prn.  I advise palliative care consult    Skin healing breakdown   risk:  continue pressure relief program.  Daily skin exams

## 2024-07-22 NOTE — PROGRESS NOTES
Hospitalist Progress Note      Date of Admission: 7/15/2024  Chief Complaint:    Chief Complaint   Patient presents with    Loss of Consciousness     While on the toilet, hx brain CA, new CHF,      Subjective:  No new complaints.  No nausea, vomiting, chest pain, or headache. Was agitated last night with bedside sitter in place. He was evaluated during HD and was doing well, normotensive. Patient completely alert and oriented with exception of saying he was at [facility].       Medications:    Infusion Medications    sodium chloride 50 mL/hr at 07/22/24 0018    sodium chloride      dextrose       Scheduled Medications    insulin glargine  10 Units SubCUTAneous BID    carvedilol  3.125 mg Oral BID    famotidine  20 mg Oral Daily    insulin lispro  0-16 Units SubCUTAneous TID WC    insulin lispro  0-4 Units SubCUTAneous Nightly    midodrine  5 mg Oral TID WC    heparin (porcine)  5,000 Units SubCUTAneous 3 times per day    valACYclovir  500 mg Oral Daily    morphine  2 mg IntraVENous Once    [Held by provider] enoxaparin  1 mg/kg SubCUTAneous BID    [Held by provider] sacubitril-valsartan  1 tablet Oral BID    tamsulosin  0.4 mg Oral Daily    sodium chloride flush  5-40 mL IntraVENous 2 times per day    [Held by provider] furosemide  20 mg IntraVENous BID     PRN Meds: calcium carbonate, albumin human 25%, heparin (porcine), aluminum & magnesium hydroxide-simethicone, sodium chloride flush, sodium chloride, potassium chloride **OR** potassium alternative oral replacement **OR** potassium chloride, magnesium sulfate, ondansetron **OR** ondansetron, melatonin, polyethylene glycol, acetaminophen **OR** acetaminophen, glucose, dextrose bolus **OR** dextrose bolus, glucagon (rDNA), dextrose    Intake/Output Summary (Last 24 hours) at 7/22/2024 0908  Last data filed at 7/22/2024 0600  Gross per 24 hour   Intake 2526.49 ml   Output 125 ml   Net 2401.49 ml       Exam:  BP (!) 123/59   Pulse 72   Temp 97.8 °F (36.6 °C)    US RETROPERITONEAL LIMITED   Final Result   Simple cyst on the kidneys bilaterally.  There is mild dilatation seen in the   renal pelvis sees bilaterally.  No evidence of renal stones.      Incidental sludge identified within the gallbladder.         CT Head W/O Contrast   Final Result   No acute intracranial abnormality.         XR CHEST PORTABLE   Final Result   No acute process.           Assessment/Plan:    Syncope: Most likely secondary to orthostasis.  Possible hypovolemia. BP improved now after IVF.     SILVINA: likely multifactorial. Considering obstructive component vs ATN. HD cath placed 7/19, neph following for HD needs. Will need to know long term HD plan prior to dc     UTI, catheter assoc: Enterococcus faecalis, completed course of ampicillin    T2DM w/ hyperglycemia: steroids likely contributing. Increasing SSI to high dose scale, added lantus for basal control. Will follow throughout day. Endocrinology consulted    Hypotension: asypmtomatic hypotension noted after starting HD, I suspect HD is strong contributor as he was been getting IVF and is receiving abx. Will continue to monitor. Midodrine added at 5 mg TID, may need to increase pending response    Hyponatremia: suspect etiology is multifactorial in setting of SILVINA and fact that patient was receiving IV hypotonic fluids. Will stop hypotonic fluids and monitor daily for now. Further workup with serum/urine studies can be obtained if remains persistent despite discontinuation of hypotonic fluids    Splenic lesion: as noted on CT A/P here. U/S spleen showing hypoechoic avascular structure. Currently patient is without abdominal pain. Defer to Hem/Onc if need for MRI is warranted    History of CVA  History of lymphoma, CNS; holding all medications at this time as per hematology/oncology consult note dated 7/16  HTN: monitor BP, adjust meds as needed  Hx of vte - was on apixaban at one time during which time he had a spontaneous hemorrhage.  Risk vs

## 2024-07-22 NOTE — PROGRESS NOTES
Physical Therapy Missed Treatment   Facility/Department: [facility] MED SURG W195/W195-01    NAME: Km Garcia  Patient Status:   : 1945 (78 y.o.)  MRN: [id number]  Account: [id number]  Gender: male        [] Patient Declines PT Treatment            [x] Patient Unavailable:     Attempted to see pt for PT tx and he is currently in dialysis. Notified  that we attempted to see.   Will attempt PT Treatment again at earliest convenience.        Electronically signed by Mei Sheikh PTA on 24 at 10:43 AM EDT

## 2024-07-22 NOTE — DIALYSIS
Hemodialysis terminated after 3 hours of treatment. Unable to maintain adequate blood flow despite flushes & reversing lines. Dr. Robert aware. 600 ml fluid removed w/treatment. Required ultrafiltration adjustments due to hypotension. Please see dialysis flowsheet for treatment details

## 2024-07-22 NOTE — PROGRESS NOTES
Progress Note  Patient: Km Garica  Unit/Bed: W195/W195-01  YOB: 1945  MRN: [id number]  Acct: [id number]   Admitting Diagnosis: Syncope and collapse [R55]  Peripheral edema [R60.0]  Renal insufficiency [N28.9]  General weakness [R53.1]  Anticoagulated [Z79.01]  Syncope, unspecified syncope type [R55]  Diarrhea, unspecified type [R19.7]  SILVINA (acute kidney injury) (HCC) [N17.9]  Date:  7/15/2024  Hospital Day: 5    Chief Complaint:  Syncope    Subjective      7/22/24: Underwent hemodialysis this morning.  Resting comfortably in bed in no acute distress.  Denies chest pain, shortness of breath or palpitations.  Hemodynamically stable with most recent blood pressure 113/56.  A.m. labs reviewed.  Worsening hyponatremia with sodium low at 123.  Creatinine of 5.90, BUN elevated at 92 and GFR low at 9.0.  Hyperglycemic with glucose 489 this morning.  Hemoglobin low at 10.5.  On telemetry, he is A-fib with heart rates 70s to 80s with PVCs noted.    7/21/24: Wife apparently concerned regarding metoprolol causing side effects.  She would like him to be switched back to Coreg discussed with nursing staff.  Patient with marginal blood pressure.  Denies any chest pain or shortness of breath     7/20/24: Hemodynamically stable. No new issues overnight. No chest pain     7/19/24: Patient laying in bed looks comfortable  Denies chest pain or shortness of breath  Plan for initiation of hemodialysis  Telemetry atrial fibrillation 80s to 90s    7/18/24: Patient laying in bed looks comfortable  Wife at bedside  Per patient he was not able to sleep well due to knee pain pain.  Patient and wife patient chronic knee pain related.  Currently patient denies knee or ankle pain  Telemetry sinus rhythm, patient was noted with paroxysmal atrial fibrillation overnight  Creatinine worsening 4.68 up from 2.75 yesterday and 1.27 on admission    7/17/24: Patient laying in bed looks comfortable  Denies chest pain or shortness of  MARGARETH Mccullough on 7/22/2024 at 12:42 PM    Attending Supervising Physician's Attestation Statement  I performed a history and physical examination on the patient and discussed the management with the physician assistant. I reviewed and agree with the findings and plan as documented in her note .    ID:      Physical exam  General: Alert oriented x1 no acute distress  Lungs: Clear to auscultation bilaterally  CV: Regular rate and rhythm no murmurs  Extremities: No pitting edema    Assessment plan:   Km Garcia is a 78 y.o. 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  Right lower extremity DVT and bilateral PE May 16, 2024 on Lovenox, left charcoaled foot, who came to the hospital after sustaining a syncopal episode.  History Obtained From:  patient, electronic medical record     Per patient, he was sitting in the toilet when he passed out wife witness event  No prodrome like symptoms  Patient was having diarrhea before syncopal episode  Denies chest pain or shortness of breath  EKG sinus rhythm with nonspecific T wave abnormality lateral leads, LVH, PACs,  Orthostatics positive  ===============  Hospital course  7/22/2024  Patient laying in bed sleeping  Wife at bedside asked not to wake up her   Wife at bedside updated on patient's condition and all questions were answered    General: No acute distress  Lungs: Clear to auscultation  CV: Irregularly irregular rhythm  Extremities: 1+ pitting edema bilaterally    Assessment and plan:    -Syncope  Possibly related to hypotension as orthostatics were positive during this admission  no major arrhythmias on telemetry

## 2024-07-23 ENCOUNTER — PREP FOR PROCEDURE (OUTPATIENT)
Dept: SURGERY | Age: 79
End: 2024-07-23

## 2024-07-23 DIAGNOSIS — N18.6 END STAGE RENAL DISEASE (HCC): ICD-10-CM

## 2024-07-23 LAB
ALBUMIN SERPL-MCNC: 2.3 G/DL (ref 3.5–4.6)
ALP SERPL-CCNC: 37 U/L (ref 35–104)
ALT SERPL-CCNC: 15 U/L (ref 0–41)
ANION GAP SERPL CALCULATED.3IONS-SCNC: 14 MEQ/L (ref 9–15)
AST SERPL-CCNC: 9 U/L (ref 0–40)
BASOPHILS # BLD: 0 K/UL (ref 0–0.2)
BASOPHILS NFR BLD: 0.2 %
BILIRUB SERPL-MCNC: <0.2 MG/DL (ref 0.2–0.7)
BUN SERPL-MCNC: 79 MG/DL (ref 8–23)
CALCIUM SERPL-MCNC: 7.8 MG/DL (ref 8.5–9.9)
CHLORIDE SERPL-SCNC: 98 MEQ/L (ref 95–107)
CO2 SERPL-SCNC: 21 MEQ/L (ref 20–31)
CREAT SERPL-MCNC: 5.39 MG/DL (ref 0.7–1.2)
EOSINOPHIL # BLD: 0.1 K/UL (ref 0–0.7)
EOSINOPHIL NFR BLD: 3.2 %
ERYTHROCYTE [DISTWIDTH] IN BLOOD BY AUTOMATED COUNT: 12.9 % (ref 11.5–14.5)
GLOBULIN SER CALC-MCNC: 2 G/DL (ref 2.3–3.5)
GLUCOSE BLD-MCNC: 116 MG/DL (ref 70–99)
GLUCOSE BLD-MCNC: 170 MG/DL (ref 70–99)
GLUCOSE BLD-MCNC: 187 MG/DL (ref 70–99)
GLUCOSE BLD-MCNC: 258 MG/DL (ref 70–99)
GLUCOSE SERPL-MCNC: 185 MG/DL (ref 70–99)
HCT VFR BLD AUTO: 28.7 % (ref 42–52)
HGB BLD-MCNC: 10 G/DL (ref 14–18)
LYMPHOCYTES # BLD: 1.5 K/UL (ref 1–4.8)
LYMPHOCYTES NFR BLD: 34.3 %
MCH RBC QN AUTO: 31.7 PG (ref 27–31.3)
MCHC RBC AUTO-ENTMCNC: 34.8 % (ref 33–37)
MCV RBC AUTO: 91.1 FL (ref 79–92.2)
MONOCYTES # BLD: 0.7 K/UL (ref 0.2–0.8)
MONOCYTES NFR BLD: 16.8 %
NEUTROPHILS # BLD: 1.4 K/UL (ref 1.4–6.5)
NEUTS SEG NFR BLD: 31.4 %
PERFORMED ON: ABNORMAL
PLATELET # BLD AUTO: 317 K/UL (ref 130–400)
POTASSIUM SERPL-SCNC: 4 MEQ/L (ref 3.4–4.9)
POTASSIUM SERPL-SCNC: 4 MEQ/L (ref 3.4–4.9)
PROT SERPL-MCNC: 4.3 G/DL (ref 6.3–8)
RBC # BLD AUTO: 3.15 M/UL (ref 4.7–6.1)
SODIUM SERPL-SCNC: 133 MEQ/L (ref 135–144)
WBC # BLD AUTO: 4.4 K/UL (ref 4.8–10.8)

## 2024-07-23 PROCEDURE — 36415 COLL VENOUS BLD VENIPUNCTURE: CPT

## 2024-07-23 PROCEDURE — 6370000000 HC RX 637 (ALT 250 FOR IP): Performed by: INTERNAL MEDICINE

## 2024-07-23 PROCEDURE — 2580000003 HC RX 258: Performed by: INTERNAL MEDICINE

## 2024-07-23 PROCEDURE — 99232 SBSQ HOSP IP/OBS MODERATE 35: CPT | Performed by: PHYSICAL MEDICINE & REHABILITATION

## 2024-07-23 PROCEDURE — 6360000002 HC RX W HCPCS: Performed by: INTERNAL MEDICINE

## 2024-07-23 PROCEDURE — 6370000000 HC RX 637 (ALT 250 FOR IP): Performed by: STUDENT IN AN ORGANIZED HEALTH CARE EDUCATION/TRAINING PROGRAM

## 2024-07-23 PROCEDURE — 85025 COMPLETE CBC W/AUTO DIFF WBC: CPT

## 2024-07-23 PROCEDURE — 99232 SBSQ HOSP IP/OBS MODERATE 35: CPT | Performed by: INTERNAL MEDICINE

## 2024-07-23 PROCEDURE — 6370000000 HC RX 637 (ALT 250 FOR IP): Performed by: PHYSICIAN ASSISTANT

## 2024-07-23 PROCEDURE — 2060000000 HC ICU INTERMEDIATE R&B

## 2024-07-23 PROCEDURE — 97535 SELF CARE MNGMENT TRAINING: CPT

## 2024-07-23 PROCEDURE — 80053 COMPREHEN METABOLIC PANEL: CPT

## 2024-07-23 PROCEDURE — 99231 SBSQ HOSP IP/OBS SF/LOW 25: CPT | Performed by: PHYSICIAN ASSISTANT

## 2024-07-23 PROCEDURE — 6370000000 HC RX 637 (ALT 250 FOR IP)

## 2024-07-23 RX ORDER — INSULIN LISPRO 100 [IU]/ML
0-4 INJECTION, SOLUTION INTRAVENOUS; SUBCUTANEOUS
Status: DISCONTINUED | OUTPATIENT
Start: 2024-07-23 | End: 2024-07-25 | Stop reason: HOSPADM

## 2024-07-23 RX ORDER — INSULIN LISPRO 100 [IU]/ML
0-4 INJECTION, SOLUTION INTRAVENOUS; SUBCUTANEOUS NIGHTLY
Status: DISCONTINUED | OUTPATIENT
Start: 2024-07-23 | End: 2024-07-25 | Stop reason: HOSPADM

## 2024-07-23 RX ORDER — CYCLOBENZAPRINE HCL 10 MG
5 TABLET ORAL 3 TIMES DAILY PRN
Status: DISCONTINUED | OUTPATIENT
Start: 2024-07-23 | End: 2024-07-25 | Stop reason: HOSPADM

## 2024-07-23 RX ORDER — LIDOCAINE 4 G/G
1 PATCH TOPICAL DAILY PRN
Status: DISCONTINUED | OUTPATIENT
Start: 2024-07-23 | End: 2024-07-25 | Stop reason: HOSPADM

## 2024-07-23 RX ORDER — FAMOTIDINE 20 MG/1
10 TABLET, FILM COATED ORAL
Status: DISCONTINUED | OUTPATIENT
Start: 2024-07-24 | End: 2024-07-25 | Stop reason: HOSPADM

## 2024-07-23 RX ORDER — SODIUM CHLORIDE 0.9 % (FLUSH) 0.9 %
5-40 SYRINGE (ML) INJECTION PRN
Status: CANCELLED | OUTPATIENT
Start: 2024-07-24

## 2024-07-23 RX ORDER — SODIUM CHLORIDE 0.9 % (FLUSH) 0.9 %
5-40 SYRINGE (ML) INJECTION EVERY 12 HOURS SCHEDULED
Status: CANCELLED | OUTPATIENT
Start: 2024-07-24

## 2024-07-23 RX ORDER — SODIUM CHLORIDE 9 MG/ML
INJECTION, SOLUTION INTRAVENOUS PRN
Status: CANCELLED | OUTPATIENT
Start: 2024-07-24

## 2024-07-23 RX ORDER — MIDODRINE HYDROCHLORIDE 10 MG/1
10 TABLET ORAL
Status: DISCONTINUED | OUTPATIENT
Start: 2024-07-23 | End: 2024-07-25 | Stop reason: HOSPADM

## 2024-07-23 RX ORDER — VANCOMYCIN HYDROCHLORIDE 500 MG/10ML
1 INJECTION, POWDER, LYOPHILIZED, FOR SOLUTION INTRAVENOUS ONCE
Status: CANCELLED | OUTPATIENT
Start: 2024-07-23 | End: 2024-07-23

## 2024-07-23 RX ORDER — INSULIN GLARGINE 100 [IU]/ML
10 INJECTION, SOLUTION SUBCUTANEOUS 2 TIMES DAILY
Status: DISCONTINUED | OUTPATIENT
Start: 2024-07-23 | End: 2024-07-25 | Stop reason: HOSPADM

## 2024-07-23 RX ADMIN — Medication 10 ML: at 08:25

## 2024-07-23 RX ADMIN — Medication 10 ML: at 20:28

## 2024-07-23 RX ADMIN — TAMSULOSIN HYDROCHLORIDE 0.4 MG: 0.4 CAPSULE ORAL at 08:24

## 2024-07-23 RX ADMIN — METOPROLOL TARTRATE 25 MG: 25 TABLET, FILM COATED ORAL at 08:24

## 2024-07-23 RX ADMIN — ACETAMINOPHEN 325MG 650 MG: 325 TABLET ORAL at 17:52

## 2024-07-23 RX ADMIN — INSULIN GLARGINE 25 UNITS: 100 INJECTION, SOLUTION SUBCUTANEOUS at 08:23

## 2024-07-23 RX ADMIN — INSULIN LISPRO 6 UNITS: 100 INJECTION, SOLUTION INTRAVENOUS; SUBCUTANEOUS at 08:23

## 2024-07-23 RX ADMIN — INSULIN GLARGINE 10 UNITS: 100 INJECTION, SOLUTION SUBCUTANEOUS at 20:28

## 2024-07-23 RX ADMIN — METOPROLOL TARTRATE 25 MG: 25 TABLET, FILM COATED ORAL at 20:28

## 2024-07-23 RX ADMIN — HEPARIN SODIUM 5000 UNITS: 5000 INJECTION INTRAVENOUS; SUBCUTANEOUS at 05:36

## 2024-07-23 RX ADMIN — MIDODRINE HYDROCHLORIDE 10 MG: 10 TABLET ORAL at 08:25

## 2024-07-23 RX ADMIN — VALACYCLOVIR 500 MG: 1 TABLET, FILM COATED ORAL at 17:21

## 2024-07-23 RX ADMIN — CYCLOBENZAPRINE 5 MG: 10 TABLET, FILM COATED ORAL at 12:16

## 2024-07-23 RX ADMIN — MIDODRINE HYDROCHLORIDE 10 MG: 10 TABLET ORAL at 17:21

## 2024-07-23 RX ADMIN — HEPARIN SODIUM 5000 UNITS: 5000 INJECTION INTRAVENOUS; SUBCUTANEOUS at 14:19

## 2024-07-23 RX ADMIN — ANTACID TABLETS 500 MG: 500 TABLET, CHEWABLE ORAL at 17:53

## 2024-07-23 RX ADMIN — FAMOTIDINE 20 MG: 20 TABLET, FILM COATED ORAL at 08:23

## 2024-07-23 RX ADMIN — MIDODRINE HYDROCHLORIDE 10 MG: 10 TABLET ORAL at 12:16

## 2024-07-23 RX ADMIN — HEPARIN SODIUM 5000 UNITS: 5000 INJECTION INTRAVENOUS; SUBCUTANEOUS at 22:00

## 2024-07-23 ASSESSMENT — PAIN DESCRIPTION - ORIENTATION: ORIENTATION: MID

## 2024-07-23 ASSESSMENT — PAIN SCALES - GENERAL
PAINLEVEL_OUTOF10: 8
PAINLEVEL_OUTOF10: 4

## 2024-07-23 ASSESSMENT — PAIN DESCRIPTION - LOCATION: LOCATION: NECK

## 2024-07-23 ASSESSMENT — ENCOUNTER SYMPTOMS: APNEA: 0

## 2024-07-23 ASSESSMENT — PAIN DESCRIPTION - DESCRIPTORS: DESCRIPTORS: ACHING

## 2024-07-23 NOTE — PROGRESS NOTES
Progress Note  Date:2024       [facility]  Patient Name:Km Garcia     YOB: 1945     Age:78 y.o.    Chief complaints uncontrolled diabetes     Subjective    Subjective:  Symptoms:  Stable.  He reports weakness.    Diet:  Adequate intake.    Activity level: Impaired due to weakness.    Pain:  He reports no pain.       Review of Systems   Constitutional:  Positive for fatigue.   Cardiovascular:  Positive for leg swelling.   Neurological:  Positive for weakness.     Objective         Vitals Last 24 Hours:  TEMPERATURE:  Temp  Av.8 °F (36.6 °C)  Min: 97.6 °F (36.4 °C)  Max: 98.1 °F (36.7 °C)  RESPIRATIONS RANGE: Resp  Av.3  Min: 18  Max: 19  PULSE OXIMETRY RANGE: SpO2  Av.3 %  Min: 98 %  Max: 100 %  PULSE RANGE: Pulse  Av.4  Min: 62  Max: 102  BLOOD PRESSURE RANGE: Systolic (24hrs), Av , Min:91 , Max:133   ; Diastolic (24hrs), Av, Min:40, Max:67    I/O (24Hr):    Intake/Output Summary (Last 24 hours) at 2024 1549  Last data filed at 2024 0824  Gross per 24 hour   Intake 531.69 ml   Output 265 ml   Net 266.69 ml     Objective:  General Appearance:  Ill-appearing.    Vital signs: (most recent): Blood pressure 114/67, pulse 65, temperature 97.7 °F (36.5 °C), temperature source Oral, resp. rate 18, height 1.854 m (6' 0.99\"), weight 116.6 kg (257 lb), SpO2 100 %.  Vital signs are normal.    HEENT: Normal HEENT exam.    Lungs:  Normal effort.    Heart: Normal rate.    Abdomen: Abdomen is soft.    Extremities: Normal range of motion.  There is dependent edema.    Neurological: Patient is alert.    Skin:  No rash.     Labs/Imaging/Diagnostics    Labs:  CBC:  Recent Labs     24  0536 24  0448 24  0456   WBC 2.6* 3.1* 4.4*   RBC 3.65* 3.35* 3.15*   HGB 11.3* 10.5* 10.0*   HCT 33.4* 30.3* 28.7*   MCV 91.5 90.4 91.1   RDW 12.6 12.7 12.9   PLT CLUMPED 371 317     CHEMISTRIES:  Recent Labs     24  0536 24  0448 24  0456   *

## 2024-07-23 NOTE — CARE COORDINATION
DC PLAN REMAIN [facility] REHAB ONCE CLEARED BY DRS.      1515  PLAN FOR TUNNELED HD CATH TOMORROW AND THEN REHAB AFTER.  [facility] UPDATED.

## 2024-07-23 NOTE — PROGRESS NOTES
Subjective:      Patient ID: Km Garcia is a 78 y.o. male    HPI78 year old male who's ca catheter is draining clear yellow urine. He voices no other urological complaints at this time.     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    Cerebrovascular accident (CVA) (HCC) 2024    Charcot ankle, left     wears brace    Diabetes mellitus (HCC)     takes metformin    Hypertension     on meds > 10 yrs    Osteoarthritis      Past Surgical History:   Procedure Laterality Date    COLONOSCOPY      > 30 yrs ago    IR NONTUNNELED VASCULAR CATHETER Right 2024    Medcomp 11.5F x 15 cm Raulerson Duo-Flow IJ double lumen catheter (LOT# [id number],  exp ) performed by Dr. Osborne    IR NONTUNNELED VASCULAR CATHETER  2024    IR NONTUNNELED VASCULAR CATHETER 2024 ML SPECIAL PROCEDURE    LITHOTRIPSY N/A 2018    WITH HOLMIUM LASER performed by Antonio Petit MD at [facility] OR     Social History     Socioeconomic History    Marital status:      Spouse name: None    Number of children: None    Years of education: None    Highest education level: None   Tobacco Use    Smoking status: Former     Current packs/day: 0.00     Types: Cigarettes     Quit date:      Years since quittin.5    Smokeless tobacco: Never   Vaping Use    Vaping Use: Never used   Substance and Sexual Activity    Alcohol use: Yes     Comment: Drinks 1 drink every other day    Drug use: No   Social History Narrative    Lives With: Spouse Coby (wife is able to help pt at home)    Type of Home: [facility] [address] [place] [facility]         Home Layout: Two level, Able to Live on Main level with bedroom/bathroom    Home Access: Stairs to enter with rails- Number of Steps: 2-3- Rails: Left    Bathroom Shower/Tub: Walk-in  person, place, and time.          Assessment:      78 year old male who's ca catheter is draining clear yellow urine. He voices no other urological complaints at this time.       Plan:      Maintain ca catheter  Continue flomax        Wero Dhillon PA-C

## 2024-07-23 NOTE — PROGRESS NOTES
6:41 AM   Result Value Ref Range    POC Glucose 170 (H) 70 - 99 mg/dl    Performed on ACCU-CHEK      Previous extensive, complex labs, notes and diagnostics reviewed and analyzed.     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  Updated functional and medical status reassessed regarding patient’s ability to participate in therapies and patient found to be able to participate in:       acute intensive comprehensive inpatient rehabilitation program including PT/OT to improve balance, ambulation, ADL’s, and to improve the P/AROM.   It is my opinion that they will be able to tolerate 3 hours of therapy a day and benefit from it at an acute level. I again discussed acute rehab with the patient and verify that the patient is able and willing to participate in 3 hours of therapy a day.  Rehab and Acute Care Case Management has also reinforced this expectation.    Will continue to follow to attempt to get patient to the most efficient but most effective level of care will be in their best interest.  Continue to focus on energy conservation heart rate and blood pressure monitoring before during and after therapy endurance and consistency of function.      Bowel constipation and Bladder dysfunction severe urinary retention with Contreras catheter since March 2020  , neurogenic bladder:  frequent toileting, ambulate to bathroom with assistance, check post void residuals.  Check for C.difficile x1 if >2 loose stools in 24 hours, continue bowel & bladder program.  Monitor for UTI symptoms including lethargy and confusion    Severe back pain and generalized OA pain: reassess pain every shift and prior to and  after each therapy session, give prn Tylenol consider half dose Oxy IR and consider scheduled Tylenol, modalities prn in therapy, consider Lidoderm, K-pad prn.  I advise palliative care consult    Skin healing breakdown   risk:  continue pressure relief program.  Daily skin exams and reports from nursing.    Severe fatigue due to immobility and nutritional deficits: monitoring for dysphagia  Add vitamin B12 vitamin D and CoQ10 titrate dosing and add protein supplementation with low carb content.    Complex discharge planning:   Discussed with care team-last 24 hour events noted.  I will continue to follow along and reassess functional and medical status as we strive to improve patient's functional and medical outcomes progressing to the most efficient and lowest level of care.       Complex Active General Medical Issues that complicate care:     1. Principal Problem:    Syncope and collapse  Active Problems:    Syncope    Dysphagia, oropharyngeal phase    Impaired mobility and activities of daily living    Urinary retention    Late effect of brain injury (HCC)    SILVINA (acute kidney injury) (HCC)    UTI (urinary tract infection)    HFrEF (heart failure with reduced ejection fraction) (HCC)    Atrial fibrillation (HCC)    Poorly controlled diabetes mellitus (HCC)  Resolved Problems:    * No resolved hospital problems. *          Events and functional changes in the past 24 hours reviewed improvements in functional status are encouraging       Focus of today's plan-   focus on improved pain control consider half dose Oxy IR versus Norco avoiding morphine as it will likely drop his blood pressure and cause increased confusion.  Palliative care consult is also advised.      Estephania Mao D.O., FAAPMR  PM&R     Attending    [phone]   [facility] [place]

## 2024-07-23 NOTE — PROGRESS NOTES
Nutrition Assessment     Type and Reason for Visit: Initial, RD Nutrition Re-Screen/LOS    Nutrition Recommendations/Plan:   Continue carb control 4 diet   Continue diabetic oral supplement BID      Malnutrition Assessment:  Malnutrition Status: No malnutrition    Nutrition Assessment:  Nutrition status appears to be stable at this time.  Wife reported decreased appetite on admission that has since improved.  Pt was orded a diabetic oral supplement which he is taking well.  PO intakes have improved since admission.  Noted plan to transfer to [facility] rehab unit possibly tomorrow    Nutrition Related Findings:   history of diabetes, hypertension, heart failure , 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  SILVINA possible ATN now. IHD started this admission.  PO intake/appetite poor on admission has since improved.  Noted plan to transfer to [facility] rehab unit tomorrow (7/24) Wound Type: None    Current Nutrition Therapies:    ADULT DIET; Regular; 4 carb choices (60 gm/meal)  ADULT ORAL NUTRITION SUPPLEMENT; Lunch, Dinner; Diabetic Oral Supplement  Diet NPO    Anthropometric Measures:  Height: 185.4 cm (6' 0.99\")  Current Body Wt: 116.6 kg (257 lb) (7/16)   BMI: 33.9    Nutrition Diagnosis:   Altered nutrition-related lab values related to endocrine dysfuntion, renal dysfunction as evidenced by lab values    Nutrition Interventions:   Food and/or Nutrient Delivery: Continue Current Diet (Continue carb control 4 diet   Continue diabetic oral supplement BID)  Nutrition Education/Counseling: No recommendation at this time  Coordination of Nutrition Care: Continue to monitor while inpatient       Goals:     Goals: PO intake 50% or greater, other (specify)  Specify Other Goals: renal labs wnl/glucose < 160    Nutrition Monitoring and Evaluation:      Food/Nutrient Intake Outcomes: Food and Nutrient Intake, Supplement Intake  Physical Signs/Symptoms Outcomes: Biochemical Data, Weight, Meal Time Behavior    Discharge Planning:     Continue Oral Nutrition Supplement, Continue current diet     Adriana Daniels RD, LD

## 2024-07-23 NOTE — CARE COORDINATION
local lidocaine anesthesia was injected subcutaneously. Ultrasound was used to study the jugular vein we intended to use prior to accessing it.  The vein was patent.   Using ultrasound access, puncture was made of the right internal jugular vein using a 21 GA needle.  A wire was advanced into the IVC, a short incision was made at the puncture site and serial dilatation performed. The catheter was then advanced over the wire. The tip of the catheter lies at the SVC/right atrial junction.  The line flushes and aspirates appropriately.  The catheter lines were both flushed with 10 cc of normal saline, and then blocked with 100 units/cc heparin. The catheter was sutured to the skin with nylon suture, and sterile bandaging was placed. Electronically signed by Addi Osborne    US ABDOMEN LIMITED  Result Date: 7/20/2024  Hypoechoic complex avascular structure identified along the superior anterior aspect of the spleen measuring 5.1 x 4.6 x 4.6 cm. Findings may suggest a complex or possible hematoma. MRI or CT scan with and without contrast is recommended for further characterization.     CT ABDOMEN PELVIS WO CONTRAST Additional Contrast? None  Result Date: 7/19/2024  1. Contreras catheter within the prostatic urethra.  Recommend advancement 2. Bilateral pelviectasis with mild bilateral hydroureter. No distal obstructing etiology identified. 3. Nonobstructive caliceal stones on the left, measuring up to 1.2 cm. 4. 6.9 x 6 x 6 cm large lesion in the anterior aspect of the spleen.  This has enlarged since 2018.  It is not further characterize, however. Statistically, vascular lesions are more likely involving the spleen. Consider splenic ultrasound for further delineation. 5. Slight fullness of the lower left rectus sheath.  Mild hematoma formation is possible. 6. Bilobar hepatic fluid density lesions, not further characterize the resembling cysts. 7. Cardiomegaly with bilateral pleural effusions and small pericardial effusion  (07/16/24 1013)  Toilet Transfers Comments: anticipate 2 person max A (07/16/24 1013)     Shower Transfers  Shower Transfers: Not tested (07/16/24 1013)      Speech Language Pathology n/a           Diet/Swallow:   Regular; 4 Carb Choices (60gm/meal)                    Current Conditions Requiring Inpatient Rehabilitation  Bowel/Bladder Dysfunction: Yes  Intervention Required = Bowel program and Chronic Contreras care   Risk for Medical/Clinical Complications = high  Skin Healing/Breakdown Risk: Yes  Intervention Required = Side to side turns and monitor bruising to abdomen (from lovenox)  Risk for Medical/Clinical Complications = high  Nutrition/Hydration Deficiency: Yes  Intervention Required = Monitor I&Os, Check Labs, and Dietary Eval  Risk for Medical/Clinical Complications = high  Medical Comorbidities: Yes  Intervention Required = DVT risk, CAD, Renal disease, and SILVINA, HTN, CVA, SDH, PE  Risk for Medical/Clinical Complications = high    Rehab/Skilled Needs:   900 minutes of Intensive Acute Rehab therapy over 7 days/week  PT Treatment Time:  1.0 hrs/day  OT Treatment Time: 1.0 hrs/day  SLP Treatment Time: 0.5 hrs/day  Rehabilitation Nursing   Case management/Social work  Wound Care  Dietitian/Nutrition  DM Education  Hemodialysis    Cultural needs:   Ethnic, Cultural, Spiritual, and Mandaen Needs  Do you have any ethnic, cultural, Holiness practices or restrictions we should know about during your stay in the hospital?  : No  Are you able to do the things that help you spiritually even though you are sick? : No  How often do you feel lonely or isolated from those around you?: Never  Do you need support with your Holiness or spiritual needs?: No  It is part of the job for the Spiritual Health Team to stop by for a visit, would you like to specifically request a visit from our ?: No   Funding needs:     None Noted    Expected Level of Improvement with Rehab  Assist for ADL Min A- Mod A  Assist for

## 2024-07-23 NOTE — CARE COORDINATION
[facility] following. Possible d/c 7/24. Patient to go for tunneled HD cath.   Electronically signed by Cesia Lee on 7/23/2024 at 3:28 PM

## 2024-07-23 NOTE — PROGRESS NOTES
Renal Adjustment Per Protocol: Pepcid 20 mg daily changed to 10 mg 3x per week following dialysis based on    Recent Labs     07/23/24  0456   CREATININE 5.39*   Estimated Creatinine Clearance: 15 mL/min (A) (based on SCr of 5.39 mg/dL (H)).Hemodialysis Intake (ml): 500 ml .     Bradford Raza R.Ph.  7/23/2024  11:40 AM

## 2024-07-23 NOTE — PROGRESS NOTES
[facility] MED SURG TREATMENT NOTE     Date: 2024  Patient Name: Km Garcia        MRN: [id number]  Account: [id number]   : 1945  (78 y.o.)  Room: [place] [address]    Chart Review:    Restrictions  Restrictions/Precautions  Restrictions/Precautions: Fall Risk     Safety:  Safety Devices  Type of Devices: All fall risk precautions in place;Bed alarm in place;Call light within reach;Left in bed    Patient's birthday verified: Yes    Subjective: \"My wife will be happy.\"     Pain   Pain at start of treatment: No    Pain at end of treatment: No      Objective: Pt in bed resting upon arrival. Pt agreeable to getting washed up. Pt declined to transfer to chair instead requesting to bathe at bed level. Pt completed as follows.     ADL Status:  Grooming: Setup;Verbal cueing;Supervision  Grooming Skilled Clinical Factors: Pt completed oral care, hair care, and washed face at bed level. Occasional verbal/visual cues for initiation and sequencing  UE Bathing: Setup;Supervision;Verbal cueing  UE Bathing Skilled Clinical Factors: VCs for initiation  LE Bathing: Based on clinical judgement;Maximum assistance  LE Bathing Skilled Clinical Factors: Pt declined to wash cristal areas or legs reporting that his hospital brief is clean. Per clinical judgment, pt would likely require significant assistance secondary to impaired balance and overall decreased ROM  UE Dressing: Unable to assess   UE Dressing Skilled Clinical Factors: Hospital Gown Only  LE Dressing: Maximum assistance;Based on clinical judgement  LE Dressing Skilled Clinical Factors: Pt declined to change socks. Per clinical judgment, pt would likely require significant assistance secondary to impaired balance and overall decreased ROM  Toileting: Dependent/Total  Toileting Skilled Clinical Factors: Pt with Cnotreras catheter  Skin Care: Soap and water    Cognition Status:  Overall Cognitive Status: WFL    Therapy key for assistance levels -

## 2024-07-23 NOTE — PROGRESS NOTES
Physical Therapy Missed Treatment   Facility/Department: [facility] MED SURG W195/W195-01    NAME: Km Garcia  Patient Status:   : 1945 (78 y.o.)  MRN: [id number]  Account: [id number]  Gender: male        [] Patient Declines PT Treatment            [x] Patient Unavailable:     Pt too fatigued at this time and was unable to stay awake. Declined to get up to sitting. Will return in pm. Explained to spouse that we would put thomas sling in chair so staff could transfer him back safely if we end up transferring him to a chair.   Will attempt PT Treatment again at earliest convenience.        Electronically signed by Mei Sheikh PTA on 24 at 11:17 AM EDT
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
## 2024-07-23 NOTE — CONSULTS
[facility]                   [address] [place] [address]                              CONSULTATION      PATIENT NAME: VLADIMIR LYNN               : 1945  MED REC NO: [id number]                        ROOM: W195  ACCOUNT NO: [id number]                       ADMIT DATE: 07/15/2024  PROVIDER: Nixon Arredondo MD    ENDOCRINE CONSULT    CONSULT DATE: 2024    REFERRING PHYSICIAN:  Ariane Hazel      REASON FOR CONSULT:  Management of uncontrolled type 2 diabetes.    CHIEF COMPLAINT AND HISTORY OF PRESENT ILLNESS:  Obtained through prior H and P and the patient's wife.  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  The patient has had renal failure from chemotherapy before, had improved before.  At this time, admitting diagnosis was syncope and collapse, UTI, CNS lymphoma, severe anemia.  The patient just started with dialysis due to worsening renal function, seen by Urology, Nephrology, and also Oncology in addition to Cardiology.  The patient did receive steroids here.  Blood sugars have gone up to 489.  Started on Lantus 10 units twice a day, Humalog coverage.  The patient's hemoglobin A1c was 4.5.  Does have moderate anemia with hemoglobin of 10.5.  Current chemistries:  Sodium 123, potassium 4.5, chloride was 90, CO2 was 16, BUN 92, creatinine 5.90.  The patient has had 2 sessions of dialysis and just started on p.o. intake.  Prior to that, the patient was well controlled on metformin according to the patient's wife, prior diagnosis of CNS lymphoma.  Most of the medical care was at [facility].    PAST MEDICAL HISTORY:  Significant for CVA, Charcot's ankle left side, CNS lymphoma, osteoarthritis, asthma, PE, DVT.    SURGICAL  HISTORY:  Colonoscopy, lithotripsy, history of craniotomy.    FAMILY HISTORY:  Significant for diabetes, heart disease.    PERSONAL AND SOCIAL HISTORY:  Does smoke cigarettes.    ALLERGIES:  INCLUDE CELEBREX AND NAPROXEN.      MEDICATIONS:  Here include Lantus 10 units twice a day, Humalog coverage, Pepcid, ProAmatine, morphine, Flomax, Valtrex.    REVIEW OF SYSTEMS:  Other than a syncopal episode, 14-point review of systems was negative.    PHYSICAL EXAMINATION:  GENERAL:  The patient is alert, awake, appears ill.  No obvious distress.  VITAL SIGNS:  Blood pressure was 110/46, pulse rate was between 40s to 100, respirations 19, and temperature 98.1.  HEENT:  Normocephalic and atraumatic.  Scar of craniotomy noted over right temporal area.  Oral mucosa is moist.  NECK:  Supple.  Trachea midline.  LUNGS:  Clear to auscultation bilaterally.  No wheezing or crackles are heard.  HEART:  Sounds are normal.  No murmurs or thrills are present.  ABDOMEN:  Soft.  Slightly obese.  Bowel sounds are present.  EXTREMITIES:  Lower extremities reveal 3+ pitting edema bilateral lower legs.  MUSCULOSKELETAL:  No joint swelling.  NEUROLOGIC:  Cranial nerve examination unable to complete.  PSYCH:  Unable to complete.  SKIN:  Showing stretched lower extremities.  No rashes.    LABORATORY DATA:  As above.    ASSESSMENT:  Uncontrolled diabetes due to use of steroids here, recent worsening renal failure, started on dialysis, CNS lymphoma, history of cerebrovascular accident, brain bleed, status post craniotomy, history of deep vein thrombosis.    PLAN:  Increase dose of Lantus to 25 units twice daily, Humalog 6 units with each meal.  High-dose Humalog coverage.  Adjust dose of insulin based on blood sugars in next 1-2 days.  Continue other medical management as per specialists and Nephrology.  Reviewed nursing consultant's note.    Total time spent was 60 minutes.  Thank you for the consult.          RANDELL GONSALVES MD      D:  07/22/2024

## 2024-07-23 NOTE — PROGRESS NOTES
Nephrology Progress Note    Assessment:  SILVINA possible obstructive component (CT showed non obstructive caliceal stones w/ bilateral pelviectasis w/ mild bilateral hydroureter), serology all negative  Left Renal stone 1.2 cm  Hx CVA  Hx Lymphoma  Nl GFR 6/21/2024   interstitial dx ?  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%  7/16/2024  Hematoma spleen 7 cm     Plan:  - continue IHD for now, next rx Wednesday   - monitor UOP   - will start pt on torsemide 160 mg daily once BP more stable   - agree w/ increasing midodrine  - plan for placement of tunneled catheter, should not hold prevent patient from going to acute rehab     Patient Active Problem List:     Bladder stones     Surgical aftercare, genitourinary system     Gross hematuria     SOB (shortness of breath)     Brain bleed (HCC)     Syncope and collapse     Athscl heart disease of native coronary artery w/o ang pctrs     Compression of brain (HCC)     Cardiomyopathy (HCC)     Cerebrovascular accident (CVA) (HCC)     Coronary artery disease involving native coronary artery of native heart without angina pectoris     Diffuse large B-cell lymphoma, unspecified site (HCC)     Dysphagia, oropharyngeal phase     History of falling     History of traumatic brain injury     Hypokalemia     Impaired mobility and activities of daily living     Intraoperative floppy iris syndrome (IFIS)     Macular degeneration of both eyes     Mass of brain     Subdural hematoma (HCC)     Methotrexate renal toxicity     Methotrexate toxicity     Obstructive and reflux uropathy, unspecified     Pancreatic cyst     PAT (paroxysmal atrial tachycardia) (HCC)     Primary hypertension     Primary osteoarthritis of both knees     Primary CNS lymphoma (HCC)     Primary pulmonary hypertension (HCC)     Urinary retention     Late effect of brain injury (HCC)     SILVINA (acute kidney injury) (HCC)     UTI (urinary tract infection)      Subjective:  Admit Date: 7/15/2024    Interval History: oliguric, seen on HD yesterday, BP  intermittently low     Medications:  Scheduled Meds:   midodrine  10 mg Oral TID WC    insulin glargine  25 Units SubCUTAneous BID    insulin lispro  6 Units SubCUTAneous TID WC    metoprolol tartrate  25 mg Oral BID    insulin lispro  0-16 Units SubCUTAneous TID WC    insulin lispro  0-4 Units SubCUTAneous Nightly    famotidine  20 mg Oral Daily    heparin (porcine)  5,000 Units SubCUTAneous 3 times per day    valACYclovir  500 mg Oral Daily    [Held by provider] enoxaparin  1 mg/kg SubCUTAneous BID    [Held by provider] sacubitril-valsartan  1 tablet Oral BID    tamsulosin  0.4 mg Oral Daily    sodium chloride flush  5-40 mL IntraVENous 2 times per day    [Held by provider] furosemide  20 mg IntraVENous BID     Continuous Infusions:   sodium chloride      dextrose         CBC:   Recent Labs     07/22/24 0448 07/23/24  0456   WBC 3.1* 4.4*   HGB 10.5* 10.0*    317       CMP:    Recent Labs     07/21/24  0536 07/22/24 0448 07/23/24 0456   * 123* 133*   K 4.2  4.2 4.4  4.4 4.0  4.0   CL 93* 90* 98   CO2 19* 16* 21   BUN 79* 92* 79*   CREATININE 5.22* 5.90* 5.39*   GLUCOSE 435* 489* 185*   CALCIUM 7.7* 7.7* 7.8*   LABGLOM 10.5* 9.0* 10.1*       Troponin: No results for input(s): \"TROPONINI\" in the last 72 hours.  BNP: No results for input(s): \"BNP\" in the last 72 hours.  INR:   No results for input(s): \"INR\" in the last 72 hours.    Lipids: No results for input(s): \"CHOL\", \"LDLDIRECT\", \"TRIG\", \"HDL\", \"AMYLASE\", \"LIPASE\" in the last 72 hours.  Liver:   Recent Labs     07/23/24  0456   AST 9   ALT 15   ALKPHOS 37   BILITOT <0.2       Iron:  No results for input(s): \"FERRITIN\" in the last 72 hours.    Invalid input(s): \"IRONS\", \"LABIRONS\"  Urinalysis: No results for input(s): \"UA\" in the last 72 hours.    Objective:  Vitals: BP (!) 91/40   Pulse 71   Temp 97.7 °F (36.5 °C) (Oral)   Resp 18   Ht 1.854 m (6' 0.99\")   Wt 116.6 kg (257 lb)   SpO2 100%   BMI 33.91 kg/m²    Wt Readings from Last 3

## 2024-07-23 NOTE — PROGRESS NOTES
Physical Therapy Med Surg Daily Treatment Note  Facility/Department: [address] TELEMETRY  Room: [place] [address]       NAME: Km Garcia  : 1945 (78 y.o.)  MRN: [id number]  CODE STATUS: Full Code    Date of Service: 2024    Patient Diagnosis(es): Syncope and collapse [R55]  Peripheral edema [R60.0]  Renal insufficiency [N28.9]  General weakness [R53.1]  Anticoagulated [Z79.01]  Syncope, unspecified syncope type [R55]  Diarrhea, unspecified type [R19.7]  SILVINA (acute kidney injury) ([facility]) [N17.9]   Chief Complaint   Patient presents with    Loss of Consciousness     While on the toilet, hx brain CA, new CHF,      Patient Active Problem List    Diagnosis Date Noted    Syncope 2024    End stage renal disease ([facility]) 2024    HFrEF (heart failure with reduced ejection fraction) ([facility]) 2024    Atrial fibrillation ([facility]) 2024    Poorly controlled diabetes mellitus ([facility]) 2024    Late effect of brain injury ([facility]) 2024    SILVINA (acute kidney injury) ([facility]) 2024    UTI (urinary tract infection) 2024    Syncope and collapse 2024    History of traumatic brain injury 2024    Impaired mobility and activities of daily living 2024    Methotrexate renal toxicity 2024    Hypokalemia 05/10/2024    Methotrexate toxicity 05/10/2024    Primary osteoarthritis of both knees 2024    Cerebrovascular accident (CVA) ([facility]) 2024    Subdural hematoma ([facility]) 2024    Compression of brain ([facility]) 2024    Diffuse large B-cell lymphoma, unspecified site ([facility]) 2024    Dysphagia, oropharyngeal phase 2024    Obstructive and reflux uropathy, unspecified 2024    Primary pulmonary hypertension (HCC) 2024    Macular degeneration of both eyes 2024    Pancreatic cyst 2024    Primary CNS lymphoma (HCC) 2024    Cardiomyopathy ([facility]) 2024    Coronary artery disease involving native coronary artery of native heart without angina  assistance from another person for activity completion. Device may be needed.  Stand by assistance = pt requires verbal cues or instructions from another person, close to but not touching, to perform the activity  Minimal assistance= pt performs 75% or more of the activity; assistance is required to complete the activity  Moderate assistance= pt performs 50% of the activity; assistance is required to complete the activity  Maximal assistance = pt performs 25% of the activity; assistance is required to complete the activity  Dependent = pt requires total physical assistance to accomplish the task

## 2024-07-23 NOTE — PROGRESS NOTES
Cardiology progress note      Patient Name: Km Garcia  Admit Date: 7/15/2024  7:06 PM  MR #: [id number]  : 1945    Attending Physician: Davide Crum MD  Reason for consult: Syncope    History of Presenting Illness:      Km Garcia is a 78 y.o. male on hospital day 6 with a history of cardiomyopathy EF 35% by TTE 2024 (cardiomyopathy thought to be related to chemotherapy induced), diabetes, paroxysmal atrial tachycardia, hypertension, hyperlipidemia, large B cell CNS lymphoma diagnosed 2024 initially treated with CHOP, converted to converted to Linalidomide and Ibrutinib after he developed nephrotoxicity to Methotrexate.  Right lower extremity DVT and bilateral PE May 16, 2024 on Lovenox, left charcoaled foot, who came to the hospital after sustaining a syncopal episode.  History Obtained From:  patient, electronic medical record    Per patient, he was sitting in the toilet when he passed out wife witness event  No prodrome like symptoms  Patient was having diarrhea before syncopal episode  Denies chest pain or shortness of breath  EKG sinus rhythm with nonspecific T wave abnormality lateral leads, LVH, PACs,  Orthostatics positive  ===============  Hospital course  2024  Patient laying in bed looks more awake  Denies chest pain or shortness of breath  Wife at bedside  Telemetry atrial fibrillation in the 70s  There was a question about possible allergic reaction to metoprolol.  Metoprolol was restarted yesterday and patient is tolerating it well with allergic reactions  Long discussion along patient and wife regarding IVC filter placement  All questions were answered  Patient and wife would like to hold off on IVC filter placement for now  They understood risk and benefits of the procedure  They understand that currently patient is not fully anticoagulated and at a high risk of developing a DVT versus PE  Plan discussed with hospitalist as well as a.m. nurse        24:  fibrillation, no significant structural heart disease by echo     Atrial fibrillation  Continue metoprolol 50 mg p.o. twice daily  Currently patient on prophylactic heparin, patient was found with possible splenic hematoma per ultrasound 7/20/2024, possible mild left rectus sheath hematoma per CT of the abdomen 7/19/2024       Right lower extremity DVT and bilateral PE May 16, 2024 on Lovenox  Currently patient on prophylactic heparin, patient was found with possible splenic hematoma per ultrasound 7/20/2024, possible mild left rectus sheath hematoma per CT of the abdomen 7/19/2024  On 7/23/24 I had a Long discussion along patient and wife regarding IVC filter placement  All questions were answered  Patient and wife would like to hold off on IVC filter placement for now  They understood risk and benefits of the procedure  They understand that currently patient is not fully anticoagulated and at a high risk of developing a DVT versus PE     Management of CKD as per nephrology and primary team  Please keep potassium between 4 and 5 magnesium above 2  Please keep hemoglobin above 8 and place above 50  Ischemic evaluation as an outpatient     Cardiomyopathy EF 35% by TTE 5/2024 at [facility].  Condition thought to be related to chemotherapy induced, EF now improved to 45 to 50% by TTE 7/16/2024   Continue metoprolol 2.5 mg p.o. twice daily.  Patient tolerating this medication well without side effect, no Coreg to prevent hypotension  Hold Entresto in the setting of borderline low blood pressures  Comments:     Thank you for allowing us to participate in the care of this patient.     Will continue to follow.    Please call if questions or concerns arise.    Electronically signed by Ralph Smith MD on 7/23/2024 at 1:55 PM    Please note this report has been partially produced using speech recognition software and may cause contain errors related to that system including grammar, punctuation and spelling as well as words and

## 2024-07-24 PROBLEM — E11.22 TYPE 2 DIABETES MELLITUS WITH CHRONIC KIDNEY DISEASE ON CHRONIC DIALYSIS, WITH LONG-TERM CURRENT USE OF INSULIN (HCC): Status: ACTIVE | Noted: 2024-07-24

## 2024-07-24 PROBLEM — Z79.4 TYPE 2 DIABETES MELLITUS WITH CHRONIC KIDNEY DISEASE ON CHRONIC DIALYSIS, WITH LONG-TERM CURRENT USE OF INSULIN (HCC): Status: ACTIVE | Noted: 2024-07-24

## 2024-07-24 PROBLEM — Z99.2 TYPE 2 DIABETES MELLITUS WITH CHRONIC KIDNEY DISEASE ON CHRONIC DIALYSIS, WITH LONG-TERM CURRENT USE OF INSULIN (HCC): Status: ACTIVE | Noted: 2024-07-24

## 2024-07-24 PROBLEM — N18.6 TYPE 2 DIABETES MELLITUS WITH CHRONIC KIDNEY DISEASE ON CHRONIC DIALYSIS, WITH LONG-TERM CURRENT USE OF INSULIN (HCC): Status: ACTIVE | Noted: 2024-07-24

## 2024-07-24 LAB
ALBUMIN SERPL-MCNC: 2.1 G/DL (ref 3.5–4.6)
ALP SERPL-CCNC: 40 U/L (ref 35–104)
ALT SERPL-CCNC: 14 U/L (ref 0–41)
ANION GAP SERPL CALCULATED.3IONS-SCNC: 16 MEQ/L (ref 9–15)
ANISOCYTOSIS BLD QL SMEAR: ABNORMAL
AST SERPL-CCNC: 8 U/L (ref 0–40)
BASOPHILS # BLD: 0.1 K/UL (ref 0–0.2)
BASOPHILS NFR BLD: 1 %
BILIRUB SERPL-MCNC: <0.2 MG/DL (ref 0.2–0.7)
BUN SERPL-MCNC: 84 MG/DL (ref 8–23)
CALCIUM SERPL-MCNC: 7.5 MG/DL (ref 8.5–9.9)
CHLORIDE SERPL-SCNC: 99 MEQ/L (ref 95–107)
CO2 SERPL-SCNC: 20 MEQ/L (ref 20–31)
CREAT SERPL-MCNC: 4.84 MG/DL (ref 0.7–1.2)
EOSINOPHIL # BLD: 0.6 K/UL (ref 0–0.7)
EOSINOPHIL NFR BLD: 12 %
ERYTHROCYTE [DISTWIDTH] IN BLOOD BY AUTOMATED COUNT: 13 % (ref 11.5–14.5)
GLOBULIN SER CALC-MCNC: 2.1 G/DL (ref 2.3–3.5)
GLUCOSE BLD-MCNC: 123 MG/DL (ref 70–99)
GLUCOSE BLD-MCNC: 129 MG/DL (ref 70–99)
GLUCOSE BLD-MCNC: 130 MG/DL (ref 70–99)
GLUCOSE BLD-MCNC: 134 MG/DL (ref 70–99)
GLUCOSE BLD-MCNC: 153 MG/DL (ref 70–99)
GLUCOSE SERPL-MCNC: 134 MG/DL (ref 70–99)
HCT VFR BLD AUTO: 27.4 % (ref 42–52)
HGB BLD-MCNC: 9.3 G/DL (ref 14–18)
LYMPHOCYTES # BLD: 1.8 K/UL (ref 1–4.8)
LYMPHOCYTES NFR BLD: 34 %
MCH RBC QN AUTO: 31.3 PG (ref 27–31.3)
MCHC RBC AUTO-ENTMCNC: 33.9 % (ref 33–37)
MCV RBC AUTO: 92.3 FL (ref 79–92.2)
METAMYELOCYTES NFR BLD MANUAL: 5 %
MONOCYTES # BLD: 0.5 K/UL (ref 0.2–0.8)
MONOCYTES NFR BLD: 9.9 %
MYELOCYTES NFR BLD MANUAL: 2 %
NEUTROPHILS # BLD: 2.4 K/UL (ref 1.4–6.5)
NEUTS BAND NFR BLD MANUAL: 2 %
NEUTS SEG NFR BLD: 35 %
PERFORMED ON: ABNORMAL
PLATELET # BLD AUTO: 305 K/UL (ref 130–400)
PLATELET BLD QL SMEAR: ADEQUATE
POTASSIUM SERPL-SCNC: 4.2 MEQ/L (ref 3.4–4.9)
PROT SERPL-MCNC: 4.2 G/DL (ref 6.3–8)
RBC # BLD AUTO: 2.97 M/UL (ref 4.7–6.1)
SLIDE REVIEW: ABNORMAL
SMUDGE CELLS BLD QL SMEAR: 14.9
SODIUM SERPL-SCNC: 135 MEQ/L (ref 135–144)
WBC # BLD AUTO: 5.4 K/UL (ref 4.8–10.8)

## 2024-07-24 PROCEDURE — 6370000000 HC RX 637 (ALT 250 FOR IP): Performed by: INTERNAL MEDICINE

## 2024-07-24 PROCEDURE — 6370000000 HC RX 637 (ALT 250 FOR IP): Performed by: STUDENT IN AN ORGANIZED HEALTH CARE EDUCATION/TRAINING PROGRAM

## 2024-07-24 PROCEDURE — 99232 SBSQ HOSP IP/OBS MODERATE 35: CPT | Performed by: PHYSICIAN ASSISTANT

## 2024-07-24 PROCEDURE — 6360000002 HC RX W HCPCS: Performed by: INTERNAL MEDICINE

## 2024-07-24 PROCEDURE — 6370000000 HC RX 637 (ALT 250 FOR IP)

## 2024-07-24 PROCEDURE — 99238 HOSP IP/OBS DSCHRG MGMT 30/<: CPT | Performed by: INTERNAL MEDICINE

## 2024-07-24 PROCEDURE — 6360000002 HC RX W HCPCS: Performed by: STUDENT IN AN ORGANIZED HEALTH CARE EDUCATION/TRAINING PROGRAM

## 2024-07-24 PROCEDURE — 97535 SELF CARE MNGMENT TRAINING: CPT

## 2024-07-24 PROCEDURE — 6370000000 HC RX 637 (ALT 250 FOR IP): Performed by: PHYSICIAN ASSISTANT

## 2024-07-24 PROCEDURE — 85025 COMPLETE CBC W/AUTO DIFF WBC: CPT

## 2024-07-24 PROCEDURE — 80053 COMPREHEN METABOLIC PANEL: CPT

## 2024-07-24 PROCEDURE — 36415 COLL VENOUS BLD VENIPUNCTURE: CPT

## 2024-07-24 PROCEDURE — 2060000000 HC ICU INTERMEDIATE R&B

## 2024-07-24 PROCEDURE — 2580000003 HC RX 258: Performed by: INTERNAL MEDICINE

## 2024-07-24 PROCEDURE — 99232 SBSQ HOSP IP/OBS MODERATE 35: CPT | Performed by: PHYSICAL MEDICINE & REHABILITATION

## 2024-07-24 RX ORDER — SODIUM CHLORIDE 9 MG/ML
INJECTION, SOLUTION INTRAVENOUS
Status: DISPENSED
Start: 2024-07-24 | End: 2024-07-24

## 2024-07-24 RX ORDER — LOPERAMIDE HYDROCHLORIDE 2 MG/1
2 CAPSULE ORAL 4 TIMES DAILY PRN
Status: DISCONTINUED | OUTPATIENT
Start: 2024-07-24 | End: 2024-07-25

## 2024-07-24 RX ORDER — HEPARIN SODIUM 1000 [USP'U]/ML
4000 INJECTION, SOLUTION INTRAVENOUS; SUBCUTANEOUS ONCE
Status: DISCONTINUED | OUTPATIENT
Start: 2024-07-24 | End: 2024-07-25 | Stop reason: HOSPADM

## 2024-07-24 RX ADMIN — HEPARIN SODIUM 5000 UNITS: 5000 INJECTION INTRAVENOUS; SUBCUTANEOUS at 06:02

## 2024-07-24 RX ADMIN — METOPROLOL TARTRATE 25 MG: 25 TABLET, FILM COATED ORAL at 20:06

## 2024-07-24 RX ADMIN — VALACYCLOVIR 500 MG: 1 TABLET, FILM COATED ORAL at 20:05

## 2024-07-24 RX ADMIN — Medication 5 ML: at 09:07

## 2024-07-24 RX ADMIN — HEPARIN SODIUM 5000 UNITS: 5000 INJECTION INTRAVENOUS; SUBCUTANEOUS at 20:06

## 2024-07-24 RX ADMIN — TAMSULOSIN HYDROCHLORIDE 0.4 MG: 0.4 CAPSULE ORAL at 08:41

## 2024-07-24 RX ADMIN — HEPARIN SODIUM 5000 UNITS: 5000 INJECTION INTRAVENOUS; SUBCUTANEOUS at 14:25

## 2024-07-24 RX ADMIN — INSULIN GLARGINE 10 UNITS: 100 INJECTION, SOLUTION SUBCUTANEOUS at 20:06

## 2024-07-24 RX ADMIN — Medication 10 ML: at 20:07

## 2024-07-24 NOTE — PROGRESS NOTES
Cardiology progress note      Patient Name: Km Garcia  Admit Date: 7/15/2024  7:06 PM  MR #: [id number]  : 1945    Attending Physician: Davide Crum MD  Reason for consult: Syncope    History of Presenting Illness:      Km Garcia is a 78 y.o. male on hospital day 7 with a history of cardiomyopathy EF 35% by TTE 2024 (cardiomyopathy thought to be related to chemotherapy induced), diabetes, paroxysmal atrial tachycardia, hypertension, hyperlipidemia, large B cell CNS lymphoma diagnosed 2024 initially treated with CHOP, converted to converted to Linalidomide and Ibrutinib after he developed nephrotoxicity to Methotrexate.  Right lower extremity DVT and bilateral PE May 16, 2024 on Lovenox, left charcoaled foot, who came to the hospital after sustaining a syncopal episode.  History Obtained From:  patient, electronic medical record    Per patient, he was sitting in the toilet when he passed out wife witness event  No prodrome like symptoms  Patient was having diarrhea before syncopal episode  Denies chest pain or shortness of breath  EKG sinus rhythm with nonspecific T wave abnormality lateral leads, LVH, PACs,  Orthostatics positive  ===============  Hospital course      24: wife at bedside. Patient appears weak. Previous notes reviewed. Dialysis catheter replacement and HD planned for tomorrow.     2024  Patient laying in bed looks more awake  Denies chest pain or shortness of breath  Wife at bedside  Telemetry atrial fibrillation in the 70s  There was a question about possible allergic reaction to metoprolol.  Metoprolol was restarted yesterday and patient is tolerating it well with allergic reactions  Long discussion along patient and wife regarding IVC filter placement  All questions were answered  Patient and wife would like to hold off on IVC filter placement for now  They understood risk and benefits of the procedure  They understand that currently patient is not fully  gets steroid shots    Asthma     seasonally, uses inhaler as needed    Athscl heart disease of native coronary artery w/o ang pctrs 2024    Cerebrovascular accident (CVA) (HCC) 2024    Charcot ankle, left     wears brace    Diabetes mellitus (HCC)     takes metformin    Hypertension     on meds > 10 yrs    Osteoarthritis      Past Surgical History:   Procedure Laterality Date    COLONOSCOPY      > 30 yrs ago    IR NONTUNNELED VASCULAR CATHETER Right 2024    Medcomp 11.5F x 15 cm Raulerson Duo-Flow IJ double lumen catheter (LOT# [id number],  exp ) performed by Dr. Osborne    IR NONTUNNELED VASCULAR CATHETER  2024    IR NONTUNNELED VASCULAR CATHETER 2024 MLOZ SPECIAL PROCEDURE    LITHOTRIPSY N/A 2018    WITH HOLMIUM LASER performed by Antonio Petit MD at [facility]     Family History  Family History   Problem Relation Age of Onset    No Known Problems Mother     Heart Disease Father     No Known Problems Sister     Other Brother         enlarged prostate, cataracts    Diabetes Brother         borderline     [] Unable to obtain due to ventilated and/ or neurologic status  Social History     Socioeconomic History    Marital status:      Spouse name: Not on file    Number of children: Not on file    Years of education: Not on file    Highest education level: Not on file   Occupational History    Not on file   Tobacco Use    Smoking status: Former     Current packs/day: 0.00     Types: Cigarettes     Quit date:      Years since quittin.5    Smokeless tobacco: Never   Vaping Use    Vaping Use: Never used   Substance and Sexual Activity    Alcohol use: Yes     Comment: Drinks 1 drink every other day    Drug use: No    Sexual activity: Not on file   Other Topics Concern    Not on file   Social History Narrative    Lives With: Spouse Coby (wife is able to help pt at home)    Type of Home: [facility] in [facility]         Home Layout: Two level, Able to Live on

## 2024-07-24 NOTE — PROGRESS NOTES
Nephrology Progress Note    Assessment:  SILVINA possible obstructive component (CT showed non obstructive caliceal stones w/ bilateral pelviectasis w/ mild bilateral hydroureter), serology all negative, non-oliguric   Left Renal stone 1.2 cm  Hx CVA  Hx Lymphoma  Nl GFR 6/21/2024   interstitial dx ?  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     Compression of brain (HCC)     Cardiomyopathy (HCC)     Cerebrovascular accident (CVA) (HCC)     Coronary artery disease involving native coronary artery of native heart without angina pectoris     Diffuse large B-cell lymphoma, unspecified site (HCC)     Dysphagia, oropharyngeal phase     History of falling     History of traumatic brain injury     Hypokalemia     Impaired mobility and activities of daily living     Intraoperative floppy iris syndrome (IFIS)     Macular degeneration of both eyes     Mass of brain     Subdural hematoma (HCC)     Methotrexate renal toxicity     Methotrexate toxicity     Obstructive and reflux uropathy, unspecified     Pancreatic cyst     PAT (paroxysmal atrial tachycardia) (HCC)     Primary hypertension     Primary osteoarthritis of both knees     Primary CNS lymphoma (HCC)     Primary pulmonary hypertension (HCC)     Urinary retention     Late effect of brain injury (HCC)     SILVINA (acute kidney injury) (HCC)     UTI (urinary tract infection)      Subjective:  Admit Date: 7/15/2024    Interval History: made more urine in the  past 24 hours, BP slightly low yesterday afternoon but has been better, discussed w/ wife at bedside     Medications:  Scheduled Meds:   midodrine  10 mg Oral TID WC    insulin glargine  10 Units SubCUTAneous BID    insulin lispro  0-4 Units SubCUTAneous TID WC    insulin lispro  0-4 Units SubCUTAneous Nightly    famotidine  10 mg Oral Once per day on Mon Wed Fri    metoprolol tartrate  25 mg Oral BID    [Held by provider] heparin (porcine)  5,000 Units SubCUTAneous 3 times per day    valACYclovir  500 mg Oral Daily    [Held by provider] sacubitril-valsartan  1 tablet Oral BID    tamsulosin  0.4 mg Oral Daily    sodium chloride flush  5-40 mL IntraVENous 2 times per day    [Held by provider] furosemide  20 mg IntraVENous BID     Continuous Infusions:   sodium chloride      dextrose         CBC:   Recent Labs     07/23/24  0456 07/24/24  0528   WBC 4.4* 5.4   HGB 10.0* 9.3*    305       CMP:    Recent Labs     07/22/24  0448 07/23/24  0456 07/24/24  0528   * 133* 135   K 4.4  4.4 4.0  4.0 4.2   CL 90* 98 99   CO2 16* 21 20   BUN 92* 79* 84*   CREATININE 5.90* 5.39* 4.84*   GLUCOSE 489* 185* 134*   CALCIUM 7.7* 7.8* 7.5*   LABGLOM 9.0* 10.1* 11.5*       Troponin: No results for input(s): \"TROPONINI\" in the last 72 hours.  BNP: No results for input(s): \"BNP\" in the last 72 hours.  INR:   No results for input(s): \"INR\" in the last 72 hours.    Lipids: No results for input(s): \"CHOL\", \"LDLDIRECT\", \"TRIG\", \"HDL\", \"AMYLASE\", \"LIPASE\" in the last 72 hours.  Liver:   Recent Labs     07/24/24  0528   AST 8   ALT 14   ALKPHOS 40   BILITOT <0.2       Iron:  No results for input(s): \"FERRITIN\" in the last 72 hours.    Invalid input(s): \"IRONS\", \"LABIRONS\"  Urinalysis: No results for input(s): \"UA\" in the last 72 hours.    Objective:  Vitals: BP (!) 156/55   Pulse 76   Temp 97.8 °F (36.6 °C) (Oral)   Resp 18   Ht 1.854 m (6' 0.99\")   Wt 124.8 kg (275 lb 2.2 oz)   SpO2 100%   BMI 36.31 kg/m²    Wt Readings

## 2024-07-24 NOTE — PROGRESS NOTES
Hospitalist Progress Note      Date of Admission: 7/15/2024  Chief Complaint:    Chief Complaint   Patient presents with    Loss of Consciousness     While on the toilet, hx brain CA, new CHF,      Subjective:  No new complaints. Patient interactive but lethargic during interview. Answers questions appropriately. Discussed with wife at length her concerns regarding loose Bms. We also discussed his bilateral leg swelling. Wife and  would like to Dr. Magallon further about IVC filter today as they are still undecided       Medications:    Infusion Medications    sodium chloride      dextrose       Scheduled Medications    midodrine  10 mg Oral TID WC    insulin glargine  10 Units SubCUTAneous BID    insulin lispro  0-4 Units SubCUTAneous TID WC    insulin lispro  0-4 Units SubCUTAneous Nightly    famotidine  10 mg Oral Once per day on Mon Wed Fri    metoprolol tartrate  25 mg Oral BID    [Held by provider] heparin (porcine)  5,000 Units SubCUTAneous 3 times per day    valACYclovir  500 mg Oral Daily    [Held by provider] sacubitril-valsartan  1 tablet Oral BID    tamsulosin  0.4 mg Oral Daily    sodium chloride flush  5-40 mL IntraVENous 2 times per day    [Held by provider] furosemide  20 mg IntraVENous BID     PRN Meds: loperamide, cyclobenzaprine, lidocaine, calcium carbonate, albumin human 25%, heparin (porcine), aluminum & magnesium hydroxide-simethicone, sodium chloride flush, sodium chloride, potassium chloride **OR** potassium alternative oral replacement **OR** potassium chloride, magnesium sulfate, ondansetron **OR** ondansetron, melatonin, polyethylene glycol, acetaminophen **OR** acetaminophen, glucose, dextrose bolus **OR** dextrose bolus, glucagon (rDNA), dextrose    Intake/Output Summary (Last 24 hours) at 7/24/2024 1141  Last data filed at 7/24/2024 0845  Gross per 24 hour   Intake 500 ml   Output 1500 ml   Net -1000 ml     Exam:  BP (!) 156/55   Pulse 76   Temp 97.8 °F (36.6 °C) (Oral)   Resp

## 2024-07-24 NOTE — FLOWSHEET NOTE
0845- Spoke to IR about the plan for patients temp dialysis and plan for a tunneled catheter, Maegan KIRBY said  is not available to assist.     1305- Received a call from short stay nurse. Patient to have tunneled cath done tomorrow due to patient eating. Patient to be NPO at midnight.     1320- Patient down for dialysis     Electronically signed by Priscilla Shah RN on 7/25/2024 at 5:57 PM

## 2024-07-24 NOTE — PROGRESS NOTES
Case cancelled until tomorrow per Anesthesia Dr Alejandre was notified, spoke with Abby RN and made aware

## 2024-07-24 NOTE — PLAN OF CARE
Problem: Discharge Planning  Goal: Discharge to home or other facility with appropriate resources  Outcome: Progressing     Problem: Pain  Goal: Verbalizes/displays adequate comfort level or baseline comfort level  Outcome: Progressing     Problem: Safety - Adult  Goal: Free from fall injury  Outcome: Progressing     Problem: Skin/Tissue Integrity  Goal: Absence of new skin breakdown  Description: 1.  Monitor for areas of redness and/or skin breakdown  2.  Assess vascular access sites hourly  3.  Every 4-6 hours minimum:  Change oxygen saturation probe site  4.  Every 4-6 hours:  If on nasal continuous positive airway pressure, respiratory therapy assess nares and determine need for appliance change or resting period.  Outcome: Progressing     Problem: ABCDS Injury Assessment  Goal: Absence of physical injury  Outcome: Progressing     Problem: Chronic Conditions and Co-morbidities  Goal: Patient's chronic conditions and co-morbidity symptoms are monitored and maintained or improved  Outcome: Progressing     Problem: Cardiovascular - Adult  Goal: Maintains optimal cardiac output and hemodynamic stability  Outcome: Progressing  Goal: Absence of cardiac dysrhythmias or at baseline  Outcome: Progressing     Problem: Metabolic/Fluid and Electrolytes - Adult  Goal: Electrolytes maintained within normal limits  Outcome: Progressing  Goal: Hemodynamic stability and optimal renal function maintained  Outcome: Progressing  Goal: Glucose maintained within prescribed range  Outcome: Progressing

## 2024-07-24 NOTE — CARE COORDINATION
SPOKE WITH CINDY FROM [facility] REGARDING PATIENT UPDATE. DISCHARGE PLAN REMAINS [facility] ONCE TUNNELED CATH PLACED TOMORROW.

## 2024-07-24 NOTE — FLOWSHEET NOTE
07/24/24 1845   Treatment   Time On 1442   Time Off 1812   Treatment Goal 2600   Observations & Evaluations   Level of Consciousness 0   Respiratory Quality/Effort Unlabored   O2 Device None (Room air)   Bilateral Breath Sounds Diminished   Skin Condition/Temp Swollen/edematous   Edema Generalized   RLE Edema +1   LLE Edema +1   Vital Signs   BP (!) 150/70   Temp 98.3 °F (36.8 °C)   Pulse 58   Respirations 16   Vascath Right Neck   Placement Date/Time: 07/19/24 1320   Present on Admission/Arrival: No  Inserted by: Dr. Osborne -[facility] (LOT# [id number],  exp 03/34/2027  Insertion Practices: Chlorohexadine skin antisepsis  Orien...   Continued need for line? Yes   Site Assessment Clean, dry & intact   Venous Lumen Status Heparin locked   Arterial Lumen Status Heparin locked   Dressing Type Antimicrobial   Date of Last Dressing Change 07/22/24   Dressing Status Clean, dry & intact   Dressing Change Due 07/29/24   Post-Hemodialysis Assessment   Post-Treatment Procedures Blood returned;Catheter capped, clamped and heparinized x 2 ports   Machine Disinfection Process Acid/Vinegar Clean;Heat Disinfect   Rinseback Volume (ml) 200 ml   Blood Volume Processed (Liters) 57 L   Dialyzer Clearance Heavily streaked   Duration of Treatment (minutes) 210 minutes   Hemodialysis Intake (ml) 400 ml   Hemodialysis Output (ml) 3000 ml   NET Removed (ml) 2600   Tolerated Treatment Good   Patient Response to Treatment Well tolerated, back to floor report given

## 2024-07-24 NOTE — CARE COORDINATION
[facility] Pre-Admission Screening Document ADDENDUM-See also PAS completed on 7/23/24 and PAS completed on 7/20/24      Hospital Course:   78 y.o. male patient with recent complicated medical history who presented to [facility] ER 7/15 after experiencing a syncopal episode while on the toilet. Patient with recent initiation of Entresto and affected blood pressure. Additionally, patient with increased lower extremity edema. Patient admitted for additional medical management. Cardiology and Oncology consulted. Course c/b SILVINA requiring new initiation of HD.  Medical status/changes: Transfer to ARU held d/t awaiting medical stability     Endo:  ASSESSMENT:  Uncontrolled diabetes due to use of steroids here, recent worsening renal failure, started on dialysis, CNS lymphoma, history of cerebrovascular accident, brain bleed, status post craniotomy, history of deep vein thrombosis.     PLAN:  Increase dose of Lantus to 25 units twice daily, Humalog 6 units with each meal.  High-dose Humalog coverage.  Adjust dose of insulin based on blood sugars in next 1-2 days.  Continue other medical management as per specialists and Nephrology.  Reviewed nursing consultant's note.     Nephrology:  Assessment:  SILVINA possible obstructive component (CT showed non obstructive caliceal stones w/ bilateral pelviectasis w/ mild bilateral hydroureter), serology all negative  Left Renal stone 1.2 cm  Hx CVA  Hx Lymphoma  Nl GFR 6/21/2024   interstitial dx ?  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  OHDx

## 2024-07-24 NOTE — CARE COORDINATION
MET WITH PATIENT'S WIFE AT BEDSIDE TO DISCUSS DISCHARGE PLAN. DISCHARGE PLAN REMAINS [facility] FOLLOWING PEND TUNNELED HD CATH.

## 2024-07-24 NOTE — PROGRESS NOTES
they will be able to tolerate 3 hours of therapy a day and benefit from it at an acute level. I again discussed acute rehab with the patient and verify that the patient is able and willing to participate in 3 hours of therapy a day.  Rehab and Acute Care Case Management has also reinforced this expectation.    Will continue to follow to attempt to get patient to the most efficient but most effective level of care will be in their best interest.  Continue to focus on energy conservation heart rate and blood pressure monitoring before during and after therapy endurance and consistency of function.      Bowel constipation and Bladder dysfunction severe urinary retention with Contreras catheter since March 2020  , neurogenic bladder:  frequent toileting, ambulate to bathroom with assistance, check post void residuals.  Check for C.difficile x1 if >2 loose stools in 24 hours, continue bowel & bladder program.  Monitor for UTI symptoms including lethargy and confusion    Severe back pain and generalized OA pain: reassess pain every shift and prior to and after each therapy session, give prn Tylenol consider half dose Oxy IR and consider scheduled Tylenol, modalities prn in therapy, consider Lidoderm, K-pad prn.  I advise palliative care consult    Skin healing breakdown   risk:  continue pressure relief program.  Daily skin exams and reports from nursing.    Severe fatigue due to immobility and nutritional deficits: monitoring for dysphagia  Add vitamin B12 vitamin D and CoQ10 titrate dosing and add protein supplementation with low carb content.    Complex discharge planning:   Discussed with care team-last 24 hour events noted.  I will continue to follow along and reassess functional and medical status as we strive to improve patient's functional and medical outcomes progressing to the most efficient and lowest level of care.       Complex Active General Medical Issues that complicate care:     1. Principal Problem:

## 2024-07-24 NOTE — PROGRESS NOTES
Endocrinology Progress Note      Assessment and Plan:   Assessment-  Type 2 diabetes  ESRD  CHF  SILVINA    Plan-  Continue Lantus 10 units twice daily  Low-dose Humalog sliding scale coverage  Monitor glycemic control closely  Will likely send patient home on insulin  Discontinue metformin upon discharge      POC Glucose:   Recent Labs     07/23/24  1538 07/23/24  1945 07/24/24  0621 07/24/24  0750 07/24/24  1149   POCGLU 187* 258* 134* 130* 129*     HGBA1C:  Lab Results   Component Value Date    LABA1C 4.5 07/16/2024    LABA1C 5.9 09/22/2022    LABA1C 6.0 (H) 01/12/2022     CBC:   Recent Labs     07/23/24  0456 07/24/24  0528   WBC 4.4* 5.4   HGB 10.0* 9.3*    305     CMP:    Lab Results   Component Value Date     07/24/2024    K 4.2 07/24/2024    CL 99 07/24/2024    CO2 20 07/24/2024    BUN 84 (HH) 07/24/2024    CREATININE 4.84 (H) 07/24/2024    GLUCOSE 134 (H) 07/24/2024    CALCIUM 7.5 (L) 07/24/2024    BILITOT <0.2 07/24/2024    ALKPHOS 40 07/24/2024    AST 8 07/24/2024    ALT 14 07/24/2024    LABGLOM 11.5 (L) 07/24/2024    GFRAA >60.0 09/22/2022    GLOB 2.1 (L) 07/24/2024       Lab Results   Component Value Date    TSH 1.300 07/15/2024    TSH 2.880 09/22/2022    TSH 3.440 06/11/2018     No results found for: \"TPOABS\"      CC:   Chief Complaint   Patient presents with    Loss of Consciousness     While on the toilet, hx brain CA, new CHF,        Subjective:   Interval History: Km is a 78-year-old diabetic male who is under good control prior to 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  He has hyperglycemia due to chemotherapy and steroid use.  He developed renal failure so following chemotherapy.  He had steroid-induced hyperglycemia during this admission.  We are monitoring his glucose daily steroids have been discontinued.  We will continue continue to make insulin dosing adjustments accordingly.    Review of systems: denies polyuria, polydipsia, ABD pain, flank pain, N/V/D, or  diaphoresis  Medications:   Scheduled Meds:   midodrine  10 mg Oral TID WC    insulin glargine  10 Units SubCUTAneous BID    insulin lispro  0-4 Units SubCUTAneous TID WC    insulin lispro  0-4 Units SubCUTAneous Nightly    famotidine  10 mg Oral Once per day on Mon Wed Fri    metoprolol tartrate  25 mg Oral BID    heparin (porcine)  5,000 Units SubCUTAneous 3 times per day    valACYclovir  500 mg Oral Daily    [Held by provider] sacubitril-valsartan  1 tablet Oral BID    tamsulosin  0.4 mg Oral Daily    sodium chloride flush  5-40 mL IntraVENous 2 times per day    [Held by provider] furosemide  20 mg IntraVENous BID     Continuous Infusions:   sodium chloride      dextrose         Objective:   Vitals: BP (!) 126/58   Pulse 88   Temp 97.2 °F (36.2 °C) (Oral)   Resp 18   Ht 1.854 m (6' 0.99\")   Wt 124.8 kg (275 lb 2.2 oz)   SpO2 100%   BMI 36.31 kg/m²    Wt Readings from Last 3 Encounters:   07/24/24 124.8 kg (275 lb 2.2 oz)   04/06/24 111.1 kg (245 lb)   03/11/24 113.4 kg (250 lb)        General appearance: appears stated age, cooperative, mild distress, and slowed mentation  Skin: Skin color, texture, turgor normal. No rashes or lesions.  Neck: thyroid normal size, non-tender,  without nodularity  Lungs: clear to auscultation bilaterally  Heart: regular rate and rhythm, S1, S2 normal, no murmur, click, rub or gallop  Abdomen: soft, non-tender. Bowel sounds normal. No masses,  no organomegaly.  Extremities: extremities normal, atraumatic, no cyanosis or edema    Patient Active Problem List:     Bladder stones     Surgical aftercare, genitourinary system     Gross hematuria     SOB (shortness of breath)     Brain bleed (HCC)     Syncope and collapse     Athscl heart disease of native coronary artery w/o ang pctrs     Compression of brain (HCC)     Cardiomyopathy (HCC)     Cerebrovascular accident (CVA) (HCC)     Coronary artery disease involving native coronary artery of native heart without angina pectoris

## 2024-07-24 NOTE — FLOWSHEET NOTE
0845 spoke with Abby KIRBY regarding temp dialysis catheter being converted to a tunneled catheter.   Dr Osborne is on vacation this week, IR unable to assist with this procedure.

## 2024-07-24 NOTE — PROGRESS NOTES
Patient was fed by wife despite n.p.o. order.    I cannot do catheter today with MAC anesthesia per anesthesia service    Patient can eat today.  Will make n.p.o. after midnight.  Plan for tunneled dialysis catheter placement tomorrow.

## 2024-07-24 NOTE — PROGRESS NOTES
Dr Trinidad and Dr Monaco aware pt had crackers and peanut butter at 12:30, going to notify Dr Alejandre

## 2024-07-24 NOTE — PROGRESS NOTES
Physical Therapy Med Surg Daily Treatment Note  Facility/Department: [address] TELEMETRY  Room: [place] [address]       NAME: Km Garcia  : 1945 (78 y.o.)  MRN: [id number]  CODE STATUS: Full Code    Date of Service: 2024    Patient Diagnosis(es): Syncope and collapse [R55]  Peripheral edema [R60.0]  Renal insufficiency [N28.9]  General weakness [R53.1]  Anticoagulated [Z79.01]  Syncope, unspecified syncope type [R55]  Diarrhea, unspecified type [R19.7]  SILVINA (acute kidney injury) ([facility]) [N17.9]   Chief Complaint   Patient presents with    Loss of Consciousness     While on the toilet, hx brain CA, new CHF,      Patient Active Problem List    Diagnosis Date Noted    Syncope 2024    End stage renal disease ([facility]) 2024    HFrEF (heart failure with reduced ejection fraction) ([facility]) 2024    Atrial fibrillation ([facility]) 2024    Poorly controlled diabetes mellitus ([facility]) 2024    Late effect of brain injury ([facility]) 2024    SILVINA (acute kidney injury) ([facility]) 2024    UTI (urinary tract infection) 2024    Syncope and collapse 2024    History of traumatic brain injury 2024    Impaired mobility and activities of daily living 2024    Methotrexate renal toxicity 2024    Hypokalemia 05/10/2024    Methotrexate toxicity 05/10/2024    Primary osteoarthritis of both knees 2024    Cerebrovascular accident (CVA) ([facility]) 2024    Subdural hematoma ([facility]) 2024    Compression of brain ([facility]) 2024    Diffuse large B-cell lymphoma, unspecified site ([facility]) 2024    Dysphagia, oropharyngeal phase 2024    Obstructive and reflux uropathy, unspecified 2024    Primary pulmonary hypertension (HCC) 2024    Macular degeneration of both eyes 2024    Pancreatic cyst 2024    Primary CNS lymphoma (HCC) 2024    Cardiomyopathy ([facility]) 2024    Coronary artery disease involving native coronary artery of native heart without angina

## 2024-07-25 ENCOUNTER — ANESTHESIA EVENT (OUTPATIENT)
Dept: OPERATING ROOM | Age: 79
End: 2024-07-25
Payer: MEDICARE

## 2024-07-25 ENCOUNTER — APPOINTMENT (OUTPATIENT)
Dept: GENERAL RADIOLOGY | Age: 79
DRG: 698 | End: 2024-07-25
Payer: MEDICARE

## 2024-07-25 ENCOUNTER — ANESTHESIA (OUTPATIENT)
Dept: OPERATING ROOM | Age: 79
End: 2024-07-25
Payer: MEDICARE

## 2024-07-25 ENCOUNTER — HOSPITAL ENCOUNTER (INPATIENT)
Age: 79
LOS: 8 days | Discharge: ANOTHER HOSPITAL | End: 2024-08-02
Attending: PHYSICAL MEDICINE & REHABILITATION | Admitting: PHYSICAL MEDICINE & REHABILITATION
Payer: MEDICARE

## 2024-07-25 VITALS
WEIGHT: 275.13 LBS | DIASTOLIC BLOOD PRESSURE: 53 MMHG | BODY MASS INDEX: 36.46 KG/M2 | RESPIRATION RATE: 20 BRPM | HEART RATE: 98 BPM | OXYGEN SATURATION: 99 % | SYSTOLIC BLOOD PRESSURE: 148 MMHG | HEIGHT: 73 IN | TEMPERATURE: 97.5 F

## 2024-07-25 PROBLEM — Z86.718 HISTORY OF DVT (DEEP VEIN THROMBOSIS): Status: ACTIVE | Noted: 2024-07-15

## 2024-07-25 LAB
ALBUMIN SERPL-MCNC: 2.3 G/DL (ref 3.5–4.6)
ALP SERPL-CCNC: 43 U/L (ref 35–104)
ALT SERPL-CCNC: 12 U/L (ref 0–41)
ANION GAP SERPL CALCULATED.3IONS-SCNC: 11 MEQ/L (ref 9–15)
AST SERPL-CCNC: 7 U/L (ref 0–40)
BASOPHILS # BLD: 0.1 K/UL (ref 0–0.2)
BASOPHILS NFR BLD: 1 %
BILIRUB SERPL-MCNC: <0.2 MG/DL (ref 0.2–0.7)
BUN SERPL-MCNC: 46 MG/DL (ref 8–23)
CALCIUM SERPL-MCNC: 7.4 MG/DL (ref 8.5–9.9)
CHLORIDE SERPL-SCNC: 100 MEQ/L (ref 95–107)
CO2 SERPL-SCNC: 27 MEQ/L (ref 20–31)
CREAT SERPL-MCNC: 3.05 MG/DL (ref 0.7–1.2)
DOHLE BOD BLD QL SMEAR: ABNORMAL
ECHO BSA: 2.45 M2
EOSINOPHIL # BLD: 0.8 K/UL (ref 0–0.7)
EOSINOPHIL NFR BLD: 11 %
ERYTHROCYTE [DISTWIDTH] IN BLOOD BY AUTOMATED COUNT: 13 % (ref 11.5–14.5)
GLOBULIN SER CALC-MCNC: 2.1 G/DL (ref 2.3–3.5)
GLUCOSE BLD-MCNC: 120 MG/DL (ref 70–99)
GLUCOSE BLD-MCNC: 121 MG/DL (ref 70–99)
GLUCOSE BLD-MCNC: 122 MG/DL (ref 70–99)
GLUCOSE BLD-MCNC: 132 MG/DL (ref 70–99)
GLUCOSE BLD-MCNC: 138 MG/DL (ref 70–99)
GLUCOSE SERPL-MCNC: 135 MG/DL (ref 70–99)
HCT VFR BLD AUTO: 28.1 % (ref 42–52)
HGB BLD-MCNC: 9.5 G/DL (ref 14–18)
LYMPHOCYTES # BLD: 1 K/UL (ref 1–4.8)
LYMPHOCYTES NFR BLD: 14 %
MCH RBC QN AUTO: 31.8 PG (ref 27–31.3)
MCHC RBC AUTO-ENTMCNC: 33.8 % (ref 33–37)
MCV RBC AUTO: 94 FL (ref 79–92.2)
METAMYELOCYTES NFR BLD MANUAL: 1 %
MONOCYTES # BLD: 0.4 K/UL (ref 0.2–0.8)
MONOCYTES NFR BLD: 4.8 %
MYELOCYTES NFR BLD MANUAL: 1 %
NEUTROPHILS # BLD: 5 K/UL (ref 1.4–6.5)
NEUTS BAND NFR BLD MANUAL: 2 %
NEUTS SEG NFR BLD: 66 %
PERFORMED ON: ABNORMAL
PLATELET # BLD AUTO: 294 K/UL (ref 130–400)
PLATELET BLD QL SMEAR: ADEQUATE
POIKILOCYTOSIS BLD QL SMEAR: ABNORMAL
POTASSIUM SERPL-SCNC: 3.6 MEQ/L (ref 3.4–4.9)
PROT SERPL-MCNC: 4.4 G/DL (ref 6.3–8)
RBC # BLD AUTO: 2.99 M/UL (ref 4.7–6.1)
SODIUM SERPL-SCNC: 138 MEQ/L (ref 135–144)
WBC # BLD AUTO: 7.1 K/UL (ref 4.8–10.8)

## 2024-07-25 PROCEDURE — 7100000001 HC PACU RECOVERY - ADDTL 15 MIN: Performed by: COLON & RECTAL SURGERY

## 2024-07-25 PROCEDURE — 6370000000 HC RX 637 (ALT 250 FOR IP): Performed by: STUDENT IN AN ORGANIZED HEALTH CARE EDUCATION/TRAINING PROGRAM

## 2024-07-25 PROCEDURE — 85025 COMPLETE CBC W/AUTO DIFF WBC: CPT

## 2024-07-25 PROCEDURE — A4217 STERILE WATER/SALINE, 500 ML: HCPCS | Performed by: COLON & RECTAL SURGERY

## 2024-07-25 PROCEDURE — 1180000000 HC REHAB R&B

## 2024-07-25 PROCEDURE — 37191 INS ENDOVAS VENA CAVA FILTR: CPT | Performed by: INTERNAL MEDICINE

## 2024-07-25 PROCEDURE — 2709999900 HC NON-CHARGEABLE SUPPLY: Performed by: COLON & RECTAL SURGERY

## 2024-07-25 PROCEDURE — C1750 CATH, HEMODIALYSIS,LONG-TERM: HCPCS | Performed by: COLON & RECTAL SURGERY

## 2024-07-25 PROCEDURE — 3600000014 HC SURGERY LEVEL 4 ADDTL 15MIN: Performed by: COLON & RECTAL SURGERY

## 2024-07-25 PROCEDURE — 6360000002 HC RX W HCPCS: Performed by: NURSE ANESTHETIST, CERTIFIED REGISTERED

## 2024-07-25 PROCEDURE — 06H03DZ INSERTION OF INTRALUMINAL DEVICE INTO INFERIOR VENA CAVA, PERCUTANEOUS APPROACH: ICD-10-PCS | Performed by: INTERNAL MEDICINE

## 2024-07-25 PROCEDURE — 2500000003 HC RX 250 WO HCPCS: Performed by: INTERNAL MEDICINE

## 2024-07-25 PROCEDURE — 3700000000 HC ANESTHESIA ATTENDED CARE: Performed by: COLON & RECTAL SURGERY

## 2024-07-25 PROCEDURE — 6360000002 HC RX W HCPCS: Performed by: COLON & RECTAL SURGERY

## 2024-07-25 PROCEDURE — 7100000010 HC PHASE II RECOVERY - FIRST 15 MIN: Performed by: INTERNAL MEDICINE

## 2024-07-25 PROCEDURE — C1769 GUIDE WIRE: HCPCS | Performed by: INTERNAL MEDICINE

## 2024-07-25 PROCEDURE — 2580000003 HC RX 258: Performed by: COLON & RECTAL SURGERY

## 2024-07-25 PROCEDURE — 3600000004 HC SURGERY LEVEL 4 BASE: Performed by: COLON & RECTAL SURGERY

## 2024-07-25 PROCEDURE — 51702 INSERT TEMP BLADDER CATH: CPT

## 2024-07-25 PROCEDURE — 02HV33Z INSERTION OF INFUSION DEVICE INTO SUPERIOR VENA CAVA, PERCUTANEOUS APPROACH: ICD-10-PCS | Performed by: COLON & RECTAL SURGERY

## 2024-07-25 PROCEDURE — 80053 COMPREHEN METABOLIC PANEL: CPT

## 2024-07-25 PROCEDURE — 7100000000 HC PACU RECOVERY - FIRST 15 MIN: Performed by: COLON & RECTAL SURGERY

## 2024-07-25 PROCEDURE — 6360000004 HC RX CONTRAST MEDICATION: Performed by: INTERNAL MEDICINE

## 2024-07-25 PROCEDURE — 6360000002 HC RX W HCPCS: Performed by: INTERNAL MEDICINE

## 2024-07-25 PROCEDURE — C1880 VENA CAVA FILTER: HCPCS | Performed by: INTERNAL MEDICINE

## 2024-07-25 PROCEDURE — 36561 INSERT TUNNELED CV CATH: CPT | Performed by: COLON & RECTAL SURGERY

## 2024-07-25 PROCEDURE — 6370000000 HC RX 637 (ALT 250 FOR IP): Performed by: INTERNAL MEDICINE

## 2024-07-25 PROCEDURE — 2580000003 HC RX 258: Performed by: INTERNAL MEDICINE

## 2024-07-25 PROCEDURE — 2500000003 HC RX 250 WO HCPCS: Performed by: NURSE ANESTHETIST, CERTIFIED REGISTERED

## 2024-07-25 PROCEDURE — 3700000001 HC ADD 15 MINUTES (ANESTHESIA): Performed by: COLON & RECTAL SURGERY

## 2024-07-25 PROCEDURE — 2709999900 HC NON-CHARGEABLE SUPPLY: Performed by: INTERNAL MEDICINE

## 2024-07-25 PROCEDURE — 6370000000 HC RX 637 (ALT 250 FOR IP)

## 2024-07-25 PROCEDURE — 36010 PLACE CATHETER IN VEIN: CPT | Performed by: INTERNAL MEDICINE

## 2024-07-25 PROCEDURE — 6370000000 HC RX 637 (ALT 250 FOR IP): Performed by: PHYSICIAN ASSISTANT

## 2024-07-25 PROCEDURE — 2500000003 HC RX 250 WO HCPCS: Performed by: COLON & RECTAL SURGERY

## 2024-07-25 PROCEDURE — 0JH63XZ INSERTION OF TUNNELED VASCULAR ACCESS DEVICE INTO CHEST SUBCUTANEOUS TISSUE AND FASCIA, PERCUTANEOUS APPROACH: ICD-10-PCS | Performed by: COLON & RECTAL SURGERY

## 2024-07-25 PROCEDURE — 7100000011 HC PHASE II RECOVERY - ADDTL 15 MIN: Performed by: INTERNAL MEDICINE

## 2024-07-25 PROCEDURE — 71045 X-RAY EXAM CHEST 1 VIEW: CPT

## 2024-07-25 PROCEDURE — C1894 INTRO/SHEATH, NON-LASER: HCPCS | Performed by: INTERNAL MEDICINE

## 2024-07-25 PROCEDURE — 36415 COLL VENOUS BLD VENIPUNCTURE: CPT

## 2024-07-25 DEVICE — FILTER VASC L49MM DIA30MM INTRO 7FR L65CM CATH L79CM VENA: Type: IMPLANTABLE DEVICE | Status: FUNCTIONAL

## 2024-07-25 RX ORDER — DEXTROSE MONOHYDRATE 100 MG/ML
INJECTION, SOLUTION INTRAVENOUS CONTINUOUS PRN
Status: CANCELLED | OUTPATIENT
Start: 2024-07-25

## 2024-07-25 RX ORDER — SODIUM CHLORIDE 0.9 % (FLUSH) 0.9 %
5-40 SYRINGE (ML) INJECTION EVERY 12 HOURS SCHEDULED
Status: DISCONTINUED | OUTPATIENT
Start: 2024-07-25 | End: 2024-07-25 | Stop reason: HOSPADM

## 2024-07-25 RX ORDER — BUPIVACAINE HYDROCHLORIDE AND EPINEPHRINE 5; 5 MG/ML; UG/ML
INJECTION, SOLUTION EPIDURAL; INTRACAUDAL; PERINEURAL PRN
Status: DISCONTINUED | OUTPATIENT
Start: 2024-07-25 | End: 2024-07-25 | Stop reason: ALTCHOICE

## 2024-07-25 RX ORDER — LANOLIN ALCOHOL/MO/W.PET/CERES
3 CREAM (GRAM) TOPICAL NIGHTLY PRN
Status: CANCELLED | OUTPATIENT
Start: 2024-07-25

## 2024-07-25 RX ORDER — VALACYCLOVIR HYDROCHLORIDE 1 G/1
500 TABLET, FILM COATED ORAL DAILY
Status: DISCONTINUED | OUTPATIENT
Start: 2024-07-26 | End: 2024-08-02 | Stop reason: HOSPADM

## 2024-07-25 RX ORDER — MAGNESIUM HYDROXIDE 1200 MG/15ML
LIQUID ORAL CONTINUOUS PRN
Status: COMPLETED | OUTPATIENT
Start: 2024-07-25 | End: 2024-07-25

## 2024-07-25 RX ORDER — SODIUM CHLORIDE 9 MG/ML
INJECTION, SOLUTION INTRAVENOUS PRN
Status: DISCONTINUED | OUTPATIENT
Start: 2024-07-25 | End: 2024-07-25 | Stop reason: HOSPADM

## 2024-07-25 RX ORDER — PREDNISONE 50 MG/1
50 TABLET ORAL ONCE
Status: DISCONTINUED | OUTPATIENT
Start: 2024-07-25 | End: 2024-07-25 | Stop reason: HOSPADM

## 2024-07-25 RX ORDER — DIPHENHYDRAMINE HYDROCHLORIDE 50 MG/ML
12.5 INJECTION INTRAMUSCULAR; INTRAVENOUS
Status: DISCONTINUED | OUTPATIENT
Start: 2024-07-25 | End: 2024-07-25 | Stop reason: HOSPADM

## 2024-07-25 RX ORDER — ONDANSETRON 2 MG/ML
4 INJECTION INTRAMUSCULAR; INTRAVENOUS
Status: DISCONTINUED | OUTPATIENT
Start: 2024-07-25 | End: 2024-07-25 | Stop reason: HOSPADM

## 2024-07-25 RX ORDER — PROCHLORPERAZINE EDISYLATE 5 MG/ML
5 INJECTION INTRAMUSCULAR; INTRAVENOUS
Status: DISCONTINUED | OUTPATIENT
Start: 2024-07-25 | End: 2024-07-25 | Stop reason: HOSPADM

## 2024-07-25 RX ORDER — ONDANSETRON 4 MG/1
4 TABLET, ORALLY DISINTEGRATING ORAL EVERY 8 HOURS PRN
Status: DISCONTINUED | OUTPATIENT
Start: 2024-07-25 | End: 2024-08-02 | Stop reason: HOSPADM

## 2024-07-25 RX ORDER — LOPERAMIDE HYDROCHLORIDE 2 MG/1
2 CAPSULE ORAL 4 TIMES DAILY
Status: CANCELLED | OUTPATIENT
Start: 2024-07-25

## 2024-07-25 RX ORDER — ACETAMINOPHEN 325 MG/1
650 TABLET ORAL EVERY 6 HOURS PRN
Status: CANCELLED | OUTPATIENT
Start: 2024-07-25

## 2024-07-25 RX ORDER — MAGNESIUM HYDROXIDE/ALUMINUM HYDROXICE/SIMETHICONE 120; 1200; 1200 MG/30ML; MG/30ML; MG/30ML
30 SUSPENSION ORAL EVERY 6 HOURS PRN
Status: DISCONTINUED | OUTPATIENT
Start: 2024-07-25 | End: 2024-07-26

## 2024-07-25 RX ORDER — LANOLIN ALCOHOL/MO/W.PET/CERES
3 CREAM (GRAM) TOPICAL NIGHTLY PRN
Status: DISCONTINUED | OUTPATIENT
Start: 2024-07-25 | End: 2024-08-01

## 2024-07-25 RX ORDER — DIPHENHYDRAMINE HCL 25 MG
50 TABLET ORAL ONCE
Status: DISCONTINUED | OUTPATIENT
Start: 2024-07-25 | End: 2024-07-25 | Stop reason: HOSPADM

## 2024-07-25 RX ORDER — SODIUM CHLORIDE 0.9 % (FLUSH) 0.9 %
5-40 SYRINGE (ML) INJECTION PRN
Status: DISCONTINUED | OUTPATIENT
Start: 2024-07-25 | End: 2024-07-25 | Stop reason: HOSPADM

## 2024-07-25 RX ORDER — NALOXONE HYDROCHLORIDE 0.4 MG/ML
INJECTION, SOLUTION INTRAMUSCULAR; INTRAVENOUS; SUBCUTANEOUS PRN
Status: DISCONTINUED | OUTPATIENT
Start: 2024-07-25 | End: 2024-07-25 | Stop reason: HOSPADM

## 2024-07-25 RX ORDER — FENTANYL CITRATE 0.05 MG/ML
50 INJECTION, SOLUTION INTRAMUSCULAR; INTRAVENOUS EVERY 5 MIN PRN
Status: DISCONTINUED | OUTPATIENT
Start: 2024-07-25 | End: 2024-07-25 | Stop reason: HOSPADM

## 2024-07-25 RX ORDER — ONDANSETRON 2 MG/ML
4 INJECTION INTRAMUSCULAR; INTRAVENOUS EVERY 6 HOURS PRN
Status: DISCONTINUED | OUTPATIENT
Start: 2024-07-25 | End: 2024-08-02 | Stop reason: HOSPADM

## 2024-07-25 RX ORDER — POTASSIUM CHLORIDE 20 MEQ/1
40 TABLET, EXTENDED RELEASE ORAL PRN
Status: CANCELLED | OUTPATIENT
Start: 2024-07-25

## 2024-07-25 RX ORDER — SODIUM CHLORIDE, SODIUM LACTATE, POTASSIUM CHLORIDE, CALCIUM CHLORIDE 600; 310; 30; 20 MG/100ML; MG/100ML; MG/100ML; MG/100ML
INJECTION, SOLUTION INTRAVENOUS CONTINUOUS
Status: DISCONTINUED | OUTPATIENT
Start: 2024-07-25 | End: 2024-07-25 | Stop reason: HOSPADM

## 2024-07-25 RX ORDER — INSULIN LISPRO 100 [IU]/ML
0-4 INJECTION, SOLUTION INTRAVENOUS; SUBCUTANEOUS
Status: CANCELLED | OUTPATIENT
Start: 2024-07-25

## 2024-07-25 RX ORDER — LIDOCAINE 4 G/G
1 PATCH TOPICAL DAILY PRN
Status: DISCONTINUED | OUTPATIENT
Start: 2024-07-25 | End: 2024-07-26

## 2024-07-25 RX ORDER — LOPERAMIDE HYDROCHLORIDE 2 MG/1
2 CAPSULE ORAL 4 TIMES DAILY PRN
Status: DISCONTINUED | OUTPATIENT
Start: 2024-07-25 | End: 2024-07-26

## 2024-07-25 RX ORDER — HEPARIN 100 UNIT/ML
SYRINGE INTRAVENOUS PRN
Status: DISCONTINUED | OUTPATIENT
Start: 2024-07-25 | End: 2024-07-25 | Stop reason: ALTCHOICE

## 2024-07-25 RX ORDER — ONDANSETRON 4 MG/1
4 TABLET, ORALLY DISINTEGRATING ORAL EVERY 8 HOURS PRN
Status: CANCELLED | OUTPATIENT
Start: 2024-07-25

## 2024-07-25 RX ORDER — POLYETHYLENE GLYCOL 3350 17 G/17G
17 POWDER, FOR SOLUTION ORAL DAILY PRN
Status: DISCONTINUED | OUTPATIENT
Start: 2024-07-25 | End: 2024-08-02 | Stop reason: HOSPADM

## 2024-07-25 RX ORDER — EPHEDRINE SULFATE/0.9% NACL/PF 25 MG/5 ML
SYRINGE (ML) INTRAVENOUS PRN
Status: DISCONTINUED | OUTPATIENT
Start: 2024-07-25 | End: 2024-07-25 | Stop reason: SDUPTHER

## 2024-07-25 RX ORDER — ACETAMINOPHEN 325 MG/1
650 TABLET ORAL EVERY 6 HOURS PRN
Status: DISCONTINUED | OUTPATIENT
Start: 2024-07-25 | End: 2024-08-02 | Stop reason: HOSPADM

## 2024-07-25 RX ORDER — INSULIN LISPRO 100 [IU]/ML
0-4 INJECTION, SOLUTION INTRAVENOUS; SUBCUTANEOUS NIGHTLY
Status: DISCONTINUED | OUTPATIENT
Start: 2024-07-26 | End: 2024-08-02 | Stop reason: HOSPADM

## 2024-07-25 RX ORDER — HEPARIN SODIUM 200 [USP'U]/100ML
INJECTION, SOLUTION INTRAVENOUS CONTINUOUS PRN
Status: COMPLETED | OUTPATIENT
Start: 2024-07-25 | End: 2024-07-25

## 2024-07-25 RX ORDER — ACETAMINOPHEN 325 MG/1
650 TABLET ORAL EVERY 4 HOURS PRN
Status: DISCONTINUED | OUTPATIENT
Start: 2024-07-25 | End: 2024-08-02 | Stop reason: HOSPADM

## 2024-07-25 RX ORDER — INSULIN GLARGINE 100 [IU]/ML
10 INJECTION, SOLUTION SUBCUTANEOUS 2 TIMES DAILY
Status: DISCONTINUED | OUTPATIENT
Start: 2024-07-26 | End: 2024-07-30

## 2024-07-25 RX ORDER — OXYCODONE HYDROCHLORIDE AND ACETAMINOPHEN 5; 325 MG/1; MG/1
1 TABLET ORAL EVERY 4 HOURS PRN
Status: CANCELLED | OUTPATIENT
Start: 2024-07-25

## 2024-07-25 RX ORDER — OXYCODONE HYDROCHLORIDE 5 MG/1
5 TABLET ORAL PRN
Status: DISCONTINUED | OUTPATIENT
Start: 2024-07-25 | End: 2024-07-25 | Stop reason: HOSPADM

## 2024-07-25 RX ORDER — POTASSIUM CHLORIDE 20 MEQ/1
40 TABLET, EXTENDED RELEASE ORAL PRN
Status: DISCONTINUED | OUTPATIENT
Start: 2024-07-25 | End: 2024-07-26

## 2024-07-25 RX ORDER — POTASSIUM CHLORIDE 7.45 MG/ML
10 INJECTION INTRAVENOUS PRN
Status: DISCONTINUED | OUTPATIENT
Start: 2024-07-25 | End: 2024-07-26

## 2024-07-25 RX ORDER — GLUCAGON 1 MG/ML
1 KIT INJECTION PRN
Status: DISCONTINUED | OUTPATIENT
Start: 2024-07-25 | End: 2024-08-02 | Stop reason: HOSPADM

## 2024-07-25 RX ORDER — HYDRALAZINE HYDROCHLORIDE 20 MG/ML
10 INJECTION INTRAMUSCULAR; INTRAVENOUS
Status: DISCONTINUED | OUTPATIENT
Start: 2024-07-25 | End: 2024-07-25 | Stop reason: HOSPADM

## 2024-07-25 RX ORDER — MIDODRINE HYDROCHLORIDE 10 MG/1
10 TABLET ORAL
Status: DISCONTINUED | OUTPATIENT
Start: 2024-07-26 | End: 2024-07-26

## 2024-07-25 RX ORDER — POTASSIUM CHLORIDE 7.45 MG/ML
10 INJECTION INTRAVENOUS PRN
Status: CANCELLED | OUTPATIENT
Start: 2024-07-25

## 2024-07-25 RX ORDER — CALCIUM CARBONATE 500 MG/1
500 TABLET, CHEWABLE ORAL 3 TIMES DAILY PRN
Status: DISCONTINUED | OUTPATIENT
Start: 2024-07-25 | End: 2024-08-02 | Stop reason: HOSPADM

## 2024-07-25 RX ORDER — CYCLOBENZAPRINE HCL 5 MG
5 TABLET ORAL 3 TIMES DAILY PRN
Status: DISCONTINUED | OUTPATIENT
Start: 2024-07-25 | End: 2024-08-02 | Stop reason: HOSPADM

## 2024-07-25 RX ORDER — POLYETHYLENE GLYCOL 3350 17 G/17G
17 POWDER, FOR SOLUTION ORAL DAILY PRN
Status: CANCELLED | OUTPATIENT
Start: 2024-07-25

## 2024-07-25 RX ORDER — INSULIN LISPRO 100 [IU]/ML
0-4 INJECTION, SOLUTION INTRAVENOUS; SUBCUTANEOUS NIGHTLY
Status: CANCELLED | OUTPATIENT
Start: 2024-07-25

## 2024-07-25 RX ORDER — BISACODYL 10 MG
10 SUPPOSITORY, RECTAL RECTAL DAILY PRN
Status: DISCONTINUED | OUTPATIENT
Start: 2024-07-25 | End: 2024-07-26

## 2024-07-25 RX ORDER — ONDANSETRON 2 MG/ML
4 INJECTION INTRAMUSCULAR; INTRAVENOUS EVERY 6 HOURS PRN
Status: CANCELLED | OUTPATIENT
Start: 2024-07-25

## 2024-07-25 RX ORDER — FENTANYL CITRATE 0.05 MG/ML
25 INJECTION, SOLUTION INTRAMUSCULAR; INTRAVENOUS EVERY 5 MIN PRN
Status: DISCONTINUED | OUTPATIENT
Start: 2024-07-25 | End: 2024-07-25 | Stop reason: HOSPADM

## 2024-07-25 RX ORDER — PROPOFOL 10 MG/ML
INJECTION, EMULSION INTRAVENOUS CONTINUOUS PRN
Status: DISCONTINUED | OUTPATIENT
Start: 2024-07-25 | End: 2024-07-25 | Stop reason: SDUPTHER

## 2024-07-25 RX ORDER — CALCIUM CARBONATE 500 MG/1
500 TABLET, CHEWABLE ORAL 3 TIMES DAILY PRN
Status: CANCELLED | OUTPATIENT
Start: 2024-07-25

## 2024-07-25 RX ORDER — HEPARIN SODIUM 1000 [USP'U]/ML
2000 INJECTION, SOLUTION INTRAVENOUS; SUBCUTANEOUS PRN
Status: DISCONTINUED | OUTPATIENT
Start: 2024-07-25 | End: 2024-08-02 | Stop reason: HOSPADM

## 2024-07-25 RX ORDER — OXYCODONE HYDROCHLORIDE 5 MG/1
10 TABLET ORAL PRN
Status: DISCONTINUED | OUTPATIENT
Start: 2024-07-25 | End: 2024-07-25 | Stop reason: HOSPADM

## 2024-07-25 RX ORDER — OXYCODONE HYDROCHLORIDE AND ACETAMINOPHEN 5; 325 MG/1; MG/1
2 TABLET ORAL EVERY 4 HOURS PRN
Status: CANCELLED | OUTPATIENT
Start: 2024-07-25

## 2024-07-25 RX ORDER — DEXTROSE MONOHYDRATE 100 MG/ML
INJECTION, SOLUTION INTRAVENOUS CONTINUOUS PRN
Status: DISCONTINUED | OUTPATIENT
Start: 2024-07-25 | End: 2024-08-02 | Stop reason: HOSPADM

## 2024-07-25 RX ORDER — GLUCAGON 1 MG/ML
1 KIT INJECTION PRN
Status: CANCELLED | OUTPATIENT
Start: 2024-07-25

## 2024-07-25 RX ORDER — INSULIN GLARGINE 100 [IU]/ML
10 INJECTION, SOLUTION SUBCUTANEOUS 2 TIMES DAILY
Status: CANCELLED | OUTPATIENT
Start: 2024-07-25

## 2024-07-25 RX ORDER — LIDOCAINE 4 G/G
1 PATCH TOPICAL DAILY PRN
Status: CANCELLED | OUTPATIENT
Start: 2024-07-25

## 2024-07-25 RX ORDER — LABETALOL HYDROCHLORIDE 5 MG/ML
10 INJECTION, SOLUTION INTRAVENOUS
Status: DISCONTINUED | OUTPATIENT
Start: 2024-07-25 | End: 2024-07-25 | Stop reason: HOSPADM

## 2024-07-25 RX ORDER — TAMSULOSIN HYDROCHLORIDE 0.4 MG/1
0.4 CAPSULE ORAL DAILY
Status: DISCONTINUED | OUTPATIENT
Start: 2024-07-26 | End: 2024-08-02 | Stop reason: HOSPADM

## 2024-07-25 RX ORDER — HEPARIN SODIUM 1000 [USP'U]/ML
2000 INJECTION, SOLUTION INTRAVENOUS; SUBCUTANEOUS PRN
Status: CANCELLED | OUTPATIENT
Start: 2024-07-25

## 2024-07-25 RX ORDER — INSULIN LISPRO 100 [IU]/ML
0-4 INJECTION, SOLUTION INTRAVENOUS; SUBCUTANEOUS
Status: DISCONTINUED | OUTPATIENT
Start: 2024-07-26 | End: 2024-08-02 | Stop reason: HOSPADM

## 2024-07-25 RX ORDER — SODIUM CHLORIDE 9 MG/ML
INJECTION, SOLUTION INTRAVENOUS CONTINUOUS PRN
Status: COMPLETED | OUTPATIENT
Start: 2024-07-25 | End: 2024-07-25

## 2024-07-25 RX ORDER — VALACYCLOVIR HYDROCHLORIDE 1 G/1
500 TABLET, FILM COATED ORAL DAILY
Status: CANCELLED | OUTPATIENT
Start: 2024-07-25

## 2024-07-25 RX ORDER — ENEMA 19; 7 G/133ML; G/133ML
1 ENEMA RECTAL DAILY PRN
Status: DISCONTINUED | OUTPATIENT
Start: 2024-07-25 | End: 2024-07-26

## 2024-07-25 RX ORDER — ACETAMINOPHEN 650 MG/1
650 SUPPOSITORY RECTAL EVERY 6 HOURS PRN
Status: DISCONTINUED | OUTPATIENT
Start: 2024-07-25 | End: 2024-08-02 | Stop reason: HOSPADM

## 2024-07-25 RX ORDER — LOPERAMIDE HYDROCHLORIDE 2 MG/1
2 CAPSULE ORAL 4 TIMES DAILY
Status: DISCONTINUED | OUTPATIENT
Start: 2024-07-25 | End: 2024-07-25 | Stop reason: HOSPADM

## 2024-07-25 RX ORDER — ONDANSETRON 2 MG/ML
4 INJECTION INTRAMUSCULAR; INTRAVENOUS EVERY 6 HOURS PRN
Status: DISCONTINUED | OUTPATIENT
Start: 2024-07-25 | End: 2024-07-25 | Stop reason: HOSPADM

## 2024-07-25 RX ORDER — MAGNESIUM HYDROXIDE/ALUMINUM HYDROXICE/SIMETHICONE 120; 1200; 1200 MG/30ML; MG/30ML; MG/30ML
30 SUSPENSION ORAL EVERY 6 HOURS PRN
Status: CANCELLED | OUTPATIENT
Start: 2024-07-25

## 2024-07-25 RX ORDER — ACETAMINOPHEN 650 MG/1
650 SUPPOSITORY RECTAL EVERY 6 HOURS PRN
Status: CANCELLED | OUTPATIENT
Start: 2024-07-25

## 2024-07-25 RX ORDER — TAMSULOSIN HYDROCHLORIDE 0.4 MG/1
0.4 CAPSULE ORAL DAILY
Status: CANCELLED | OUTPATIENT
Start: 2024-07-26

## 2024-07-25 RX ORDER — MIDODRINE HYDROCHLORIDE 10 MG/1
10 TABLET ORAL
Status: CANCELLED | OUTPATIENT
Start: 2024-07-25

## 2024-07-25 RX ORDER — CYCLOBENZAPRINE HCL 10 MG
5 TABLET ORAL 3 TIMES DAILY PRN
Status: CANCELLED | OUTPATIENT
Start: 2024-07-25

## 2024-07-25 RX ADMIN — LOPERAMIDE HYDROCHLORIDE 2 MG: 2 CAPSULE ORAL at 18:40

## 2024-07-25 RX ADMIN — METOPROLOL TARTRATE 25 MG: 25 TABLET, FILM COATED ORAL at 20:14

## 2024-07-25 RX ADMIN — VALACYCLOVIR 500 MG: 1 TABLET, FILM COATED ORAL at 18:40

## 2024-07-25 RX ADMIN — LOPERAMIDE HYDROCHLORIDE 2 MG: 2 CAPSULE ORAL at 20:14

## 2024-07-25 RX ADMIN — PHENYLEPHRINE HYDROCHLORIDE 200 MCG: 10 INJECTION INTRAVENOUS at 12:28

## 2024-07-25 RX ADMIN — PROPOFOL 40 MG: 10 INJECTION, EMULSION INTRAVENOUS at 12:07

## 2024-07-25 RX ADMIN — PROPOFOL 100 MCG/KG/MIN: 10 INJECTION, EMULSION INTRAVENOUS at 12:08

## 2024-07-25 RX ADMIN — INSULIN GLARGINE 10 UNITS: 100 INJECTION, SOLUTION SUBCUTANEOUS at 20:15

## 2024-07-25 RX ADMIN — PHENYLEPHRINE HYDROCHLORIDE 150 MCG: 10 INJECTION INTRAVENOUS at 12:32

## 2024-07-25 RX ADMIN — VANCOMYCIN HYDROCHLORIDE 1000 MG: 1 INJECTION, POWDER, LYOPHILIZED, FOR SOLUTION INTRAVENOUS at 13:43

## 2024-07-25 RX ADMIN — PHENYLEPHRINE HYDROCHLORIDE 100 MCG: 10 INJECTION INTRAVENOUS at 12:13

## 2024-07-25 RX ADMIN — EPHEDRINE SULFATE 5 MG: 5 INJECTION INTRAVENOUS at 12:20

## 2024-07-25 RX ADMIN — PHENYLEPHRINE HYDROCHLORIDE 200 MCG: 10 INJECTION INTRAVENOUS at 12:19

## 2024-07-25 RX ADMIN — PHENYLEPHRINE HYDROCHLORIDE 200 MCG: 10 INJECTION INTRAVENOUS at 12:16

## 2024-07-25 RX ADMIN — PROPOFOL 30 MG: 10 INJECTION, EMULSION INTRAVENOUS at 12:23

## 2024-07-25 RX ADMIN — SODIUM CHLORIDE: 9 INJECTION, SOLUTION INTRAVENOUS at 10:13

## 2024-07-25 RX ADMIN — Medication 5 ML: at 20:15

## 2024-07-25 ASSESSMENT — PAIN SCALES - GENERAL
PAINLEVEL_OUTOF10: 4
PAINLEVEL_OUTOF10: 0

## 2024-07-25 ASSESSMENT — PAIN DESCRIPTION - LOCATION: LOCATION: NECK

## 2024-07-25 NOTE — BRIEF OP NOTE
Brief Postoperative Note      Patient: Km Garcia  YOB: 1945  MRN: [id number]    Date of Procedure: 7/25/2024    Pre-Op Diagnosis Codes:     * End stage renal disease (HCC) [N18.6]    Post-Op Diagnosis: Same       Procedure(s):  Ultrasound-guided right internal jugular tunneled hemodialysis catheter.    Surgeon(s):  Vahid Hu MD    Assistant:  First Assistant: Gloria Campbell    Anesthesia: Monitor Anesthesia Care, local    Estimated Blood Loss (mL): 20    Complications: None    Specimens:   * No specimens in log *    Implants:  * No implants in log *      Drains:   Rectal Tube (Active)   Site Assessment Clean, dry & intact 07/25/24 0410   Stool Appearance Loose 07/25/24 0410   Stool Color Brown 07/25/24 0410   Stool Amount Large 07/25/24 0410       Urinary Catheter 07/15/24 (Active)   Catheter Indications Urinary retention (acute or chronic), continuous bladder irrigation or bladder outlet obstruction 07/24/24 2000   Site Assessment No urethral drainage 07/25/24 0800   Urine Color Yellow 07/25/24 1000   Urine Appearance Cloudy 07/25/24 1000   Urine Odor Malodorous 07/23/24 1600   Collection Container Standard 07/25/24 1000   Securement Method Securing device (Describe) 07/25/24 1000   Catheter Care  Soap and water 07/24/24 0039   Catheter Best Practices  Bag below bladder;Drainage tube clipped to bed 07/23/24 1600   Status Draining;Patent 07/23/24 1600   Output (mL) 425 mL 07/25/24 1000       [REMOVED] Rectal Tube (Removed)       [REMOVED] Fecal Management System 07/15/24 (Removed)   Stool Appearance Watery 07/16/24 0932   Stool Color Brown 07/16/24 0932   Position of Indicator Line Visible 07/16/24 0255   Skin Assessment of the Anal Area Unremarkable 07/16/24 0255   Tube Irrigated Yes 07/16/24 0255   Stool Amount Small 07/16/24 0255   Fecal Management Tube Output 50 ml 07/16/24 0554       Findings:  Infection Present At Time Of Surgery (PATOS) (choose all levels that have infection

## 2024-07-25 NOTE — PROGRESS NOTES
Arrived to pre/post cath from [address], alert and oriented. Vital signs obtained. Prepped for procedure. Obtained consent for procedure from patient's wife.

## 2024-07-25 NOTE — PROGRESS NOTES
Physical Therapy Missed Treatment   Facility/Department: [facility] MED SURG MLOZ OR Pool/NONE    NAME: Km Garcia    : 1945 (78 y.o.)  MRN: [id number]    Account: [id number]  Gender: male    Chart reviewed, attempted PT at 1103. Patient unavailable 2° to:    [] Hold per nsg request    [] Pt declined    [] Nsg notified   [] Other notified    [x] Pt.. off floor for test/procedure.     [] Pt. Unavailable       Will attempt PT treatment again at earliest convenience.      Electronically signed by Grant Barbour PTA on 24 at 11:03 AM EDT

## 2024-07-25 NOTE — DISCHARGE INSTR - DIET
Good nutrition is important when healing from an illness, injury, or surgery.  Follow any nutrition recommendations given to you during your hospital stay.   If you were given an oral nutrition supplement while in the hospital, continue to take this supplement at home.  You can take it with meals, in-between meals, and/or before bedtime. These supplements can be purchased at most local grocery stores, pharmacies, and chain ooma-stores.   If you have any questions about your diet or nutrition, call the hospital and ask for the dietitian.    Low sodium and cholesterol diet

## 2024-07-25 NOTE — DISCHARGE SUMMARY
CONSULT TO ENDOCRINOLOGY  IP CONSULT TO GENERAL SURGERY  IP CONSULT TO UROLOGY  IP CONSULT TO NEPHROLOGY    Significant Diagnostic Studies:    Refer to chart     Please refer to chart if no studies are shown here    Echo (TTE) complete (PRN contrast/bubble/strain/3D)    Result Date: 7/16/2024    Left Ventricle: Low normal left ventricular systolic function with a visually estimated EF of 45 - 50%. Left ventricle size is normal. Mildly increased wall thickness. Normal wall motion.   Right Ventricle: Normal systolic function.   Tricuspid Valve: Mild regurgitation. Normal RVSP. The estimated RVSP is 25 mmHg.   Pericardium: No pericardial effusion.   Image quality is suboptimal.     CT Head W/O Contrast    Result Date: 7/15/2024  EXAMINATION: CT OF THE HEAD WITHOUT CONTRAST  7/15/2024 8:18 pm TECHNIQUE: CT of the head was performed without the administration of intravenous contrast. Automated exposure control, iterative reconstruction, and/or weight based adjustment of the mA/kV was utilized to reduce the radiation dose to as low as reasonably achievable. COMPARISON: None. HISTORY: ORDERING SYSTEM PROVIDED HISTORY: syncope TECHNOLOGIST PROVIDED HISTORY: Reason for exam:->syncope Has a \"code stroke\" or \"stroke alert\" been called?->No Decision Support Exception - unselect if not a suspected or confirmed emergency medical condition->Emergency Medical Condition (MA) What reading provider will be dictating this exam?->CRC FINDINGS: BRAIN/VENTRICLES: There is no acute intracranial hemorrhage, mass effect or midline shift.  No abnormal extra-axial fluid collection.  The gray-white differentiation is maintained without evidence of an acute infarct.  There is no evidence of hydrocephalus. ORBITS: The visualized portion of the orbits demonstrate no acute abnormality. SINUSES: The visualized paranasal sinuses and mastoid air cells demonstrate no acute abnormality. SOFT TISSUES/SKULL:  No acute abnormality of the visualized skull  for you. Read the directions carefully, and ask your doctor or other care provider to review them with you.                  Disposition:   If discharged to Home, Any [facility] needs that were indicated and/or required as been addressed and set up by Social Work.     Condition at discharge: good     Activity: activity as tolerated    Total time taken for discharging this patient: 40 minutes. Greater than 70% of time was spent focused exclusively on this patient. Time was taken to review chart, discuss plans with consultants, reconciling medications, discussing plan answering questions with patient.     Signed:  Davide Crum MD  7/25/2024, 4:39 PM  ----------------------------------------------------------------------------------------------------------------------    Km Garcia

## 2024-07-25 NOTE — BRIEF OP NOTE
Section of Cardiology  Adult Brief Procedure Note        Procedure(s):  IVC filter placement.     Pre-operative Diagnosis:  hx of DVT with CI to OAC    H&P Status: Completed and reviewed.     Post-operative Diagnosis:      IVC venogram: normal. No thrombus or duplication of IVC    Findings:  See full report    Complications:  none    Primary Proceduralist:   Dr.Wes Magallon DO      Full procedure note to follow

## 2024-07-25 NOTE — ANESTHESIA POSTPROCEDURE EVALUATION
Department of Anesthesiology  Postprocedure Note    Patient: Km Garcia  MRN: [id number]  YOB: 1945  Date of evaluation: 7/25/2024    Procedure Summary       Date: 07/25/24 Room / Location: [address]    Anesthesia Start: 1159 Anesthesia Stop: 1253    Procedure: Ultrasound-guided right internal jugular tunneled hemodialysis catheter. (Right: Chest) Diagnosis:       End stage renal disease (HCC)      (End stage renal disease (HCC) [N18.6])    Surgeons: Vahid Hu MD Responsible Provider: Chase Trinidad MD    Anesthesia Type: MAC ASA Status: 4            Anesthesia Type: MAC    Micaela Phase I:      Micaela Phase II:      Anesthesia Post Evaluation    Patient location during evaluation: bedside  Patient participation: complete - patient participated  Level of consciousness: awake and awake and alert  Pain score: 0  Airway patency: patent  Nausea & Vomiting: no nausea and no vomiting  Cardiovascular status: blood pressure returned to baseline and hemodynamically stable  Respiratory status: acceptable and face mask  Hydration status: euvolemic  Pain management: adequate        No notable events documented.

## 2024-07-25 NOTE — PROGRESS NOTES
Spoke with Dr Trinidad, pt did not receive metoprolol this morning but received his dose last evening, aware of today's vs, ok per Dr Trinidad not to give the metoprolol prior to procedure

## 2024-07-25 NOTE — PROGRESS NOTES
Brief Renal Note:   Attempted to see patient however currently down getting tunneled dialysis catheter   Continue dialysis for now as though making urine has interdialytic solute rise   Ok to discharge to [facility] rehab from renal standpoint once medically cleared     Hugh Robert MD  Nephrology

## 2024-07-25 NOTE — ANESTHESIA PRE PROCEDURE
Department of Anesthesiology  Preprocedure Note       Name:  Km Garcia   Age:  78 y.o.  :  1945                                          MRN:  [id number]         Date:  2024      Surgeon: Surgeon(s):  Vahid Hu MD    Procedure: Procedure(s):  Ultrasound-guided right internal jugular tunneled hemodialysis catheter. C-ARM  room 195    Medications prior to admission:   Prior to Admission medications    Medication Sig Start Date End Date Taking? Authorizing Provider   acetaminophen (TYLENOL) 500 MG tablet Take 1 tablet by mouth as needed for Pain   Yes ProviderWeston MD   albuterol (PROVENTIL) (2.5 MG/3ML) 0.083% nebulizer solution Take 3 mLs by nebulization as needed for Wheezing   Yes ProviderWeston MD   carvedilol (COREG) 25 MG tablet Take 1 tablet by mouth 2 times daily (with meals)   Yes ProviderWeston MD   enoxaparin (LOVENOX) 120 MG/0.8ML injection Inject into the skin 2 times daily   Yes ProviderWeston MD   furosemide (LASIX) 20 MG tablet Take 1 tablet by mouth daily 24  Yes ProviderWeston MD   hydrALAZINE (APRESOLINE) 25 MG tablet Take 1 tablet by mouth as needed (prn if SBP >120)   Yes ProviderWeston MD   zinc oxide (TRIAD HYDROPHILIC) PSTE paste Apply topically daily   Yes Provider, MD Weston   ibrutinib (IMBRUVICA) 280 MG tablet Take 2 tablets by mouth daily   Yes ProviderWeston MD   empagliflozin (JARDIANCE) 25 MG tablet Take 1 tablet by mouth daily   Yes ProviderWeston MD   lenalidomide (REVLIMID) 10 MG chemo capsule Take 1 capsule by mouth daily   Yes ProviderWeston MD   ondansetron (ZOFRAN-ODT) 4 MG disintegrating tablet Take 1 tablet by mouth every 12 hours as needed for Nausea or Vomiting   Yes ProviderWeston MD   sacubitril-valsartan (ENTRESTO) 49-51 MG per tablet Take 1 tablet by mouth 2 times daily   Yes Weston Briseno MD   Dyclonine HCl (SUCRETS SORE THROAT) 2 MG LOZG Take 1 tablet by

## 2024-07-25 NOTE — CARE COORDINATION
Spoke with CM on 1W- advised rehab is ready for patient when patient is medically cleared. Room 241 assigned if admitting today. Patient going for HD cath and IVC filter placement.  Electronically signed by Cesia Lee on 7/25/2024 at 11:39 AM

## 2024-07-25 NOTE — FLOWSHEET NOTE
1415- Patient returned back to floor from special procedures.. Vitals signs stable /65 , HR 73 . Temp 97.9F , SpO2 100 Room air. Dialysis cath dressing is clean, dry and intact. No bleeding from site.     1435- Patient taken down for IVC filter     1755- Received report from Cath nurse. Was told patient had IVC filter placed, R femoral approach.     1805- Assessed patients R groin puncture site. No hematoma or bleeding from site. Femoral pulse present along with +2 pedal pulses.     Electronically signed by Priscilla Shah RN on 7/25/2024 at 6:34 PM

## 2024-07-25 NOTE — OP NOTE
[facility]                   [address] [place] [address]                            OPERATIVE REPORT      PATIENT NAME: VLADIMIR LYNN               : 1945  MED REC NO: [id number]                        ROOM: 95  ACCOUNT NO: [id number]                       ADMIT DATE: 07/15/2024  PROVIDER: Vahid Hu MD      DATE OF PROCEDURE:  2024    SURGEON:  Vahid Hu MD    ASSISTANT:  Ms. Campbell.    PREOPERATIVE DIAGNOSIS:  End-stage renal disease.    POSTOPERATIVE DIAGNOSIS:  End-stage renal disease.    PROCEDURES PERFORMED:    1. Removal of temporary dialysis catheter.  2. Ultrasound-guided right internal jugular tunneled dialysis catheter placement.    SEDATION:    1. MAC.  2. Local.    ESTIMATED BLOOD LOSS:  20.    SPECIMEN:  None.    COMPLICATIONS:  None.    INDICATIONS:  This is a 78-year-old male, requiring a tunneled dialysis catheter for end-stage renal disease.  He has a temporary catheter placed.  Risks and benefits were explained.  Risks of infection, bleeding, collapsed lung, and catheter complications discussed.  Despite the risks, the couple understood the benefits and wished to proceed.  Consent obtained.    DESCRIPTION OF PROCEDURE:  He was taken to the operating room, placed in the supine position.  MAC anesthesia was administered.  His right neck and chest were prepped and draped with a ChloraPrep-containing solution.  A temporary dialysis catheter was removed prior to prepping and pressure was held for 5 minutes without bleeding.    An ultrasound was used to identify the internal jugular vein.  0.25% Marcaine was injected for local analgesia at all sites.    A Seldinger needle was inserted and the guidewire was advanced and confirmed on fluoroscopy.  A 28 cm dialysis catheter was tunneled from the anterior chest wall to the exit site of the guidewire.  A dilator followed by dilator sheath were inserted over the wire.  The dilator

## 2024-07-25 NOTE — DISCHARGE INSTR - COC
Continuity of Care Form    Patient Name: Km Garcia   :  1945  MRN:  [id number]    Admit date:  7/15/2024  Discharge date:  ***    Code Status Order: Full Code   Advance Directives:     Admitting Physician:  Vishal Fox MD  PCP: Dawood Darby MD    Discharging Nurse: ***  Discharging Hospital Unit/Room#: W195/W195-01  Discharging Unit Phone Number: ***    Emergency Contact:   Extended Emergency Contact Information  Primary Emergency Contact: Coby Hummel  Address: [address] [place], [place] [address] [facility]  Home Phone: [phone]  Work Phone: [phone]  Mobile Phone: [phone]  Relation: Spouse    Past Surgical History:  Past Surgical History:   Procedure Laterality Date    COLONOSCOPY      > 30 yrs ago    IR NONTUNNELED VASCULAR CATHETER Right 2024    Medcomp 11.5F x 15 cm Raulerson Duo-Flow IJ double lumen catheter (LOT# [id number],  exp ) performed by Dr. Osborne    IR NONTUNNELED VASCULAR CATHETER  2024    IR NONTUNNELED VASCULAR CATHETER 2024 [facility] SPECIAL PROCEDURE    LITHOTRIPSY N/A 2018    WITH HOLMIUM LASER performed by Antonio Petit MD at [facility] OR       Immunization History:   Immunization History   Administered Date(s) Administered    COVID-19, PFIZER Bivalent, DO NOT Dilute, (age 12y+), IM, 30 mcg/0.3 mL 2022    COVID-19, PFIZER GRAY top, DO NOT Dilute, (age 12 y+), IM, 30 mcg/0.3 mL 2022    COVID-19, PFIZER PURPLE top, DILUTE for use, (age 12 y+), 30mcg/0.3mL 2021, 2021, 10/05/2021       Active Problems:  Patient Active Problem List   Diagnosis Code    Bladder stones N21.0    Surgical aftercare, genitourinary system Z48.816    Gross hematuria R31.0    SOB (shortness of breath) R06.02    Brain bleed (HCC) I61.9    Syncope and collapse R55    Athscl heart disease of native coronary artery w/o ang pctrs I25.10    Compression of brain (HCC) G93.5    Cardiomyopathy (HCC) I42.9    Cerebrovascular accident

## 2024-07-25 NOTE — CARE COORDINATION
[facility]. MESSAGE LEFT. WILL FOLLOW.

## 2024-07-26 LAB
GLUCOSE BLD-MCNC: 109 MG/DL (ref 70–99)
GLUCOSE BLD-MCNC: 131 MG/DL (ref 70–99)
GLUCOSE BLD-MCNC: 175 MG/DL (ref 70–99)
GLUCOSE BLD-MCNC: 178 MG/DL (ref 70–99)
PERFORMED ON: ABNORMAL

## 2024-07-26 PROCEDURE — 6370000000 HC RX 637 (ALT 250 FOR IP): Performed by: INTERNAL MEDICINE

## 2024-07-26 PROCEDURE — 6370000000 HC RX 637 (ALT 250 FOR IP): Performed by: PHYSICAL MEDICINE & REHABILITATION

## 2024-07-26 PROCEDURE — 6370000000 HC RX 637 (ALT 250 FOR IP): Performed by: STUDENT IN AN ORGANIZED HEALTH CARE EDUCATION/TRAINING PROGRAM

## 2024-07-26 PROCEDURE — 5A1D70Z PERFORMANCE OF URINARY FILTRATION, INTERMITTENT, LESS THAN 6 HOURS PER DAY: ICD-10-PCS | Performed by: PHYSICAL MEDICINE & REHABILITATION

## 2024-07-26 PROCEDURE — 99231 SBSQ HOSP IP/OBS SF/LOW 25: CPT | Performed by: INTERNAL MEDICINE

## 2024-07-26 PROCEDURE — 97535 SELF CARE MNGMENT TRAINING: CPT

## 2024-07-26 PROCEDURE — 1180000000 HC REHAB R&B

## 2024-07-26 PROCEDURE — 8010000000 HC HEMODIALYSIS ACUTE INPT

## 2024-07-26 PROCEDURE — 6360000002 HC RX W HCPCS: Performed by: STUDENT IN AN ORGANIZED HEALTH CARE EDUCATION/TRAINING PROGRAM

## 2024-07-26 PROCEDURE — 97167 OT EVAL HIGH COMPLEX 60 MIN: CPT

## 2024-07-26 PROCEDURE — 97162 PT EVAL MOD COMPLEX 30 MIN: CPT

## 2024-07-26 PROCEDURE — 99231 SBSQ HOSP IP/OBS SF/LOW 25: CPT | Performed by: PHYSICIAN ASSISTANT

## 2024-07-26 PROCEDURE — 87507 IADNA-DNA/RNA PROBE TQ 12-25: CPT

## 2024-07-26 RX ORDER — MIDODRINE HYDROCHLORIDE 5 MG/1
5 TABLET ORAL
Status: DISCONTINUED | OUTPATIENT
Start: 2024-07-26 | End: 2024-08-01

## 2024-07-26 RX ORDER — DIPHENOXYLATE HYDROCHLORIDE AND ATROPINE SULFATE 2.5; .025 MG/1; MG/1
1 TABLET ORAL 3 TIMES DAILY
Status: DISPENSED | OUTPATIENT
Start: 2024-07-26 | End: 2024-07-27

## 2024-07-26 RX ORDER — SODIUM CHLORIDE 9 MG/ML
INJECTION, SOLUTION INTRAVENOUS
Status: DISCONTINUED
Start: 2024-07-26 | End: 2024-07-26

## 2024-07-26 RX ADMIN — ACETAMINOPHEN 650 MG: 325 TABLET ORAL at 08:31

## 2024-07-26 RX ADMIN — DIPHENOXYLATE HYDROCHLORIDE AND ATROPINE SULFATE 1 TABLET: 2.5; .025 TABLET ORAL at 19:43

## 2024-07-26 RX ADMIN — LOPERAMIDE HYDROCHLORIDE 2 MG: 2 CAPSULE ORAL at 11:01

## 2024-07-26 RX ADMIN — ALTEPLASE 2 MG: 2.2 INJECTION, POWDER, LYOPHILIZED, FOR SOLUTION INTRAVENOUS at 17:16

## 2024-07-26 RX ADMIN — METOPROLOL TARTRATE 25 MG: 25 TABLET, FILM COATED ORAL at 08:31

## 2024-07-26 RX ADMIN — METOPROLOL TARTRATE 25 MG: 25 TABLET, FILM COATED ORAL at 21:14

## 2024-07-26 RX ADMIN — VALACYCLOVIR 500 MG: 1 TABLET, FILM COATED ORAL at 18:12

## 2024-07-26 RX ADMIN — ALTEPLASE 2 MG: 2.2 INJECTION, POWDER, LYOPHILIZED, FOR SOLUTION INTRAVENOUS at 17:10

## 2024-07-26 RX ADMIN — TAMSULOSIN HYDROCHLORIDE 0.4 MG: 0.4 CAPSULE ORAL at 08:31

## 2024-07-26 RX ADMIN — INSULIN GLARGINE 10 UNITS: 100 INJECTION, SOLUTION SUBCUTANEOUS at 21:58

## 2024-07-26 ASSESSMENT — PAIN DESCRIPTION - LOCATION: LOCATION: GENERALIZED

## 2024-07-26 ASSESSMENT — PAIN SCALES - GENERAL
PAINLEVEL_OUTOF10: 3
PAINLEVEL_OUTOF10: 0

## 2024-07-26 NOTE — PROGRESS NOTES
Nephrology Progress Note    Assessment:  SILVINA possible obstructive component (CT showed non obstructive caliceal stones w/ bilateral pelviectasis w/ mild bilateral hydroureter), serology all negative, non-oliguric, now s/p tunneled dialysis catheter placement (7/25)  Left Renal stone 1.2 cm  Hx CVA  Hx Lymphoma  Nl GFR 6/21/2024   interstitial dx ?  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%  7/16/2024  Hematoma spleen 7 cm      Plan:  - continue IHD MWF   - monitor labs and UOP for improvement        Patient Active Problem List:     Bladder stones     Surgical aftercare, genitourinary system     Gross hematuria     SOB (shortness of breath)     Brain bleed (HCC)     Syncope and collapse     Athscl heart disease of native coronary artery w/o ang pctrs     Compression of brain (HCC)     Cardiomyopathy (HCC)     Cerebrovascular accident (CVA) (HCC)     Coronary artery disease involving native coronary artery of native heart without angina pectoris     Diffuse large B-cell lymphoma, unspecified site (HCC)     Dysphagia, oropharyngeal phase     History of falling     History of traumatic brain injury     Hypokalemia     Impaired mobility and activities of daily living     Intraoperative floppy iris syndrome (IFIS)     Macular degeneration of both eyes     Mass of brain     Subdural hematoma (HCC)     Methotrexate renal toxicity     Methotrexate toxicity     Obstructive and reflux uropathy, unspecified     Pancreatic cyst     PAT (paroxysmal atrial tachycardia) (HCC)     Primary hypertension     Primary osteoarthritis of both knees     Primary CNS lymphoma (HCC)     Primary pulmonary hypertension (HCC)     Urinary retention     Late effect of brain injury (HCC)     SILVINA (acute kidney injury) (HCC)     UTI (urinary tract infection)     Syncope     HFrEF (heart failure with reduced ejection fraction) (HCC)     Atrial fibrillation (HCC)     Poorly controlled diabetes mellitus (HCC)     End stage renal disease (HCC)     Type 2 diabetes mellitus with  chronic kidney disease on chronic dialysis, with long-term current use of insulin (HCC)     History of DVT (deep vein thrombosis)      Subjective:  Admit Date: 7/25/2024    Interval History: pt was being followed for dialysis dependent ATN, now transferred to rehab, did get tunneled catheter placed yesterday and then IVC filter      Medications:  Scheduled Meds:   midodrine  5 mg Oral TID WC    diphenoxylate-atropine  1 tablet Oral TID    insulin glargine  10 Units SubCUTAneous BID    insulin lispro  0-4 Units SubCUTAneous TID WC    insulin lispro  0-4 Units SubCUTAneous Nightly    metoprolol tartrate  25 mg Oral BID    tamsulosin  0.4 mg Oral Daily    valACYclovir  500 mg Oral Daily     Continuous Infusions:   dextrose         CBC:   Recent Labs     07/24/24  0528 07/25/24  0608   WBC 5.4 7.1   HGB 9.3* 9.5*    294     CMP:    Recent Labs     07/24/24  0528 07/25/24  0608    138   K 4.2 3.6   CL 99 100   CO2 20 27   BUN 84* 46*   CREATININE 4.84* 3.05*   GLUCOSE 134* 135*   CALCIUM 7.5* 7.4*   LABGLOM 11.5* 20.0*     Troponin: No results for input(s): \"TROPONINI\" in the last 72 hours.  BNP: No results for input(s): \"BNP\" in the last 72 hours.  INR: No results for input(s): \"INR\" in the last 72 hours.  Lipids: No results for input(s): \"CHOL\", \"LDLDIRECT\", \"TRIG\", \"HDL\", \"AMYLASE\", \"LIPASE\" in the last 72 hours.  Liver:   Recent Labs     07/25/24  0608   AST 7   ALT 12   ALKPHOS 43   BILITOT <0.2     Iron:  No results for input(s): \"FERRITIN\" in the last 72 hours.    Invalid input(s): \"IRONS\", \"LABIRONS\"  Urinalysis: No results for input(s): \"UA\" in the last 72 hours.    Objective:  Vitals: BP (!) 149/81   Pulse 59   Temp 98.4 °F (36.9 °C) (Oral)   Resp 18   Ht 1.854 m (6' 1\")   Wt 121.2 kg (267 lb 3.2 oz)   SpO2 97%   BMI 35.25 kg/m²    Wt Readings from Last 3 Encounters:   07/25/24 121.2 kg (267 lb 3.2 oz)   07/24/24 124.8 kg (275 lb 2.2 oz)   04/06/24 111.1 kg (245 lb)      24HR INTAKE/OUTPUT:

## 2024-07-26 NOTE — PROGRESS NOTES
[place] [facility]  Rehabilitation Initial Assessment: Occupational Therapy  Room: R251R251-01    NAME: Km Garcia  : 1945  MRN: [id number]    Date of Service: 2024    Rehab Diagnosis(es): Impaired mobility and ADL's due to Impaired mobility and ADLs due to Polyneuropathy  Patient Active Problem List    Diagnosis Date Noted    Syncope 2024    Type 2 diabetes mellitus with chronic kidney disease on chronic dialysis, with long-term current use of insulin ([facility]) 2024    End stage renal disease ([facility]) 2024    HFrEF (heart failure with reduced ejection fraction) ([facility]) 2024    Atrial fibrillation ([facility]) 2024    Poorly controlled diabetes mellitus ([facility]) 2024    Late effect of brain injury ([facility]) 2024    SILVINA (acute kidney injury) ([facility]) 2024    UTI (urinary tract infection) 2024    Syncope and collapse 2024    History of DVT (deep vein thrombosis) 07/15/2024    History of traumatic brain injury 2024    Impaired mobility and activities of daily living 2024    Methotrexate renal toxicity 2024    Hypokalemia 05/10/2024    Methotrexate toxicity 05/10/2024    Primary osteoarthritis of both knees 2024    Cerebrovascular accident (CVA) ([facility]) 2024    Subdural hematoma ([facility]) 2024    Compression of brain ([facility]) 2024    Diffuse large B-cell lymphoma, unspecified site ([facility]) 2024    Dysphagia, oropharyngeal phase 2024    Obstructive and reflux uropathy, unspecified 2024    Primary pulmonary hypertension ([facility]) 2024    Macular degeneration of both eyes 2024    Pancreatic cyst 2024    Primary CNS lymphoma ([facility]) 2024    Cardiomyopathy ([facility]) 2024    Coronary artery disease involving native coronary artery of native heart without angina pectoris 2024    PAT (paroxysmal atrial tachycardia) ([facility]) 2024    Mass of brain 2024    Brain bleed ([facility]) 2024

## 2024-07-26 NOTE — CARE COORDINATION
Case Management Initial Assessment        NAME:  Km Garcia  ROOM: R251/R251-01  :  1945  DATE: 2024        Social Functional:  Social/Functional History  Lives With: Spouse  Type of Home: House  Home Layout: Two level;Able to Live on Main level with bedroom/bathroom (Patient goes to the basement at times to get tools--a flight straight down w/ one HR)  Home Access: Stairs to enter with rails  Entrance Stairs - Number of Steps: 2-3  Entrance Stairs - Rails: Left  Bathroom Shower/Tub: Walk-in shower;Doors  Bathroom Toilet: Handicap height  Bathroom Equipment: Shower chair;Grab bars in shower;Hand-held shower  Bathroom Accessibility: Walker accessible  Home Equipment: Crutches;Walker - Rolling  ADL Assistance:  (Patient reported that wife would assist with threading ca bag through his pants. She also assists patient to get in and out of bed and to complete toileting. Wife empties the ca catheter)  Homemaking Assistance:  (share with wife- \"wife does most\")  Meal Prep Responsibility: Secondary  Laundry Responsibility: No  Cleaning Responsibility: No  Bill Paying/Finance Responsibility: No  Shopping Responsibility: No  Health Care Management: No (Wife prepares the meds and hands them to patient when it is time to take them)  Ambulation Assistance: Independent (ww)  Transfer Assistance: Independent  Active : No  Patient's  Info: wife  Mode of Transportation: Car  Education: Patient has a bachelors degree and then took additional courses for certifications  Occupation: Retired  Type of Occupation: 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 Patient questions answered appropriately. Explained discharge process. Patient stated understanding. Pt stated that he prefers to go by Julian. He has a Bachelors degree  and retired as a  for the [employer]. Support system consists of his wife, some family members that are local (they do not have children), and friends. The plan is to return to his two level home, there are 2-3 MANDY w/ L HR ascending. There is first floor set up with a bed/bathroom. There is a flight of stairs w/ 1 HR to reach the basement where pt will go to get tools at times. The bathroom is walker accessible and has a walk in shower w/ doors, shower chair, grab bars in shower, hand held shower, and handicap height toilet. DME consists of a ww and crutches. Pt reported that wife would assist with threading ca bag through his pants, as well as emptying it. She also assists patient to get in and out of bed and to complete toileting. Wife completes all IADLs and pt will assist as able. Wife provides transport via car. They have no pets. Pt would like wife to be updated. Electronically signed by SHAYY Perdomo LSW on 7/26/2024 at 10:52 AM          Electronically signed by:    SHAYY Perdomo LSW,   7/26/2024, 10:22 AM

## 2024-07-26 NOTE — PROGRESS NOTES
functional    Neuro:  Balance  Sitting - Static: Good  Sitting - Dynamic: Good;-  Standing - Static: Fair;+  Standing - Dynamic: Fair                       Tone: Normal    Bed mobility  Rolling to Left: Moderate assistance  Rolling to Right: Moderate assistance  Supine to Sit: Maximum assistance  Bed Mobility Comments: Hand over hand assist to complete each transition. Difficulty mobilizing LEs.    Transfers  Sit to Stand: Moderate Assistance  Stand to Sit: Moderate Assistance  Bed to Chair: Moderate assistance  Comment: Multiple attempts STS from EOB without success. Visual demonstration provided for sequencing. Bed elevated for practice. pt able to complete transfers with ModA from elevated bed surface and WC. Increased time and effort for all transitions.    Ambulation  Surface: Level tile  Device: Rolling Walker  Assistance: Minimal assistance  Quality of Gait: Steadying assist for safety with assist for management of WW.  Distance: 5ft                   Activity Tolerance  Activity Tolerance: Patient tolerated treatment well    Patient Education  Education Given To: Patient  Education Provided: Role of Therapy;Plan of Care  Education Provided Comments: Rehab program expectations; recommendations  Education Method: Verbal  Education Outcome: Continued education needed    Quality Indicators (IRF-JAKE):  Rolling L and R: Partial/Moderate Assistance (helper does <50%) - 3  Sit>Supine: Not attempted due to Environmental Limitations (i.e. weather, equipment unavailable) - 10  Supine>Sit: Substantial/Maximal Assistance (helper does >50%) - 2  Sit>Stand: Partial/Moderate Assistance (helper does <50%) - 3  Chair/Bed>Chair Transfer: Partial/Moderate Assistance (helper does <50%) - 3  Car Transfers: Not attempted due to Medical Condition or Safety Concerns (I.e. unsafe or physician orders) - 88  Walk 10 ft: Not attempted due to Medical Condition or Safety Concerns (I.e. unsafe or physician orders) - 88  Walk 50 ft with two  education & training, Patient/Caregiver education & training, Equipment evaluation, education, & procurement, Positioning, Therapeutic activities, Modalities  Requires PT Follow-Up: Yes    Patient's Goal:   I want to get back home.     GOALS:  Long Term Goals  Long Term Goal 1: Pt to complete bed mobility with Chriss  Long Term Goal 2: Pt to complete transfers with indep  Long Term Goal 3: Pt to ambulate 50-150ft with LRD and indep  Long Term Goal 4: Pt to manage 3 steps with HR and SBA    ELOS:   Therapy Duration: 2 Weeks    Therapy Time:    Individual   Time In 0930   Time Out 1000   Minutes 30     8 Minutes (transfers)       Zoe Musa, PT, 07/26/24 at 1:36 PM

## 2024-07-26 NOTE — H&P
6/21/2024   interstitial dx ?  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 He voices no other urological complaints at this time.           Plan:   Maintain ca catheter  Continue flomax     Cardiology:  7/22/24: Underwent hemodialysis this morning.  Resting comfortably in bed in no acute distress.  Denies chest pain, shortness of breath or palpitations.  Hemodynamically stable with most recent blood pressure 113/56.  A.m. labs reviewed.  Worsening hyponatremia with sodium low at 123.  Creatinine of 5.90, BUN elevated at 92 and GFR low at 9.0.  Hyperglycemic with glucose 489 this morning.  Hemoglobin low at 10.5.  On telemetry, he is A-fib with heart rates 70s to 80s with PVCs noted.     7/21/24: Wife apparently concerned regarding metoprolol causing side effects.  She would like him to be switched back to Coreg discussed with nursing staff.  Patient with marginal blood pressure.  Denies any chest pain or shortness of breath      7/20/24: Hemodynamically stable. No new issues overnight. No chest pain      7/19/24: Patient laying in bed looks comfortable  Denies chest pain or shortness of breath  Plan for initiation of hemodialysis  Telemetry atrial fibrillation 80s to 90s     7/18/24: Patient laying in bed looks comfortable  Wife at bedside  Per patient he was not able to sleep well due to knee pain pain.  Patient and wife patient chronic knee pain related.  Currently patient denies knee or ankle pain  Telemetry sinus rhythm, patient was noted with paroxysmal atrial fibrillation overnight  Creatinine worsening 4.68 up from 2.75 yesterday and  been:    Current Rehabilitation Assessments:  Physical Therapy:   Bed mobility:     Transfers:     Gait:      Stairs:     W/C mobility:       Occupational Therapy:   Hand Dominance: Right                Speech Therapy:            Diet/Swallow:                         COGNITION:  OT:    SP:          Prior to admission patient was independent with all ADLs and mobilityand did not require any outside services.       Past Medical History:   Diagnosis Date    AMD (age related macular degeneration)     left eye only, gets steroid shots    Asthma     seasonally, uses inhaler as needed    Athscl heart disease of native coronary artery w/o ang pctrs 1/1/2024    Cerebrovascular accident (CVA) (HCC) 4/11/2024    Charcot ankle, left     wears brace    Diabetes mellitus (HCC)     takes metformin    History of DVT (deep vein thrombosis) 7/15/2024    Hypertension     on meds > 10 yrs    Osteoarthritis        Past Surgical History:   Procedure Laterality Date    COLONOSCOPY      > 30 yrs ago    IR NONTUNNELED VASCULAR CATHETER Right 07/19/2024    Medcomp 11.5F x 15 cm Raulerson Duo-Flow IJ double lumen catheter (LOT# [id number],  exp 03/34/2027) performed by Dr. Osborne    IR NONTUNNELED VASCULAR CATHETER  7/19/2024    IR NONTUNNELED VASCULAR CATHETER 7/19/2024 [facility] SPECIAL PROCEDURE    LITHOTRIPSY N/A 12/12/2018    WITH HOLMIUM LASER performed by Antonio Petit MD at [facility] OR    VASCULAR SURGERY Right 7/25/2024    Ultrasound-guided right internal jugular tunneled hemodialysis catheter. performed by Vahid Hu MD at [facility] OR       Current Facility-Administered Medications   Medication Dose Route Frequency Provider Last Rate Last Admin    acetaminophen (TYLENOL) tablet 650 mg  650 mg Oral Q6H PRN Davide Crum MD        Or    acetaminophen (TYLENOL) suppository 650 mg  650 mg Rectal Q6H PRN Davide Crum MD        aluminum & magnesium hydroxide-simethicone (MAALOX) 200-200-20 MG/5ML suspension 30 mL  30 mL Oral Q6H PRN

## 2024-07-26 NOTE — PROGRESS NOTES
[facility]  Occupational Therapy      NAME:  Km Garcia  ROOM: R251/R251-01  :  1945  DATE: 2024    Attempted to see Km Garcia on this date at 1430 for a 30 minute session for   []  Initial Evaluation   [x]  Scheduled therapy    []  Make-up therapy   []  Group therapy   []  Family training    Patient was unable to be seen due to:   [x] Off unit for dialysis - unknown at the time of scheduling that patient was going to dialysis   [] Patient refused, stating \"    [] Therapy on hold due to     [] Nursing deferred due to    [] Other:        Electronically signed by NAOMI Melchor on 24 at 2:37 PM EDT

## 2024-07-26 NOTE — CARE COORDINATION
Pt was in dialysis so LSW updated wife who was waiting in pt's room, she was made aware of discharge date and goals. Regarding PLOF, wife reported that she would assist him in getting over the 7\" rise in the shower and then pt would bathe himself. Pt dressed himself from the waist up and wife would assist with LB dressing and ca management. Pt was able to toilet himself in the night. Wife noted that pt would use a crutch on his other side when going up and down the stairs. Pt was active with [facility] prior to hospitalization but wife requested a list of agencies to review all options, LSW provided it. Wife did note that she is planning to stick with pt's Oncologist Dr. Gama Carroll from [facility] when discharged, and would like a Cardiology consult. LSW notified RN of wife's request for the consult. Electronically signed by SHAYY Perdomo, ABBY on 7/26/2024 at 5:14 PM

## 2024-07-26 NOTE — PROGRESS NOTES
2100  Pt arrived to unit from [address] [place]. VSS. Pt denies any pain or SOB. Admission assessment complete. Skin verified with 2nd RN Anna. Pt would like his wife to sign consents and for wife to go over home medications with nurse. Safety measures in place. Call light in reach. Will continue to monitor. Electronically signed by Temi Mayfield RN on 7/26/2024 at 2:49 AM

## 2024-07-26 NOTE — PROGRESS NOTES
Comprehensive Nutrition Assessment    Type and Reason for Visit:  Initial, Consult    Nutrition Recommendations/Plan:   Discontinue cardiac, potassium and phosphorus restrictions   Add Carb Control 5; MAGNUS diet   When po intakes improve, add 2 GM sodium restriction      Malnutrition Assessment:  Malnutrition Status:  Insufficient data (07/26/24 1534)    Context:  Acute Illness     Findings of the 6 clinical characteristics of malnutrition:  Energy Intake:  75% or less of estimated energy requirements for 7 or more days  Weight Loss:  Unable to assess     Body Fat Loss:  Unable to assess     Muscle Mass Loss:  Unable to assess    Fluid Accumulation:  Unable to assess     Strength:  Not Performed    Nutrition Assessment:    Pt with inadequate po intake during admission to regular medical floor due to abdominal discomfort after starting dialysis treatments and loose stools. Pt currently withmultiple dietary restrictions, will liberalize diet to allow for more menu options.  Pt currently undergoing dialysis.  Recommend obtaining phosphorus labs to determine necessity of phosphorus restriction, potassium has been wnl.  Will add carb control to diet order.  Wife wishes to refrain from oral supplements until diarrhea resolves.    Nutrition Related Findings:    PMH: diabetes, hypertension, heart failure, 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   SILVINA possible ATN now. IHD started during admission to regular medical floor.  BM 7/25, meds/labs reviewed (mildly elevated glucose), 2+ generalized edea noted Wound Type: None       Current Nutrition Intake & Therapies:    Average Meal Intake: 1-25%  Average Supplements Intake: None Ordered  ADULT DIET; Regular; Low Fat/Low Chol/High Fiber/2 gm Na; No Added Salt (3-4 gm); Low Potassium (Less than 3000 mg/day); Low Phosphorus (Less than 1000 mg); Less than 60 gm    Anthropometric Measures:  Height: 185.4 cm (6' 1\")  Ideal Body Weight (IBW): 184 lbs (84 kg)    Admission Body Weight:  121.1 kg (267 lb)  Current Body Weight: 116.6 kg (257 lb) (admit wt; significant edema present...257 lb initial hospital admission),  Weight Source: Bed Scale  Current BMI (kg/m2): 33.9  Usual Body Weight: 113.4 kg (250 lb) (stated 3/11/2024; 263 lb-2022)  % Weight Change (Calculated): 2.8                    BMI Categories: Obese Class 1 (BMI 30.0-34.9) (based on initial admission wt)    Estimated Daily Nutrient Needs:  Energy Requirements Based On: Kcal/kg  Weight Used for Energy Requirements: Admission (initial hospital admission)  Energy (kcal/day): ~2085 kcal @ 18 kcal/kg  Weight Used for Protein Requirements: Ideal  Protein (g/day): 100-125 gm (kg IBW x 1.2-1.5)  Method Used for Fluid Requirements: Standard Renal  Fluid (ml/day): 500-800 ml + DUO (or as per MD)    Nutrition Diagnosis:   Inadequate oral intake related to altered GI function as evidenced by diarrhea, intake 0-25%, intake 26-50%  Altered nutrition-related lab values related to endocrine dysfuntion, renal dysfunction as evidenced by lab values    Nutrition Interventions:   Food and/or Nutrient Delivery: Modify Current Diet (Discontinue cardiac, potassium and phosphorus restrictions  Add Carb Control 5; MAGNUS diet   When po intakes improve, add 2 GM sodium restriction)  Nutrition Education/Counseling: No recommendation at this time  Coordination of Nutrition Care: Continue to monitor while inpatient       Goals:     Goals: PO intake 75% or greater, other (specify)  Specify Other Goals: glucose < 160    Nutrition Monitoring and Evaluation:      Food/Nutrient Intake Outcomes: Food and Nutrient Intake  Physical Signs/Symptoms Outcomes: Biochemical Data, Diarrhea, Weight, Meal Time Behavior    Discharge Planning:    Too soon to determine     Adriana Daniels RD, LD

## 2024-07-26 NOTE — PLAN OF CARE
Problem: Safety - Adult  Goal: Free from fall injury  Outcome: Progressing     Problem: Pain  Goal: Verbalizes/displays adequate comfort level or baseline comfort level  Outcome: Progressing     Problem: Skin/Tissue Integrity  Goal: Absence of new skin breakdown  Description: 1.  Monitor for areas of redness and/or skin breakdown  2.  Assess vascular access sites hourly  3.  Every 4-6 hours minimum:  Change oxygen saturation probe site  4.  Every 4-6 hours:  If on nasal continuous positive airway pressure, respiratory therapy assess nares and determine need for appliance change or resting period.  Outcome: Progressing     Problem: ABCDS Injury Assessment  Goal: Absence of physical injury  Outcome: Progressing     Problem: Chronic Conditions and Co-morbidities  Goal: Patient's chronic conditions and co-morbidity symptoms are monitored and maintained or improved  Outcome: Progressing

## 2024-07-26 NOTE — PLAN OF CARE
Nutrition Problem #1: Inadequate oral intake  Intervention: Food and/or Nutrient Delivery: Modify Current Diet (Discontinue cardiac, potassium and phosphorus restrictions  Add Carb Control 5; MAGNUS diet   When po intakes improve, add 2 GM sodium restriction)

## 2024-07-26 NOTE — FLOWSHEET NOTE
0830:Patient assessment completed. A&Ox3. Patient in 3/10 generalized pain. Tylenol given. LBM 7/26. Contreras patent, Draining clear yellow urine. Balloon rectal tube in place. Patient in bed, call light within reach, and bed alarm on.    0900: Per hospitalist balloon rectal tube must be in place until diarrhea subsides.

## 2024-07-26 NOTE — PROGRESS NOTES
Subjective:      Patient ID: Km Garcia is a 78 y.o. male    HPI78 year old male who's ca catheter is draining clear yellow urine. He voices no other urological complaints at this time.        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    Cerebrovascular accident (CVA) (HCC) 2024    Charcot ankle, left     wears brace    Diabetes mellitus (HCC)     takes metformin    History of DVT (deep vein thrombosis) 7/15/2024    Hypertension     on meds > 10 yrs    Osteoarthritis      Past Surgical History:   Procedure Laterality Date    COLONOSCOPY      > 30 yrs ago    IR NONTUNNELED VASCULAR CATHETER Right 2024    Medcomp 11.5F x 15 cm Raulerson Duo-Flow IJ double lumen catheter (LOT# [id number],  exp ) performed by Dr. Osborne    IR NONTUNNELED VASCULAR CATHETER  2024    IR NONTUNNELED VASCULAR CATHETER [place] [facility] [place]    LITHOTRIPSY N/A 2018    WITH HOLMIUM LASER performed by Antonio Petit [place] [facility] [place]    VASCULAR SURGERY Right 2024    Ultrasound-guided right internal jugular tunneled hemodialysis catheter. performed by Vahid Hu MD [place] [facility] [place]     Social History     Socioeconomic History    Marital status:      Spouse name: None    Number of children: None    Years of education: None    Highest education level: None   Tobacco Use    Smoking status: Former     Current packs/day: 0.00     Types: Cigarettes     Quit date:      Years since quittin.5    Smokeless tobacco: Never   Vaping Use    Vaping Use: Never used   Substance and Sexual Activity    Alcohol use: Yes     Comment: Drinks 1 drink every other day    Drug use: No   Social History Narrative    Lives With: Spouse Coby (wife is able to help pt at home)    Type of Home:  (GLYCOLAX) packet 17 g  17 g Oral Daily PRN Davide Crum MD        potassium chloride (KLOR-CON M) extended release tablet 40 mEq  40 mEq Oral PRN Davide Crum MD        Or    potassium bicarb-citric acid (EFFER-K) effervescent tablet 40 mEq  40 mEq Oral PRN Davide Crum MD        Or    potassium chloride 10 mEq/100 mL IVPB (Peripheral Line)  10 mEq IntraVENous PRN Davide Crum MD        tamsulosin (FLOMAX) capsule 0.4 mg  0.4 mg Oral Daily Davide Crum MD   0.4 mg at 07/26/24 0831    valACYclovir (VALTREX) tablet 500 mg  500 mg Oral Daily Davide Crmu MD        acetaminophen (TYLENOL) tablet 650 mg  650 mg Oral Q4H PRN Juli Kahn MD   650 mg at 07/26/24 0831    bisacodyl (DULCOLAX) suppository 10 mg  10 mg Rectal Daily PRN Juli Kahn MD        sodium phosphate (FLEET) rectal enema 1 enema  1 enema Rectal Daily PRN Juli Kahn MD         Celecoxib and Naproxen  reviewed      Review of Systems   Constitutional:  Negative for fever.   HENT:  Negative for congestion.    Respiratory:  Negative for apnea.    Genitourinary:  Negative for hematuria.   Neurological:  Negative for speech difficulty.         Objective:   Physical Exam  HENT:      Mouth/Throat:      Mouth: Mucous membranes are moist.   Pulmonary:      Effort: Pulmonary effort is normal.   Skin:     General: Skin is warm.   Neurological:      Mental Status: He is alert and oriented to person, place, and time.          Assessment:      78 year old male who's ca catheter is draining clear yellow urine. He voices no other urological complaints at this time.          Plan:      Maintain ca catheter  Continue flomax  Follow up with established urologist after discharge                      Wero Dhillon PA-C

## 2024-07-26 NOTE — DIALYSIS
Hemodialysis completed as ordered. 2000 ml removed with treatment, tolerated well. Catheter functioned fairly, Cathflo instilled at end of treatment to dwell until next treatment. Please see dialysis flowsheets for treatment details

## 2024-07-26 NOTE — CONSULTS
Hospital Medicine  History and Physical    Patient:  Km Garcia  MRN: [id number]    CHIEF COMPLAINT:  No chief complaint on file.      History Obtained From:  Patient, EMR  Primary Care Physician: Dawood Darby MD    HISTORY OF PRESENT ILLNESS:   The patient is a 78 y.o. male with PMH of lymphoma, craniotomy, cardiomyopathy, hypertension and diabetes.  Patient was admitted to medical floor with syncope and was found to have multiple complex medical issues as listed.  Subsequently patient was seen by physical Occupational Therapy team and admitted to the rehab unit for inpatient physical and Occupational Therapy.  Patient is doing well.  He continues to have Contreras catheter and rectal tube.  No chest pain or palpitation.  No fever or chills.  No abdominal pain.  Poor appetite but improving.    Past Medical History:      Diagnosis Date    AMD (age related macular degeneration)     left eye only, gets steroid shots    Asthma     seasonally, uses inhaler as needed    Athscl heart disease of native coronary artery w/o ang pctrs 1/1/2024    Cerebrovascular accident (CVA) (HCC) 4/11/2024    Charcot ankle, left     wears brace    Diabetes mellitus (HCC)     takes metformin    History of DVT (deep vein thrombosis) 7/15/2024    Hypertension     on meds > 10 yrs    Osteoarthritis        Past Surgical History:      Procedure Laterality Date    COLONOSCOPY      > 30 yrs ago    IR NONTUNNELED VASCULAR CATHETER Right 07/19/2024    Medcomp 11.5F x 15 cm Raulerson Duo-Flow IJ double lumen catheter (LOT# [id number],  exp 03/34/2027) performed by Dr. Osborne    IR NONTUNNELED VASCULAR CATHETER  7/19/2024    IR NONTUNNELED VASCULAR CATHETER [place] [facility] [place]    LITHOTRIPSY N/A 12/12/2018    WITH HOLMIUM LASER [place] [facility] [place]    VASCULAR SURGERY Right 7/25/2024    Ultrasound-guided right internal jugular tunneled hemodialysis catheter. performed [place]id Hu [place] [facility] [place]  sliding scale coverage    *UTI, completed antibiotic course as per report    *Multiple other medical issues not listed above.    Patient has multiple medical issues.  All appear to be stable at this time.    Some of pt medical issues, chronic symptoms, abnormalities seen on labs and imaging may not be addressed while patient is currently on the rehab unit.    We may not follow patient daily.  Sometimes we follow patient only on an as-needed basis.  Please call or alert hospitalist if patient develops any signs or symptoms that may require physical evaluation and intervention.    Patient would need to follow-up with his primary care doctor and other needed outpatient providers to address all of his chronic, subacute medical issues and all of the abnormalities seen on labs and imaging that have not been addressed during this stay on the rehab unit.    I will be off service starting this Sunday.  Patient would be managed by one of my colleagues after that.    On discharge, please make sure that patient has an outpatient appointment with his primary care doctor as well as all of the other specialists that had seen here at [facility].  Please [facility] to arrival to [facility] admission.        Tanya Ervin MD, MD  Admitting Hospitalist    TTS: 85mins where I focused more than 75% of my attention on rendering care, and planning treatment course for this patient, in addition to talking to RN team, mid levels, consulting with other physicians and following up on labs and imaging.    High Risk Readmission Screening Tool Score Noted.     Emergency Contact:

## 2024-07-26 NOTE — ONCOLOGY
Hematology/Oncology  Attending Progress Note        CHIEF COMPLAINT/HPI:  Syncope and weakness is better.    REVIEW OF SYSTEMS:    Unremarkable except for symptoms mentioned in HPI.    Current Inpatient Medications:    Current Facility-Administered Medications: acetaminophen (TYLENOL) tablet 650 mg, 650 mg, Oral, Q6H PRN **OR** acetaminophen (TYLENOL) suppository 650 mg, 650 mg, Rectal, Q6H PRN  aluminum & magnesium hydroxide-simethicone (MAALOX) 200-200-20 MG/5ML suspension 30 mL, 30 mL, Oral, Q6H PRN  calcium carbonate (TUMS) chewable tablet 500 mg, 500 mg, Oral, TID PRN  cyclobenzaprine (FLEXERIL) tablet 5 mg, 5 mg, Oral, TID PRN  dextrose 10 % infusion, , IntraVENous, Continuous PRN  dextrose bolus 10% 125 mL, 125 mL, IntraVENous, PRN **OR** dextrose bolus 10% 250 mL, 250 mL, IntraVENous, PRN  glucagon injection 1 mg, 1 mg, SubCUTAneous, PRN  glucose chewable tablet 16 g, 4 tablet, Oral, PRN  heparin (porcine) injection 2,000 Units, 2,000 Units, IntraVENous, PRN  insulin glargine (LANTUS) injection vial 10 Units, 10 Units, SubCUTAneous, BID  insulin lispro (HUMALOG,ADMELOG) injection vial 0-4 Units, 0-4 Units, SubCUTAneous, TID WC  insulin lispro (HUMALOG,ADMELOG) injection vial 0-4 Units, 0-4 Units, SubCUTAneous, Nightly  lidocaine 4 % external patch 1 patch, 1 patch, TransDERmal, Daily PRN  loperamide (IMODIUM) capsule 2 mg, 2 mg, Oral, 4x Daily PRN  melatonin tablet 3 mg, 3 mg, Oral, Nightly PRN  metoprolol tartrate (LOPRESSOR) tablet 25 mg, 25 mg, Oral, BID  midodrine (PROAMATINE) tablet 10 mg, 10 mg, Oral, TID WC  ondansetron (ZOFRAN-ODT) disintegrating tablet 4 mg, 4 mg, Oral, Q8H PRN **OR** ondansetron (ZOFRAN) injection 4 mg, 4 mg, IntraVENous, Q6H PRN  polyethylene glycol (GLYCOLAX) packet 17 g, 17 g, Oral, Daily PRN  potassium chloride (KLOR-CON M) extended release tablet 40 mEq, 40 mEq, Oral, PRN **OR** potassium bicarb-citric acid (EFFER-K) effervescent tablet 40 mEq, 40 mEq, Oral, PRN **OR**

## 2024-07-26 NOTE — CARE COORDINATION
[facility]  INPATIENT REHABILITATION  TEAM CONFERENCE NOTE  Room: R251/R251-01  Admit Date: 2024       Date: 2024  Patient Name: Km aGrcia        MRN: [id number]    : 1945  (78 y.o.)  Gender: male        REHAB DIAGNOSIS:   Diagnosis: Impaired mobility and ADL's due to Polyneuropathy. [facility] rehab admission 24    CO MORBIDITIES:      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    Cerebrovascular accident (CVA) (HCC) 2024    Charcot ankle, left     wears brace    Diabetes mellitus (HCC)     takes metformin    History of DVT (deep vein thrombosis) 7/15/2024    Hypertension     on meds > 10 yrs    Osteoarthritis      Past Surgical History:   Procedure Laterality Date    COLONOSCOPY      > 30 yrs ago    IR NONTUNNELED VASCULAR CATHETER Right 2024    Medcomp 11.5F x 15 cm Raulerson Duo-Flow IJ double lumen catheter (LOT# [id number],  exp ) performed by Dr. Osborne    IR NONTUNNELED VASCULAR CATHETER  2024    IR NONTUNNELED VASCULAR CATHETER [place] [facility] [place]    LITHOTRIPSY N/A 2018    WITH HOLMIUM LASER [place] [facility] [place]    VASCULAR SURGERY Right 2024    Ultrasound-guided right internal jugular tunneled hemodialysis catheter. performed [place] Vahid Hu [place] [facility] [place]     Chart Reviewed: Yes  Family / Caregiver Present: No  Diagnosis: Impaired mobility and ADL's due to Polyneuropathy. [facility] rehab admission 24  General Comment  Comments: Pt resting in bed - agreeable to PT evaluation  Restrictions  Restrictions/Precautions: Fall Risk (rectal tube in place)  CASE MANAGEMENT    Social/Functional History  Social/Functional History  Lives With: Spouse  Type of Home: House  Home Layout: Two level, Able to  1347)  Following Commands: Follows one step commands with increased time (07/26/24 1347)  Attention Span: Difficulty dividing attention (07/26/24 1347)  Memory: Decreased recall of recent events;Decreased recall of precautions (07/26/24 1347)  Safety Judgement: Decreased awareness of need for safety (07/26/24 1347)  Problem Solving: Assistance required to implement solutions (07/26/24 1347)  Insights: Decreased awareness of deficits (07/26/24 1347)  Initiation: Requires cues for all (07/26/24 1347)  Sequencing: Requires cues for all (07/26/24 1347)          Orientation  Overall Orientation Status: Within Functional Limits (07/26/24 1347)  SP:              RECREATIONAL THERAPY  Attendance to recreational therapy programs:    []  Pet Therapy  [] Music Therapy  [] Art Therapy    [] Recreation Therapy Group [] Support Group           Patient social interaction (mood, participation): good    Patient strengths: wife supportive    Patients goal: return home w/ wife    Problems/Barriers: not goaled for complete independence, [place]        1. Safety:          - Intervention / Plan:    [x]  falls protocol     [x]  PT/OT    []  SP        - Results:         2. Potential DME needs:         - Intervention / Plan:  [x]  PT/OT     [x]  Assess equipment needs/access       - Results:         3.  Weakness:          - Intervention / Plan:  [x]  PT/OT      []  Other:         - Results:         4.  Discharge planning needs:          - Intervention / Plan:  [x]  Weekly team conference      [x]  family training        - Results:         5.            - Intervention / Plan:          - Results:         6.            - Intervention / Plan:         - Results:         7.            - Intervention / Plan:         - Results:           Discharge Plan   Estimated Length of Stay: TBD    Tentative Discharge date: 8/8/24      Anticipated Discharge Destination:  Home      Team recommendations:    1. Follow up Therapy :    PT  OT  RN  Social Work  HH

## 2024-07-27 LAB
ADV 40+41 DNA STL QL NAA+NON-PROBE: NOT DETECTED
C CAYETANENSIS DNA STL QL NAA+NON-PROBE: NOT DETECTED
C COLI+JEJ+UPSA DNA STL QL NAA+NON-PROBE: NOT DETECTED
CRYPTOSP DNA STL QL NAA+NON-PROBE: NOT DETECTED
E HISTOLYT DNA STL QL NAA+NON-PROBE: NOT DETECTED
EAEC PAA PLAS AGGR+AATA ST NAA+NON-PRB: NOT DETECTED
EC STX1+STX2 GENES STL QL NAA+NON-PROBE: NOT DETECTED
EPEC EAE GENE STL QL NAA+NON-PROBE: NOT DETECTED
ETEC LTA+ST1A+ST1B TOX ST NAA+NON-PROBE: NOT DETECTED
G LAMBLIA DNA STL QL NAA+NON-PROBE: NOT DETECTED
GI PATH DNA+RNA PNL STL NAA+NON-PROBE: NOT DETECTED
GLUCOSE BLD-MCNC: 120 MG/DL (ref 70–99)
GLUCOSE BLD-MCNC: 136 MG/DL (ref 70–99)
GLUCOSE BLD-MCNC: 146 MG/DL (ref 70–99)
GLUCOSE BLD-MCNC: 184 MG/DL (ref 70–99)
NOROVIRUS GI+II RNA STL QL NAA+NON-PROBE: NOT DETECTED
P SHIGELLOIDES DNA STL QL NAA+NON-PROBE: NOT DETECTED
PERFORMED ON: ABNORMAL
RVA RNA STL QL NAA+NON-PROBE: NOT DETECTED
S ENT+BONG DNA STL QL NAA+NON-PROBE: NOT DETECTED
SAPO I+II+IV+V RNA STL QL NAA+NON-PROBE: NOT DETECTED
SHIGELLA SP+EIEC IPAH ST NAA+NON-PROBE: NOT DETECTED
V CHOL+PARA+VUL DNA STL QL NAA+NON-PROBE: NOT DETECTED
V CHOLERAE DNA STL QL NAA+NON-PROBE: NOT DETECTED
Y ENTEROCOL DNA STL QL NAA+NON-PROBE: NOT DETECTED

## 2024-07-27 PROCEDURE — 6370000000 HC RX 637 (ALT 250 FOR IP): Performed by: INTERNAL MEDICINE

## 2024-07-27 PROCEDURE — 6360000002 HC RX W HCPCS: Performed by: INTERNAL MEDICINE

## 2024-07-27 PROCEDURE — 6370000000 HC RX 637 (ALT 250 FOR IP): Performed by: STUDENT IN AN ORGANIZED HEALTH CARE EDUCATION/TRAINING PROGRAM

## 2024-07-27 PROCEDURE — 97535 SELF CARE MNGMENT TRAINING: CPT

## 2024-07-27 PROCEDURE — 6370000000 HC RX 637 (ALT 250 FOR IP): Performed by: NURSE PRACTITIONER

## 2024-07-27 PROCEDURE — P9047 ALBUMIN (HUMAN), 25%, 50ML: HCPCS | Performed by: INTERNAL MEDICINE

## 2024-07-27 PROCEDURE — 2580000003 HC RX 258: Performed by: INTERNAL MEDICINE

## 2024-07-27 PROCEDURE — 6370000000 HC RX 637 (ALT 250 FOR IP): Performed by: PHYSICAL MEDICINE & REHABILITATION

## 2024-07-27 PROCEDURE — 99222 1ST HOSP IP/OBS MODERATE 55: CPT | Performed by: NURSE PRACTITIONER

## 2024-07-27 PROCEDURE — 1180000000 HC REHAB R&B

## 2024-07-27 PROCEDURE — 97530 THERAPEUTIC ACTIVITIES: CPT

## 2024-07-27 RX ORDER — ALBUMIN (HUMAN) 12.5 G/50ML
25 SOLUTION INTRAVENOUS ONCE
Status: COMPLETED | OUTPATIENT
Start: 2024-07-27 | End: 2024-07-27

## 2024-07-27 RX ORDER — 0.9 % SODIUM CHLORIDE 0.9 %
500 INTRAVENOUS SOLUTION INTRAVENOUS ONCE
Status: COMPLETED | OUTPATIENT
Start: 2024-07-27 | End: 2024-07-27

## 2024-07-27 RX ORDER — DIPHENOXYLATE HYDROCHLORIDE AND ATROPINE SULFATE 2.5; .025 MG/1; MG/1
1 TABLET ORAL 2 TIMES DAILY
Status: DISCONTINUED | OUTPATIENT
Start: 2024-07-27 | End: 2024-07-29

## 2024-07-27 RX ORDER — CHOLESTYRAMINE LIGHT 4 G/5.7G
4 POWDER, FOR SUSPENSION ORAL 2 TIMES DAILY
Status: DISCONTINUED | OUTPATIENT
Start: 2024-07-27 | End: 2024-08-02 | Stop reason: HOSPADM

## 2024-07-27 RX ADMIN — PSYLLIUM HUSK 1 PACKET: 3.4 POWDER ORAL at 12:06

## 2024-07-27 RX ADMIN — VALACYCLOVIR 500 MG: 1 TABLET, FILM COATED ORAL at 17:56

## 2024-07-27 RX ADMIN — SODIUM CHLORIDE 500 ML: 9 INJECTION, SOLUTION INTRAVENOUS at 15:19

## 2024-07-27 RX ADMIN — MIDODRINE HYDROCHLORIDE 5 MG: 5 TABLET ORAL at 08:31

## 2024-07-27 RX ADMIN — DIPHENOXYLATE HYDROCHLORIDE AND ATROPINE SULFATE 1 TABLET: 2.5; .025 TABLET ORAL at 20:08

## 2024-07-27 RX ADMIN — ALBUMIN (HUMAN) 25 G: 0.25 INJECTION, SOLUTION INTRAVENOUS at 14:20

## 2024-07-27 RX ADMIN — MIDODRINE HYDROCHLORIDE 5 MG: 5 TABLET ORAL at 14:27

## 2024-07-27 RX ADMIN — INSULIN GLARGINE 10 UNITS: 100 INJECTION, SOLUTION SUBCUTANEOUS at 20:30

## 2024-07-27 RX ADMIN — DIPHENOXYLATE HYDROCHLORIDE AND ATROPINE SULFATE 1 TABLET: 2.5; .025 TABLET ORAL at 08:32

## 2024-07-27 RX ADMIN — INSULIN GLARGINE 10 UNITS: 100 INJECTION, SOLUTION SUBCUTANEOUS at 08:32

## 2024-07-27 RX ADMIN — CHOLESTYRAMINE 4 G: 4 POWDER, FOR SUSPENSION ORAL at 20:08

## 2024-07-27 RX ADMIN — TAMSULOSIN HYDROCHLORIDE 0.4 MG: 0.4 CAPSULE ORAL at 08:31

## 2024-07-27 RX ADMIN — METOPROLOL TARTRATE 12.5 MG: 25 TABLET, FILM COATED ORAL at 20:06

## 2024-07-27 RX ADMIN — METOPROLOL TARTRATE 25 MG: 25 TABLET, FILM COATED ORAL at 08:32

## 2024-07-27 RX ADMIN — MIDODRINE HYDROCHLORIDE 5 MG: 5 TABLET ORAL at 17:56

## 2024-07-27 NOTE — PLAN OF CARE
Problem: Safety - Adult  Goal: Free from fall injury  7/27/2024 1304 by Pattie Shirley RN  Outcome: Progressing  7/27/2024 0649 by Emy Barbour RN  Outcome: Progressing     Problem: Discharge Planning  Goal: Discharge to home or other facility with appropriate resources  7/27/2024 1304 by Pattie Shirley RN  Outcome: Progressing  7/27/2024 0649 by Emy Barbour RN  Outcome: Progressing     Problem: Pain  Goal: Verbalizes/displays adequate comfort level or baseline comfort level  Outcome: Progressing     Problem: Skin/Tissue Integrity  Goal: Absence of new skin breakdown  Description: 1.  Monitor for areas of redness and/or skin breakdown  2.  Assess vascular access sites hourly  3.  Every 4-6 hours minimum:  Change oxygen saturation probe site  4.  Every 4-6 hours:  If on nasal continuous positive airway pressure, respiratory therapy assess nares and determine need for appliance change or resting period.  Outcome: Progressing     Problem: ABCDS Injury Assessment  Goal: Absence of physical injury  Outcome: Progressing     Problem: Chronic Conditions and Co-morbidities  Goal: Patient's chronic conditions and co-morbidity symptoms are monitored and maintained or improved  Outcome: Progressing     Problem: Nutrition Deficit:  Goal: Optimize nutritional status  Outcome: Progressing

## 2024-07-27 NOTE — PROGRESS NOTES
INDIVIDUALIZED OVERALL REHAB PLAN OF CARE  ADDENDUM TO REHAB PROGRESS NOTE-for audit purposes must also refer to this day's clinical note and combine the information      Date: 2024  Patient Name: Km Garcia   Room: R251/R251-01    MRN: [id number]    : 1945  (78 y.o.)  Gender: male       Today 2024 during weekly team meeting, I reviewed the patient Km Garcia in detail with the therapists and nurses involved in patient's care gathering complex physiatric data regarding current medical issues, progress in therapies, factors limiting progress, social issues, psychological issues, ongoing therapeutic plans and discharge planning.    Legend:  I= independent Im =Modified independent  S=Supervised SB=stand by GALVEZ=set up CG=contact yo Min= minimal Mod=Moderate Max=maximal Max of 2 =maximal assist of 2 people      CURRENT FUNCTIONAL STATUS:     See team     NURSING ISSUES:          Nursing will continue to focus on bowel and bladder continence transitioning toward independence by time of discharge.  Monitoring post void residuals monitoring for severe constipation and bowel obstruction.      Barriers to progress and discharge complex medical conditions      Bowel function- loose stools  Plans to address- teach spinal cord style bowel program     Bladder function-  P[employer]ik    Plans to address- schedule voids before bed and therapy     Skin deficits- dressing changes   Plans to address- special mattress     Hydration/Nutritional deficits- monitoring for dysphagia  Plans to address-Push PO, assist with feeds as needed      BP- decline in BP intake is a concern  Plans to address- check ortho BPs before therapies-and use abdominal binder and TEDs as needed    Pt and Family training goals-  teach home technology options like Rebeca use and home assistive devices      Focus on achieving ADL goals with co-treating with OT when possible. Focus on cognition and co treat with SLP when possible.      PHYSICAL    Based on a comprehensive evaluation of the above, the individualized therapy and Discharge plan will be:    -Times stated are an average that will be varied based on the patient's daily need.        PT   1 1/2  hrs/day 5-7 days per wk      OT   1 1/2 hrs per day 5-7 days per wk     ST  1/2    hrs /day 3-5 days per week       Estimated LOS   1-2 week(s)    -Overall functional prognosis:     [x]  Good    []  Fair    []  Poor     -Medical Prognosis:   [x]  Good    []  Fair    []  Poor    This patient was made aware of the discussion of Plan of Care, their projected dicharge date and their projected function at discharge.     LUIS Mao, DO

## 2024-07-27 NOTE — PROGRESS NOTES
Progress Note  Date:2024       [facility]  Patient Name:Km Garcia     YOB: 1945     Age:78 y.o.    Chief complaint uncontrolled type 2 diabetes    Subjective    Subjective:  Symptoms:  Stable.  He reports weakness.    Diet:  Adequate intake.    Activity level: Impaired due to weakness.    Pain:  He reports no pain.       Review of Systems   Constitutional:  Positive for fatigue.   Cardiovascular:  Positive for leg swelling.   Neurological:  Positive for weakness.   All other systems reviewed and are negative.    Objective         Vitals Last 24 Hours:  TEMPERATURE:  Temp  Av.9 °F (36.6 °C)  Min: 97.5 °F (36.4 °C)  Max: 98.2 °F (36.8 °C)  RESPIRATIONS RANGE: Resp  Av.3  Min: 14  Max: 20  PULSE OXIMETRY RANGE: SpO2  Av %  Min: 99 %  Max: 99 %  PULSE RANGE: Pulse  Av.5  Min: 66  Max: 99  BLOOD PRESSURE RANGE: Systolic (24hrs), Av , Min:99 , Max:140   ; Diastolic (24hrs), Av, Min:49, Max:65    I/O (24Hr):    Intake/Output Summary (Last 24 hours) at 2024 1709  Last data filed at 2024 0515  Gross per 24 hour   Intake 240 ml   Output 900 ml   Net -660 ml     Objective:  General Appearance:  Comfortable.    Vital signs: (most recent): Blood pressure (!) 99/50, pulse 99, temperature 98.2 °F (36.8 °C), temperature source Oral, resp. rate 14, height 1.854 m (6' 1\"), weight 121.2 kg (267 lb 3.2 oz), SpO2 99 %.  Vital signs are normal.    HEENT: Normal HEENT exam.    Lungs:  Normal effort and normal respiratory rate.    Heart: Normal rate.    Abdomen: Abdomen is soft.    Extremities: Normal range of motion.  There is dependent edema.    Neurological: Patient is alert.    Skin:  No rash.     Labs/Imaging/Diagnostics    Labs:  CBC:  Recent Labs     24  0608   WBC 7.1   RBC 2.99*   HGB 9.5*   HCT 28.1*   MCV 94.0*   RDW 13.0        CHEMISTRIES:  Recent Labs     24  0608      K 3.6      CO2 27   BUN 46*   CREATININE 3.05*   GLUCOSE

## 2024-07-27 NOTE — PROGRESS NOTES
Physical Therapy Missed Treatment   Facility/Department: [facility] MED SURG R251/R251-01    NAME: Km Garcia    : 1945 (78 y.o.)  MRN: [id number]    Account: [id number]  Gender: male      [x] Patient Declines PT Treatment: Patient declines treatment stating \"no\" states he is tired. Offers to sit edge of bed and supine therex. Patient cont to decline. Patient education for benefits of therapy and encouragement offered without success. Patient agrees to attempt later this afternoon.        Electronically signed by Amisha Thorne PTA on 24 at 1:37 PM EDT

## 2024-07-27 NOTE — ACP (ADVANCE CARE PLANNING)
Advance care planning and goals of care discussion:  Wife to give me permission to proceed with conversation.  Conversation lasted for  20 minutes:    We discussed his case, prognosis, expectation and trajectory.  We discussed his CODE STATUS and the difference between full code, CCA and CC.    Wife stated that her  is a fighter.  He has a strong body and well.    Wife stated that the patient has healthcare power of .  She is in charge of making decisions for him if he gets to a point where he cannot make decision for himself.    Wife wants him to receive all needed medical care including chest compression, shocks, intubation life support in case of a cardiac or respiratory arrest.    She wants him to be full code at this time    CODE STATUS is full code  .  Continue to provide him the care that he needs  Continue PT OT

## 2024-07-27 NOTE — PROGRESS NOTES
Physical Therapy Rehab Treatment Note  [place] [facility]       NAME: Km Garcia  : 1945 (78 y.o.)  MRN: [id number]  CODE STATUS: Full Code    Date of Service: 2024       Restrictions:  Restrictions/Precautions: Fall Risk       SUBJECTIVE: Patient agreeable to attempt sit edge of bed. Wife present.       Pain   6/10 parag knees, ok for tx, declines med or RN intervention.      OBJECTIVE:      Blocked practice 3 repititions of bend knees, reach across body, turn head toward turn side in prep for turn        Roll Left  Assistance Level: Maximum assistance;Dependent  Skilled Clinical Factors: vc for patient to keep eyes open, cues and assist for head turn to direction intended to roll, reach across body with poor movement initiation and max/dep assist to achieve  Scooting  Assistance Level: Dependent;Requires x 2 assistance  Skilled Clinical Factors: scoot head of bed, vc for technique with poor movement initiation      ASSESSMENT/PROGRESS TOWARDS GOALS:   Assessment  Assessment: Patient demonstrated great fatigue this session. Several cues to keep eyes open. Hand over hand and max+2 assist for rolling to side. Patient completed 3 repititions of upper body rotation for roll practice before needing bm clean up. Patient would benefit from continued skilled therapy to improve qol, decrease demand of caregiver and return to plof.  Activity Tolerance: Other (comment) (bm, needs cleaned up)    Goals:  Long Term Goals  Long Term Goal 1: Pt to complete bed mobility with Chriss  Long Term Goal 2: Pt to complete transfers with indep  Long Term Goal 3: Pt to ambulate 50-150ft with LRD and indep  Long Term Goal 4: Pt to manage 3 steps with HR and SBA    PLAN OF CARE/Safety:    Direct handoff to nursing for patient care      Therapy Time:   Individual   Time In 1110   Time Out 1120   Minutes 10      Minutes:10  Transfer/Bed mobility training:10  Gait trainin  Neuro re  education:0  Therapeutic ex:0      Amisha Thorne, PTA, 07/27/24 at 11:36 AM

## 2024-07-27 NOTE — CONSULTS
position and LMA. COMPARISON: CT HEAD W/O CONTRAST 5/31/2024, 3:16 PM MR HEAD W/+W/O 3/14/2024, 12:18 PM TECHNIQUE: Patient questionnaire was completed, and was reviewed by MRI personnel prior to the patient entering the scanner. Multiplanar, multisequence MR imaging of the head was performed with and without intravenous contrast. INTRA-PROCEDURE MEDS: Gadoterate Meglumine (DOTAREM) 10 MMOL/20ML solution 20 mL Route: Intravenous Push FINDINGS: Images degraded by motion artifact. Brain Parenchyma: Postsurgical changes to prior right frontal ventriculostomy catheter placement with cranioplasty over the right frontal defect. There is gliosis and hemosiderin staining along the prior ventriculostomy catheter tract. 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 No new intracranial mass. Small amount of intrinsic T1 hyperintensity with associated susceptibility artifact in the bilateral thalami likely representing blood products or mineralization/calcification. The white matter is within normal limits of signal intensity for age. Ventricles and Sulci: Enlargement of the lateral and third ventricles, grossly unchanged from CT dated 5/31/2024. The fourth ventricle is not enlarged. Mild periventricular T2/flair hyperintensity. Skull Base: Hypothalamic and pituitary region are grossly normal. Craniocervical junction is normal. No marrow replacement process. Vasculature: Major intracranial vessels have normal flow voids suggesting patency. 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 Extracranial structures: There is mild paranasal sinus mucosal thickening. The mastoid air cells are clear. The orbits and extracranial soft tissues are unremarkable. IMPRESSION: 1.  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 No new intracranial mass. 2.  Persistent mild hydrocephalus. The ventricular caliber is grossly unchanged since 5/31/2024. 3.  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

## 2024-07-27 NOTE — PROGRESS NOTES
Subjective:  The patient complains of ,\" moderate acute on chronic progressive fatigue and   weakness  partially relieved by rest, medications, PT,  OT,   SLP and rest and exacerbated by recent    events .      I am concerned about patient’s medical complexities and barriers to advancing in rehab goals including weakness .        I reviewed current care and plans for further care with other rehab providers including nursing and case management.  According to recent nursing note, \"  see notes  \".  Emy Barbour RN  Registered Nurse     Plan of Care     Signed     Date of Service: 7/27/2024  6:49 AM     Signed            Problem: Safety - Adult  Goal: Free from fall injury  Outcome: Progressing     Problem: Discharge Planning  Goal: Discharge to home or other facility with appropriate resources  Outcome: Progressing                    ROS x10:  The patient also complains of severely impaired mobility and activities of daily living.  Otherwise no new problems with vision, hearing, nose, mouth, throat, dermal, cardiovascular, GI, , pulmonary, musculoskeletal, psychiatric or neurological. See also Acute Rehab PM&R H&P.       Vital signs:  BP (!) 99/50   Pulse 99   Temp 98.2 °F (36.8 °C) (Oral)   Resp 14   Ht 1.854 m (6' 1\")   Wt 121.2 kg (267 lb 3.2 oz)   SpO2 99%   BMI 35.25 kg/m²   I/O:   PO/Intake:  fair PO intake,    diet    Bowel:   continent   Bladder: continent  no  ca  General:  Patient is well developed,   adequately nourished, and    well kempt.     HEENT:    Pupils equal, hearing intact to loud voice, external inspection of ear and nose benign.  Inspection of lips, tongue and gums benign    Musculoskeletal: No significant change in strength or tone.  All joints stable.      Inspection and palpation of digits and nails show no clubbing, cyanosis or inflammatory conditions.   Neuro/Psychiatric: Affect: flat but pleasant.  Alert and oriented to person, place and situation with    cues.  No  bedside therapies prn.   Bowel and Bladder dysfunction  , Neurogenic bowel and bladder:  frequent toileting, ambulate to bathroom with assistance, check post void residuals.  Check for C.difficile x1 if >2 loose stools in 24 hours, continue bowel & bladder program.  Monitor bowel and bladder function.  Lactinex 2 PO every AC.  MOM prn, Brown Bomb prn, Glycerin suppository prn, enema prn.  Encourage therapy and nursing to co-treat and problem solve re continence.    Moderate back pain,  , as well as generalized OA pain: reassess pain every shift and prior to and after each therapy session, give prn Tylenol and consider scheduled Tylenol, modalities prn in therapy, masage, Lidoderm, K-pad prn. Consider scheduled AM pain meds.  Skin healing   and breakdown risk:  continue pressure relief program.  Daily skin exams and reports from nursing.  Fatigue due to nutritional and hydration deficiency: Add and titrate vitamin B12 vitamin D and CoQ10 continue to monitor I&O’s, calorie counts prn, dietary consult prn.  Add healthy snack at night.  Acute episodic insomnia with situational adjustment disorder:  prn Ambien, monitor for day time sedation.  Falls risk elevated:  patient to use call light to get nursing assistance to get up, bed and chair alarm.  Elevated DVT risk: progressive activities in PT, continue prophylaxis RADAMES hose, elevation and meds-see MAR  .   Oral pharyngeal dysphagia-up in a degrees of feeds-modified diet as needed.  Complex discharge planning:  Weekly team meeting every Monday to re-assess progress towards goals, discuss and address social, psychological and medical comorbidities and to address difficulties they may be having progressing in therapy.  Patient and family education is in progress.  The patient is to follow-up with their family physician after discharge.        Complex Active General Medical Issues that complicate care Assess & Plan:    Patient Active Problem List   Diagnosis    Bladder

## 2024-07-27 NOTE — PROGRESS NOTES
Subjective:  The patient complains of ,\" moderate acute on chronic progressive fatigue and   weakness  partially relieved by rest, medications, PT,  OT,   SLP and rest and exacerbated by recent    events .      I am concerned about patient’s medical complexities and barriers to advancing in rehab goals including weakness .        I reviewed current care and plans for further care with other rehab providers including nursing and case management.  According to recent nursing note, \"  see notes  \".    ROS x10:  The patient also complains of severely impaired mobility and activities of daily living.  Otherwise no new problems with vision, hearing, nose, mouth, throat, dermal, cardiovascular, GI, , pulmonary, musculoskeletal, psychiatric or neurological. See also Acute Rehab PM&R H&P.       Vital signs:  /65   Pulse 66   Temp 97.5 °F (36.4 °C)   Resp 20   Ht 1.854 m (6' 1\")   Wt 121.2 kg (267 lb 3.2 oz)   SpO2 99%   BMI 35.25 kg/m²   I/O:   PO/Intake:  fair PO intake,    diet    Bowel:   continent   Bladder: continent  no  ca  General:  Patient is well developed,   adequately nourished, and    well kempt.     HEENT:    Pupils equal, hearing intact to loud voice, external inspection of ear and nose benign.  Inspection of lips, tongue and gums benign    Musculoskeletal: No significant change in strength or tone.  All joints stable.      Inspection and palpation of digits and nails show no clubbing, cyanosis or inflammatory conditions.   Neuro/Psychiatric: Affect: flat but pleasant.  Alert and oriented to person, place and situation with    cues.  No significant change in deep tendon reflexes or sensation  Lungs:  Diminished, CTA-B. Respiration effort is   normal at rest.     Heart:   S1 = S2,   RRR.       Abdomen:  Soft, non-tender, no enlargement of liver or spleen.  Extremities:    lower extremity edema and tenderness   Skin:   Intact to general survey,      Rehabilitation:  Physical Therapy:   Bed  loose stools in 24 hours, continue bowel & bladder program.  Monitor bowel and bladder function.  Lactinex 2 PO every AC.  MOM prn, Brown Bomb prn, Glycerin suppository prn, enema prn.  Encourage therapy and nursing to co-treat and problem solve re continence.    Moderate back pain,  , as well as generalized OA pain: reassess pain every shift and prior to and after each therapy session, give prn Tylenol and consider scheduled Tylenol, modalities prn in therapy, masage, Lidoderm, K-pad prn. Consider scheduled AM pain meds.  Skin healing   and breakdown risk:  continue pressure relief program.  Daily skin exams and reports from nursing.  Fatigue due to nutritional and hydration deficiency: Add and titrate vitamin B12 vitamin D and CoQ10 continue to monitor I&O’s, calorie counts prn, dietary consult prn.  Add healthy snack at night.  Acute episodic insomnia with situational adjustment disorder:  prn Ambien, monitor for day time sedation.  Falls risk elevated:  patient to use call light to get nursing assistance to get up, bed and chair alarm.  Elevated DVT risk: progressive activities in PT, continue prophylaxis RADAMES hose, elevation and meds-see MAR  .   Oral pharyngeal dysphagia-up in a degrees of feeds-modified diet as needed.  Complex discharge planning:  Weekly team meeting every Monday to re-assess progress towards goals, discuss and address social, psychological and medical comorbidities and to address difficulties they may be having progressing in therapy.  Patient and family education is in progress.  The patient is to follow-up with their family physician after discharge.        Complex Active General Medical Issues that complicate care Assess & Plan:    Patient Active Problem List   Diagnosis    Bladder stones    Surgical aftercare, genitourinary system    Gross hematuria    SOB (shortness of breath)    Brain bleed (HCC)    Syncope and collapse    Athscl heart disease of native coronary artery w/o ang pctrs

## 2024-07-27 NOTE — PROGRESS NOTES
Patient is lying in bed sleeping.  Arousable.  Denies any acute pain or discomfort.  He continues to have liquid diarrhea.  About 500 mL of liquid stool since this morning    Patient is lying in bed, no distress.  Pale skin and buccal mucosa.  Chest is clear, heart regular.  Abdomen soft    *Hypotension, likely secondary to volume loss secondary to diarrhea in the setting of low albumin and low oncotic pressure causing the patient to have third spacing.  .  My plan is to infuse albumin today and may be daily  .  Normal saline infusion 500 mm  Additional volume management to be addressed by nephrology team  .  Avoid excessive fluid removal with dialysis    *DVT prophylaxis.  Patient risk of DVT is based on his functional and ambulation status that is monitored and managed by physiatry.  History of brain bleed.  Patient is at risk of re bleed  History of lymphoma.  Patient is at risk of thrombosis.  Pharmacological DVT prophylaxis is to be addressed by rehab physician  (based on his ambulation status  ) in collaboration with the oncology team (already consulted ) based on his risk of DVT versus risk of clot versus risk of bleed.  Decision has been deferred.    *Functional impairment.  Rehabilitation, PT OT are to be addressed by physiatrist and the rehab team     *SILVINA, now requiring hemodialysis.  Electrolytes, volume, acid-base balance are to be addressed by nephrology team.  Avoid nephrotoxic drugs hoping that his kidney function will recover within the next 2 weeks.     *Lymphoma.  Patient is to follow-up with his oncology team.  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     *Cardiomyopathy.  Hypovolemic at this time.  Volume status is to be addressed by nephrology team.  Fluid removal to keep him in a euvolemic state.  Patient is on beta-blocker.  Patient is off Entresto due to acute kidney failure with potential reversibility.

## 2024-07-27 NOTE — PROGRESS NOTES
Physical Therapy Missed Treatment   Facility/Department: [facility] MED SURG R251/R251-01    NAME: Km Garcia    : 1945 (78 y.o.)  MRN: [id number]    Account: [id number]  Gender: male            [x] Patient Unavailable: patient requests PTA come back later as he is attempting a bowel movement.   Patient missed 50 min of tx dt above request and shortened tx session d/t patient needs cleaned up, anal tube checked. Direct handoff to nursing staff.                Electronically signed by Amisha Thorne PTA on 24 at 12:32 PM EDT

## 2024-07-27 NOTE — PROGRESS NOTES
OCCUPATIONAL THERAPY  INPATIENT REHAB TREATMENT NOTE  [facility]      NAME: Km Garcia  : 1945 (78 y.o.)  MRN: [id number]  CODE STATUS: Full Code  Room: R251/R251-01    Date of Service: 2024    Referring Physician: Dr Stevens for Dr Mao  Rehab Diagnosis: Impaired mobility and ADL's due to Impaired mobility and ADLs due to Polyneuropathy    Hospital course:   Comments: 78 y.o. male patient with recent complicated medical history who presented to [facility] ER 7/15 after experiencing a syncopal episode while on the toilet. Patient with recent initiation of Entresto and affected blood pressure. Additionally, patient with increased lower extremity edema. Patient admitted for additional medical management. Cardiology and Oncology consulted. Course c/b SILVINA requiring new initiation of HD      Restrictions  Restrictions/Precautions  Restrictions/Precautions: Fall Risk                 Patient's date of birth confirmed: Yes    SAFETY:  Safety Devices  Safety Devices in place: Yes  Type of devices: All fall risk precautions in place    SUBJECTIVE: Pt agreeable to OT treatment.       Pain at start of treatment: No    Pain at end of treatment: No    Location: N/A  Description: N/A  Nursing notified: Not Applicable  Nurse: Marsha  Intervention: None    COGNITION:  Orientation  Overall Orientation Status: Within Functional Limits  Orientation Level: Oriented to place;Oriented to time;Oriented to situation;Oriented to person  Cognition  Overall Cognitive Status: Exceptions  Arousal/Alertness: Appropriate responses to stimuli  Following Commands: Follows one step commands with increased time  Attention Span: Difficulty dividing attention  Memory: Decreased recall of recent events;Decreased recall of precautions  Safety Judgement: Decreased awareness of need for safety;Decreased awareness of need for assistance  Problem Solving: Assistance required to implement solutions;Assistance required to

## 2024-07-27 NOTE — PROGRESS NOTES
OCCUPATIONAL THERAPY  INPATIENT REHAB TREATMENT NOTE  [facility]      NAME: Km Garcia  : 1945 (78 y.o.)  MRN: [id number]  CODE STATUS: Full Code  Room: R251/R251-01    Date of Service: 2024    Referring Physician: Dr Stevens for Dr Mao  Rehab Diagnosis: Impaired mobility and ADL's due to Impaired mobility and ADLs due to Polyneuropathy    Hospital course:   Comments: 78 y.o. male patient with recent complicated medical history who presented to [facility] ER 7/15 after experiencing a syncopal episode while on the toilet. Patient with recent initiation of Entresto and affected blood pressure. Additionally, patient with increased lower extremity edema. Patient admitted for additional medical management. Cardiology and Oncology consulted. Course c/b SILVINA requiring new initiation of HD      Restrictions  Restrictions/Precautions  Restrictions/Precautions: Fall Risk                 Patient's date of birth confirmed: Yes    SAFETY:  Safety Devices  Safety Devices in place: Yes  Type of devices: All fall risk precautions in place    SUBJECTIVE:       Pain at start of treatment: Yes: Pt. unable to rate pain-      Pain at end of treatment: Yes: Pt. unable to rate pain-      Location: L foot  Description: uncomfortable  Nursing notified: Yes  Nurse: Marsha  Intervention: Repositioned    COGNITION:  Orientation  Overall Orientation Status: Within Functional Limits  Orientation Level: Oriented to place;Oriented to time;Oriented to situation;Oriented to person  Cognition  Overall Cognitive Status: Exceptions  Arousal/Alertness: Appropriate responses to stimuli  Following Commands: Follows one step commands with increased time  Attention Span: Difficulty dividing attention  Memory: Decreased recall of recent events;Decreased recall of precautions  Safety Judgement: Decreased awareness of need for safety;Decreased awareness of need for assistance  Problem Solving: Assistance required to implement  Strengthening, Functional mobility training, Endurance training, Patient/Caregiver education & training, Self-Care / ADL, Safety education & training, Home management training, Equipment evaluation, education, & procurement, Coordination training       Patient goals : \"get up and walk again\"  Time Frame for Long Term Goals : Within 2 weeks patient to demonstrate progress in the following areas in order to meet long term goals as stated in the initial evaluation  Long Term Goal 1: improve self care status  Long Term Goal 2: improve coordination and strength of UEs  Long Term Goal 3: improve balance to complete daily tasks    Therapy Time:   Individual Group Co-Treat   Time In 1300       Time Out 1330         Minutes 30         Therapeutic activities: 30 minutes     Electronically signed by:    CHARLETTE Bullock,   7/27/2024, 3:12 PM

## 2024-07-28 LAB
GLUCOSE BLD-MCNC: 129 MG/DL (ref 70–99)
GLUCOSE BLD-MCNC: 145 MG/DL (ref 70–99)
GLUCOSE BLD-MCNC: 158 MG/DL (ref 70–99)
GLUCOSE BLD-MCNC: 97 MG/DL (ref 70–99)
PERFORMED ON: ABNORMAL
PERFORMED ON: NORMAL

## 2024-07-28 PROCEDURE — 97535 SELF CARE MNGMENT TRAINING: CPT

## 2024-07-28 PROCEDURE — 6370000000 HC RX 637 (ALT 250 FOR IP): Performed by: STUDENT IN AN ORGANIZED HEALTH CARE EDUCATION/TRAINING PROGRAM

## 2024-07-28 PROCEDURE — 1180000000 HC REHAB R&B

## 2024-07-28 PROCEDURE — 6370000000 HC RX 637 (ALT 250 FOR IP): Performed by: INTERNAL MEDICINE

## 2024-07-28 PROCEDURE — 99232 SBSQ HOSP IP/OBS MODERATE 35: CPT | Performed by: NURSE PRACTITIONER

## 2024-07-28 PROCEDURE — 6370000000 HC RX 637 (ALT 250 FOR IP): Performed by: PHYSICAL MEDICINE & REHABILITATION

## 2024-07-28 PROCEDURE — 6370000000 HC RX 637 (ALT 250 FOR IP): Performed by: NURSE PRACTITIONER

## 2024-07-28 PROCEDURE — 97110 THERAPEUTIC EXERCISES: CPT

## 2024-07-28 RX ADMIN — CHOLESTYRAMINE 4 G: 4 POWDER, FOR SUSPENSION ORAL at 21:56

## 2024-07-28 RX ADMIN — ACETAMINOPHEN 650 MG: 325 TABLET ORAL at 21:55

## 2024-07-28 RX ADMIN — MIDODRINE HYDROCHLORIDE 5 MG: 5 TABLET ORAL at 17:20

## 2024-07-28 RX ADMIN — DIPHENOXYLATE HYDROCHLORIDE AND ATROPINE SULFATE 1 TABLET: 2.5; .025 TABLET ORAL at 09:49

## 2024-07-28 RX ADMIN — MIDODRINE HYDROCHLORIDE 5 MG: 5 TABLET ORAL at 12:25

## 2024-07-28 RX ADMIN — DIPHENOXYLATE HYDROCHLORIDE AND ATROPINE SULFATE 1 TABLET: 2.5; .025 TABLET ORAL at 21:55

## 2024-07-28 RX ADMIN — PSYLLIUM HUSK 1 PACKET: 3.4 POWDER ORAL at 09:49

## 2024-07-28 RX ADMIN — METOPROLOL TARTRATE 12.5 MG: 25 TABLET, FILM COATED ORAL at 21:55

## 2024-07-28 RX ADMIN — VALACYCLOVIR 500 MG: 1 TABLET, FILM COATED ORAL at 17:20

## 2024-07-28 RX ADMIN — TAMSULOSIN HYDROCHLORIDE 0.4 MG: 0.4 CAPSULE ORAL at 09:49

## 2024-07-28 RX ADMIN — INSULIN GLARGINE 10 UNITS: 100 INJECTION, SOLUTION SUBCUTANEOUS at 21:30

## 2024-07-28 RX ADMIN — CHOLESTYRAMINE 4 G: 4 POWDER, FOR SUSPENSION ORAL at 09:49

## 2024-07-28 RX ADMIN — METOPROLOL TARTRATE 12.5 MG: 25 TABLET, FILM COATED ORAL at 09:49

## 2024-07-28 ASSESSMENT — PAIN SCALES - GENERAL: PAINLEVEL_OUTOF10: 7

## 2024-07-28 ASSESSMENT — PAIN DESCRIPTION - LOCATION: LOCATION: KNEE

## 2024-07-28 ASSESSMENT — PAIN DESCRIPTION - DESCRIPTORS: DESCRIPTORS: ACHING

## 2024-07-28 ASSESSMENT — PAIN DESCRIPTION - ORIENTATION: ORIENTATION: RIGHT;LEFT

## 2024-07-28 ASSESSMENT — PAIN SCALES - WONG BAKER: WONGBAKER_NUMERICALRESPONSE: NO HURT

## 2024-07-28 NOTE — PROGRESS NOTES
Gastroenterology Progress Note    Km Garcia is a 78 y.o. male patient.  Hospitalization Day:3    Chief C/O: diarrhea    SUBJECTIVE: Seen and examined, persistent soft stools 2-3 a day, no abdominal pain, no fever or chills, wife reports prior negative C. difficile and GI panel studies, she states that his symptoms started after the initiation of dialysis.  He has been initiated on Imodium, cholestyramine, and fiber.    ROS:  Gastrointestinal ROS: no abdominal pain, change in bowel habits, or black or bloody stools    Physical    VITALS:  BP (!) 111/48   Pulse 69   Temp 99 °F (37.2 °C) (Oral)   Resp 17   Ht 1.854 m (6' 1\")   Wt 116.8 kg (257 lb 8 oz)   SpO2 98%   BMI 33.97 kg/m²   TEMPERATURE:  Current - Temp: 99 °F (37.2 °C); Max - Temp  Av.6 °F (37 °C)  Min: 98.4 °F (36.9 °C)  Max: 99 °F (37.2 °C)    General:  Alert and oriented,  No apparent distress  Skin- without jaundice  Eyes: anicteric sclera  Cardiac: RRR, Nl s1s2, without murmurs  Lungs CTA Bilaterally, normal effort  Abdomen soft, ND, NT, no HSM, Bowel sounds normal  Ext: without edema  Neuro: no asterixis     Data    Data Review:    No results for input(s): \"WBC\", \"HGB\", \"HCT\", \"MCV\", \"PLT\" in the last 72 hours.  No results for input(s): \"NA\", \"K\", \"CL\", \"CO2\", \"PHOS\", \"BUN\", \"CREATININE\" in the last 72 hours.    Invalid input(s): \"CA\"  No results for input(s): \"AST\", \"ALT\", \"BILIDIR\", \"BILITOT\", \"ALKPHOS\" in the last 72 hours.    Invalid input(s): \"ALB\"  No results for input(s): \"LIPASE\", \"AMYLASE\" in the last 72 hours.  No results for input(s): \"PROTIME\", \"INR\" in the last 72 hours.        ASSESSMENT:  77 y/o male admitted to acute rehab for generalized deconditioning, impaired mobility and ADLs due to polyneuropathy.  GI was asked to evaluate persistent diarrhea.  Patient reports 1 month history of 2-3 soft bowel movements daily, no blood or mucus, no abdominal pain, no unintentional weight loss.  Symptoms started prior to arrival to

## 2024-07-28 NOTE — PROGRESS NOTES
OCCUPATIONAL THERAPY  INPATIENT REHAB TREATMENT NOTE  [facility]      NAME: Km Garcia  : 1945 (78 y.o.)  MRN: [id number]  CODE STATUS: Full Code  Room: R251/R251-01    Date of Service: 2024    Referring Physician: Dr Stevens for Dr Mao  Rehab Diagnosis: Impaired mobility and ADL's due to Impaired mobility and ADLs due to Polyneuropathy    Hospital course:   Comments: 78 y.o. male patient with recent complicated medical history who presented to [facility] ER 7/15 after experiencing a syncopal episode while on the toilet. Patient with recent initiation of Entresto and affected blood pressure. Additionally, patient with increased lower extremity edema. Patient admitted for additional medical management. Cardiology and Oncology consulted. Course c/b SILVINA requiring new initiation of HD      Restrictions  Restrictions/Precautions  Restrictions/Precautions: Fall Risk                 Patient's date of birth confirmed: Yes    SAFETY:  Safety Devices  Safety Devices in place: Yes  Type of devices: All fall risk precautions in place    SUBJECTIVE:       Pain at start of treatment: No    Pain at end of treatment: No  COGNITION:  Orientation  Overall Orientation Status: Within Normal Limits  Cognition  Overall Cognitive Status: WFL    OBJECTIVE:  Pt seen in room while in bed and engaged in BUE strengthening exercises.  Tolerated tx well.      OT Exercises  Exercise Treatment: Pt participated in BUE strengthening exercises using 2# dumbbell and completed 20 reps x 2 of elbow flex/extension, shoulder flex/ext, horizontal add/abd, scapular protraction/retraction, wrist flexion/extension and supination/pronation. Rest break x 3. Pain in L shoulder with shoulder flexion/extension so exercise was completed against gravity without weight.  Pt tolerated well.    Education:  Education  Education Given To: Patient  Education Provided: Home Exercise Program  Education Provided Comments: educated on proper

## 2024-07-28 NOTE — ONCOLOGY
Patient had acute intracranial hemorrhage while on Eliquis in 4/2024.  IVC filter placed for previous PE.  I feel risk exceeds benefit if further anti coagulation use.

## 2024-07-28 NOTE — PROGRESS NOTES
Subjective:  The patient complains of ,\" moderate acute on chronic progressive fatigue and   weakness  partially relieved by rest, medications, PT,  OT,   SLP and rest and exacerbated by recent    events .      I am concerned about patient’s medical complexities and barriers to advancing in rehab goals including weakness .        I reviewed current care and plans for further care with other rehab providers including nursing and case management.  According to recent nursing note, \"  see notes  \".   Pattie Shirley RN  Registered Nurse     Plan of Care     Signed     Date of Service: 7/28/2024 12:50 PM     Signed            Problem: Safety - Adult  Goal: Free from fall injury  Outcome: Progressing     Problem: Discharge Planning  Goal: Discharge to home or other facility with appropriate resources  Outcome: Progressing     Problem: Pain  Goal: Verbalizes/displays adequate comfort level or baseline comfort level  Outcome: Progressing     Problem: Skin/Tissue Integrity  Goal: Absence of new skin breakdown  Description: 1.  Monitor for areas of redness and/or skin breakdown  2.  Assess vascular access sites hourly  3.  Every 4-6 hours minimum:  Change oxygen saturation probe site  4.  Every 4-6 hours:  If on nasal continuous positive airway pressure, respiratory therapy assess nares and determine need for appliance change or resting period.  Outcome: Progressing     Problem: ABCDS Injury Assessment  Goal: Absence of physical injury  Outcome: Progressing     Problem: Chronic Conditions and Co-morbidities  Goal: Patient's chronic conditions and co-morbidity symptoms are monitored and maintained or improved  Outcome: Progressing     Problem: Nutrition Deficit:  Goal: Optimize nutritional status  Outcome: Progressin                   ROS x10:  The patient also complains of severely impaired mobility and activities of daily living.  Otherwise no new problems with vision, hearing, nose, mouth, throat, dermal,  PLMT/REPLACE/REMOVAL    1.    Successful placement of a temporary central venous dialysis catheter in the right internal jugular vein for dialysis. Note that this is a temporary dialysis catheter which must be removed or converted to a tunneled dialysis catheter prior to patient discharge. Radiation dose: 3.12 mGy Additional clinical data: Patient with acute kidney failure now requiring dialysis Procedure: 1.   Ultrasound guidance for vascular access. 2.   Fluoroscopic guidance for placement of a central line. 3.   Placement of a central venous line using the right internal jugular vein. Body of Report: Pre-procedure evaluation confirmed that the patient was an appropriate candidate for conscious sedation.  Adequate sedation was maintained during the entire procedure.  Vital signs, pulse oximetry, and response to verbal commands were monitored and recorded by the nurse throughout the procedure and the recovery period.  The flow sheet was placed in the medical record including the medications and dosages used.  The patient returned to baseline neurologic and physiologic status prior to leaving the department.  No immediate sedation related complications were noted.  Medication for conscious sedation was administered via IV route. Informed and written consent was obtained from the patient following discussion of risks, benefits and alternatives to this procedure.  The was patient placed supine on the angiographic table.  The patient's neck and chest were then prepped and draped in normal sterile fashion.  A small amount of local lidocaine anesthesia was injected subcutaneously. Ultrasound was used to study the jugular vein we intended to use prior to accessing it.  The vein was patent.   Using ultrasound access, puncture was made of the right internal jugular vein using a 21 GA needle.  A wire was advanced into the IVC, a short incision was made at the puncture site and serial dilatation performed. The catheter was

## 2024-07-28 NOTE — PROGRESS NOTES
Physical Therapy Rehab Treatment Note  Facility/Department: [facility]  [facility]       NAME: Km Garcia  : 1945 (78 y.o.)  MRN: [id number]  CODE STATUS: Full Code    Date of Service: 2024       Restrictions:  Restrictions/Precautions: Fall Risk       SUBJECTIVE:   Subjective: Nurse reports patient's Recal tube came out last night when he had a BM. Wife present upon arrival.    Pain  Pain: Denies pain.      OBJECTIVE:             Roll Left  Assistance Level: Maximum assistance  Roll Right  Assistance Level: Maximum assistance                            ASSESSMENT/PROGRESS TOWARDS GOALS:   Assessment  Assessment: Treatment session ended early due to patient having a bowel movement after attempting to complete bed mobility. Patient requiring assistance from PCA to be cleaned up with treatment discontinued at this time. Patient missing 50min of PT this AM.    Goals:  Long Term Goals  Long Term Goal 1: Pt to complete bed mobility with Chriss  Long Term Goal 2: Pt to complete transfers with indep  Long Term Goal 3: Pt to ambulate 50-150ft with LRD and indep  Long Term Goal 4: Pt to manage 3 steps with HR and SBA    PLAN OF CARE/Safety:   Safety Devices  Type of Devices: All fall risk precautions in place;Nurse notified      Therapy Time:   Individual   Time In 1035   Time Out 1045   Minutes 10      Minutes:10  Transfer/Bed mobility training:10  Gait training:  Neuro re education:  Therapeutic ex:      Lisa Garcia PTA, 24 at 10:51 AM       Electronically signed by Lisa Garcia PTA on 2024 at 10:52 AM

## 2024-07-28 NOTE — PROGRESS NOTES
Physical Therapy Rehab Treatment Note  Facility/Department: [facility]  [facility]       NAME: Km Garcia  : 1945 (78 y.o.)  MRN: [id number]  CODE STATUS: Full Code    Date of Service: 2024       Restrictions:  Restrictions/Precautions: Fall Risk       SUBJECTIVE:   Subjective: Patient agreeable to participate, reports minor LB and ankle pain.    Pain  Pain: 5/10 LB/ankle- declined intervention.      OBJECTIVE:             Bed Mobility  Overall Assistance Level: Minimal Assistance  Roll Left  Assistance Level: Moderate assistance  Roll Right  Assistance Level: Maximum assistance  Sit to Supine  Assistance Level: Minimal assistance  Supine to Sit  Assistance Level: Moderate assistance  Skilled Clinical Factors: Vcs for sequencing, increased time to complete  Scooting  Assistance Level: Minimal assistance    Sit to Stand  Assistance Level: Moderate assistance;Requires x 2 assistance  Stand to Sit  Assistance Level: Moderate assistance;Requires x 2 assistance                PT Exercises  Exercise Treatment: Bright hamstring stretch 3/30sec  A/AROM Exercises: Seated: LAQ, marching, hip abduction/adduction MRE x10 ea- cues for technique and speed  Static Standing Balance Exercises: Stood at WW < 1min x 3 trials              ASSESSMENT/PROGRESS TOWARDS GOALS:   Assessment  Assessment: Patient agreeable to participate, able to complete sit to stand transfer with Mod A of two people. After initial stand, patient reports having a BM. Patient agreeable to continue as tolerated however declined to progress to ambulation. Limited standing tolerance due to incontinence as well as Bright knee soreness. Aide notified at end of session, patient missing 23 min of PT.    Goals:  Long Term Goals  Long Term Goal 1: Pt to complete bed mobility with Chriss  Long Term Goal 2: Pt to complete transfers with indep  Long Term Goal 3: Pt to ambulate 50-150ft with LRD and indep  Long Term Goal 4: Pt to manage 3 steps with HR and  SBA    PLAN OF CARE/Safety:   Safety Devices  Type of Devices: All fall risk precautions in place;Nurse notified;Bed alarm in place;Call light within reach      Therapy Time:   Individual   Time In 1118   Time Out 1145   Minutes 27      Minutes:27  Transfer/Bed mobility trainin  Gait training:  Neuro re education:  Therapeutic ex:      Lisa Garcia PTA, 24 at 12:33 PM     Electronically signed by Lisa Garcia PTA on 2024 at 12:33 PM

## 2024-07-28 NOTE — PROGRESS NOTES
Physical Therapy  Facility/Department: [facility] MED SURG W195/W195-01  Physical Therapy Discharge      NAME: Km Garcia    : 1945 (78 y.o.)  MRN: [id number]    Account: [id number]  Gender: male      Patient has been discharged from acute care hospital. DC patient from current PT program.      Electronically signed by Estephania Cadena PT on 24 at 11:35 AM EDT

## 2024-07-29 LAB
ALBUMIN SERPL-MCNC: 2.5 G/DL (ref 3.5–4.6)
ANION GAP SERPL CALCULATED.3IONS-SCNC: 12 MEQ/L (ref 9–15)
BACTERIA URNS QL MICRO: NEGATIVE /HPF
BASOPHILS # BLD: 0.1 K/UL (ref 0–0.2)
BASOPHILS NFR BLD: 1 %
BILIRUB UR QL STRIP: NEGATIVE
BUN SERPL-MCNC: 32 MG/DL (ref 8–23)
CALCIUM SERPL-MCNC: 7.8 MG/DL (ref 8.5–9.9)
CHLORIDE SERPL-SCNC: 100 MEQ/L (ref 95–107)
CLARITY UR: ABNORMAL
CO2 SERPL-SCNC: 22 MEQ/L (ref 20–31)
COLOR UR: YELLOW
CREAT SERPL-MCNC: 2.46 MG/DL (ref 0.7–1.2)
EOSINOPHIL # BLD: 0.3 K/UL (ref 0–0.7)
EOSINOPHIL NFR BLD: 2 %
EPI CELLS #/AREA URNS AUTO: ABNORMAL /HPF (ref 0–5)
ERYTHROCYTE [DISTWIDTH] IN BLOOD BY AUTOMATED COUNT: 13.1 % (ref 11.5–14.5)
GLUCOSE BLD-MCNC: 122 MG/DL (ref 70–99)
GLUCOSE BLD-MCNC: 138 MG/DL (ref 70–99)
GLUCOSE BLD-MCNC: 83 MG/DL (ref 70–99)
GLUCOSE BLD-MCNC: 83 MG/DL (ref 70–99)
GLUCOSE SERPL-MCNC: 78 MG/DL (ref 70–99)
GLUCOSE UR STRIP-MCNC: NEGATIVE MG/DL
HCT VFR BLD AUTO: 30.2 % (ref 42–52)
HGB BLD-MCNC: 10.2 G/DL (ref 14–18)
HGB UR QL STRIP: ABNORMAL
HYALINE CASTS #/AREA URNS AUTO: ABNORMAL /HPF (ref 0–5)
KETONES UR STRIP-MCNC: NEGATIVE MG/DL
LEUKOCYTE ESTERASE UR QL STRIP: ABNORMAL
LYMPHOCYTES # BLD: 1.7 K/UL (ref 1–4.8)
LYMPHOCYTES NFR BLD: 12 %
MCH RBC QN AUTO: 31.8 PG (ref 27–31.3)
MCHC RBC AUTO-ENTMCNC: 33.8 % (ref 33–37)
MCV RBC AUTO: 94.1 FL (ref 79–92.2)
MONOCYTES # BLD: 0.7 K/UL (ref 0.2–0.8)
MONOCYTES NFR BLD: 4.9 %
NEUTROPHILS # BLD: 11.2 K/UL (ref 1.4–6.5)
NEUTS SEG NFR BLD: 81 %
NITRITE UR QL STRIP: NEGATIVE
PERFORMED ON: ABNORMAL
PERFORMED ON: ABNORMAL
PERFORMED ON: NORMAL
PERFORMED ON: NORMAL
PH UR STRIP: 5.5 [PH] (ref 5–9)
PHOSPHATE SERPL-MCNC: 4.7 MG/DL (ref 2.3–4.8)
PLATELET # BLD AUTO: 323 K/UL (ref 130–400)
PLATELET BLD QL SMEAR: ADEQUATE
POTASSIUM SERPL-SCNC: 3.1 MEQ/L (ref 3.4–4.9)
PROT UR STRIP-MCNC: 30 MG/DL
RBC # BLD AUTO: 3.21 M/UL (ref 4.7–6.1)
RBC #/AREA URNS AUTO: ABNORMAL /HPF (ref 0–5)
SLIDE REVIEW: ABNORMAL
SODIUM SERPL-SCNC: 134 MEQ/L (ref 135–144)
SP GR UR STRIP: 1.01 (ref 1–1.03)
UROBILINOGEN UR STRIP-ACNC: 0.2 E.U./DL
WBC # BLD AUTO: 13.8 K/UL (ref 4.8–10.8)
WBC #/AREA URNS AUTO: >100 /HPF (ref 0–5)

## 2024-07-29 PROCEDURE — 99231 SBSQ HOSP IP/OBS SF/LOW 25: CPT | Performed by: PHYSICIAN ASSISTANT

## 2024-07-29 PROCEDURE — 6370000000 HC RX 637 (ALT 250 FOR IP): Performed by: PHYSICAL MEDICINE & REHABILITATION

## 2024-07-29 PROCEDURE — 99213 OFFICE O/P EST LOW 20 MIN: CPT

## 2024-07-29 PROCEDURE — 85025 COMPLETE CBC W/AUTO DIFF WBC: CPT

## 2024-07-29 PROCEDURE — 6370000000 HC RX 637 (ALT 250 FOR IP): Performed by: STUDENT IN AN ORGANIZED HEALTH CARE EDUCATION/TRAINING PROGRAM

## 2024-07-29 PROCEDURE — 97530 THERAPEUTIC ACTIVITIES: CPT

## 2024-07-29 PROCEDURE — 97535 SELF CARE MNGMENT TRAINING: CPT

## 2024-07-29 PROCEDURE — 6370000000 HC RX 637 (ALT 250 FOR IP): Performed by: INTERNAL MEDICINE

## 2024-07-29 PROCEDURE — 36415 COLL VENOUS BLD VENIPUNCTURE: CPT

## 2024-07-29 PROCEDURE — 6370000000 HC RX 637 (ALT 250 FOR IP): Performed by: NURSE PRACTITIONER

## 2024-07-29 PROCEDURE — 81001 URINALYSIS AUTO W/SCOPE: CPT

## 2024-07-29 PROCEDURE — 1180000000 HC REHAB R&B

## 2024-07-29 PROCEDURE — 87086 URINE CULTURE/COLONY COUNT: CPT

## 2024-07-29 PROCEDURE — 97110 THERAPEUTIC EXERCISES: CPT

## 2024-07-29 PROCEDURE — 80069 RENAL FUNCTION PANEL: CPT

## 2024-07-29 RX ORDER — CIPROFLOXACIN 250 MG/1
250 TABLET, FILM COATED ORAL EVERY 12 HOURS SCHEDULED
Status: COMPLETED | OUTPATIENT
Start: 2024-07-29 | End: 2024-07-29

## 2024-07-29 RX ORDER — TRAMADOL HYDROCHLORIDE 50 MG/1
50 TABLET ORAL EVERY 6 HOURS PRN
Status: DISCONTINUED | OUTPATIENT
Start: 2024-07-29 | End: 2024-08-02 | Stop reason: HOSPADM

## 2024-07-29 RX ORDER — DIPHENOXYLATE HYDROCHLORIDE AND ATROPINE SULFATE 2.5; .025 MG/1; MG/1
1 TABLET ORAL 2 TIMES DAILY PRN
Status: DISCONTINUED | OUTPATIENT
Start: 2024-07-29 | End: 2024-08-02 | Stop reason: HOSPADM

## 2024-07-29 RX ADMIN — METOPROLOL TARTRATE 12.5 MG: 25 TABLET, FILM COATED ORAL at 20:58

## 2024-07-29 RX ADMIN — CHOLESTYRAMINE 4 G: 4 POWDER, FOR SUSPENSION ORAL at 09:25

## 2024-07-29 RX ADMIN — DIPHENOXYLATE HYDROCHLORIDE AND ATROPINE SULFATE 1 TABLET: 2.5; .025 TABLET ORAL at 08:20

## 2024-07-29 RX ADMIN — PSYLLIUM HUSK 1 PACKET: 3.4 POWDER ORAL at 11:05

## 2024-07-29 RX ADMIN — METOPROLOL TARTRATE 12.5 MG: 25 TABLET, FILM COATED ORAL at 08:20

## 2024-07-29 RX ADMIN — TRAMADOL HYDROCHLORIDE 50 MG: 50 TABLET ORAL at 18:46

## 2024-07-29 RX ADMIN — CHOLESTYRAMINE 4 G: 4 POWDER, FOR SUSPENSION ORAL at 20:58

## 2024-07-29 RX ADMIN — VALACYCLOVIR 500 MG: 1 TABLET, FILM COATED ORAL at 18:47

## 2024-07-29 RX ADMIN — TAMSULOSIN HYDROCHLORIDE 0.4 MG: 0.4 CAPSULE ORAL at 08:20

## 2024-07-29 RX ADMIN — ACETAMINOPHEN 650 MG: 325 TABLET ORAL at 20:59

## 2024-07-29 RX ADMIN — CIPROFLOXACIN HYDROCHLORIDE 250 MG: 250 TABLET, FILM COATED ORAL at 18:46

## 2024-07-29 RX ADMIN — INSULIN GLARGINE 10 UNITS: 100 INJECTION, SOLUTION SUBCUTANEOUS at 20:58

## 2024-07-29 ASSESSMENT — ENCOUNTER SYMPTOMS: APNEA: 0

## 2024-07-29 ASSESSMENT — PAIN SCALES - WONG BAKER
WONGBAKER_NUMERICALRESPONSE: NO HURT
WONGBAKER_NUMERICALRESPONSE: NO HURT

## 2024-07-29 ASSESSMENT — PAIN DESCRIPTION - LOCATION
LOCATION: KNEE;BACK
LOCATION: LEG;KNEE;FOOT

## 2024-07-29 ASSESSMENT — PAIN DESCRIPTION - ORIENTATION: ORIENTATION: RIGHT;LEFT

## 2024-07-29 ASSESSMENT — PAIN SCALES - GENERAL
PAINLEVEL_OUTOF10: 8
PAINLEVEL_OUTOF10: 6

## 2024-07-29 NOTE — PROGRESS NOTES
Wound Ostomy Continence Nurse  Consult Note       NAME:  Km Garcia  MEDICAL RECORD NUMBER:  [id number]  AGE: 78 y.o.   GENDER: male  : 1945  TODAY'S DATE:  2024    Subjective   Reason for WO Nurse Evaluation and Assessment: pressure injury to sacrum      Km Garcia is a 78 y.o. male referred by:   [x] Physician  [] Nursing  [] Other:     Wound Identification:  Wound Type: pressure  Contributing Factors: chronic pressure, decreased mobility, and shear force    Wound History: Patient admitted to [facility] on 24. Nursing noted open area to sacrum noted on 24.   Current Wound Care Treatment:  Recommending 1) Continue all pressure injury prevention interventions 2) low air loss pressure relieving mattress 3) zinc paste bid to open area on sacrum.    Patient Goal of Care:  [x] Wound Healing  [] Odor Control  [] Palliative Care  [] Pain Control   [] Other:         PAST MEDICAL HISTORY        Diagnosis Date    AMD (age related macular degeneration)     left eye only, gets steroid shots    Asthma     seasonally, uses inhaler as needed    Athscl heart disease of native coronary artery w/o ang pctrs 2024    Cerebrovascular accident (CVA) (HCC) 2024    Charcot ankle, left     wears brace    Diabetes mellitus (HCC)     takes metformin    History of DVT (deep vein thrombosis) 7/15/2024    Hypertension     on meds > 10 yrs    Osteoarthritis        PAST SURGICAL HISTORY    Past Surgical History:   Procedure Laterality Date    COLONOSCOPY      > 30 yrs ago    IR NONTUNNELED VASCULAR CATHETER Right 2024    Medcomp 11.5F x 15 cm [facility] Duo-Flow IJ double lumen catheter (LOT# [id number],  exp ) performed by Dr. Osborne    IR NONTUNNELED VASCULAR CATHETER  2024    IR NONTUNNELED VASCULAR CATHETER [place] [facility] [place]    LITHOTRIPSY N/A 2018    WITH HOLMIUM LASER performed by Antonio Petit [place] [facility] [place]    VASCULAR SURGERY Right 2024     Ultrasound-guided right internal jugular tunneled hemodialysis catheter. performed by Vahid Hu MD at [facility] OR       FAMILY HISTORY    Family History   Problem Relation Age of Onset    No Known Problems Mother     Heart Disease Father     No Known Problems Sister     Other Brother         enlarged prostate, cataracts    Diabetes Brother         borderline       SOCIAL HISTORY    Social History     Tobacco Use    Smoking status: Former     Current packs/day: 0.00     Types: Cigarettes     Quit date:      Years since quittin.5    Smokeless tobacco: Never   Vaping Use    Vaping Use: Never used   Substance Use Topics    Alcohol use: Yes     Comment: Drinks 1 drink every other day    Drug use: No       ALLERGIES    Allergies   Allergen Reactions    Celecoxib      Other reaction(s): GI Upset    Naproxen      Other reaction(s): GI Upset       MEDICATIONS    No current facility-administered medications on file prior to encounter.     Current Outpatient Medications on File Prior to Encounter   Medication Sig Dispense Refill    acetaminophen (TYLENOL) 500 MG tablet Take 1 tablet by mouth as needed for Pain      albuterol (PROVENTIL) (2.5 MG/3ML) 0.083% nebulizer solution Take 3 mLs by nebulization as needed for Wheezing      carvedilol (COREG) 25 MG tablet Take 1 tablet by mouth 2 times daily (with meals)      enoxaparin (LOVENOX) 120 MG/0.8ML injection Inject into the skin 2 times daily      furosemide (LASIX) 20 MG tablet Take 1 tablet by mouth daily      hydrALAZINE (APRESOLINE) 25 MG tablet Take 1 tablet by mouth as needed (prn if SBP >120)      zinc oxide (TRIAD HYDROPHILIC) PSTE paste Apply topically daily      ibrutinib (IMBRUVICA) 280 MG tablet Take 2 tablets by mouth daily      empagliflozin (JARDIANCE) 25 MG tablet Take 1 tablet by mouth daily      lenalidomide (REVLIMID) 10 MG chemo capsule Take 1 capsule by mouth daily      ondansetron (ZOFRAN-ODT) 4 MG disintegrating tablet Take 1 tablet by

## 2024-07-29 NOTE — PROGRESS NOTES
[facility] Rehabiltation      NAME:  Km Garcia  ROOM: R251/R251-01  :  1945  DATE: 2024    Attempted to see Km Garcia on this date for:   []  Initial Evaluation   [x]  Scheduled therapy (60 min)   []  Make-up therapy   []  Group therapy   []  Family training    Patient was unable to be seen due to:  [x] Patient and family refused tx, stating \"he just had PT and OT\" and also reporting pt should be leaving for dialysis at \"any time\". Encouraged pt participation in tx to his tolerance until transport arrived, pt and family continue to decline and request that pt rest at this time.         Electronically signed by JAH DELACRUZ PTA on 24 at 10:36 AM EDT

## 2024-07-29 NOTE — CARE COORDINATION
[facility]  INPATIENT REHABILITATION  TEAM CONFERENCE NOTE  Room: R251/R251-01  Admit Date: 2024       Date: 2024  Patient Name: Km Garcia        MRN: [id number]    : 1945  (78 y.o.)  Gender: male        REHAB DIAGNOSIS:   Diagnosis: Impaired mobility and ADL's due to Polyneuropathy. 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 Osborne    IR NONTUNNELED VASCULAR CATHETER  2024    IR NONTUNNELED VASCULAR CATHETER 2024 [facility] SPECIAL PROCEDURE    LITHOTRIPSY N/A 2018    WITH HOLMIUM LASER performed by Antonio Petit MD at [facility] OR    VASCULAR SURGERY Right 2024    Ultrasound-guided right internal jugular tunneled hemodialysis catheter. performed by Vahid Hu MD at [facility] OR        Restrictions  Restrictions/Precautions: Fall Risk  CASE MANAGEMENT    Social/Functional History  Social/Functional History  Lives With: Spouse  Type of Home: House  Home Layout: Two level, Able to Live on Main level with bedroom/bathroom (Patient goes to the basement at times to get tools--a flight straight down w/ one HR)  Home Access: Stairs to enter with rails  Entrance Stairs - Number of Steps: 2-3  Entrance Stairs - Rails:

## 2024-07-29 NOTE — PROGRESS NOTES
Physical Therapy Rehab Treatment Note  [place] [facility]  [facility]       NAME: Km Garcia  : 1945 (78 y.o.)  MRN: [id number]  CODE STATUS: Full Code    Date of Service: 2024       Restrictions:  Restrictions/Precautions: Fall Risk       SUBJECTIVE:   Subjective: Pt's wife present throughout session and reports pt has been tired. Pt declines any OOB activity but willing to perform ther ex with encouragement    Pain  Pain: 7/10 parag knees - declines intervention      OBJECTIVE:     PT Exercises  A/AROM Exercises: Supine: AP, QS (RLE only d/t bakers cyst on LLE), heel slides, hip abd x10 ea  , TA iso 3\" hold x5 - unable to tolerate x10 reps     ASSESSMENT/PROGRESS TOWARDS GOALS:   Assessment  Assessment: Tx limited secondary to wife speaking with nsg about pt's care and her concerns at beginning of session. Tx also limited by pt's tolerance d/t decreased endurance. Encouragement provided for OOB activity, pt declines. Supine ther ex utilized to continue to improve strength, endurance and overall functional mobility to progress toward goals and decrease risk of falls. VC/TC's provided throughout ther ex for good technique.    Goals:  Long Term Goals  Long Term Goal 1: Pt to complete bed mobility with Chriss  Long Term Goal 2: Pt to complete transfers with indep  Long Term Goal 3: Pt to ambulate 50-150ft with LRD and indep  Long Term Goal 4: Pt to manage 3 steps with HR and SBA    PLAN OF CARE/Safety:   Additional Comments: Cont. POC      Therapy Time:   Individual   Time In 1016   Time Out 1030   Minutes 14    16 mins missed d/t pt's wife speaking about pt's care with nsg   Minutes:  Transfer/Bed mobility trainin  Gait trainin  Neuro re education:0  Therapeutic ex:14      Aziza Guzman PTA, 24 at 12:09 PM

## 2024-07-29 NOTE — PROGRESS NOTES
Patient is no longer having diarrhea.  Rectal tube was removed.  Patient had loose stool yesterday but nothing this morning.  No abdominal pain.  No nausea or vomiting.    Patient is lying in bed, no distress.  Much more awake than previously seen.  Pale skin and buccal mucosa.  Chest is clear, heart regular.  Abdomen soft    *Hypotension, likely secondary to volume loss secondary to diarrhea in the setting of low albumin and low oncotic pressure causing the patient to have third spacing.  Stabilized.    *DVT prophylaxis.  Patient risk of DVT is based on his functional and ambulation status that is monitored and managed by physiatry.  History of brain bleed.  Patient is at risk of re bleed  History of lymphoma.  Patient is at risk of thrombosis.  Pharmacological DVT prophylaxis is to be addressed by rehab physician  (based on his ambulation status and risk of DVT ) in collaboration with the oncology team (already consulted ) based on his risk of bleed. Clots vs bleed.  Patient was seen by Dr. Mckeon who recommended against anticoagulation.  Decision has been deferred to both specialists.    *Functional impairment.  Rehabilitation, PT OT are to be addressed by physiatrist and the rehab team     *SILVINA, now requiring hemodialysis.  Electrolytes, volume, acid-base balance are to be addressed by nephrology team.  Avoid nephrotoxic drugs hoping that his kidney function will recover within the next 2 weeks.     *Lymphoma.  Patient is to follow-up with his oncology team.  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     *Cardiomyopathy.  Hypovolemic at this time.  Volume status is to be addressed by nephrology team.  Fluid removal to keep him in a euvolemic state.  Patient is on beta-blocker.  Patient is off Entresto due to acute kidney failure with potential reversibility.     *History of DVT, pulm embolism status post IVC filter

## 2024-07-29 NOTE — PROGRESS NOTES
Patient assessment completed. A&Ox3. Patient in 7/10 pain to both their knees. Tylenol given. LBM 7/26. Contreras patent, draining clear yellow urine, and no discomfort reported. Patient in bed, call light within reach, and bed alarm on. Zinc applied to bottom and genital area due to redness and excoriation. Medications given per MAR.  Pt denied any other needs at this time.

## 2024-07-29 NOTE — PLAN OF CARE
Problem: Safety - Adult  Goal: Free from fall injury  Outcome: Progressing  Flowsheets (Taken 7/29/2024 1403)  Free From Fall Injury: Instruct family/caregiver on patient safety     Problem: Discharge Planning  Goal: Discharge to home or other facility with appropriate resources  Outcome: Progressing  Flowsheets (Taken 7/29/2024 1403)  Discharge to home or other facility with appropriate resources:   Identify barriers to discharge with patient and caregiver   Arrange for needed discharge resources and transportation as appropriate   Identify discharge learning needs (meds, wound care, etc)   Arrange for interpreters to assist at discharge as needed   Refer to discharge planning if patient needs post-hospital services based on physician order or complex needs related to functional status, cognitive ability or social support system     Problem: Pain  Goal: Verbalizes/displays adequate comfort level or baseline comfort level  Outcome: Progressing  Flowsheets (Taken 7/29/2024 1403)  Verbalizes/displays adequate comfort level or baseline comfort level:   Encourage patient to monitor pain and request assistance   Assess pain using appropriate pain scale   Administer analgesics based on type and severity of pain and evaluate response   Implement non-pharmacological measures as appropriate and evaluate response   Consider cultural and social influences on pain and pain management   Notify Licensed Independent Practitioner if interventions unsuccessful or patient reports new pain     Problem: Skin/Tissue Integrity  Goal: Absence of new skin breakdown  Description: 1.  Monitor for areas of redness and/or skin breakdown  2.  Assess vascular access sites hourly  3.  Every 4-6 hours minimum:  Change oxygen saturation probe site  4.  Every 4-6 hours:  If on nasal continuous positive airway pressure, respiratory therapy assess nares and determine need for appliance change or resting period.  Outcome: Progressing     Problem:  ABCDS Injury Assessment  Goal: Absence of physical injury  Outcome: Progressing  Flowsheets (Taken 7/29/2024 1403)  Absence of Physical Injury: Implement safety measures based on patient assessment     Problem: Chronic Conditions and Co-morbidities  Goal: Patient's chronic conditions and co-morbidity symptoms are monitored and maintained or improved  Outcome: Progressing  Flowsheets (Taken 7/29/2024 1403)  Care Plan - Patient's Chronic Conditions and Co-Morbidity Symptoms are Monitored and Maintained or Improved:   Monitor and assess patient's chronic conditions and comorbid symptoms for stability, deterioration, or improvement   Collaborate with multidisciplinary team to address chronic and comorbid conditions and prevent exacerbation or deterioration   Update acute care plan with appropriate goals if chronic or comorbid symptoms are exacerbated and prevent overall improvement and discharge     Problem: Nutrition Deficit:  Goal: Optimize nutritional status  Outcome: Progressing  Nutrient intake appropriate for improving, restoring, or maintaining nutritional needs:   Assess nutritional status and recommend course of action   Monitor oral intake, labs, and treatment plans   Recommend appropriate diets, oral nutritional supplements, and vitamin/mineral supplements    Electronically signed by Gaby Rosales RN on 7/29/2024 at 2:04 PM

## 2024-07-29 NOTE — PROGRESS NOTES
Subjective:  The patient complains of ,\" moderate acute on chronic progressive fatigue and   weakness  partially relieved by rest, medications, PT,  OT,   SLP and rest and exacerbated by recent    events .      I am concerned about patient’s medical complexities and barriers to advancing in rehab goals including weakness .        I reviewed current care and plans for further care with other rehab providers including nursing and case management.  According to recent nursing note, \"  see notes  \".     Pattie Shirley, RN  Registered Nurse  Nursing     Progress Notes     Signed     Date of Service: 7/28/2024  2:49 PM     Signed         Removed 20 gauge saline lock from LAC, leaks when flushed, catheter intact. Contreras patent to gravity, urine clear yellow. Pt and spouse report that he has been having flatus but no additional BM's at this time. Call light in reach. Electronically signed by Pattie Shirley RN on 7/28/2024 at 2:51 PM               Gaby Rosales RN  Registered Nurse  Nursing     Progress Notes     Signed     Date of Service: 7/29/2024 10:23 AM     Signed         Patient's wife at bedside requesting to speak to nephrology regarding dialysis and states she has other questions/concerns. PS sent to Dr. Robert, at bedside, answered all questions     Per Dr. Robert canceling dialysis today, pending tomorrow, repeat BMP in AM, start strict I/O     Hospitalist NP notified K 3.1                      ROS x10:  The patient also complains of severely impaired mobility and activities of daily living.  Otherwise no new problems with vision, hearing, nose, mouth, throat, dermal, cardiovascular, GI, , pulmonary, musculoskeletal, psychiatric or neurological. See also Acute Rehab PM&R H&P.       Vital signs:  BP (!) 143/61   Pulse 67   Temp 98.1 °F (36.7 °C) (Oral)   Resp 18   Ht 1.854 m (6' 1\")   Wt 116.1 kg (255 lb 15.3 oz)   SpO2 100%   BMI 33.77 kg/m²   I/O:   PO/Intake:  fair PO intake,     unspecified    Pancreatic cyst    PAT (paroxysmal atrial tachycardia) (HCC)    Primary hypertension    Primary osteoarthritis of both knees    Primary CNS lymphoma (HCC)    Primary pulmonary hypertension (HCC)    Urinary retention    Late effect of brain injury (HCC)    SILVINA (acute kidney injury) (HCC)    UTI (urinary tract infection)    Syncope    HFrEF (heart failure with reduced ejection fraction) (HCC)    Atrial fibrillation (HCC)    Poorly controlled diabetes mellitus (HCC)    End stage renal disease (HCC)    Type 2 diabetes mellitus with chronic kidney disease on chronic dialysis, with long-term current use of insulin (HCC)    History of DVT (deep vein thrombosis)            Focus of today's plan-  Initiate and modify therapuetic plan to meet patients individual needs, add rest breaks as needed, and  see team   7/28  Rectal tube dc   Awaiting GI input, metamucil started   Check KUB  Cholestrium started, metamucil  7/29  Family concerned about tolerating therapies 104 minutes today , 361 this week day 5   Will refer to [facility] or SNF    MD Estephania Martinez D.O., PM&R     Attending    [phone]   [facility] [place]

## 2024-07-29 NOTE — PROGRESS NOTES
Patient's wife at bedside requesting to speak to nephrology regarding dialysis and states she has other questions/concerns. PS sent to Dr. Robert, at bedside, answered all questions    Per Dr. Robert canceling dialysis today, pending tomorrow, repeat BMP in AM, start strict I/O    Hospitalist NP notified K 3.1

## 2024-07-29 NOTE — PROGRESS NOTES
OCCUPATIONAL THERAPY  INPATIENT REHAB TREATMENT NOTE  [facility]      NAME: Km Garcia  : 1945 (78 y.o.)  MRN: [id number]  CODE STATUS: Full Code  Room: R251/R251-01    Date of Service: 2024    Referring Physician: Dr Stevens for Dr Mao  Rehab Diagnosis: Impaired mobility and ADL's due to Impaired mobility and ADLs due to Polyneuropathy    Hospital course:   Comments: 78 y.o. male patient with recent complicated medical history who presented to [facility] ER 7/15 after experiencing a syncopal episode while on the toilet. Patient with recent initiation of Entresto and affected blood pressure. Additionally, patient with increased lower extremity edema. Patient admitted for additional medical management. Cardiology and Oncology consulted. Course c/b SILVINA requiring new initiation of HD    Restrictions  Restrictions/Precautions  Restrictions/Precautions: Fall Risk      Patient's date of birth confirmed: Yes    SAFETY:  Safety Devices  Safety Devices in place: Yes  Type of devices: All fall risk precautions in place    SUBJECTIVE:  Subjective  Subjective: Pt requested to get washed up in bed.    Pain at start of treatment: Yes: 7/10    Pain at end of treatment: Yes: 7/10    Location: [facility] notified: Declined  Intervention: Other: Declined    COGNITION:  Orientation  Overall Orientation Status: Within Normal Limits  Orientation Level: Oriented X4  Cognition  Overall Cognitive Status: WFL  Arousal/Alertness: Appropriate responses to stimuli  Following Commands: Follows one step commands with increased time  Attention Span: Difficulty dividing attention  Memory: Decreased recall of recent events  Safety Judgement: Decreased awareness of need for safety;Decreased awareness of need for assistance  Insights: Decreased awareness of deficits  Initiation: Requires cues for some  Sequencing: Requires cues for some    OBJECTIVE:    Pt declined washing up in the bathroom and requested to

## 2024-07-29 NOTE — PROGRESS NOTES
Returned from cath lab to pre/post cath, alert and oriented. R groin remains stable, dressing is clean, dry, intact. VSS.     1830: R groin remains stable, no bleeding or hematoma. Report given to 1 Marcus RN. Transport requested for pt to return to [facility].

## 2024-07-29 NOTE — PROGRESS NOTES
Subjective:      Patient ID: Km Garcia is a 78 y.o. male    HPI78 year old male who's ca catheter is draining clear yellow urine. He voices no other urological complaints at this time.        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    Cerebrovascular accident (CVA) (HCC) 2024    Charcot ankle, left     wears brace    Diabetes mellitus (HCC)     takes metformin    History of DVT (deep vein thrombosis) 7/15/2024    Hypertension     on meds > 10 yrs    Osteoarthritis      Past Surgical History:   Procedure Laterality Date    COLONOSCOPY      > 30 yrs ago    IR NONTUNNELED VASCULAR CATHETER Right 2024    Medcomp 11.5F x 15 cm Raulerson Duo-Flow IJ double lumen catheter (LOT# [id number],  exp ) performed by Dr. Osborne    IR NONTUNNELED VASCULAR CATHETER  2024    IR NONTUNNELED VASCULAR CATHETER 2024 [facility]    LITHOTRIPSY N/A 2018    WITH HOLMIUM LASER performed by Antonio Petit MD at [facility]    VASCULAR SURGERY Right 2024    Ultrasound-guided right internal jugular tunneled hemodialysis catheter. performed by Vahid Hu MD at [facility]     Social History     Socioeconomic History    Marital status:      Spouse name: None    Number of children: None    Years of education: None    Highest education level: None   Tobacco Use    Smoking status: Former     Current packs/day: 0.00     Types: Cigarettes     Quit date:      Years since quittin.5    Smokeless tobacco: Never   Vaping Use    Vaping Use: Never used   Substance and Sexual Activity    Alcohol use: Yes     Comment: Drinks 1 drink every other day    Drug use: No   Social History Narrative    Lives With: Spouse Coby (wife is able to help pt at home)    Type of Home:

## 2024-07-29 NOTE — PROGRESS NOTES
Nephrology Progress Note    Assessment:  SILVINA possible obstructive component (CT showed non obstructive caliceal stones w/ bilateral pelviectasis w/ mild bilateral hydroureter), serology all negative, non-oliguric, now s/p tunneled dialysis catheter placement (7/25)  Left Renal stone 1.2 cm  Hx CVA  Hx Lymphoma  Nl GFR 6/21/2024   interstitial dx ?  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%  7/16/2024  Hematoma spleen 7 cm      Plan:  - holding HD today, tentative for tomorrow   - strict I/O   - BMP tomorrow        Patient Active Problem List:     Bladder stones     Surgical aftercare, genitourinary system     Gross hematuria     SOB (shortness of breath)     Brain bleed (HCC)     Syncope and collapse     Athscl heart disease of native coronary artery w/o ang pctrs     Compression of brain (HCC)     Cardiomyopathy (HCC)     Cerebrovascular accident (CVA) (HCC)     Coronary artery disease involving native coronary artery of native heart without angina pectoris     Diffuse large B-cell lymphoma, unspecified site (HCC)     Dysphagia, oropharyngeal phase     History of falling     History of traumatic brain injury     Hypokalemia     Impaired mobility and activities of daily living     Intraoperative floppy iris syndrome (IFIS)     Macular degeneration of both eyes     Mass of brain     Subdural hematoma (HCC)     Methotrexate renal toxicity     Methotrexate toxicity     Obstructive and reflux uropathy, unspecified     Pancreatic cyst     PAT (paroxysmal atrial tachycardia) (HCC)     Primary hypertension     Primary osteoarthritis of both knees     Primary CNS lymphoma (HCC)     Primary pulmonary hypertension (HCC)     Urinary retention     Late effect of brain injury (HCC)     SILVINA (acute kidney injury) (HCC)     UTI (urinary tract infection)     Syncope     HFrEF (heart failure with reduced ejection fraction) (HCC)     Atrial fibrillation (HCC)     Poorly controlled diabetes mellitus (HCC)     End stage renal disease (HCC)     Type 2 diabetes

## 2024-07-29 NOTE — PROGRESS NOTES
Order from Dr. Stevens to obtain UA from ca. Dr. Stevens made aware ca cannot be removed and replaced to obtain UA (as per urology do not remove ca). Dr. Stevens states okay to obtain UA from current indwelling ca port. Obtained and tubed to lab.    Dr. Stevens also notified patient verbalizing inadequate pain control with tylenol and flexeril, and per wife they have tried OTC topical medications for his pain with no relief. Order received for ultram.

## 2024-07-30 PROBLEM — K59.1 FUNCTIONAL DIARRHEA: Status: ACTIVE | Noted: 2024-07-30

## 2024-07-30 LAB
ALBUMIN SERPL-MCNC: 2.2 G/DL (ref 3.5–4.6)
ANION GAP SERPL CALCULATED.3IONS-SCNC: 11 MEQ/L (ref 9–15)
BUN SERPL-MCNC: 33 MG/DL (ref 8–23)
CALCIUM SERPL-MCNC: 7.7 MG/DL (ref 8.5–9.9)
CHLORIDE SERPL-SCNC: 101 MEQ/L (ref 95–107)
CO2 SERPL-SCNC: 21 MEQ/L (ref 20–31)
CREAT SERPL-MCNC: 2.56 MG/DL (ref 0.7–1.2)
GLUCOSE BLD-MCNC: 116 MG/DL (ref 70–99)
GLUCOSE BLD-MCNC: 123 MG/DL (ref 70–99)
GLUCOSE BLD-MCNC: 129 MG/DL (ref 70–99)
GLUCOSE BLD-MCNC: 97 MG/DL (ref 70–99)
GLUCOSE SERPL-MCNC: 81 MG/DL (ref 70–99)
PERFORMED ON: ABNORMAL
PERFORMED ON: NORMAL
PHOSPHATE SERPL-MCNC: 4.2 MG/DL (ref 2.3–4.8)
POTASSIUM SERPL-SCNC: 3.1 MEQ/L (ref 3.4–4.9)
SODIUM SERPL-SCNC: 133 MEQ/L (ref 135–144)

## 2024-07-30 PROCEDURE — 97535 SELF CARE MNGMENT TRAINING: CPT

## 2024-07-30 PROCEDURE — 6370000000 HC RX 637 (ALT 250 FOR IP): Performed by: NURSE PRACTITIONER

## 2024-07-30 PROCEDURE — 80069 RENAL FUNCTION PANEL: CPT

## 2024-07-30 PROCEDURE — 6370000000 HC RX 637 (ALT 250 FOR IP): Performed by: STUDENT IN AN ORGANIZED HEALTH CARE EDUCATION/TRAINING PROGRAM

## 2024-07-30 PROCEDURE — 36415 COLL VENOUS BLD VENIPUNCTURE: CPT

## 2024-07-30 PROCEDURE — 6370000000 HC RX 637 (ALT 250 FOR IP): Performed by: INTERNAL MEDICINE

## 2024-07-30 PROCEDURE — 99231 SBSQ HOSP IP/OBS SF/LOW 25: CPT | Performed by: NURSE PRACTITIONER

## 2024-07-30 PROCEDURE — 99232 SBSQ HOSP IP/OBS MODERATE 35: CPT | Performed by: INTERNAL MEDICINE

## 2024-07-30 PROCEDURE — 97530 THERAPEUTIC ACTIVITIES: CPT

## 2024-07-30 PROCEDURE — 6370000000 HC RX 637 (ALT 250 FOR IP): Performed by: PHYSICAL MEDICINE & REHABILITATION

## 2024-07-30 PROCEDURE — 1180000000 HC REHAB R&B

## 2024-07-30 RX ORDER — POTASSIUM CHLORIDE 20 MEQ/1
40 TABLET, EXTENDED RELEASE ORAL ONCE
Status: COMPLETED | OUTPATIENT
Start: 2024-07-30 | End: 2024-07-30

## 2024-07-30 RX ADMIN — CHOLESTYRAMINE 4 G: 4 POWDER, FOR SUSPENSION ORAL at 22:07

## 2024-07-30 RX ADMIN — PSYLLIUM HUSK 1 PACKET: 3.4 POWDER ORAL at 08:19

## 2024-07-30 RX ADMIN — MIDODRINE HYDROCHLORIDE 5 MG: 5 TABLET ORAL at 08:20

## 2024-07-30 RX ADMIN — METOPROLOL TARTRATE 12.5 MG: 25 TABLET, FILM COATED ORAL at 08:18

## 2024-07-30 RX ADMIN — VALACYCLOVIR 500 MG: 1 TABLET, FILM COATED ORAL at 17:47

## 2024-07-30 RX ADMIN — TAMSULOSIN HYDROCHLORIDE 0.4 MG: 0.4 CAPSULE ORAL at 08:18

## 2024-07-30 RX ADMIN — METOPROLOL TARTRATE 12.5 MG: 25 TABLET, FILM COATED ORAL at 22:07

## 2024-07-30 RX ADMIN — TRAMADOL HYDROCHLORIDE 50 MG: 50 TABLET ORAL at 08:19

## 2024-07-30 RX ADMIN — MIDODRINE HYDROCHLORIDE 5 MG: 5 TABLET ORAL at 12:24

## 2024-07-30 RX ADMIN — POTASSIUM CHLORIDE 40 MEQ: 1500 TABLET, EXTENDED RELEASE ORAL at 12:24

## 2024-07-30 RX ADMIN — TRAMADOL HYDROCHLORIDE 50 MG: 50 TABLET ORAL at 14:35

## 2024-07-30 RX ADMIN — CHOLESTYRAMINE 4 G: 4 POWDER, FOR SUSPENSION ORAL at 08:19

## 2024-07-30 ASSESSMENT — PAIN DESCRIPTION - ORIENTATION: ORIENTATION: RIGHT;LEFT

## 2024-07-30 ASSESSMENT — PAIN DESCRIPTION - LOCATION
LOCATION: GENERALIZED
LOCATION: KNEE

## 2024-07-30 ASSESSMENT — PAIN SCALES - GENERAL
PAINLEVEL_OUTOF10: 8
PAINLEVEL_OUTOF10: 8

## 2024-07-30 ASSESSMENT — PAIN DESCRIPTION - DESCRIPTORS: DESCRIPTORS: ACHING

## 2024-07-30 NOTE — PROGRESS NOTES
Progress Note  Date:2024       [facility]  Patient Name:Km Garcia     YOB: 1945     Age:78 y.o.    Chief complaint uncontrolled type 2 diabetes    Subjective    Subjective:  Symptoms:  Stable.  He reports weakness.    Diet:  Adequate intake.    Activity level: Impaired due to weakness.    Pain:  He reports no pain.       Review of Systems   Constitutional:  Positive for fatigue.   Cardiovascular:  Positive for leg swelling.   Neurological:  Positive for weakness.   All other systems reviewed and are negative.    Objective         Vitals Last 24 Hours:  TEMPERATURE:  Temp  Av.1 °F (37.3 °C)  Min: 98.1 °F (36.7 °C)  Max: 100.2 °F (37.9 °C)  RESPIRATIONS RANGE: Resp  Av  Min: 18  Max: 20  PULSE OXIMETRY RANGE: SpO2  Av.5 %  Min: 96 %  Max: 99 %  PULSE RANGE: Pulse  Av.6  Min: 67  Max: 108  BLOOD PRESSURE RANGE: Systolic (24hrs), Av , Min:107 , Max:143   ; Diastolic (24hrs), Av, Min:61, Max:66    I/O (24Hr):    Intake/Output Summary (Last 24 hours) at 2024 1011  Last data filed at 2024 0552  Gross per 24 hour   Intake 600 ml   Output 1150 ml   Net -550 ml     Objective:  General Appearance:  Comfortable.    Vital signs: (most recent): Blood pressure 107/65, pulse 98, temperature 98.1 °F (36.7 °C), temperature source Oral, resp. rate 18, height 1.854 m (6' 1\"), weight 116.1 kg (255 lb 15.3 oz), SpO2 99 %.  Vital signs are normal.    HEENT: Normal HEENT exam.    Lungs:  Normal effort.    Heart: Normal rate.    Abdomen: Abdomen is soft.    Extremities: Normal range of motion.  There is dependent edema.    Neurological: Patient is alert.    Skin:  No rash.     Labs/Imaging/Diagnostics    Labs:  CBC:  Recent Labs     24   WBC 13.8*   RBC 3.21*   HGB 10.2*   HCT 30.2*   MCV 94.1*   RDW 13.1        CHEMISTRIES:  Recent Labs     24  05   * 133*   K 3.1* 3.1*    101   CO2 22 21   BUN 32* 33*   CREATININE

## 2024-07-30 NOTE — CARE COORDINATION
LSW met with pt and wife and relayed what was discussed in IDT meeting yesterday. Pt stated that he wants to stay on Acute Rehab and will participate in therapy. Pt and wife are aware of the requirement for meeting minutes and the possibility of discharging to a lower level of care if pt cannot tolerate the program. Pt will not have dialysis this afternoon, so LSW notified therapy of that in case they can add him to the schedule to make up minutes. Electronically signed by SHAYY Perdomo, ABBY on 7/30/2024 at 12:16 PM

## 2024-07-30 NOTE — PROGRESS NOTES
Gastroenterology Progress Note    Km Garcia is a 78 y.o. male patient.  Hospitalization Day:5    Chief C/O: Diarrhea    SUBJECTIVE: Clinically improved, no acute events, having soft formed bowel movement daily, no abdominal pain, no further diarrhea.    ROS:  Gastrointestinal ROS: no abdominal pain, change in bowel habits, or black or bloody stools    Physical    VITALS:  /65   Pulse 98   Temp 98.1 °F (36.7 °C) (Oral)   Resp 18   Ht 1.854 m (6' 1\")   Wt 116.1 kg (255 lb 15.3 oz)   SpO2 99%   BMI 33.77 kg/m²   TEMPERATURE:  Current - Temp: 98.1 °F (36.7 °C); Max - Temp  Av.1 °F (37.3 °C)  Min: 98.1 °F (36.7 °C)  Max: 100.2 °F (37.9 °C)    General:  Alert and oriented,  No apparent distress  Skin- without jaundice  Eyes: anicteric sclera  Cardiac: RRR, Nl s1s2, without murmurs  Lungs CTA Bilaterally, normal effort  Abdomen soft, ND, NT, no HSM, Bowel sounds normal  Ext: without edema  Neuro: no asterixis     Data    Data Review:    Recent Labs     24  0514   WBC 13.8*   HGB 10.2*   HCT 30.2*   MCV 94.1*        Recent Labs     24  0514 24  0527   * 133*   K 3.1* 3.1*    101   CO2 22 21   PHOS 4.7 4.2   BUN 32* 33*   CREATININE 2.46* 2.56*     No results for input(s): \"AST\", \"ALT\", \"BILIDIR\", \"BILITOT\", \"ALKPHOS\" in the last 72 hours.    Invalid input(s): \"ALB\"  No results for input(s): \"LIPASE\", \"AMYLASE\" in the last 72 hours.  No results for input(s): \"PROTIME\", \"INR\" in the last 72 hours.        ASSESSMENT:  77 y/o male admitted to acute rehab for generalized deconditioning, impaired mobility and ADLs due to polyneuropathy.  GI was asked to evaluate persistent diarrhea.  Patient reports 1 month history of 2-3 soft bowel movements daily, no blood or mucus, no abdominal pain, no unintentional weight loss.  Symptoms started prior to arrival to hospitalization.  No history of colonoscopy, no family history of colorectal cancer or IBD.  He does have normocytic

## 2024-07-30 NOTE — PROGRESS NOTES
Physical Therapy Rehab Treatment Note  Facility/Department: [facility]  [facility]       NAME: Km Garcia  : 1945 (78 y.o.)  MRN: [id number]  CODE STATUS: Full Code    Date of Service: 2024       Restrictions:  Restrictions/Precautions: Fall Risk       SUBJECTIVE:   Subjective: Pt's wife present throughout session and reports pt has been doing better than yesterday. Pt initially declining any OOB activity but willing with encouragement from wife and therapist.    Pain  Pain: 8/10 parag knees - previously medicated.      OBJECTIVE:         Bed Mobility  Additional Factors: Verbal cues;Increased time to complete;Head of bed raised;With handrails (HOB raised due to pt awaiting maitenance to come fix bed extender)  Roll Right  Assistance Level: Maximum assistance  Skilled Clinical Factors: completed x3 - assist with hand placement on bedrail and to complete full roll, vc's for technique with fair carry over  Sit to Supine  Assistance Level: Maximum assistance  Skilled Clinical Factors: maxA at BLEs and guiding assist provided at trunk  Supine to Sit  Assistance Level: Maximum assistance  Skilled Clinical Factors: assist at trunk to complete, vc's for sequencing with inconsistent carry over  Scooting  Assistance Level: Moderate assistance  Skilled Clinical Factors: scoot along EOB, vc for technique with poor movement initiation    Transfers  Surface: To bed;From bed  Additional Factors: Verbal cues;Hand placement cues;Increased time to complete  Device: Walker  Sit to Stand  Assistance Level: Moderate assistance;Requires x 2 assistance  Skilled Clinical Factors: 2 attempts to complete a full stand, vc's throughout for improved technique and sequencing. lifting assist +2 required to complete  Stand to Sit  Assistance Level: Moderate assistance  Skilled Clinical Factors: assist to improve eccentric control              PT Exercises  A/AROM Exercises: seated EOB: AP, LAQ x10ea parag  Static Standing

## 2024-07-30 NOTE — PROGRESS NOTES
[facility]    Acute  Rehabilitation  MUSIC THERAPY      Date:  7/30/2024        Patient Name: Km Garcia       MRN: [id number]        YOB: 1945 (78 y.o.)       Gender: male  Diagnosis: Impaired mobility and ADL's due to Impaired mobility and ADLs due to Polyneuropathy  Referring Practitioner: Dr Stevens for Dr Mao    RESTRICTIONS/PRECAUTIONS:  Restrictions/Precautions: Fall Risk     Hearing: Exceptions to WFL  Hearing Exceptions: Hard of hearing/hearing concerns    Subjective/Observation:   Patient declined participating in individual music therapy session at 12:27pm stating he is not interested.      Assessment/Plan:      [] Patient would like to have music therapy, just not today. [facility] will try to see pt again, if time allows.      [] Patient would like to have music therapy another time, however their D/C is before [facility] will be back on unit.        *If patient is still on rehab unit, or comes back to rehab unit, [facility] will attempt to see patient then if time allows.      [x] Patient does not want music therapy. [facility] will not attempt to see pt again unless pt specifically requests.       SAJAN Sorensen    7/30/2024  Electronically signed by Laxmi Mauro on 7/30/2024 at 2:11 PM

## 2024-07-30 NOTE — PROGRESS NOTES
OCCUPATIONAL THERAPY  INPATIENT REHAB TREATMENT NOTE  [facility]      NAME: Km Garcia  : 1945 (78 y.o.)  MRN: [id number]  CODE STATUS: Full Code  Room: R251/R251-01    Date of Service: 2024    Referring Physician: Dr Stevens for Dr Mao  Rehab Diagnosis: Impaired mobility and ADL's due to Impaired mobility and ADLs due to Polyneuropathy    Hospital course:   Comments: 78 y.o. male patient with recent complicated medical history who presented to [facility] ER 7/15 after experiencing a syncopal episode while on the toilet. Patient with recent initiation of Entresto and affected blood pressure. Additionally, patient with increased lower extremity edema. Patient admitted for additional medical management. Cardiology and Oncology consulted. Course c/b SILVINA requiring new initiation of HD    Restrictions  Restrictions/Precautions  Restrictions/Precautions: Fall Risk          Patient's date of birth confirmed: Yes    SAFETY:  Safety Devices  Type of devices: All fall risk precautions in place    SUBJECTIVE:   \"Im just tired\"    Pain at start of treatment: Yes: 8/10    Pain at end of treatment: No    Location: both knees and feet   Description: ache  Nursing notified: Yes  Prev medicated    COGNITION:     Unable to fully assess. Pt  with minimal conversation through session and appears lethargic. More alert once his wife was present     OBJECTIVE:    ADL- bed level     Grooming/Oral Hygiene  Assistance Level: Modified independent  Skilled Clinical Factors: apply deodorant, brush hair, wash face, oral hygiene  Upper Extremity Bathing  Assistance Level: Supervision;Increased time to complete  Skilled Clinical Factors: VC for thoroughness.  Lower Extremity Bathing  Assistance Level: Maximum assistance  Skilled Clinical Factors: pt able to wash cristal area- assist distally. Pt declined to roll to completely wash backside  Upper Extremity Dressing  Skilled Clinical Factors: hospital gown  Putting  Time:   Individual Group Co-Treat   Time In 830       Time Out 1000         Minutes 90             ADL/IADL trainin minutes  Therapeutic activities: 35 minutes     Electronically signed by:    Rafael Rosales OT,   2024, 9:10 AM

## 2024-07-30 NOTE — PROGRESS NOTES
[facility]      NAME:  Km Garcia  ROOM: R251/R251-01  :  1945  DATE: 2024    Attempted to see Km Garcia on this date for:   []  Initial Evaluation   []  Scheduled therapy    [x]  Make-up therapy   []  Group therapy   []  Family training    Patient was unable to be seen due to:  [x] Patient refused, reporting increased BLE pain and an upset stomach and requesting medication. RN notified who reported she had given pt pain medication ~1 hour ago. RN and this therapist provided encouragement to pt for participation and educated on importance of participation with pt continuing to decline.            Electronically signed by JAH DELACRUZ PTA on 24 at 4:03 PM EDT

## 2024-07-30 NOTE — PROGRESS NOTES
Patient assessment completed. A&Ox3. Patient reported pain at 8/10 to both their legs. Tylenol given. LBM 7/29. Contreras patent, draining clear yellow urine, and no discomfort reported. Patient in bed, call light within reach, and bed alarm on. Pt given cristal care. Zinc applied to bottom and genital area due to redness and excoriation. Medications given per MAR.  Pt denied any other needs at this time.

## 2024-07-30 NOTE — PROGRESS NOTES
Nephrology Progress Note    Assessment:  SILVINA possible obstructive component (CT showed non obstructive caliceal stones w/ bilateral pelviectasis w/ mild bilateral hydroureter), serology all negative, non-oliguric, now s/p tunneled dialysis catheter placement (7/25)  Left Renal stone 1.2 cm  Hx CVA  Hx Lymphoma  Nl GFR 6/21/2024   interstitial dx ?  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%  7/16/2024  Hematoma spleen 7 cm      Plan:  - holding HD today, tentative for tomorrow   - strict I/O   - BMP tomorrow        Patient Active Problem List:     Bladder stones     Surgical aftercare, genitourinary system     Gross hematuria     SOB (shortness of breath)     Brain bleed (HCC)     Syncope and collapse     Athscl heart disease of native coronary artery w/o ang pctrs     Compression of brain (HCC)     Cardiomyopathy (HCC)     Cerebrovascular accident (CVA) (HCC)     Coronary artery disease involving native coronary artery of native heart without angina pectoris     Diffuse large B-cell lymphoma, unspecified site (HCC)     Dysphagia, oropharyngeal phase     History of falling     History of traumatic brain injury     Hypokalemia     Impaired mobility and activities of daily living     Intraoperative floppy iris syndrome (IFIS)     Macular degeneration of both eyes     Mass of brain     Subdural hematoma (HCC)     Methotrexate renal toxicity     Methotrexate toxicity     Obstructive and reflux uropathy, unspecified     Pancreatic cyst     PAT (paroxysmal atrial tachycardia) (HCC)     Primary hypertension     Primary osteoarthritis of both knees     Primary CNS lymphoma (HCC)     Primary pulmonary hypertension (HCC)     Urinary retention     Late effect of brain injury (HCC)     SILVINA (acute kidney injury) (HCC)     UTI (urinary tract infection)     Syncope     HFrEF (heart failure with reduced ejection fraction) (HCC)     Atrial fibrillation (HCC)     Poorly controlled diabetes mellitus (HCC)     End stage renal disease (HCC)     Type 2 diabetes  mellitus with chronic kidney disease on chronic dialysis, with long-term current use of insulin (HCC)     History of DVT (deep vein thrombosis)      Subjective:  Admit Date: 7/25/2024    Interval History: Scr slightly up from yesterday but not significant to initiate HD, discussed w/ wife at bedside again, pt doing ok, remains non-oliguric     Medications:  Scheduled Meds:   potassium chloride  40 mEq Oral Once    psyllium husk-aspartame  1 packet Oral Daily    metoprolol tartrate  12.5 mg Oral BID    cholestyramine light  4 g Oral BID    midodrine  5 mg Oral TID WC    insulin lispro  0-4 Units SubCUTAneous TID WC    insulin lispro  0-4 Units SubCUTAneous Nightly    tamsulosin  0.4 mg Oral Daily    valACYclovir  500 mg Oral Daily     Continuous Infusions:   dextrose         CBC:   Recent Labs     07/29/24  0514   WBC 13.8*   HGB 10.2*          CMP:    Recent Labs     07/29/24  0514 07/30/24  0527   * 133*   K 3.1* 3.1*    101   CO2 22 21   BUN 32* 33*   CREATININE 2.46* 2.56*   GLUCOSE 78 81   CALCIUM 7.8* 7.7*   LABGLOM 25.8* 24.6*       Troponin: No results for input(s): \"TROPONINI\" in the last 72 hours.  BNP: No results for input(s): \"BNP\" in the last 72 hours.  INR: No results for input(s): \"INR\" in the last 72 hours.  Lipids: No results for input(s): \"CHOL\", \"LDLDIRECT\", \"TRIG\", \"HDL\", \"AMYLASE\", \"LIPASE\" in the last 72 hours.  Liver:   No results for input(s): \"AST\", \"ALT\", \"ALKPHOS\", \"LABALBU\", \"BILITOT\" in the last 72 hours.    Invalid input(s): \"PROT\", \"BILDIR\"    Iron:  No results for input(s): \"FERRITIN\" in the last 72 hours.    Invalid input(s): \"IRONS\", \"LABIRONS\"  Urinalysis: No results for input(s): \"UA\" in the last 72 hours.    Objective:  Vitals: /65   Pulse 98   Temp 98.1 °F (36.7 °C) (Oral)   Resp 18   Ht 1.854 m (6' 1\")   Wt 116.1 kg (255 lb 15.3 oz)   SpO2 99%   BMI 33.77 kg/m²    Wt Readings from Last 3 Encounters:   07/29/24 116.1 kg (255 lb 15.3 oz)   07/24/24

## 2024-07-31 LAB
ALBUMIN SERPL-MCNC: 2.3 G/DL (ref 3.5–4.6)
ANION GAP SERPL CALCULATED.3IONS-SCNC: 14 MEQ/L (ref 9–15)
BACTERIA UR CULT: NORMAL
BUN SERPL-MCNC: 29 MG/DL (ref 8–23)
CALCIUM SERPL-MCNC: 7.7 MG/DL (ref 8.5–9.9)
CHLORIDE SERPL-SCNC: 102 MEQ/L (ref 95–107)
CO2 SERPL-SCNC: 17 MEQ/L (ref 20–31)
CREAT SERPL-MCNC: 2.17 MG/DL (ref 0.7–1.2)
ECHO BSA: 2.45 M2
GLUCOSE BLD-MCNC: 116 MG/DL (ref 70–99)
GLUCOSE BLD-MCNC: 124 MG/DL (ref 70–99)
GLUCOSE BLD-MCNC: 150 MG/DL (ref 70–99)
GLUCOSE BLD-MCNC: 151 MG/DL (ref 70–99)
GLUCOSE SERPL-MCNC: 120 MG/DL (ref 70–99)
PERFORMED ON: ABNORMAL
PHOSPHATE SERPL-MCNC: 3.7 MG/DL (ref 2.3–4.8)
POTASSIUM SERPL-SCNC: 3.1 MEQ/L (ref 3.4–4.9)
SODIUM SERPL-SCNC: 133 MEQ/L (ref 135–144)

## 2024-07-31 PROCEDURE — 97110 THERAPEUTIC EXERCISES: CPT

## 2024-07-31 PROCEDURE — 80069 RENAL FUNCTION PANEL: CPT

## 2024-07-31 PROCEDURE — 6370000000 HC RX 637 (ALT 250 FOR IP): Performed by: PHYSICAL MEDICINE & REHABILITATION

## 2024-07-31 PROCEDURE — 97535 SELF CARE MNGMENT TRAINING: CPT

## 2024-07-31 PROCEDURE — 6370000000 HC RX 637 (ALT 250 FOR IP): Performed by: INTERNAL MEDICINE

## 2024-07-31 PROCEDURE — 97116 GAIT TRAINING THERAPY: CPT

## 2024-07-31 PROCEDURE — 6370000000 HC RX 637 (ALT 250 FOR IP): Performed by: NURSE PRACTITIONER

## 2024-07-31 PROCEDURE — 1180000000 HC REHAB R&B

## 2024-07-31 PROCEDURE — 6370000000 HC RX 637 (ALT 250 FOR IP): Performed by: STUDENT IN AN ORGANIZED HEALTH CARE EDUCATION/TRAINING PROGRAM

## 2024-07-31 PROCEDURE — 97530 THERAPEUTIC ACTIVITIES: CPT

## 2024-07-31 PROCEDURE — 36415 COLL VENOUS BLD VENIPUNCTURE: CPT

## 2024-07-31 PROCEDURE — 99231 SBSQ HOSP IP/OBS SF/LOW 25: CPT | Performed by: PHYSICIAN ASSISTANT

## 2024-07-31 RX ORDER — POTASSIUM CHLORIDE 20 MEQ/1
20 TABLET, EXTENDED RELEASE ORAL ONCE
Status: COMPLETED | OUTPATIENT
Start: 2024-07-31 | End: 2024-07-31

## 2024-07-31 RX ADMIN — TRAMADOL HYDROCHLORIDE 50 MG: 50 TABLET ORAL at 21:02

## 2024-07-31 RX ADMIN — METOPROLOL TARTRATE 12.5 MG: 25 TABLET, FILM COATED ORAL at 21:01

## 2024-07-31 RX ADMIN — METOPROLOL TARTRATE 12.5 MG: 25 TABLET, FILM COATED ORAL at 09:45

## 2024-07-31 RX ADMIN — PSYLLIUM HUSK 1 PACKET: 3.4 POWDER ORAL at 09:44

## 2024-07-31 RX ADMIN — VALACYCLOVIR 500 MG: 1 TABLET, FILM COATED ORAL at 17:53

## 2024-07-31 RX ADMIN — MIDODRINE HYDROCHLORIDE 5 MG: 5 TABLET ORAL at 12:51

## 2024-07-31 RX ADMIN — POTASSIUM CHLORIDE 20 MEQ: 1500 TABLET, EXTENDED RELEASE ORAL at 14:29

## 2024-07-31 RX ADMIN — MIDODRINE HYDROCHLORIDE 5 MG: 5 TABLET ORAL at 17:54

## 2024-07-31 RX ADMIN — TAMSULOSIN HYDROCHLORIDE 0.4 MG: 0.4 CAPSULE ORAL at 09:45

## 2024-07-31 RX ADMIN — CHOLESTYRAMINE 4 G: 4 POWDER, FOR SUSPENSION ORAL at 09:44

## 2024-07-31 RX ADMIN — TRAMADOL HYDROCHLORIDE 50 MG: 50 TABLET ORAL at 10:11

## 2024-07-31 ASSESSMENT — PAIN DESCRIPTION - DESCRIPTORS
DESCRIPTORS: ACHING;SORE

## 2024-07-31 ASSESSMENT — PAIN SCALES - GENERAL
PAINLEVEL_OUTOF10: 8
PAINLEVEL_OUTOF10: 4
PAINLEVEL_OUTOF10: 8

## 2024-07-31 ASSESSMENT — PAIN DESCRIPTION - ORIENTATION
ORIENTATION: RIGHT;LEFT

## 2024-07-31 ASSESSMENT — PAIN DESCRIPTION - LOCATION
LOCATION: LEG

## 2024-07-31 ASSESSMENT — ENCOUNTER SYMPTOMS: APNEA: 0

## 2024-07-31 NOTE — PROGRESS NOTES
mellitus (HCC)     End stage renal disease (HCC)     Type 2 diabetes mellitus with chronic kidney disease on chronic dialysis, with long-term current use of insulin (HCC)     History of DVT (deep vein thrombosis)      Subjective:  Admit Date: 7/25/2024    Interval History: Scr down to down today, no acute overnight events     Medications:  Scheduled Meds:   psyllium husk-aspartame  1 packet Oral Daily    metoprolol tartrate  12.5 mg Oral BID    cholestyramine light  4 g Oral BID    midodrine  5 mg Oral TID WC    insulin lispro  0-4 Units SubCUTAneous TID WC    insulin lispro  0-4 Units SubCUTAneous Nightly    tamsulosin  0.4 mg Oral Daily    valACYclovir  500 mg Oral Daily     Continuous Infusions:   dextrose         CBC:   Recent Labs     07/29/24  0514   WBC 13.8*   HGB 10.2*          CMP:    Recent Labs     07/29/24  0514 07/30/24  0527 07/31/24  0554   * 133* 133*   K 3.1* 3.1* 3.1*    101 102   CO2 22 21 17*   BUN 32* 33* 29*   CREATININE 2.46* 2.56* 2.17*   GLUCOSE 78 81 120*   CALCIUM 7.8* 7.7* 7.7*   LABGLOM 25.8* 24.6* 30.0*       Troponin: No results for input(s): \"TROPONINI\" in the last 72 hours.  BNP: No results for input(s): \"BNP\" in the last 72 hours.  INR: No results for input(s): \"INR\" in the last 72 hours.  Lipids: No results for input(s): \"CHOL\", \"LDLDIRECT\", \"TRIG\", \"HDL\", \"AMYLASE\", \"LIPASE\" in the last 72 hours.  Liver:   No results for input(s): \"AST\", \"ALT\", \"ALKPHOS\", \"LABALBU\", \"BILITOT\" in the last 72 hours.    Invalid input(s): \"PROT\", \"BILDIR\"    Iron:  No results for input(s): \"FERRITIN\" in the last 72 hours.    Invalid input(s): \"IRONS\", \"LABIRONS\"  Urinalysis: No results for input(s): \"UA\" in the last 72 hours.    Objective:  Vitals: BP (!) 133/57   Pulse 75   Temp 98.4 °F (36.9 °C) (Oral)   Resp 17   Ht 1.854 m (6' 1\")   Wt 118.5 kg (261 lb 3.9 oz)   SpO2 99%   BMI 34.47 kg/m²    Wt Readings from Last 3 Encounters:   07/31/24 118.5 kg (261 lb 3.9 oz)

## 2024-07-31 NOTE — PROGRESS NOTES
OCCUPATIONAL THERAPY  INPATIENT REHAB TREATMENT NOTE  [facility]      NAME: Km Garcia  : 1945 (78 y.o.)  MRN: [id number]  CODE STATUS: Full Code  Room: R251/R251-01    Date of Service: 2024    Referring Physician: Dr Stevens for Dr Mao  Rehab Diagnosis: Impaired mobility and ADL's due to Impaired mobility and ADLs due to Polyneuropathy    Hospital course:   Comments: 78 y.o. male patient with recent complicated medical history who presented to [facility] ER 7/15 after experiencing a syncopal episode while on the toilet. Patient with recent initiation of Entresto and affected blood pressure. Additionally, patient with increased lower extremity edema. Patient admitted for additional medical management. Cardiology and Oncology consulted. Course c/b SILVINA requiring new initiation of HD    Restrictions  Restrictions/Precautions  Restrictions/Precautions: Fall Risk          Patient's date of birth confirmed: Yes    SAFETY:  Safety Devices  Type of devices: All fall risk precautions in place    SUBJECTIVE:   \"I need a couple minutes\"    Pain at start of treatment: Yes: 8/10    Pain at end of treatment: Yes: 8/10    Location: B knees, feet  Description: ache  Nursing notified: Yes  Nurse: Marsha  Intervention: RN provided pain medication    OBJECTIVE:    Pt presented supine in bed. Pt initially declining to get out of bed reporting he needs a few minutes to rest. Pt agreeable to complete grooming tasks while waiting. Cues needed for task initiation. Pt also complaining of 8/10 pain. Nurse notified who reports patient declined pain medication prior. Discussed importance of addressing pain prior to scheduled therapy sessions to improve his ability to tolerate out of bed activity     After grooming pt was motivated to get out of bed however he was noted to be soiled with large soft BM. D.t being end of session nuring notified.    ADL     Grooming/Oral Hygiene  Assistance Level: Set-up  Skilled

## 2024-07-31 NOTE — PROGRESS NOTES
OCCUPATIONAL THERAPY  INPATIENT REHAB TREATMENT NOTE  [facility]      NAME: Km Garcia  : 1945 (78 y.o.)  MRN: [id number]  CODE STATUS: Full Code  Room: R251/R251-01    Date of Service: 2024    Referring Physician: Dr Stevens for Dr Mao  Rehab Diagnosis: Impaired mobility and ADL's due to Impaired mobility and ADLs due to Polyneuropathy    Hospital course:   Comments: 78 y.o. male patient with recent complicated medical history who presented to [facility] ER 7/15 after experiencing a syncopal episode while on the toilet. Patient with recent initiation of Entresto and affected blood pressure. Additionally, patient with increased lower extremity edema. Patient admitted for additional medical management. Cardiology and Oncology consulted. Course c/b SILVINA requiring new initiation of HD    Restrictions  Restrictions/Precautions  Restrictions/Precautions: Fall Risk          Patient's date of birth confirmed: Yes    SAFETY:  Safety Devices  Type of devices: All fall risk precautions in place    SUBJECTIVE:   \"Even the little stuff is a pain in the ass now\" Pt talking about how difficult completing a stand pivot transfer is     Pain at start of treatment: No    Pain at end of treatment: No    Pt reports pain in both knees becomes an 8/10 when standing- declines intervention. No pain once back in bed    OBJECTIVE:    Pt presented seated in w/c- sat up for lunch. Pt expressing significant fatigue from being in the chair and declines to go to the gym d/t needing to go back to bed . Pt agreeable to bed level treatment once back in bed     Sit to Supine  Assistance Level: Moderate assistance  Skilled Clinical Factors: Assist with BLEs  Scooting  Assistance Level: Moderate assistance  Bed To/From Chair  Technique: Stand pivot  Assistance Level: Moderate assistance  Skilled Clinical Factors: extensive time and effort needed for transfer    Pt with good ability to complete transfer with assist

## 2024-07-31 NOTE — PROGRESS NOTES
Patient assessment completed. A&Ox3. LBM 7/30. Contreras patent, draining clear yellow urine, and no discomfort reported. Patient in bed, call light within reach, and bed alarm on. Pt given cristal care. Zinc applied to bottom and genital area due to redness and excoriation. Medications given per MAR.  Pt denied any other needs at this time.

## 2024-07-31 NOTE — PROGRESS NOTES
[facility]      NAME:  Km Garcia  ROOM: R251/R251-01  :  1945  DATE: 2024    Attempted to see Km Garcia on this date for:   []  Initial Evaluation   []  Scheduled therapy    [x]  Make-up therapy (30 min)   []  Group therapy   []  Family training    Patient was unable to be seen due to:  [x] Patient refused, stating \"I did a bunch earlier, I'll get up later\". Encouraged pt participation in bed level activities and educated on importance of participation in tx, pt continues to decline all activities.        Electronically signed by JAH DELACRUZ PTA on 24 at 3:41 PM EDT

## 2024-07-31 NOTE — PROGRESS NOTES
Subjective:      Patient ID: Km Garcia is a 78 y.o. male    HPI78 year old male who's ca catheter is draining clear yellow urine. He voices no other urological complaints at this time.        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    Cerebrovascular accident (CVA) (HCC) 2024    Charcot ankle, left     wears brace    Diabetes mellitus (HCC)     takes metformin    History of DVT (deep vein thrombosis) 7/15/2024    Hypertension     on meds > 10 yrs    Osteoarthritis      Past Surgical History:   Procedure Laterality Date    COLONOSCOPY      > 30 yrs ago    IR NONTUNNELED VASCULAR CATHETER Right 2024    Medcomp 11.5F x 15 cm Raulerson Duo-Flow IJ double lumen catheter (LOT# [id number],  exp ) performed by Dr. Osborne    IR NONTUNNELED VASCULAR CATHETER  2024    IR NONTUNNELED VASCULAR CATHETER 2024 [facility] SPECIAL PROCEDURE    LITHOTRIPSY N/A 2018    WITH HOLMIUM LASER performed by Antonio Petit MD at [facility] OR    VASCULAR SURGERY Right 2024    Ultrasound-guided right internal jugular tunneled hemodialysis catheter. performed by Vahid Hu MD at [facility] OR     Social History     Socioeconomic History    Marital status:      Spouse name: None    Number of children: None    Years of education: None    Highest education level: None   Tobacco Use    Smoking status: Former     Current packs/day: 0.00     Types: Cigarettes     Quit date:      Years since quittin.6    Smokeless tobacco: Never   Vaping Use    Vaping Use: Never used   Substance and Sexual Activity    Alcohol use: Yes     Comment: Drinks 1 drink every other day    Drug use: No   Social History Narrative    Lives With: Spouse Coby (wife is able to help pt at home)    Type of Home:  traMADol (ULTRAM) tablet 50 mg  50 mg Oral Q6H PRN Juli Kahn MD   50 mg at 07/31/24 1011    psyllium husk-aspartame (METAMUCIL FIBER) packet 1 packet  1 packet Oral Daily Arpita Sims, APRN - CNP   1 packet at 07/31/24 0944    metoprolol tartrate (LOPRESSOR) tablet 12.5 mg  12.5 mg Oral BID Tanya Ervin MD   12.5 mg at 07/31/24 0945    cholestyramine light packet 4 g  4 g Oral BID Juli Kahn MD   4 g at 07/31/24 0944    midodrine (PROAMATINE) tablet 5 mg  5 mg Oral TID  Tanya Ervin MD   5 mg at 07/30/24 1224    acetaminophen (TYLENOL) tablet 650 mg  650 mg Oral Q6H PRN Davide Crum MD        Or    acetaminophen (TYLENOL) suppository 650 mg  650 mg Rectal Q6H PRN Davide Crum MD        calcium carbonate (TUMS) chewable tablet 500 mg  500 mg Oral TID PRN Davide Crum MD        cyclobenzaprine (FLEXERIL) tablet 5 mg  5 mg Oral TID PRN Davide Crum MD        dextrose 10 % infusion   IntraVENous Continuous PRN Davide Crum MD        dextrose bolus 10% 125 mL  125 mL IntraVENous PRN Davide Crum MD        Or    dextrose bolus 10% 250 mL  250 mL IntraVENous PRN Davide Crum MD        glucagon injection 1 mg  1 mg SubCUTAneous PRN Davide Crum MD        glucose chewable tablet 16 g  4 tablet Oral PRN Davide Crum MD        heparin (porcine) injection 2,000 Units  2,000 Units IntraVENous PRN Davide Crum MD        insulin lispro (HUMALOG,ADMELOG) injection vial 0-4 Units  0-4 Units SubCUTAneous TID  Davide Crum MD        insulin lispro (HUMALOG,ADMELOG) injection vial 0-4 Units  0-4 Units SubCUTAneous Nightly Davide Crum MD        melatonin tablet 3 mg  3 mg Oral Nightly PRN Davide Crum MD        ondansetron (ZOFRAN-ODT) disintegrating tablet 4 mg  4 mg Oral Q8H PRN Davide Crum MD        Or    ondansetron (ZOFRAN) injection 4 mg  4 mg IntraVENous Q6H PRN Davide Crum MD        polyethylene glycol

## 2024-07-31 NOTE — PROGRESS NOTES
Physical Therapy Rehab Treatment Note  Facility/Department: [facility]  Room: [email or web address]R251-01       NAME: Km Garcia  : 1945 (78 y.o.)  MRN: [id number]  CODE STATUS: Full Code    Date of Service: 2024       Restrictions:  Restrictions/Precautions: Fall Risk       SUBJECTIVE:   Subjective: Pt's wife present at start of session, reported they are waiting for pt's nurse and PCA to come clean pt up.    Pain  Pain: 8/10 parag knees at start of session - previously medicated. 3/10 at rest at end of session.      OBJECTIVE:         Bed Mobility  Additional Factors: Verbal cues;Increased time to complete;Head of bed raised;With handrails  Supine to Sit  Assistance Level: Maximum assistance  Skilled Clinical Factors: pt able to get LEs to the EOB without assistance but required assist at trunk, multiple attempts to complete without assistance without success  Scooting  Assistance Level: Dependent;Stand by assist  Skilled Clinical Factors: required use of chux pad to scoot to EOB but was able to scoot along EOB with SBA only    Transfers  Surface: To bed;From bed;Wheelchair  Additional Factors: Verbal cues;Hand placement cues;Increased time to complete  Device: Walker  Sit to Stand  Assistance Level: Moderate assistance  Skilled Clinical Factors: 4 attempts to complete a full stand, vc's throughout for improved technique and sequencing. heavy lifting assist required  Stand to Sit  Assistance Level: Moderate assistance  Skilled Clinical Factors: assist to improve eccentric control  Bed To/From Chair  Technique: Stand step  Assistance Level: Minimal assistance (touching assist)  Skilled Clinical Factors: with ww, assist varied between touching-Jillian. vc's for safe approach to chair with good carry over    Ambulation  Surface: Level surface  Device: Rolling walker  Distance: 3' to w/c  Activity: Within Room  Activity Comments: fwd flexed posture, heavy bracing on ww  Additional Factors: Verbal cues;Hand placement  training: 10  Neuro re education: 0  Therapeutic ex: 15      JAH DELACRUZ, AXEL, 07/31/24 at 12:20 PM

## 2024-07-31 NOTE — PROGRESS NOTES
Subjective:  The patient complains of ,\" moderate acute on chronic progressive fatigue and   weakness  partially relieved by rest, medications, PT,  OT,   SLP and rest and exacerbated by recent    events .      I am concerned about patient’s medical complexities and barriers to advancing in rehab goals including weakness .        I reviewed current care and plans for further care with other rehab providers including nursing and case management.  According to recent nursing note, \"  see notes  \".     Pattie Shirley, RN  Registered Nurse  Nursing     Progress Notes     Signed     Date of Service: 7/28/2024  2:49 PM     Signed         Removed 20 gauge saline lock from LAC, leaks when flushed, catheter intact. Contreras patent to gravity, urine clear yellow. Pt and spouse report that he has been having flatus but no additional BM's at this time. Call light in reach. Electronically signed by Pattie Shirley RN on 7/28/2024 at 2:51 PM               Gaby Rosales RN  Registered Nurse  Nursing     Progress Notes     Signed     Date of Service: 7/29/2024 10:23 AM     Signed         Patient's wife at bedside requesting to speak to nephrology regarding dialysis and states she has other questions/concerns. PS sent to Dr. Robert, at bedside, answered all questions     Per Dr. Robert canceling dialysis today, pending tomorrow, repeat BMP in AM, start strict I/O     Hospitalist NP notified K 3.1                      ROS x10:  The patient also complains of severely impaired mobility and activities of daily living.  Otherwise no new problems with vision, hearing, nose, mouth, throat, dermal, cardiovascular, GI, , pulmonary, musculoskeletal, psychiatric or neurological. See also Acute Rehab PM&R H&P.       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 Extracranial structures: There is mild paranasal sinus mucosal thickening. The mastoid air cells are clear. The orbits and extracranial soft tissues are unremarkable. IMPRESSION: 1.  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 No new intracranial mass. 2.  Persistent mild hydrocephalus. The ventricular caliber is grossly unchanged since 5/31/2024. 3.  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 MACRO: None       Previous extensive, complex labs, notes and diagnostics reviewed and analyzed.     ALLERGIES:    Allergies as of 07/25/2024 - Fully Reviewed 07/25/2024   Allergen Reaction Noted    Celecoxib  11/14/2012    Naproxen  11/14/2012      (please also verify by checking MAR)       Complex Physical Medicine & Rehab Issues Assess & Plan:   Severe abnormality of gait and mobility and impaired self-care and ADL's secondary to  polyneuropathy   .  Functional and medical status reassessed regarding patient’s ability to participate in therapies and patient found to be able to participate in acute intensive comprehensive inpatient rehabilitation program including PT/OT to improve balance, ambulation, ADL’s, and to improve the P/AROM.  Therapeutic modifications regarding activities in therapies, place, amount of time per day and intensity of therapy made daily.  In bed therapies or bedside therapies prn.   Bowel and Bladder dysfunction  , Neurogenic bowel and bladder:  frequent toileting, ambulate to bathroom with assistance, check post void residuals.  Check for C.difficile x1 if >2 loose stools in 24 hours, continue bowel & bladder program.  Monitor bowel and bladder function.  Lactinex 2 PO every AC.  MOM prn, Brown Bomb prn, Glycerin suppository prn, enema prn.  Encourage therapy and nursing to co-treat and problem solve re continence.    Moderate back pain,  , as well as generalized OA pain: reassess pain every shift and prior to and

## 2024-07-31 NOTE — PROGRESS NOTES
No new symptoms    .  Labs, imaging, vital signs were reviewed    Urine culture is negative    Consider repeat UA in 3 to 5 days    Hypokalemia, potassium supplementation    Volume, electrolytes and kidney function are managed by nephrology team    Contreras catheter in the retention managed by urology team

## 2024-07-31 NOTE — PLAN OF CARE
Problem: Safety - Adult  Goal: Free from fall injury  7/31/2024 1112 by Pattie Shirley RN  Outcome: Progressing  7/31/2024 1104 by Pattie Shirley RN  Outcome: Progressing     Problem: Discharge Planning  Goal: Discharge to home or other facility with appropriate resources  7/31/2024 1112 by Pattie Shirley RN  Outcome: Progressing  7/31/2024 1104 by Pattie Shirley RN  Outcome: Progressing     Problem: Pain  Goal: Verbalizes/displays adequate comfort level or baseline comfort level  7/31/2024 1112 by Pattie Shirley RN  Outcome: Progressing  7/31/2024 1104 by Pattie Shirley RN  Outcome: Progressing     Problem: Skin/Tissue Integrity  Goal: Absence of new skin breakdown  Description: 1.  Monitor for areas of redness and/or skin breakdown  2.  Assess vascular access sites hourly  3.  Every 4-6 hours minimum:  Change oxygen saturation probe site  4.  Every 4-6 hours:  If on nasal continuous positive airway pressure, respiratory therapy assess nares and determine need for appliance change or resting period.  7/31/2024 1112 by Pattie Shirley RN  Outcome: Progressing  7/31/2024 1104 by Pattie Shirley RN  Outcome: Progressing     Problem: ABCDS Injury Assessment  Goal: Absence of physical injury  7/31/2024 1112 by Pattie Shirley RN  Outcome: Progressing  7/31/2024 1104 by Pattie Shirley RN  Outcome: Progressing     Problem: Chronic Conditions and Co-morbidities  Goal: Patient's chronic conditions and co-morbidity symptoms are monitored and maintained or improved  7/31/2024 1112 by Pattie Shirley RN  Outcome: Progressing  7/31/2024 1104 by Pattie Shirley RN  Outcome: Progressing     Problem: Nutrition Deficit:  Goal: Optimize nutritional status  7/31/2024 1112 by Pattie Shirley RN  Outcome: Progressing  7/31/2024 1104 by Pattie Shirley RN  Outcome: Progressing

## 2024-07-31 NOTE — CARE COORDINATION
LSW met with Dr. Stevens and pt and discussed discharge plan. Upon reviewing therapy from yesterday and today, pt has refused some therapy due to fatigue. Pt had expressed that he is very tired and agreed that he may perform better at an LTAC. Pt's wife will be contacted tomorrow to discuss further. Electronically signed by SHAYY Perdomo, ABBY on 7/31/2024 at 5:42 PM

## 2024-07-31 NOTE — PROGRESS NOTES
Removed gauze and tegaderm dressing from R groin, soiled with loose stools per wife. Skin intact, puncture site healed, area cleansed and left CHARLETTE. Removed leaking 20 gauge saline lock from LFA with cannula intact. No dialysis ordered for today. Contreras patent to gravity, urine clear yellow. Specialty bed now in use to protect skin integrity. Electronically signed by Pattie Shirley RN on 7/31/2024 at 5:10 PM

## 2024-08-01 LAB
ANION GAP SERPL CALCULATED.3IONS-SCNC: 12 MEQ/L (ref 9–15)
BUN SERPL-MCNC: 26 MG/DL (ref 8–23)
CALCIUM SERPL-MCNC: 7.9 MG/DL (ref 8.5–9.9)
CHLORIDE SERPL-SCNC: 104 MEQ/L (ref 95–107)
CO2 SERPL-SCNC: 18 MEQ/L (ref 20–31)
CREAT SERPL-MCNC: 2.05 MG/DL (ref 0.7–1.2)
GLUCOSE BLD-MCNC: 100 MG/DL (ref 70–99)
GLUCOSE BLD-MCNC: 125 MG/DL (ref 70–99)
GLUCOSE BLD-MCNC: 162 MG/DL (ref 70–99)
GLUCOSE BLD-MCNC: 170 MG/DL (ref 70–99)
GLUCOSE SERPL-MCNC: 100 MG/DL (ref 70–99)
PERFORMED ON: ABNORMAL
POTASSIUM SERPL-SCNC: 3.5 MEQ/L (ref 3.4–4.9)
SODIUM SERPL-SCNC: 134 MEQ/L (ref 135–144)

## 2024-08-01 PROCEDURE — 80048 BASIC METABOLIC PNL TOTAL CA: CPT

## 2024-08-01 PROCEDURE — 36415 COLL VENOUS BLD VENIPUNCTURE: CPT

## 2024-08-01 PROCEDURE — 97535 SELF CARE MNGMENT TRAINING: CPT

## 2024-08-01 PROCEDURE — 97110 THERAPEUTIC EXERCISES: CPT

## 2024-08-01 PROCEDURE — 6370000000 HC RX 637 (ALT 250 FOR IP): Performed by: INTERNAL MEDICINE

## 2024-08-01 PROCEDURE — 1180000000 HC REHAB R&B

## 2024-08-01 PROCEDURE — 6370000000 HC RX 637 (ALT 250 FOR IP): Performed by: NURSE PRACTITIONER

## 2024-08-01 PROCEDURE — 6370000000 HC RX 637 (ALT 250 FOR IP): Performed by: PHYSICAL MEDICINE & REHABILITATION

## 2024-08-01 PROCEDURE — 6370000000 HC RX 637 (ALT 250 FOR IP): Performed by: STUDENT IN AN ORGANIZED HEALTH CARE EDUCATION/TRAINING PROGRAM

## 2024-08-01 PROCEDURE — 97530 THERAPEUTIC ACTIVITIES: CPT

## 2024-08-01 PROCEDURE — 83630 LACTOFERRIN FECAL (QUAL): CPT

## 2024-08-01 PROCEDURE — 99231 SBSQ HOSP IP/OBS SF/LOW 25: CPT | Performed by: PHYSICIAN ASSISTANT

## 2024-08-01 RX ORDER — SODIUM BICARBONATE 650 MG/1
650 TABLET ORAL 2 TIMES DAILY
Status: DISCONTINUED | OUTPATIENT
Start: 2024-08-01 | End: 2024-08-02 | Stop reason: HOSPADM

## 2024-08-01 RX ORDER — METRONIDAZOLE 500 MG/1
500 TABLET ORAL EVERY 8 HOURS SCHEDULED
Status: DISCONTINUED | OUTPATIENT
Start: 2024-08-01 | End: 2024-08-02 | Stop reason: HOSPADM

## 2024-08-01 RX ORDER — POTASSIUM CHLORIDE 20 MEQ/1
20 TABLET, EXTENDED RELEASE ORAL ONCE
Status: COMPLETED | OUTPATIENT
Start: 2024-08-01 | End: 2024-08-01

## 2024-08-01 RX ORDER — TRAMADOL HYDROCHLORIDE 50 MG/1
50 TABLET ORAL
Status: DISCONTINUED | OUTPATIENT
Start: 2024-08-02 | End: 2024-08-02 | Stop reason: HOSPADM

## 2024-08-01 RX ORDER — MIDODRINE HYDROCHLORIDE 2.5 MG/1
2.5 TABLET ORAL
Status: DISCONTINUED | OUTPATIENT
Start: 2024-08-01 | End: 2024-08-02 | Stop reason: HOSPADM

## 2024-08-01 RX ORDER — MECOBALAMIN 5000 MCG
5 TABLET,DISINTEGRATING ORAL NIGHTLY
Status: DISCONTINUED | OUTPATIENT
Start: 2024-08-01 | End: 2024-08-02 | Stop reason: HOSPADM

## 2024-08-01 RX ORDER — DIPHENOXYLATE HYDROCHLORIDE AND ATROPINE SULFATE 2.5; .025 MG/1; MG/1
1 TABLET ORAL DAILY
Status: DISCONTINUED | OUTPATIENT
Start: 2024-08-01 | End: 2024-08-02 | Stop reason: HOSPADM

## 2024-08-01 RX ADMIN — DIPHENOXYLATE HYDROCHLORIDE AND ATROPINE SULFATE 1 TABLET: 2.5; .025 TABLET ORAL at 22:58

## 2024-08-01 RX ADMIN — PSYLLIUM HUSK 1 PACKET: 3.4 POWDER ORAL at 10:11

## 2024-08-01 RX ADMIN — SODIUM BICARBONATE 650 MG: 650 TABLET ORAL at 22:58

## 2024-08-01 RX ADMIN — TRAMADOL HYDROCHLORIDE 50 MG: 50 TABLET ORAL at 11:05

## 2024-08-01 RX ADMIN — TAMSULOSIN HYDROCHLORIDE 0.4 MG: 0.4 CAPSULE ORAL at 10:11

## 2024-08-01 RX ADMIN — Medication 5 MG: at 22:58

## 2024-08-01 RX ADMIN — METOPROLOL TARTRATE 12.5 MG: 25 TABLET, FILM COATED ORAL at 22:58

## 2024-08-01 RX ADMIN — POTASSIUM CHLORIDE 20 MEQ: 1500 TABLET, EXTENDED RELEASE ORAL at 16:46

## 2024-08-01 RX ADMIN — TRAMADOL HYDROCHLORIDE 50 MG: 50 TABLET ORAL at 16:46

## 2024-08-01 RX ADMIN — CHOLESTYRAMINE 4 G: 4 POWDER, FOR SUSPENSION ORAL at 10:11

## 2024-08-01 RX ADMIN — METOPROLOL TARTRATE 12.5 MG: 25 TABLET, FILM COATED ORAL at 10:11

## 2024-08-01 RX ADMIN — MIDODRINE HYDROCHLORIDE 2.5 MG: 2.5 TABLET ORAL at 13:01

## 2024-08-01 RX ADMIN — METRONIDAZOLE 500 MG: 500 TABLET ORAL at 23:24

## 2024-08-01 RX ADMIN — CHOLESTYRAMINE 4 G: 4 POWDER, FOR SUSPENSION ORAL at 22:58

## 2024-08-01 RX ADMIN — VALACYCLOVIR 500 MG: 1 TABLET, FILM COATED ORAL at 18:00

## 2024-08-01 RX ADMIN — MIDODRINE HYDROCHLORIDE 2.5 MG: 2.5 TABLET ORAL at 18:00

## 2024-08-01 ASSESSMENT — PAIN DESCRIPTION - ORIENTATION
ORIENTATION: RIGHT;LEFT
ORIENTATION: RIGHT;LEFT

## 2024-08-01 ASSESSMENT — ENCOUNTER SYMPTOMS: APNEA: 0

## 2024-08-01 ASSESSMENT — PAIN DESCRIPTION - DESCRIPTORS
DESCRIPTORS: ACHING
DESCRIPTORS: ACHING;SORE

## 2024-08-01 ASSESSMENT — PAIN SCALES - GENERAL
PAINLEVEL_OUTOF10: 8
PAINLEVEL_OUTOF10: 8
PAINLEVEL_OUTOF10: 3

## 2024-08-01 ASSESSMENT — PAIN DESCRIPTION - LOCATION
LOCATION: LEG
LOCATION: LEG

## 2024-08-01 NOTE — PLAN OF CARE
Problem: Safety - Adult  Goal: Free from fall injury  8/1/2024 1210 by Pattie Shirley RN  Outcome: Progressing  8/1/2024 0353 by Martha Muller RN  Outcome: Progressing     Problem: Discharge Planning  Goal: Discharge to home or other facility with appropriate resources  8/1/2024 1210 by Pattie Shirley RN  Outcome: Progressing  8/1/2024 0353 by Martha Muller RN  Outcome: Progressing  Flowsheets (Taken 7/31/2024 2105)  Discharge to home or other facility with appropriate resources:   Identify barriers to discharge with patient and caregiver   Arrange for needed discharge resources and transportation as appropriate   Identify discharge learning needs (meds, wound care, etc)     Problem: Pain  Goal: Verbalizes/displays adequate comfort level or baseline comfort level  8/1/2024 1210 by Pattie Shirley RN  Outcome: Progressing  8/1/2024 0353 by Martha Muller RN  Outcome: Progressing  Flowsheets (Taken 7/31/2024 2101)  Verbalizes/displays adequate comfort level or baseline comfort level:   Encourage patient to monitor pain and request assistance   Assess pain using appropriate pain scale   Administer analgesics based on type and severity of pain and evaluate response     Problem: Skin/Tissue Integrity  Goal: Absence of new skin breakdown  Description: 1.  Monitor for areas of redness and/or skin breakdown  2.  Assess vascular access sites hourly  3.  Every 4-6 hours minimum:  Change oxygen saturation probe site  4.  Every 4-6 hours:  If on nasal continuous positive airway pressure, respiratory therapy assess nares and determine need for appliance change or resting period.  8/1/2024 1210 by Pattie Shirley RN  Outcome: Progressing  8/1/2024 0353 by Martha Muller RN  Outcome: Progressing     Problem: ABCDS Injury Assessment  Goal: Absence of physical injury  8/1/2024 1210 by Pattie Shirley RN  Outcome: Progressing  8/1/2024 0353 by Martha Muller RN  Outcome: Progressing     Problem: Chronic

## 2024-08-01 NOTE — PROGRESS NOTES
Patient continues to have twice a day loose bowel movement.  No abdominal pain.  No nausea or vomiting but poor appetite.  Pain in the morning mostly in his knees that is preventing him from participating in therapy    Patient is lying in bed, no distress.  Much more awake than previously seen.  Pale skin and buccal mucosa.  Chest is clear, heart regular.  Abdomen soft    *Hypotension, resolved    *DVT prophylaxis.  Patient risk of DVT is based on his functional and ambulation status that is monitored and managed by physiatry.  History of brain bleed.  Patient is at risk of re bleed  History of lymphoma.  Patient is at risk of thrombosis.  Pharmacological DVT prophylaxis is to be addressed by rehab physician  (based on his ambulation status and risk of DVT ) in collaboration with the oncology team (already consulted ) based on his risk of bleed. Clots vs bleed.  Patient was seen by Dr. Mckeon who recommended against anticoagulation.  Decision has been deferred to both specialists.    *Functional impairment.  Rehabilitation, PT OT are to be addressed by physiatrist and the rehab team     *SILVINA, now requiring hemodialysis.  Electrolytes, volume, acid-base balance are to be addressed by nephrology team.  Avoid nephrotoxic drugs hoping that his kidney function will recover within the next 2 weeks.     *Lymphoma.  Patient is to follow-up with his oncology team.  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     *Cardiomyopathy.  Hypovolemic at this time.  Volume status is to be addressed by nephrology team.  Fluid removal to keep him in a euvolemic state.  Patient is on beta-blocker.  Patient is off Entresto due to acute kidney failure with potential reversibility.     *History of DVT, pulm embolism status post IVC filter placement.     *Diarrhea.  CAT scan of the abdomen does not show any colitis..  Much improved.  Wife requested daily

## 2024-08-01 NOTE — PROGRESS NOTES
Nephrology Progress Note    Assessment:  SILVINA possible obstructive component (CT showed non obstructive caliceal stones w/ bilateral pelviectasis w/ mild bilateral hydroureter), serology all negative, non-oliguric, now s/p tunneled dialysis catheter placement (7/25)  Left Renal stone 1.2 cm  Hx CVA  Hx Lymphoma  Nl GFR 6/21/2024   interstitial dx ?  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%  7/16/2024  Hematoma spleen 7 cm      Plan:  - no HD today, unlikely to need it going forward  - will plan for dialysis catheter removal when able to be done   - decreasing midodrine to 2.5 mg, may be able to wean off altogether        Patient Active Problem List:     Bladder stones     Surgical aftercare, genitourinary system     Gross hematuria     SOB (shortness of breath)     Brain bleed (HCC)     Syncope and collapse     Athscl heart disease of native coronary artery w/o ang pctrs     Compression of brain (HCC)     Cardiomyopathy (HCC)     Cerebrovascular accident (CVA) (HCC)     Coronary artery disease involving native coronary artery of native heart without angina pectoris     Diffuse large B-cell lymphoma, unspecified site (HCC)     Dysphagia, oropharyngeal phase     History of falling     History of traumatic brain injury     Hypokalemia     Impaired mobility and activities of daily living     Intraoperative floppy iris syndrome (IFIS)     Macular degeneration of both eyes     Mass of brain     Subdural hematoma (HCC)     Methotrexate renal toxicity     Methotrexate toxicity     Obstructive and reflux uropathy, unspecified     Pancreatic cyst     PAT (paroxysmal atrial tachycardia) (HCC)     Primary hypertension     Primary osteoarthritis of both knees     Primary CNS lymphoma (HCC)     Primary pulmonary hypertension (HCC)     Urinary retention     Late effect of brain injury (HCC)     SILVINA (acute kidney injury) (HCC)     UTI (urinary tract infection)     Syncope     HFrEF (heart failure with reduced ejection fraction) (HCC)     Atrial fibrillation

## 2024-08-01 NOTE — PROGRESS NOTES
Subjective:      Patient ID: Km Garcia is a 78 y.o. male    HPI78 year old male who's ca catheter is draining clear yellow urine. He voices no other urological complaints at this time.        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    Cerebrovascular accident (CVA) (HCC) 2024    Charcot ankle, left     wears brace    Diabetes mellitus (HCC)     takes metformin    History of DVT (deep vein thrombosis) 7/15/2024    Hypertension     on meds > 10 yrs    Osteoarthritis      Past Surgical History:   Procedure Laterality Date    COLONOSCOPY      > 30 yrs ago    INVASIVE VASCULAR N/A 2024    Vena cava filter insertion - cath lab performed by Dev Magallon DO at [facility]ARDI[facility] CATH [facility]    IR NONTUNNELED VASCULAR CATHETER Right 2024    Medcomp 11.5F x 15 cm Raulerson Duo-Flow IJ double lumen catheter (LOT# [id number],  exp ) performed by Dr. Osborne    IR NONTUNNELED VASCULAR CATHETER  2024    IR NONTUNNELED VASCULAR CATHETER 2024 [facility] SPECIAL PROCEDURE    LITHOTRIPSY N/A 2018    WITH HOLMIUM LASER performed by Antonio Petit MD at [facility] OR    VASCULAR SURGERY Right 2024    Ultrasound-guided right internal jugular tunneled hemodialysis catheter. performed by Vahid Hu MD at [facility] OR     Social History     Socioeconomic History    Marital status:      Spouse name: None    Number of children: None    Years of education: None    Highest education level: None   Tobacco Use    Smoking status: Former     Current packs/day: 0.00     Types: Cigarettes     Quit date:      Years since quittin.6    Smokeless tobacco: Never   Vaping Use    Vaping Use: Never used   Substance and Sexual Activity    Alcohol use: Yes     Comment: Drinks 1 drink every other day    Drug  ondansetron (ZOFRAN) injection 4 mg  4 mg IntraVENous Q6H PRN Davide Crum MD        polyethylene glycol (GLYCOLAX) packet 17 g  17 g Oral Daily PRN Davide Crum MD        tamsulosin (FLOMAX) capsule 0.4 mg  0.4 mg Oral Daily Davide Crum MD   0.4 mg at 08/01/24 1011    valACYclovir (VALTREX) tablet 500 mg  500 mg Oral Daily Davide Crum MD   500 mg at 07/31/24 1753    acetaminophen (TYLENOL) tablet 650 mg  650 mg Oral Q4H PRN Juli Kahn MD   650 mg at 07/29/24 2059     Celecoxib and Naproxen  reviewed      Review of Systems   Constitutional:  Negative for fever.   HENT:  Negative for congestion.    Respiratory:  Negative for apnea.    Genitourinary:  Negative for hematuria.   Neurological:  Negative for speech difficulty.         Objective:   Physical Exam  HENT:      Mouth/Throat:      Mouth: Mucous membranes are moist.   Pulmonary:      Effort: Pulmonary effort is normal.   Skin:     General: Skin is warm.   Neurological:      Mental Status: He is alert and oriented to person, place, and time.          Assessment:      78 year old male who's ca catheter is draining clear yellow urine. He voices no other urological complaints at this time.          Plan:      Maintain ca catheter  Continue flomax  Follow up with established urologist after discharge        Wero Dhillon PA-C

## 2024-08-01 NOTE — PROGRESS NOTES
OCCUPATIONAL THERAPY  INPATIENT REHAB TREATMENT NOTE  [facility]      NAME: Km Garcia  : 1945 (78 y.o.)  MRN: [id number]  CODE STATUS: Full Code  Room: R251/R251-01    Date of Service: 2024    Referring Physician: Dr Stevens for Dr Mao  Rehab Diagnosis: Impaired mobility and ADL's due to Impaired mobility and ADLs due to Polyneuropathy    Hospital course:   Comments: 78 y.o. male patient with recent complicated medical history who presented to [facility] 7/15 after experiencing a syncopal episode while on the toilet. Patient with recent initiation of Entresto and affected blood pressure. Additionally, patient with increased lower extremity edema. Patient admitted for additional medical management. Cardiology and Oncology consulted. Course c/b SILVINA requiring new initiation of HD      Restrictions  Restrictions/Precautions  Restrictions/Precautions: Fall Risk        Patient's date of birth confirmed: Yes    SAFETY:  Safety Devices  Safety Devices in place: Yes  Type of devices: All fall risk precautions in place;Call light within reach;Bed alarm in place;Left in bed (Wife present at bedside)    SUBJECTIVE:  Subjective  Subjective: Patient receptive to participate in OT treatment at bedside    Pain at start of treatment: Yes: 8/10    Pain at end of treatment: No    Location: Bilateral LE  Description: Ache  Nursing notified: No  Nurse: N/A  Intervention: Patient reports his legs do not hurt if he does not move them      OBJECTIVE:    Patient agreeable to participate in OT session while remaining in bed with HOB elevated. Patient completes BUE strengthening using 2# weight, 1 set x 10 reps of the following:  - shoulder flexion  - shoulder abduction  - punch outs  - bicep curls  - forearm rotation  - wrist curls  Comments: Patient attempts a second set of strengthening exercises, completing shoulder flexion and abduction; however, increased fatigue noted and patient requests to change  activities.     Patient engages in additional BUE strength and endurance activity for improved activity tolerance. Patient performs repetitive reaching to fill 5x5 peg board, two layers high, for a total of 50 large mushroom pegs. Patient alternates hands with each row. After filling board, patient removes stack of double pegs and separates using in-hand manipulation. Patient returns all pegs to storage using 6# resistive clip, alternating hands after completing five pegs.        ASSESSMENT:  Assessment: Patient tolerated treatment well while remaining in bed. Patient requires frequent rest breaks and works at a slow pace with all exercises. Patient reports a decrease in LE pain at the end of treatment session.  Activity Tolerance: Patient tolerated treatment well      PLAN OF CARE:  Balance training, Strengthening, Functional mobility training, Endurance training, Patient/Caregiver education & training, Self-Care / ADL, Safety education & training, Home management training, Equipment evaluation, education, & procurement, Coordination training       Patient goals : \"get up and walk again\"  Time Frame for Long Term Goals : Within 2 weeks patient to demonstrate progress in the following areas in order to meet long term goals as stated in the initial evaluation  Long Term Goal 1: improve self care status  Long Term Goal 2: improve coordination and strength of UEs  Long Term Goal 3: improve balance to complete daily tasks        Therapy Time:   Individual Group Co-Treat   Time In 1528       Time Out 1614         Minutes 46          Variance: 5  Reason:  (Dr. Stevens present)      Therapeutic activities: 41 minutes     Electronically signed by:    NAOMI Arguello,   8/1/2024, 4:32 PM

## 2024-08-01 NOTE — PROGRESS NOTES
Physical Therapy Rehab Treatment Note  Facility/Department: [facility]  [facility]       NAME: Km Garcia  : 1945 (78 y.o.)  MRN: [id number]  CODE STATUS: Full Code    Date of Service: 2024       Restrictions:  Restrictions/Precautions: Fall Risk       SUBJECTIVE:   Subjective: Pt resting in bed at start of session, declining any OOB activity at this time.    Pain  Pain: 8/10 parag knees at start of session, nurse notified.      OBJECTIVE:         Bed Mobility  Additional Factors:  (attempted, pt refused)    Transfers  Additional Factors:  (attempted, pt refused)              PT Exercises  A/AROM Exercises: supine: AP, QS x10ea parag - also attempted GS, heel slides, and hip abd - pt refused      Activity Tolerance  Activity Tolerance: Patient limited by pain;Other (comment) (tx limited by pt refusing all activities)           ASSESSMENT/PROGRESS TOWARDS GOALS:   Assessment  Assessment: Tx focusing on LE strengthening exercises as pt declined any OOB activities despite multiple attempts to encourage participation from therapist. Pt reporting he had an episode of bowel incontinence this morning when attempting to sit EOB with OT that took \"forever\" to clean up. Despite initially being agreeable to supine therex, pt began refusing further tx after completing AP and QS. Further encouragement and education provided to pt on importance of participation in therapy, pt continues to adamantly decline all activities.  Activity Tolerance: Patient limited by pain;Other (comment) (tx limited by pt refusing all activities)    Goals:  Long Term Goals  Long Term Goal 1: Pt to complete bed mobility with Chriss  Long Term Goal 2: Pt to complete transfers with indep  Long Term Goal 3: Pt to ambulate 50-150ft with LRD and indep  Long Term Goal 4: Pt to manage 3 steps with HR and SBA    PLAN OF CARE/Safety:   Safety Devices  Type of Devices: All fall risk precautions in place;Bed alarm in place;Left in bed;Call light

## 2024-08-01 NOTE — PLAN OF CARE
Nutrition Problem #1: Inadequate oral intake  Intervention: Food and/or Nutrient Delivery: Continue Current Diet (Continue Carb Control 5 diet)

## 2024-08-01 NOTE — PROGRESS NOTES
Subjective:  The patient complains of ,\" moderate acute on chronic progressive fatigue and   weakness  partially relieved by rest, medications, PT,  OT,   SLP and rest and exacerbated by recent    events .      I am concerned about patient’s medical complexities and barriers to advancing in rehab goals including weakness .        I reviewed current care and plans for further care with other rehab providers including nursing and case management.  According to recent nursing note, \"  see notes  \".      Xander Montes LPN  Licensed Nurse  Nursing     Progress Notes     Signed     Date of Service: 7/31/2024 12:04 AM     Signed         Patient assessment completed. A&Ox3. LBM 7/30. Ca patent, draining clear yellow urine, and no discomfort reported. Patient in bed, call light within reach, and bed alarm on. Pt given cristal care. Zinc applied to bottom and genital area due to redness and excoriation. Medications given per MAR.  Pt denied any other needs at this time.                            ROS x10:  The patient also complains of severely impaired mobility and activities of daily living.  Otherwise no new problems with vision, hearing, nose, mouth, throat, dermal, cardiovascular, GI, , pulmonary, musculoskeletal, psychiatric or neurological. See also Acute Rehab PM&R H&P.       Vital signs:  BP (!) 120/58   Pulse 90   Temp 97.7 °F (36.5 °C)   Resp 18   Ht 1.854 m (6' 1\")   Wt 118.5 kg (261 lb 3.9 oz)   SpO2 100%   BMI 34.47 kg/m²   I/O:   PO/Intake:  fair PO intake,    diet    Bowel:   continent   Bladder: continent  no  ca  General:  Patient is well developed,   adequately nourished, and    well kempt.     HEENT:    Pupils equal, hearing intact to loud voice, external inspection of ear and nose benign.  Inspection of lips, tongue and gums benign    Musculoskeletal: No significant change in strength or tone.  All joints stable.      Inspection and palpation of digits and nails show no clubbing, cyanosis  or inflammatory conditions.   Neuro/Psychiatric: Affect: flat but pleasant.  Alert and oriented to person, place and situation with    cues.  No significant change in deep tendon reflexes or sensation  Lungs:  Diminished, CTA-B. Respiration effort is   normal at rest.     Heart:   S1 = S2,   RRR.       Abdomen:  Soft, non-tender, no enlargement of liver or spleen.  Extremities:    lower extremity edema and tenderness   Skin:   Intact to general survey,      Rehabilitation:  Physical Therapy:   Bed mobility:  Bed mobility  Rolling to Left: Moderate assistance (07/26/24 1246)  Rolling to Right: Moderate assistance (07/26/24 1246)  Supine to Sit: Maximum assistance (07/26/24 1246)  Bed Mobility Comments: Hand over hand assist to complete each transition. Difficulty mobilizing LEs. (07/26/24 1246)  Bed Mobility  Overall Assistance Level: Minimal Assistance (07/28/24 1230)  Additional Factors: Verbal cues;Increased time to complete;Head of bed raised;With handrails (07/31/24 1120)  Overall Assistance Level: Minimal Assistance (07/28/24 1230)  Additional Factors: Verbal cues;Increased time to complete;Head of bed raised;With handrails (07/31/24 1120)  Roll Left  Assistance Level: Moderate assistance (07/28/24 1230)  Skilled Clinical Factors: vc for patient to keep eyes open, cues and assist for head turn to direction intended to roll, reach across body with poor movement initiation and max/dep assist to achieve (07/27/24 1129)  Roll Right  Assistance Level: Maximum assistance (07/30/24 1157)  Skilled Clinical Factors: completed x3 - assist with hand placement on bedrail and to complete full roll, vc's for technique with fair carry over (07/30/24 1157)  Sit to Supine  Assistance Level: Maximum assistance (07/30/24 1157)  Skilled Clinical Factors: maxA at BLEs and guiding assist provided at trunk (07/30/24 1157)  Supine to Sit  Assistance Level: Maximum assistance (07/31/24 1120)  Skilled Clinical Factors: pt able to get

## 2024-08-01 NOTE — PROGRESS NOTES
dextrose bolus 10% 250 mL  250 mL IntraVENous PRN Davide Crum MD        glucagon injection 1 mg  1 mg SubCUTAneous PRN Davdie Crum MD        glucose chewable tablet 16 g  4 tablet Oral PRN Davide Crum MD        heparin (porcine) injection 2,000 Units  2,000 Units IntraVENous PRN Davide Crum MD        insulin lispro (HUMALOG,ADMELOG) injection vial 0-4 Units  0-4 Units SubCUTAneous TID WC Davide Crum MD        insulin lispro (HUMALOG,ADMELOG) injection vial 0-4 Units  0-4 Units SubCUTAneous Nightly Davide Crum MD        ondansetron (ZOFRAN-ODT) disintegrating tablet 4 mg  4 mg Oral Q8H PRN Davide Crum MD        Or    ondansetron (ZOFRAN) injection 4 mg  4 mg IntraVENous Q6H PRN Davide Crum MD        polyethylene glycol (GLYCOLAX) packet 17 g  17 g Oral Daily PRN Davide Crum MD        tamsulosin (FLOMAX) capsule 0.4 mg  0.4 mg Oral Daily Davide Crum MD   0.4 mg at 08/01/24 1011    valACYclovir (VALTREX) tablet 500 mg  500 mg Oral Daily Davide Crum MD   500 mg at 07/31/24 1753    acetaminophen (TYLENOL) tablet 650 mg  650 mg Oral Q4H PRN Juli Kahn MD   650 mg at 07/29/24 2059       PHYSICAL EXAM:    EYES:  Lids and lashes normal, pupils equal, round and reactive to light, extra ocular muscles intact, sclera clear, conjunctiva normal    ENT:  Normocephalic, without obvious abnormality, atraumatic, sinuses nontender on palpation, external ears without lesions, oral pharynx with moist mucus membranes, tonsils without erythema or exudates, gums normal and good dentition.    NECK:  Supple, symmetrical, trachea midline, no adenopathy, thyroid symmetric, not enlarged and no tenderness, skin normal    CHEST:    LUNGS:  No increased work of breathing, good air exchange, clear to auscultation bilaterally, no crackles or wheezing    CARDIOVASCULAR:  Normal apical impulse, regular rate and rhythm, normal S1 and S2, no S3 or S4, and no murmur  noted    ABDOMEN:  No scars, normal bowel sounds, soft, non-distended, non-tender, no masses palpated, no hepatosplenomegally    MUSCULOSKELETAL:  There is no redness, warmth, or swelling of the joints.  Full range of motion noted.  Motor strength is 5 out of 5 all extremities bilaterally.  Tone is normal.  EXTREMITIES:    NEURO:    DATA:      PT/INR:  No results found for: \"PTINR\"  PTT:    Lab Results   Component Value Date/Time    APTT 41.6 07/19/2024 09:05 AM     CMP:    Lab Results   Component Value Date/Time     08/01/2024 05:06 AM    K 3.5 08/01/2024 05:06 AM    K 3.6 07/25/2024 06:08 AM     08/01/2024 05:06 AM    CO2 18 08/01/2024 05:06 AM    BUN 26 08/01/2024 05:06 AM     Magnesium:    Lab Results   Component Value Date/Time    MG 1.6 07/18/2024 05:28 AM     Phosphorus:  No components found for: \"PO4\"  Calcium:  No components found for: \"CA\"  CBC:    Lab Results   Component Value Date/Time    WBC 13.8 07/29/2024 05:14 AM    RBC 3.21 07/29/2024 05:14 AM    HGB 10.2 07/29/2024 05:14 AM    HCT 30.2 07/29/2024 05:14 AM    MCV 94.1 07/29/2024 05:14 AM    RDW 13.1 07/29/2024 05:14 AM     07/29/2024 05:14 AM     DIFF:    Lab Results   Component Value Date/Time    MCV 94.1 07/29/2024 05:14 AM    RDW 13.1 07/29/2024 05:14 AM      LDH:  No results found for: \"LDH\"  Uric Acid:  No components found for: \"URIC\"    EKG Reviewed  Appropriate Radiology Reviewed      Pathology: Reviewed where indicated      ASSESSMENT:  Principal Problem:    Impaired mobility and activities of daily living  Active Problems:    Functional diarrhea  Resolved Problems:    * No resolved hospital problems. *    Patient Active Problem List   Diagnosis    Bladder stones    Surgical aftercare, genitourinary system    Gross hematuria    SOB (shortness of breath)    Brain bleed (HCC)    Syncope and collapse    Athscl heart disease of native coronary artery w/o ang pctrs    Compression of brain (HCC)    Cardiomyopathy (HCC)

## 2024-08-01 NOTE — CARE COORDINATION
CM spoke with pt and wife. We are concerned that pt has not been getting his minutes. Pt may need a lower level of care. We sent a referral to [facility] in [place]. They called us back and could not take him. CM had along conversation with wife and she feels he needs to go back to medical for the diarrhea problem. CAREY explained that would be up to the medical doctor to make that decision. CAREY spoke with Dr Ervin regarding her concerns. Dr Ervin spoke with pt and wife and will make changes to his medicines and does not feel pt needs to be transferred. CAREY went in with Dr Stevens and she spoke with them. She stated pt needs to do his therapy and not refuse. Doctor was also going to make some med changes and if not better by Monday he may need to go back to medical and have a colonoscopy. Pt and wife are agreeable. Electronically signed by Tania De La O RN on 8/1/2024 at 4:58 PM .

## 2024-08-01 NOTE — PROGRESS NOTES
Pt assisted with bedside commode to urinate, refusal of straight cath. Pt required much assistance, very difficulty to transfer to commode without assistance. Pt appropriately cleaned for specimen. Pt states she is normally independent at home and that this weakness is new starting today. She states she was on the bathroom floor earlier and had trouble getting up. Pt flowsheets completed per assessment. A&Ox 4 with flat affect. Contreras is intact and patent draining clear yellow urine no discomfort per pt. Pt is in bed states he is having some pain 8/10 PRN given. Tiffany care given and zinc applied to bottom and groin area due to excoriation. Pt is cooperative with care and MAR. Denies any needs at this time , call light in reach, will continue with care.  Electronically signed by Martha Muller RN on 8/1/2024 at 12:40 AM

## 2024-08-01 NOTE — PROGRESS NOTES
Comprehensive Nutrition Assessment    Type and Reason for Visit:  Reassess    Nutrition Recommendations/Plan:   Continue Carb Control 5 diet      Malnutrition Assessment:  Malnutrition Status:  Insufficient data (07/26/24 1429)    Context:  Acute Illness     Findings of the 6 clinical characteristics of malnutrition:  Energy Intake:  75% or less of estimated energy requirements for 7 or more days  Weight Loss:  Unable to assess     Body Fat Loss:  Unable to assess     Muscle Mass Loss:  Unable to assess    Fluid Accumulation:  Unable to assess     Strength:  Not Performed    Nutrition Assessment:    No progress towards nutrition related goals, po intakes remain poor due to persistant diarrhea.  Continue to decline oral supplements.  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 SILVINA possible ATN now. IHD started during admission to regular medical floor. persistant loos stool daily, FMS in place, meds/labs reviewed (mildly elevated glucose, hyponatremia), lomotil discontinued, Metamucil since 7/27.  2+ generalized edea noted Wound Type: None       Current Nutrition Intake & Therapies:    Average Meal Intake: 1-25%  Average Supplements Intake: None Ordered  ADULT DIET; Regular; 5 carb choices (75 gm/meal); No Added Salt (3-4 gm)    Anthropometric Measures:  Height: 185.4 cm (6' 1\")  Ideal Body Weight (IBW): 184 lbs (84 kg)    Admission Body Weight: 121.1 kg (267 lb)  Current Body Weight: 100.2 kg (221 lb) (8/1),  Weight Source: Bed Scale  Current BMI (kg/m2): 29.2  Usual Body Weight: 113.4 kg (250 lb) (stated 3/11/2024; 263 lb-2022)  % Weight Change (Calculated): -11.6                    BMI Categories: Overweight (BMI 25.0-29.9)    Estimated Daily Nutrient Needs:  Energy Requirements Based On: Kcal/kg  Weight Used for Energy Requirements: Current  Energy (kcal/day): ~2200 kcal @ 22

## 2024-08-01 NOTE — PROGRESS NOTES
OCCUPATIONAL THERAPY  INPATIENT REHAB TREATMENT NOTE  [facility]      NAME: Km Garcia  : 1945 (78 y.o.)  MRN: [id number]  CODE STATUS: Full Code  Room: R251/R251-01    Date of Service: 2024    Referring Physician: Dr Stevens for Dr Mao  Rehab Diagnosis: Impaired mobility and ADL's due to Impaired mobility and ADLs due to Polyneuropathy    Hospital course:   Comments: 78 y.o. male patient with recent complicated medical history who presented to [facility] ER 7/15 after experiencing a syncopal episode while on the toilet. Patient with recent initiation of Entresto and affected blood pressure. Additionally, patient with increased lower extremity edema. Patient admitted for additional medical management. Cardiology and Oncology consulted. Course c/b SILVINA requiring new initiation of HD    Restrictions  Restrictions/Precautions  Restrictions/Precautions: Fall Risk         Patient's date of birth confirmed: Yes    SAFETY:  Safety Devices  Safety Devices in place: Yes  Type of devices: All fall risk precautions in place    SUBJECTIVE:  Subjective  Subjective: \"I think I have to go again.\"    Pain at start of treatment: Yes: 10    Pain at end of treatment: Yes: 8/10    Location: both legs/feet  Nursing notified: Declined  Intervention: Other: Declined    COGNITION:  Orientation  Overall Orientation Status: Within Normal Limits  Cognition  Overall Cognitive Status: WFL  Arousal/Alertness: Appropriate responses to stimuli  Following Commands: Follows one step commands with increased time  Attention Span: Difficulty dividing attention    OBJECTIVE:    Grooming/Oral Hygiene  Assistance Level: Modified independent  Skilled Clinical Factors: Seated in front of sink. Oral care, hair care and wash face.  Upper Extremity Bathing  Assistance Level: Supervision;Increased time to complete  Skilled Clinical Factors: VC for thoroughness. Seated in front of the sink.  Lower Extremity Bathing  Assistance  to demonstrate progress in the following areas in order to meet long term goals as stated in the initial evaluation  Long Term Goal 1: improve self care status  Long Term Goal 2: improve coordination and strength of UEs  Long Term Goal 3: improve balance to complete daily tasks    Therapy Time:   Individual Group Co-Treat   Time In 0830       Time Out 1000         Minutes 90         ADL/IADL trainin minutes     Electronically signed by:    CHARLETTE Saavedra,   2024, 11:20 AM

## 2024-08-02 ENCOUNTER — HOSPITAL ENCOUNTER (INPATIENT)
Age: 79
LOS: 15 days | Discharge: HOSPICE | DRG: 673 | End: 2024-08-20
Attending: INTERNAL MEDICINE | Admitting: STUDENT IN AN ORGANIZED HEALTH CARE EDUCATION/TRAINING PROGRAM
Payer: MEDICARE

## 2024-08-02 ENCOUNTER — APPOINTMENT (OUTPATIENT)
Dept: CT IMAGING | Age: 79
End: 2024-08-02
Attending: PHYSICAL MEDICINE & REHABILITATION
Payer: MEDICARE

## 2024-08-02 VITALS
HEART RATE: 86 BPM | HEIGHT: 73 IN | WEIGHT: 221.2 LBS | DIASTOLIC BLOOD PRESSURE: 76 MMHG | BODY MASS INDEX: 29.31 KG/M2 | RESPIRATION RATE: 16 BRPM | SYSTOLIC BLOOD PRESSURE: 108 MMHG | TEMPERATURE: 98.1 F | OXYGEN SATURATION: 100 %

## 2024-08-02 DIAGNOSIS — I48.0 PAROXYSMAL ATRIAL FIBRILLATION (HCC): ICD-10-CM

## 2024-08-02 DIAGNOSIS — R52 PAIN: ICD-10-CM

## 2024-08-02 DIAGNOSIS — I48.91 ATRIAL FIBRILLATION, UNSPECIFIED TYPE (HCC): ICD-10-CM

## 2024-08-02 DIAGNOSIS — C83.30 DIFFUSE LARGE B-CELL LYMPHOMA, UNSPECIFIED BODY REGION (HCC): Primary | ICD-10-CM

## 2024-08-02 LAB
ANION GAP SERPL CALCULATED.3IONS-SCNC: 11 MEQ/L (ref 9–15)
BASOPHILS # BLD: 0.1 K/UL (ref 0–0.2)
BASOPHILS NFR BLD: 1.2 %
BUN SERPL-MCNC: 24 MG/DL (ref 8–23)
BURR CELLS: ABNORMAL
CALCIUM SERPL-MCNC: 7.6 MG/DL (ref 8.5–9.9)
CHLORIDE SERPL-SCNC: 106 MEQ/L (ref 95–107)
CO2 SERPL-SCNC: 16 MEQ/L (ref 20–31)
CREAT SERPL-MCNC: 1.87 MG/DL (ref 0.7–1.2)
EOSINOPHIL # BLD: 0.2 K/UL (ref 0–0.7)
EOSINOPHIL NFR BLD: 3 %
ERYTHROCYTE [DISTWIDTH] IN BLOOD BY AUTOMATED COUNT: 13.2 % (ref 11.5–14.5)
GLUCOSE BLD-MCNC: 127 MG/DL (ref 70–99)
GLUCOSE BLD-MCNC: 145 MG/DL (ref 70–99)
GLUCOSE BLD-MCNC: 156 MG/DL (ref 70–99)
GLUCOSE BLD-MCNC: 196 MG/DL (ref 70–99)
GLUCOSE SERPL-MCNC: 129 MG/DL (ref 70–99)
HCT VFR BLD AUTO: 27.4 % (ref 42–52)
HGB BLD-MCNC: 9.1 G/DL (ref 14–18)
LACTOFERRIN, FECAL: POSITIVE
LYMPHOCYTES # BLD: 0.9 K/UL (ref 1–4.8)
LYMPHOCYTES NFR BLD: 15.5 %
MCH RBC QN AUTO: 31.3 PG (ref 27–31.3)
MCHC RBC AUTO-ENTMCNC: 33.2 % (ref 33–37)
MCV RBC AUTO: 94.2 FL (ref 79–92.2)
MONOCYTES # BLD: 0.5 K/UL (ref 0.2–0.8)
MONOCYTES NFR BLD: 8.3 %
NEUTROPHILS # BLD: 3.6 K/UL (ref 1.4–6.5)
NEUTS SEG NFR BLD: 64.1 %
OVALOCYTES BLD QL SMEAR: ABNORMAL
PERFORMED ON: ABNORMAL
PLATELET # BLD AUTO: 379 K/UL (ref 130–400)
PLATELET BLD QL SMEAR: ADEQUATE
POIKILOCYTOSIS BLD QL SMEAR: ABNORMAL
POTASSIUM SERPL-SCNC: 3.3 MEQ/L (ref 3.4–4.9)
RBC # BLD AUTO: 2.91 M/UL (ref 4.7–6.1)
SARS-COV-2 RDRP RESP QL NAA+PROBE: NOT DETECTED
SLIDE REVIEW: ABNORMAL
SODIUM SERPL-SCNC: 133 MEQ/L (ref 135–144)
WBC # BLD AUTO: 5.7 K/UL (ref 4.8–10.8)

## 2024-08-02 PROCEDURE — 6370000000 HC RX 637 (ALT 250 FOR IP): Performed by: PHYSICAL MEDICINE & REHABILITATION

## 2024-08-02 PROCEDURE — G0379 DIRECT REFER HOSPITAL OBSERV: HCPCS

## 2024-08-02 PROCEDURE — 97535 SELF CARE MNGMENT TRAINING: CPT

## 2024-08-02 PROCEDURE — 2060000000 HC ICU INTERMEDIATE R&B

## 2024-08-02 PROCEDURE — 70450 CT HEAD/BRAIN W/O DYE: CPT

## 2024-08-02 PROCEDURE — 85025 COMPLETE CBC W/AUTO DIFF WBC: CPT

## 2024-08-02 PROCEDURE — G0378 HOSPITAL OBSERVATION PER HR: HCPCS

## 2024-08-02 PROCEDURE — 6370000000 HC RX 637 (ALT 250 FOR IP): Performed by: INTERNAL MEDICINE

## 2024-08-02 PROCEDURE — 97110 THERAPEUTIC EXERCISES: CPT

## 2024-08-02 PROCEDURE — 87635 SARS-COV-2 COVID-19 AMP PRB: CPT

## 2024-08-02 PROCEDURE — 99231 SBSQ HOSP IP/OBS SF/LOW 25: CPT | Performed by: PHYSICIAN ASSISTANT

## 2024-08-02 PROCEDURE — 80048 BASIC METABOLIC PNL TOTAL CA: CPT

## 2024-08-02 PROCEDURE — 36415 COLL VENOUS BLD VENIPUNCTURE: CPT

## 2024-08-02 PROCEDURE — 97530 THERAPEUTIC ACTIVITIES: CPT

## 2024-08-02 PROCEDURE — 6370000000 HC RX 637 (ALT 250 FOR IP): Performed by: STUDENT IN AN ORGANIZED HEALTH CARE EDUCATION/TRAINING PROGRAM

## 2024-08-02 RX ORDER — VALACYCLOVIR HYDROCHLORIDE 1 G/1
500 TABLET, FILM COATED ORAL DAILY
Status: CANCELLED | OUTPATIENT
Start: 2024-08-02

## 2024-08-02 RX ORDER — ACETAMINOPHEN 325 MG/1
650 TABLET ORAL EVERY 6 HOURS PRN
Status: DISCONTINUED | OUTPATIENT
Start: 2024-08-02 | End: 2024-08-07 | Stop reason: DRUGHIGH

## 2024-08-02 RX ORDER — GLUCAGON 1 MG/ML
1 KIT INJECTION PRN
Status: DISCONTINUED | OUTPATIENT
Start: 2024-08-02 | End: 2024-08-20 | Stop reason: HOSPADM

## 2024-08-02 RX ORDER — HEPARIN SODIUM 1000 [USP'U]/ML
2000 INJECTION, SOLUTION INTRAVENOUS; SUBCUTANEOUS PRN
Status: DISCONTINUED | OUTPATIENT
Start: 2024-08-02 | End: 2024-08-16

## 2024-08-02 RX ORDER — POLYETHYLENE GLYCOL 3350 17 G/17G
17 POWDER, FOR SOLUTION ORAL DAILY PRN
Status: DISCONTINUED | OUTPATIENT
Start: 2024-08-02 | End: 2024-08-20 | Stop reason: HOSPADM

## 2024-08-02 RX ORDER — ONDANSETRON 4 MG/1
4 TABLET, ORALLY DISINTEGRATING ORAL EVERY 8 HOURS PRN
Status: DISCONTINUED | OUTPATIENT
Start: 2024-08-02 | End: 2024-08-20 | Stop reason: HOSPADM

## 2024-08-02 RX ORDER — DIPHENOXYLATE HYDROCHLORIDE AND ATROPINE SULFATE 2.5; .025 MG/1; MG/1
1 TABLET ORAL 2 TIMES DAILY PRN
Status: CANCELLED | OUTPATIENT
Start: 2024-08-02

## 2024-08-02 RX ORDER — METRONIDAZOLE 500 MG/1
500 TABLET ORAL EVERY 8 HOURS SCHEDULED
Status: CANCELLED | OUTPATIENT
Start: 2024-08-02 | End: 2024-08-04

## 2024-08-02 RX ORDER — ONDANSETRON 4 MG/1
4 TABLET, ORALLY DISINTEGRATING ORAL EVERY 8 HOURS PRN
Status: CANCELLED | OUTPATIENT
Start: 2024-08-02

## 2024-08-02 RX ORDER — ACETAMINOPHEN 325 MG/1
650 TABLET ORAL EVERY 6 HOURS PRN
Status: CANCELLED | OUTPATIENT
Start: 2024-08-02

## 2024-08-02 RX ORDER — TRAMADOL HYDROCHLORIDE 50 MG/1
50 TABLET ORAL
Status: CANCELLED | OUTPATIENT
Start: 2024-08-03

## 2024-08-02 RX ORDER — CALCIUM CARBONATE 500 MG/1
500 TABLET, CHEWABLE ORAL 3 TIMES DAILY PRN
Status: CANCELLED | OUTPATIENT
Start: 2024-08-02

## 2024-08-02 RX ORDER — INSULIN LISPRO 100 [IU]/ML
0-4 INJECTION, SOLUTION INTRAVENOUS; SUBCUTANEOUS NIGHTLY
Status: DISCONTINUED | OUTPATIENT
Start: 2024-08-02 | End: 2024-08-20 | Stop reason: HOSPADM

## 2024-08-02 RX ORDER — CALCIUM CARBONATE 500 MG/1
500 TABLET, CHEWABLE ORAL 3 TIMES DAILY PRN
Status: DISCONTINUED | OUTPATIENT
Start: 2024-08-02 | End: 2024-08-05

## 2024-08-02 RX ORDER — MIDODRINE HYDROCHLORIDE 5 MG/1
2.5 TABLET ORAL
Status: DISCONTINUED | OUTPATIENT
Start: 2024-08-03 | End: 2024-08-04

## 2024-08-02 RX ORDER — CHOLESTYRAMINE LIGHT 4 G/5.7G
4 POWDER, FOR SUSPENSION ORAL 2 TIMES DAILY
Status: DISCONTINUED | OUTPATIENT
Start: 2024-08-02 | End: 2024-08-20 | Stop reason: HOSPADM

## 2024-08-02 RX ORDER — DEXTROSE MONOHYDRATE 100 MG/ML
INJECTION, SOLUTION INTRAVENOUS CONTINUOUS PRN
Status: DISCONTINUED | OUTPATIENT
Start: 2024-08-02 | End: 2024-08-20 | Stop reason: HOSPADM

## 2024-08-02 RX ORDER — TAMSULOSIN HYDROCHLORIDE 0.4 MG/1
0.4 CAPSULE ORAL DAILY
Status: DISCONTINUED | OUTPATIENT
Start: 2024-08-03 | End: 2024-08-06

## 2024-08-02 RX ORDER — MIDODRINE HYDROCHLORIDE 2.5 MG/1
2.5 TABLET ORAL
Status: CANCELLED | OUTPATIENT
Start: 2024-08-02

## 2024-08-02 RX ORDER — SODIUM BICARBONATE 650 MG/1
650 TABLET ORAL 2 TIMES DAILY
Status: DISCONTINUED | OUTPATIENT
Start: 2024-08-02 | End: 2024-08-20 | Stop reason: HOSPADM

## 2024-08-02 RX ORDER — ACETAMINOPHEN 325 MG/1
650 TABLET ORAL EVERY 4 HOURS PRN
Status: DISCONTINUED | OUTPATIENT
Start: 2024-08-02 | End: 2024-08-20 | Stop reason: HOSPADM

## 2024-08-02 RX ORDER — ONDANSETRON 2 MG/ML
4 INJECTION INTRAMUSCULAR; INTRAVENOUS EVERY 6 HOURS PRN
Status: CANCELLED | OUTPATIENT
Start: 2024-08-02

## 2024-08-02 RX ORDER — TRAMADOL HYDROCHLORIDE 50 MG/1
50 TABLET ORAL EVERY 6 HOURS PRN
Status: DISCONTINUED | OUTPATIENT
Start: 2024-08-02 | End: 2024-08-05

## 2024-08-02 RX ORDER — ACETAMINOPHEN 650 MG/1
650 SUPPOSITORY RECTAL EVERY 6 HOURS PRN
Status: DISCONTINUED | OUTPATIENT
Start: 2024-08-02 | End: 2024-08-07 | Stop reason: DRUGHIGH

## 2024-08-02 RX ORDER — SODIUM BICARBONATE 650 MG/1
650 TABLET ORAL 2 TIMES DAILY
Status: CANCELLED | OUTPATIENT
Start: 2024-08-02

## 2024-08-02 RX ORDER — ACETAMINOPHEN 650 MG/1
650 SUPPOSITORY RECTAL EVERY 6 HOURS PRN
Status: CANCELLED | OUTPATIENT
Start: 2024-08-02

## 2024-08-02 RX ORDER — POLYETHYLENE GLYCOL 3350 17 G/17G
17 POWDER, FOR SOLUTION ORAL DAILY PRN
Status: CANCELLED | OUTPATIENT
Start: 2024-08-02

## 2024-08-02 RX ORDER — DIPHENOXYLATE HYDROCHLORIDE AND ATROPINE SULFATE 2.5; .025 MG/1; MG/1
1 TABLET ORAL 2 TIMES DAILY PRN
Status: DISCONTINUED | OUTPATIENT
Start: 2024-08-02 | End: 2024-08-20 | Stop reason: HOSPADM

## 2024-08-02 RX ORDER — HEPARIN SODIUM 1000 [USP'U]/ML
2000 INJECTION, SOLUTION INTRAVENOUS; SUBCUTANEOUS PRN
Status: CANCELLED | OUTPATIENT
Start: 2024-08-02

## 2024-08-02 RX ORDER — GLUCAGON 1 MG/ML
1 KIT INJECTION PRN
Status: CANCELLED | OUTPATIENT
Start: 2024-08-02

## 2024-08-02 RX ORDER — VALACYCLOVIR HYDROCHLORIDE 1 G/1
500 TABLET, FILM COATED ORAL DAILY
Status: DISCONTINUED | OUTPATIENT
Start: 2024-08-03 | End: 2024-08-20 | Stop reason: HOSPADM

## 2024-08-02 RX ORDER — CYCLOBENZAPRINE HCL 10 MG
5 TABLET ORAL 3 TIMES DAILY PRN
Status: DISCONTINUED | OUTPATIENT
Start: 2024-08-02 | End: 2024-08-20 | Stop reason: HOSPADM

## 2024-08-02 RX ORDER — ACETAMINOPHEN 325 MG/1
650 TABLET ORAL EVERY 4 HOURS PRN
Status: CANCELLED | OUTPATIENT
Start: 2024-08-02

## 2024-08-02 RX ORDER — TRAMADOL HYDROCHLORIDE 50 MG/1
50 TABLET ORAL
Status: DISCONTINUED | OUTPATIENT
Start: 2024-08-03 | End: 2024-08-05

## 2024-08-02 RX ORDER — MECOBALAMIN 5000 MCG
5 TABLET,DISINTEGRATING ORAL NIGHTLY
Status: DISCONTINUED | OUTPATIENT
Start: 2024-08-02 | End: 2024-08-20 | Stop reason: HOSPADM

## 2024-08-02 RX ORDER — MECOBALAMIN 5000 MCG
5 TABLET,DISINTEGRATING ORAL NIGHTLY
Status: CANCELLED | OUTPATIENT
Start: 2024-08-02

## 2024-08-02 RX ORDER — TRAMADOL HYDROCHLORIDE 50 MG/1
50 TABLET ORAL EVERY 6 HOURS PRN
Status: CANCELLED | OUTPATIENT
Start: 2024-08-02

## 2024-08-02 RX ORDER — DIPHENOXYLATE HYDROCHLORIDE AND ATROPINE SULFATE 2.5; .025 MG/1; MG/1
1 TABLET ORAL DAILY
Status: DISCONTINUED | OUTPATIENT
Start: 2024-08-02 | End: 2024-08-20 | Stop reason: HOSPADM

## 2024-08-02 RX ORDER — DEXTROSE MONOHYDRATE 100 MG/ML
INJECTION, SOLUTION INTRAVENOUS CONTINUOUS PRN
Status: CANCELLED | OUTPATIENT
Start: 2024-08-02

## 2024-08-02 RX ORDER — METRONIDAZOLE 500 MG/1
500 TABLET ORAL EVERY 8 HOURS SCHEDULED
Status: COMPLETED | OUTPATIENT
Start: 2024-08-02 | End: 2024-08-04

## 2024-08-02 RX ORDER — INSULIN LISPRO 100 [IU]/ML
0-4 INJECTION, SOLUTION INTRAVENOUS; SUBCUTANEOUS NIGHTLY
Status: CANCELLED | OUTPATIENT
Start: 2024-08-02

## 2024-08-02 RX ORDER — CYCLOBENZAPRINE HCL 5 MG
5 TABLET ORAL 3 TIMES DAILY PRN
Status: CANCELLED | OUTPATIENT
Start: 2024-08-02

## 2024-08-02 RX ORDER — CHOLESTYRAMINE LIGHT 4 G/5.7G
4 POWDER, FOR SUSPENSION ORAL 2 TIMES DAILY
Status: CANCELLED | OUTPATIENT
Start: 2024-08-02

## 2024-08-02 RX ORDER — INSULIN LISPRO 100 [IU]/ML
0-4 INJECTION, SOLUTION INTRAVENOUS; SUBCUTANEOUS
Status: CANCELLED | OUTPATIENT
Start: 2024-08-02

## 2024-08-02 RX ORDER — INSULIN LISPRO 100 [IU]/ML
0-4 INJECTION, SOLUTION INTRAVENOUS; SUBCUTANEOUS
Status: DISCONTINUED | OUTPATIENT
Start: 2024-08-03 | End: 2024-08-20 | Stop reason: HOSPADM

## 2024-08-02 RX ORDER — ONDANSETRON 2 MG/ML
4 INJECTION INTRAMUSCULAR; INTRAVENOUS EVERY 6 HOURS PRN
Status: DISCONTINUED | OUTPATIENT
Start: 2024-08-02 | End: 2024-08-20 | Stop reason: HOSPADM

## 2024-08-02 RX ORDER — TAMSULOSIN HYDROCHLORIDE 0.4 MG/1
0.4 CAPSULE ORAL DAILY
Status: CANCELLED | OUTPATIENT
Start: 2024-08-03

## 2024-08-02 RX ORDER — DIPHENOXYLATE HYDROCHLORIDE AND ATROPINE SULFATE 2.5; .025 MG/1; MG/1
1 TABLET ORAL DAILY
Status: CANCELLED | OUTPATIENT
Start: 2024-08-02

## 2024-08-02 RX ADMIN — TAMSULOSIN HYDROCHLORIDE 0.4 MG: 0.4 CAPSULE ORAL at 08:54

## 2024-08-02 RX ADMIN — CHOLESTYRAMINE 4 G: 4 POWDER, FOR SUSPENSION ORAL at 08:56

## 2024-08-02 RX ADMIN — DIPHENOXYLATE HYDROCHLORIDE AND ATROPINE SULFATE 1 TABLET: 2.5; .025 TABLET ORAL at 22:23

## 2024-08-02 RX ADMIN — MIDODRINE HYDROCHLORIDE 2.5 MG: 2.5 TABLET ORAL at 12:48

## 2024-08-02 RX ADMIN — MIDODRINE HYDROCHLORIDE 2.5 MG: 2.5 TABLET ORAL at 08:55

## 2024-08-02 RX ADMIN — METOPROLOL TARTRATE 12.5 MG: 25 TABLET, FILM COATED ORAL at 21:48

## 2024-08-02 RX ADMIN — ACETAMINOPHEN 650 MG: 325 TABLET ORAL at 21:48

## 2024-08-02 RX ADMIN — METRONIDAZOLE 500 MG: 500 TABLET ORAL at 12:48

## 2024-08-02 RX ADMIN — TRAMADOL HYDROCHLORIDE 50 MG: 50 TABLET ORAL at 08:55

## 2024-08-02 RX ADMIN — SODIUM BICARBONATE 650 MG TABLET 650 MG: at 21:48

## 2024-08-02 RX ADMIN — METOPROLOL TARTRATE 12.5 MG: 25 TABLET, FILM COATED ORAL at 08:55

## 2024-08-02 RX ADMIN — MIDODRINE HYDROCHLORIDE 2.5 MG: 2.5 TABLET ORAL at 17:15

## 2024-08-02 RX ADMIN — TRAMADOL HYDROCHLORIDE 50 MG: 50 TABLET ORAL at 21:47

## 2024-08-02 RX ADMIN — Medication 5 MG: at 21:48

## 2024-08-02 RX ADMIN — METRONIDAZOLE 500 MG: 500 TABLET ORAL at 22:05

## 2024-08-02 RX ADMIN — SODIUM BICARBONATE 650 MG: 650 TABLET ORAL at 08:55

## 2024-08-02 RX ADMIN — CHOLESTYRAMINE 4 G: 4 POWDER, FOR SUSPENSION ORAL at 21:49

## 2024-08-02 RX ADMIN — VALACYCLOVIR 500 MG: 1 TABLET, FILM COATED ORAL at 17:14

## 2024-08-02 ASSESSMENT — ENCOUNTER SYMPTOMS: APNEA: 0

## 2024-08-02 ASSESSMENT — PAIN SCALES - GENERAL
PAINLEVEL_OUTOF10: 5
PAINLEVEL_OUTOF10: 0
PAINLEVEL_OUTOF10: 5

## 2024-08-02 ASSESSMENT — PAIN DESCRIPTION - ORIENTATION: ORIENTATION: RIGHT;LEFT

## 2024-08-02 ASSESSMENT — PAIN DESCRIPTION - LOCATION: LOCATION: KNEE

## 2024-08-02 NOTE — PROGRESS NOTES
DISCHARGE SUMMARY    Hospital Course: The patient was admitted to the Rehabilitation Unit to address ADL and mobility deficits-as detailed above and below.  The patient was enrolled in acute PT, OT program.  Weekly team meetings were held to assess functional progress toward their goals-and modify the therapy program.  The patient's medical, emotional, psychosocial and functional issues were addressed.  The patient progressed in the rehab program and is now ready for discharge home.  Refer to functional assessments summary report for detailed functional status.  Refer to the medical problem list below to see the medical issues addressed.  The social and DC complexities are detailed in the DC planning section below.    Greater than 3 5 minutes was spent on coordinating patients discharge including follow-up care, medications and patient/family education.  Extended time needed because of the potential use of controlled medications are high risk medications and a high risk population individual.  Patient and family were instructed to use lowest effective dose of these medications and slowly titrate off over the next 2 to 4 weeks.  They are not to combine opiates with sedatives.     I reviewed her [place] prescription monitoring service data sheets in hopes of eliminating polypharmacy and weaning to the lowest effective dose of pain medications and eliminating the concomitant use of benzodiazepines.  I see   no medications of concern.  I see   no habits of combining sedatives and narcotics.    Subjective:  The patient complains of ,\" moderate acute on chronic progressive fatigue and   weakness  partially relieved by rest, medications, PT,  OT,   SLP and rest and exacerbated by recent    events .      I am concerned about patient’s medical complexities and barriers to advancing in rehab goals including weakness .        I reviewed current care and plans for further care with other rehab providers including nursing and case  1129)  Assistance Level: Minimal assistance (touching assist) (07/31/24 1129)  Gait Deviations: Slow rod;Decreased step length bilateral;Wide base of support (07/31/24 1129)  Skilled Clinical Factors: occasional stabilizing assistance to improve safety, vc's for safe approach to chair with good carry over (07/31/24 1129)  Stairs:     W/C mobility:       Occupational Therapy:   Hand Dominance: Right  ADL  Feeding: Minimal assistance (07/26/24 1349)  Grooming: Setup;Verbal cueing;Supervision (07/26/24 1349)  Grooming Skilled Clinical Factors: Pt completed oral care, hair care, and washed face at bed level. Occasional verbal/visual cues for initiation and sequencing (07/26/24 1349)  UE Bathing: Setup;Supervision;Verbal cueing (07/26/24 1349)  UE Bathing Skilled Clinical Factors: VCs for initiation (07/26/24 1349)  LE Bathing: Dependent/Total (07/26/24 1349)  UE Dressing Skilled Clinical Factors: Hospital Gown Only (07/26/24 1349)  LE Dressing: Dependent/Total (07/26/24 1349)  LE Dressing Skilled Clinical Factors: to remove socks and don absorbant undergarment (07/26/24 1349)  Toileting: Dependent/Total (07/26/24 1349)  Toileting Skilled Clinical Factors: Patient with ca catheter in place as well as a rectal tube. Loose BM present in patient's undergarment and he was cleaned at a dependent level (07/26/24 1349)  Toilet Transfers  Toilet Transfer: Unable to assess (07/26/24 1352)     Shower Transfers  Shower Transfers: Not tested (07/26/24 1352)    Speech Therapy:            Diet/Swallow:                      Lab/X-ray studies reviewed, analyzed and discussed with patient and staff:   Recent Results (from the past 24 hour(s))   POCT Glucose    Collection Time: 08/01/24  8:11 PM   Result Value Ref Range    POC Glucose 170 (H) 70 - 99 mg/dl    Performed on ACCU-CHEK    FECAL LACTOFERRIN    Collection Time: 08/01/24 10:25 PM   Result Value Ref Range    Lactoferrin, Fecal POSITIVE (A) Negative   Basic Metabolic Panel

## 2024-08-02 NOTE — PROGRESS NOTES
I came by to remove his tunneled dialysis catheter since he no longer requires dialysis.    There was some dizziness requiring a rapid response.    I will return Monday wherever he is located in the hospital to remove his tunneled dialysis catheter at the bedside.

## 2024-08-02 NOTE — SIGNIFICANT EVENT
Rapid response called for patient experiencing dizziness.  Patient vitals are currently stable.  Heart rate within normal range.  Blood pressure within acceptable range.  Saturation while in the high 90s on room air.  Patient is oriented x 3.  He is mentally intact.  No new numbness or weakness.  Glucose within appropriate range.      Will obtain orthostatic vitals  Obtain CT head stat given patient recent ICH.    Anna Cuenca, [facility]

## 2024-08-02 NOTE — PROGRESS NOTES
Pt's wife came to nurses states he is dizzy now while getting up with PT.  Pt states he is lightheaded and dizzy now.  Pt vitals updated and nurse notified provider via perfect serve awaiting response.

## 2024-08-02 NOTE — DISCHARGE SUMMARY
characterize the resembling CIS.  Minimal gallstones and sludge.  Pancreatic volume loss is observed.  6.9 x 6 x 6 cm large lesion in the anterior aspect of this normal sized spleen.  Minimal calcification is observed at the posterior margin. The adrenal glands are normal.  Renal fluid density lesions are seen, suspected to represent cysts.  Somewhat large nonobstructive caliceal stones are seen on the left, measuring up to 1.2 cm.  Bilateral pelviectasis is seen with mild bilateral hydroureter.  No distal obstruction is observed.  A Contreras catheter is seen within the prostatic urethra.  There is mild prostate enlargement GI/Bowel:  There is no evidence of bowel obstruction.  No evidence of abnormal bowel wall thickening or distension. Pelvis: Patulous inguinal canals. Aforementioned moderate prostate enlargement. Contreras catheter within the prostatic urethra. Peritoneum/Retroperitoneum: No lymphadenopathy.  No ascites.  Arterial calcifications Bones/Soft Tissues: Slight fullness of the left lower rectus sheath. Injection related changes seen in the anterior abdominal soft tissues.  There are extensive degenerative changes of the spine.     1. Contreras catheter within the prostatic urethra.  Recommend advancement 2. Bilateral pelviectasis with mild bilateral hydroureter. No distal obstructing etiology identified. 3. Nonobstructive caliceal stones on the left, measuring up to 1.2 cm. 4. 6.9 x 6 x 6 cm large lesion in the anterior aspect of the spleen.  This has enlarged since 2018.  It is not further characterize, however. Statistically, vascular lesions are more likely involving the spleen. Consider splenic ultrasound for further delineation. 5. Slight fullness of the lower left rectus sheath.  Mild hematoma formation is possible. 6. Bilobar hepatic fluid density lesions, not further characterize the resembling cysts. 7. Cardiomegaly with bilateral pleural effusions and small pericardial effusion The findings were sent to  therapies, improving tolerance and less diarrhea  If diarrhea continues will get colonoscopy  8/2  Dc to medical lightheaded, sent for emergent head CT     MD Estephania Martinez D.O., PM&R     Attending    [phone]   [facility] [place]

## 2024-08-02 NOTE — PROGRESS NOTES
Physical Therapy Rehab Treatment Note  [place] [facility]  Room: R2/R251-01       NAME: Km Garcia  : 1945 (78 y.o.)  MRN: [id number]  CODE STATUS: Full Code    Date of Service: 2024       Restrictions:  Restrictions/Precautions: Fall Risk       SUBJECTIVE:   Subjective: Pt resting in bed upon entering the room. After some encouragement pt agreeable to go to the gym for therapy.    Pain  Pain: 8/10 Bright knees and feet. Pt state he was recently medicated.      OBJECTIVE:     Sit to Supine  Assistance Level: Maximum assistance  Skilled Clinical Factors: maxA at BLEs and guiding assist provided at trunk  Supine to Sit  Assistance Level: Moderate assistance;Minimal assistance  Skilled Clinical Factors: Pt able to complete most of supine to sit transfer, however requires trunk lifting assistance secondary to fatigue and decreased weakness.  Scooting  Assistance Level: Stand by assist  Skilled Clinical Factors: Increase time and effort to scoot EOB with short rest breaks d/t feeling lightheaded. /58 seated EOB.    Transfers  Surface: To bed;From bed;Wheelchair  Device: Walker  Sit to Stand  Assistance Level: Maximum assistance;Requires x 2 assistance  Skilled Clinical Factors: After 3 attempts pt was able to complete STS EOB with Max A +2 secondary to increase bright knee and feet pain.  In the gym ot able to complete STS x 2 with Max A from one therpaist.  Stand to Sit  Assistance Level: Minimal assistance  Skilled Clinical Factors: assist to improve eccentric control  Bed To/From Chair  Technique: Stand step  Assistance Level: Minimal assistance (touch assist)  Skilled Clinical Factors: with ww, assist varied between touching-Jillian. vc's for safe approach to chair with good carry over      PT Exercises  A/AROM Exercises: Seated AP/ LAQ/ marching x 15  Resistive Exercises: seated with RTB: hip abd, ham curl x15ea bright  Functional Mobility Circuit Training: STS from w/c focusing on technique -

## 2024-08-02 NOTE — PLAN OF CARE
Problem: Safety - Adult  Goal: Free from fall injury  8/2/2024 0922 by Suresh Manjarrez RN  Outcome: Progressing  8/2/2024 0051 by Marco A Logan RN  Outcome: Progressing     Problem: Discharge Planning  Goal: Discharge to home or other facility with appropriate resources  8/2/2024 0922 by Suresh Manjarrez RN  Outcome: Progressing  8/2/2024 0051 by Marco A Logan RN  Outcome: Progressing     Problem: Pain  Goal: Verbalizes/displays adequate comfort level or baseline comfort level  8/2/2024 0922 by Suresh Manjarrez RN  Outcome: Progressing  8/2/2024 0051 by Marco A Logan RN  Outcome: Progressing     Problem: Skin/Tissue Integrity  Goal: Absence of new skin breakdown  Description: 1.  Monitor for areas of redness and/or skin breakdown  2.  Assess vascular access sites hourly  3.  Every 4-6 hours minimum:  Change oxygen saturation probe site  4.  Every 4-6 hours:  If on nasal continuous positive airway pressure, respiratory therapy assess nares and determine need for appliance change or resting period.  8/2/2024 0922 by Suresh Manjarrez RN  Outcome: Progressing  8/2/2024 0051 by Marco A Logan RN  Outcome: Progressing     Problem: ABCDS Injury Assessment  Goal: Absence of physical injury  8/2/2024 0922 by Suresh Manjarrez RN  Outcome: Progressing  8/2/2024 0051 by Marco A Logan RN  Outcome: Progressing     Problem: Chronic Conditions and Co-morbidities  Goal: Patient's chronic conditions and co-morbidity symptoms are monitored and maintained or improved  8/2/2024 0922 by Suresh Manjarrez RN  Outcome: Progressing  8/2/2024 0051 by Marco A Logan RN  Outcome: Progressing     Problem: Nutrition Deficit:  Goal: Optimize nutritional status  8/2/2024 0922 by Suresh Manjarrez RN  Outcome: Progressing  8/2/2024 0051 by Marco A Logan RN  Outcome: Progressing

## 2024-08-02 NOTE — PROGRESS NOTES
Physical Therapy Missed Treatment   Facility/Department: [facility] MED SURG R251/R251-01    NAME: Km Garcia  Patient Status:   : 1945 (78 y.o.)  MRN: [id number]  Account: [id number]  Gender: male        [x] Patient Declines PT Treatment            [] Patient Unavailable:     Arrived at 1320 pm to get pt out of bed for tx. Pt initially agreeable to participate and when instructed to get out of bed he said \"I'm not going anywhere now, I'm tired and I want to catch up on the new\" pt declined to participate in therex at this time. Pt requested to have lunch tray taken away from room at this time. Notified PT that pt is choosing not to participate this pm in session.   Will attempt PT Treatment again at earliest convenience.        Electronically signed by Mei Sheikh PTA on 24 at 1:33 PM EDT

## 2024-08-02 NOTE — PROGRESS NOTES
OCCUPATIONAL THERAPY  INPATIENT REHAB TREATMENT NOTE  [facility]      NAME: Km Garcia  : 1945 (78 y.o.)  MRN: [id number]  CODE STATUS: Full Code  Room: R251/R251-01    Date of Service: 2024    Referring Physician: Dr Stevens for Dr Mao  Rehab Diagnosis: Impaired mobility and ADL's due to Impaired mobility and ADLs due to Polyneuropathy    Hospital course:   Comments: 78 y.o. male patient with recent complicated medical history who presented to [facility] ER 7/15 after experiencing a syncopal episode while on the toilet. Patient with recent initiation of Entresto and affected blood pressure. Additionally, patient with increased lower extremity edema. Patient admitted for additional medical management. Cardiology and Oncology consulted. Course c/b SILVINA requiring new initiation of HD      Restrictions  Restrictions/Precautions  Restrictions/Precautions: Fall Risk      Patient's date of birth confirmed: Yes    SAFETY:  Safety Devices  Safety Devices in place: Yes  Type of devices: All fall risk precautions in place;Call light within reach;Bed alarm in place;Left in bed    SUBJECTIVE:  Subjective  Subjective: Pt minimally verbalized during session.    Pain at start of treatment: Yes: 710    Pain at end of treatment: Yes: 7/10    Location: knees and feet  Nursing notified: Declined  Intervention: Other: Declined    COGNITION:  Orientation  Overall Orientation Status: Within Normal Limits  Orientation Level: Oriented X4  Cognition  Overall Cognitive Status: WFL  Arousal/Alertness: Appropriate responses to stimuli  Following Commands: Follows one step commands with increased time  Attention Span: Difficulty dividing attention  Memory: Decreased recall of recent events  Safety Judgement: Decreased awareness of need for safety;Decreased awareness of need for assistance  Problem Solving: Assistance required to implement solutions;Assistance required to identify errors made  Insights: Decreased

## 2024-08-02 NOTE — PROGRESS NOTES
[facility] Rehabilitation  Occupational Therapy      NAME:  Km Garcia  ROOM: R251/R251-01  :  1945  DATE: 2024    Attempted to see Km Garcia on this date at 7:05 am and 7:50 am for Get up and go minute session for   []  Initial Evaluation   []  Scheduled therapy    []  Make-up therapy   []  Group therapy   []  Family training    Patient was unable to be seen due to:   [] Off unit for testing/procedure   [] Patient refused, stating \"    [] Therapy on hold due to     [] Nursing deferred due to    [x] Other:  pt sleeping very heavy both times      Electronically signed by CHARLETTE Puga on 24 at 7:58 AM EDT

## 2024-08-02 NOTE — PROGRESS NOTES
Subjective:  The patient complains of ,\" moderate acute on chronic progressive fatigue and   weakness  partially relieved by rest, medications, PT,  OT,   SLP and rest and exacerbated by recent    events .      I am concerned about patient’s medical complexities and barriers to advancing in rehab goals including weakness .        I reviewed current care and plans for further care with other rehab providers including nursing and case management.  According to recent nursing note, \"  see notes  \".      Martha Muller, RN  Registered Nurse  Nursing     Progress Notes     Signed     Date of Service: 8/1/2024 12:37 AM     Signed         Pt flowsheets completed per assessment. A&Ox 4 with flat affect. Ca is intact and patent draining clear yellow urine no discomfort per pt. Pt is in bed states he is having some pain 8/10 PRN given. Tiffany care given and zinc applied to bottom and groin area due to excoriation. Pt is cooperative with care and MAR. Denies any needs at this time , call light in reach, will continue with care.  Electronically signed by Martha Muller RN on 8/1/2024 at 12:40 AM                           ROS x10:  The patient also complains of severely impaired mobility and activities of daily living.  Otherwise no new problems with vision, hearing, nose, mouth, throat, dermal, cardiovascular, GI, , pulmonary, musculoskeletal, psychiatric or neurological. See also Acute Rehab PM&R H&P.       Vital signs:  /65   Pulse 80   Temp 98.6 °F (37 °C)   Resp 16   Ht 1.854 m (6' 1\")   Wt 100.3 kg (221 lb 3.2 oz)   SpO2 100%   BMI 29.18 kg/m²   I/O:   PO/Intake:  fair PO intake,    diet    Bowel:   continent   Bladder: continent  no  ca  General:  Patient is well developed,   adequately nourished, and    well kempt.     HEENT:    Pupils equal, hearing intact to loud voice, external inspection of ear and nose benign.  Inspection of lips, tongue and gums benign    Musculoskeletal: No significant  Coronary artery disease involving native coronary artery of native heart without angina pectoris    Diffuse large B-cell lymphoma, unspecified site (HCC)    Dysphagia, oropharyngeal phase    History of falling    History of traumatic brain injury    Hypokalemia    Impaired mobility and activities of daily living    Intraoperative floppy iris syndrome (IFIS)    Macular degeneration of both eyes    Mass of brain    Subdural hematoma (HCC)    Methotrexate renal toxicity    Methotrexate toxicity    Obstructive and reflux uropathy, unspecified    Pancreatic cyst    PAT (paroxysmal atrial tachycardia) (HCC)    Primary hypertension    Primary osteoarthritis of both knees    Primary CNS lymphoma (HCC)    Primary pulmonary hypertension (HCC)    Urinary retention    Late effect of brain injury (HCC)    SILVINA (acute kidney injury) (HCC)    UTI (urinary tract infection)    Syncope    HFrEF (heart failure with reduced ejection fraction) (HCC)    Atrial fibrillation (HCC)    Poorly controlled diabetes mellitus (HCC)    End stage renal disease (HCC)    Type 2 diabetes mellitus with chronic kidney disease on chronic dialysis, with long-term current use of insulin (HCC)    History of DVT (deep vein thrombosis)    Functional diarrhea            Focus of today's plan-  Initiate and modify therapuetic plan to meet patients individual needs, add rest breaks as needed, and  see team   7/28  Rectal tube dc   Awaiting GI input, metamucil started   Check KUB  Cholestrium started, metamucil  7/29  Family concerned about tolerating therapies 104 minutes today , 361 this week day 5 7/30  Will refer to [facility] or SNF  7/31  Exhausted, stools constantly, will check scan, increase cholesterine, send fecal leukocytes ??collitis??  Refusing therapies, not sure he can tolerate  8/1   Met with team, refused therapies last few days because of severe diarrhea, will not refuse therapies, improving tolerance and less diarrhea  If diarrhea continues will get

## 2024-08-02 NOTE — PROGRESS NOTES
Nephrology Progress Note    Assessment:  SILVINA possible obstructive component (CT showed non obstructive caliceal stones w/ bilateral pelviectasis w/ mild bilateral hydroureter), serology all negative, non-oliguric, now s/p tunneled dialysis catheter placement (7/25)  Left Renal stone 1.2 cm  Hx CVA  Hx Lymphoma  Nl GFR 6/21/2024   interstitial dx ?  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 De Leon for dialysis catheter removal whenever possible  - replete K        Patient Active Problem List:     Bladder stones     Surgical aftercare, genitourinary system     Gross hematuria     SOB (shortness of breath)     Brain bleed (HCC)     Syncope and collapse     Athscl heart disease of native coronary artery w/o ang pctrs     Compression of brain (HCC)     Cardiomyopathy (HCC)     Cerebrovascular accident (CVA) (HCC)     Coronary artery disease involving native coronary artery of native heart without angina pectoris     Diffuse large B-cell lymphoma, unspecified site (HCC)     Dysphagia, oropharyngeal phase     History of falling     History of traumatic brain injury     Hypokalemia     Impaired mobility and activities of daily living     Intraoperative floppy iris syndrome (IFIS)     Macular degeneration of both eyes     Mass of brain     Subdural hematoma (HCC)     Methotrexate renal toxicity     Methotrexate toxicity     Obstructive and reflux uropathy, unspecified     Pancreatic cyst     PAT (paroxysmal atrial tachycardia) (HCC)     Primary hypertension     Primary osteoarthritis of both knees     Primary CNS lymphoma (HCC)     Primary pulmonary hypertension (HCC)     Urinary retention     Late effect of brain injury (HCC)     SILVINA (acute kidney injury) (HCC)     UTI (urinary tract infection)     Syncope     HFrEF (heart failure with reduced ejection fraction) (HCC)     Atrial fibrillation (HCC)     Poorly controlled diabetes mellitus (HCC)     End  (221 lb 3.2 oz)   SpO2 100%   BMI 29.18 kg/m²    Wt Readings from Last 3 Encounters:   08/01/24 100.3 kg (221 lb 3.2 oz)   07/24/24 124.8 kg (275 lb 2.2 oz)   04/06/24 111.1 kg (245 lb)      24HR INTAKE/OUTPUT:  No intake or output data in the 24 hours ending 08/02/24 1132      General: alert, in no apparent distress  HEENT: normocephalic, atraumatic, anicteric  Lungs: non-labored respirations, clear to auscultation bilaterally  Heart: regular rate and rhythm, no murmurs or rubs  Abdomen: soft, non-tender, non-distended  Ext: no cyanosis, trace peripheral edema  Neuro: alert, follows commands       Electronically signed by Hugh Robert MD

## 2024-08-02 NOTE — PROGRESS NOTES
PRN Davide Crum MD        heparin (porcine) injection 2,000 Units  2,000 Units IntraVENous PRN Davide Crum MD        insulin lispro (HUMALOG,ADMELOG) injection vial 0-4 Units  0-4 Units SubCUTAneous TID WC Davide Crum MD        insulin lispro (HUMALOG,ADMELOG) injection vial 0-4 Units  0-4 Units SubCUTAneous Nightly Davide Crum MD        ondansetron (ZOFRAN-ODT) disintegrating tablet 4 mg  4 mg Oral Q8H PRN Davide Crum MD        Or    ondansetron (ZOFRAN) injection 4 mg  4 mg IntraVENous Q6H PRN Davide Crum MD        polyethylene glycol (GLYCOLAX) packet 17 g  17 g Oral Daily PRN Davide Crum MD        tamsulosin (FLOMAX) capsule 0.4 mg  0.4 mg Oral Daily Davide Crum MD   0.4 mg at 08/02/24 0854    valACYclovir (VALTREX) tablet 500 mg  500 mg Oral Daily Davide Crum MD   500 mg at 08/01/24 1800    acetaminophen (TYLENOL) tablet 650 mg  650 mg Oral Q4H PRN Juli Kahn MD   650 mg at 07/29/24 2059     Celecoxib and Naproxen  reviewed      Review of Systems   Constitutional:  Negative for fever.   HENT:  Negative for congestion.    Respiratory:  Negative for apnea.    Genitourinary:  Negative for hematuria.   Neurological:  Negative for speech difficulty.         Objective:   Physical Exam  HENT:      Mouth/Throat:      Mouth: Mucous membranes are moist.   Pulmonary:      Effort: Pulmonary effort is normal.   Skin:     General: Skin is warm.   Neurological:      Mental Status: He is alert.          Assessment:      78 year old male who's ca catheter is draining clear yellow urine. He voices no other urological complaints at this time.          Plan:      Maintain ca catheter  Continue flomax  Follow up with established urologist after discharge                      Wero Dhillon PA-C

## 2024-08-02 NOTE — PROGRESS NOTES
Pt is aox4 pwd sitting up in bed even unlabored respirations.  Pt wife at bedside advised she thinks pt needs a probiotic.   Nurse advised he would pass along to Provider to make aware.

## 2024-08-02 NOTE — H&P
History and Physical    Admit Date: 7/25/2024  PCP: Dawood Darby MD    CHIEF COMPLAINT:  dizziness        HISTORY OF PRESENT ILLNESS:    The patient is a 78 y.o. male with a past medical history of lymphoma, craniotomy, cardiomyopathy, hypertension diabetes, DVT who was admitted to acute rehab after a syncope medical admission.  Patient had rapid response called today for dizziness.  His vitals were stable.  Patient is being seen by multiple consultants including nephrology for SILVINA with possible obstructive component.  Urology is following the patient.  He has nonobstructive stones with bilateral Belviq XR ectasis with mild bilateral hydroureter.  He was seen by surgery for removal of dialysis catheter but this is on hold due to significant event today with dizziness.      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 1/1/2024    Cerebrovascular accident (CVA) (HCC) 4/11/2024    Charcot ankle, left     wears brace    Diabetes mellitus (HCC)     takes metformin    History of DVT (deep vein thrombosis) 7/15/2024    Hypertension     on meds > 10 yrs    Osteoarthritis        Past Surgical History:        Procedure Laterality Date    COLONOSCOPY      > 30 yrs ago    INVASIVE VASCULAR N/A 7/25/2024    Vena cava filter insertion - cath lab performed by Dev Magallon DO at [facility] CARDIAC CATH LAB    IR NONTUNNELED VASCULAR CATHETER Right 07/19/2024    Medcomp 11.5F x 15 cm Raulerson Duo-Flow IJ double lumen catheter (LOT# [id number],  exp 03/34/2027) performed by Dr. Osborne    IR NONTUNNELED VASCULAR CATHETER  7/19/2024    IR NONTUNNELED VASCULAR CATHETER 7/19/2024 [facility] SPECIAL PROCEDURE    LITHOTRIPSY N/A 12/12/2018    WITH HOLMIUM LASER performed by Antonio Petit MD at [facility] OR    VASCULAR SURGERY Right 7/25/2024

## 2024-08-02 NOTE — PROGRESS NOTES
OCCUPATIONAL THERAPY  INPATIENT REHAB TREATMENT NOTE  [facility]      NAME: Km Garcia  : 1945 (78 y.o.)  MRN: [id number]  CODE STATUS: Full Code  Room: R251/R251-01    Date of Service: 2024    Referring Physician: Dr Stevens for Dr Mao  Rehab Diagnosis: Impaired mobility and ADL's due to Impaired mobility and ADLs due to Polyneuropathy    Hospital course:   Comments: 78 y.o. male patient with recent complicated medical history who presented to [facility] ER 7/15 after experiencing a syncopal episode while on the toilet. Patient with recent initiation of Entresto and affected blood pressure. Additionally, patient with increased lower extremity edema. Patient admitted for additional medical management. Cardiology and Oncology consulted. Course c/b SILVINA requiring new initiation of HD      Restrictions  Restrictions/Precautions  Restrictions/Precautions: Fall Risk     Patient's date of birth confirmed: Yes    SAFETY:  Safety Devices  Type of devices: Left in bed;Nurse notified (RN in room with pt at end of session to take vitals)    SUBJECTIVE:    Pain   Pain at start of treatment: Yes: 8/10    Pain at end of treatment: Yes: 8/10    Location: Left Leg, Bilateral Feet  Description: Sore  Nursing notified: No  Intervention: Repositioned  Pt and spouse report that pt had pain medication earlier today.     COGNITION:  Overall Cognitive Status: Exceptions  Arousal/Alertness: Appropriate responses to stimuli  Following Commands: Inconsistently follows commands  Attention Span: Difficulty attending to directions;Difficulty dividing attention  Memory: Decreased recall of recent events  Safety Judgement: Decreased awareness of need for safety;Decreased awareness of need for assistance  Problem Solving: Decreased awareness of errors  Insights: Decreased awareness of deficits  Initiation: Requires cues for all  Sequencing: Requires cues for all        OBJECTIVE: Pt sitting EOB with OT Tech present

## 2024-08-03 PROBLEM — R42 DIZZINESS: Status: ACTIVE | Noted: 2024-08-03

## 2024-08-03 LAB
ANION GAP SERPL CALCULATED.3IONS-SCNC: 12 MEQ/L (ref 9–15)
ANISOCYTOSIS BLD QL SMEAR: ABNORMAL
BASOPHILS # BLD: 0.4 K/UL (ref 0–0.2)
BASOPHILS NFR BLD: 6 %
BUN SERPL-MCNC: 24 MG/DL (ref 8–23)
BURR CELLS: ABNORMAL
CALCIUM SERPL-MCNC: 7.1 MG/DL (ref 8.5–9.9)
CHLORIDE SERPL-SCNC: 106 MEQ/L (ref 95–107)
CO2 SERPL-SCNC: 17 MEQ/L (ref 20–31)
CREAT SERPL-MCNC: 2.02 MG/DL (ref 0.7–1.2)
EOSINOPHIL # BLD: 0.4 K/UL (ref 0–0.7)
EOSINOPHIL NFR BLD: 6 %
ERYTHROCYTE [DISTWIDTH] IN BLOOD BY AUTOMATED COUNT: 13.1 % (ref 11.5–14.5)
GLUCOSE BLD-MCNC: 106 MG/DL (ref 70–99)
GLUCOSE BLD-MCNC: 114 MG/DL (ref 70–99)
GLUCOSE BLD-MCNC: 134 MG/DL (ref 70–99)
GLUCOSE BLD-MCNC: 139 MG/DL (ref 70–99)
GLUCOSE SERPL-MCNC: 89 MG/DL (ref 70–99)
HCT VFR BLD AUTO: 24 % (ref 42–52)
HGB BLD-MCNC: 8.1 G/DL (ref 14–18)
LYMPHOCYTES # BLD: 1.6 K/UL (ref 1–4.8)
LYMPHOCYTES NFR BLD: 25 %
MCH RBC QN AUTO: 31.8 PG (ref 27–31.3)
MCHC RBC AUTO-ENTMCNC: 33.8 % (ref 33–37)
MCV RBC AUTO: 94.1 FL (ref 79–92.2)
METAMYELOCYTES NFR BLD MANUAL: 2 %
MONOCYTES # BLD: 0.4 K/UL (ref 0.2–0.8)
MONOCYTES NFR BLD: 6.7 %
NEUTROPHILS # BLD: 3.6 K/UL (ref 1.4–6.5)
NEUTS BAND NFR BLD MANUAL: 2 %
NEUTS SEG NFR BLD: 53 %
PERFORMED ON: ABNORMAL
PLATELET # BLD AUTO: 341 K/UL (ref 130–400)
PLATELET BLD QL SMEAR: ADEQUATE
POIKILOCYTOSIS BLD QL SMEAR: ABNORMAL
POTASSIUM SERPL-SCNC: 3.6 MEQ/L (ref 3.4–4.9)
RBC # BLD AUTO: 2.55 M/UL (ref 4.7–6.1)
SLIDE REVIEW: ABNORMAL
SMUDGE CELLS BLD QL SMEAR: 8.7
SODIUM SERPL-SCNC: 135 MEQ/L (ref 135–144)
WBC # BLD AUTO: 6.3 K/UL (ref 4.8–10.8)

## 2024-08-03 PROCEDURE — 6370000000 HC RX 637 (ALT 250 FOR IP): Performed by: INTERNAL MEDICINE

## 2024-08-03 PROCEDURE — 85025 COMPLETE CBC W/AUTO DIFF WBC: CPT

## 2024-08-03 PROCEDURE — G0378 HOSPITAL OBSERVATION PER HR: HCPCS

## 2024-08-03 PROCEDURE — 80048 BASIC METABOLIC PNL TOTAL CA: CPT

## 2024-08-03 PROCEDURE — 36415 COLL VENOUS BLD VENIPUNCTURE: CPT

## 2024-08-03 RX ADMIN — MIDODRINE HYDROCHLORIDE 2.5 MG: 5 TABLET ORAL at 11:38

## 2024-08-03 RX ADMIN — TRAMADOL HYDROCHLORIDE 50 MG: 50 TABLET ORAL at 05:01

## 2024-08-03 RX ADMIN — METOPROLOL TARTRATE 12.5 MG: 25 TABLET, FILM COATED ORAL at 21:29

## 2024-08-03 RX ADMIN — SODIUM BICARBONATE 650 MG TABLET 650 MG: at 09:10

## 2024-08-03 RX ADMIN — DIPHENOXYLATE HYDROCHLORIDE AND ATROPINE SULFATE 1 TABLET: 2.5; .025 TABLET ORAL at 23:54

## 2024-08-03 RX ADMIN — CYCLOBENZAPRINE 5 MG: 10 TABLET, FILM COATED ORAL at 21:29

## 2024-08-03 RX ADMIN — METRONIDAZOLE 500 MG: 500 TABLET ORAL at 05:02

## 2024-08-03 RX ADMIN — Medication 5 MG: at 21:29

## 2024-08-03 RX ADMIN — VALACYCLOVIR HYDROCHLORIDE 500 MG: 1 TABLET, FILM COATED ORAL at 17:09

## 2024-08-03 RX ADMIN — METRONIDAZOLE 500 MG: 500 TABLET ORAL at 21:30

## 2024-08-03 RX ADMIN — SODIUM BICARBONATE 650 MG TABLET 650 MG: at 21:29

## 2024-08-03 RX ADMIN — CHOLESTYRAMINE 4 G: 4 POWDER, FOR SUSPENSION ORAL at 21:29

## 2024-08-03 RX ADMIN — DIPHENOXYLATE HYDROCHLORIDE AND ATROPINE SULFATE 1 TABLET: 2.5; .025 TABLET ORAL at 05:01

## 2024-08-03 RX ADMIN — METRONIDAZOLE 500 MG: 500 TABLET ORAL at 14:55

## 2024-08-03 RX ADMIN — METOPROLOL TARTRATE 12.5 MG: 25 TABLET, FILM COATED ORAL at 09:10

## 2024-08-03 RX ADMIN — MIDODRINE HYDROCHLORIDE 2.5 MG: 5 TABLET ORAL at 23:53

## 2024-08-03 RX ADMIN — CHOLESTYRAMINE 4 G: 4 POWDER, FOR SUSPENSION ORAL at 09:10

## 2024-08-03 RX ADMIN — DIPHENOXYLATE HYDROCHLORIDE AND ATROPINE SULFATE 1 TABLET: 2.5; .025 TABLET ORAL at 21:29

## 2024-08-03 RX ADMIN — TAMSULOSIN HYDROCHLORIDE 0.4 MG: 0.4 CAPSULE ORAL at 09:10

## 2024-08-03 RX ADMIN — TRAMADOL HYDROCHLORIDE 50 MG: 50 TABLET ORAL at 14:55

## 2024-08-03 ASSESSMENT — PAIN DESCRIPTION - LOCATION
LOCATION: SHOULDER
LOCATION: KNEE

## 2024-08-03 ASSESSMENT — PAIN SCALES - GENERAL
PAINLEVEL_OUTOF10: 6
PAINLEVEL_OUTOF10: 0
PAINLEVEL_OUTOF10: 0
PAINLEVEL_OUTOF10: 7
PAINLEVEL_OUTOF10: 0

## 2024-08-03 ASSESSMENT — PAIN DESCRIPTION - ORIENTATION
ORIENTATION: RIGHT;LEFT
ORIENTATION: RIGHT

## 2024-08-03 ASSESSMENT — PAIN SCALES - WONG BAKER: WONGBAKER_NUMERICALRESPONSE: HURTS A LITTLE BIT

## 2024-08-03 ASSESSMENT — PAIN DESCRIPTION - DESCRIPTORS: DESCRIPTORS: ACHING

## 2024-08-04 LAB
ANION GAP SERPL CALCULATED.3IONS-SCNC: 12 MEQ/L (ref 9–15)
ANISOCYTOSIS BLD QL SMEAR: ABNORMAL
BASOPHILS # BLD: 0.1 K/UL (ref 0–0.2)
BASOPHILS NFR BLD: 1.8 %
BUN SERPL-MCNC: 23 MG/DL (ref 8–23)
BURR CELLS: ABNORMAL
CALCIUM SERPL-MCNC: 7.9 MG/DL (ref 8.5–9.9)
CHLORIDE SERPL-SCNC: 108 MEQ/L (ref 95–107)
CO2 SERPL-SCNC: 16 MEQ/L (ref 20–31)
CREAT SERPL-MCNC: 2.01 MG/DL (ref 0.7–1.2)
EOSINOPHIL # BLD: 0.3 K/UL (ref 0–0.7)
EOSINOPHIL NFR BLD: 5.5 %
ERYTHROCYTE [DISTWIDTH] IN BLOOD BY AUTOMATED COUNT: 13.1 % (ref 11.5–14.5)
GLUCOSE BLD-MCNC: 142 MG/DL (ref 70–99)
GLUCOSE BLD-MCNC: 157 MG/DL (ref 70–99)
GLUCOSE BLD-MCNC: 157 MG/DL (ref 70–99)
GLUCOSE SERPL-MCNC: 102 MG/DL (ref 70–99)
HCT VFR BLD AUTO: 25.4 % (ref 42–52)
HGB BLD-MCNC: 8.6 G/DL (ref 14–18)
LYMPHOCYTES # BLD: 1.7 K/UL (ref 1–4.8)
LYMPHOCYTES NFR BLD: 31.3 %
MACROCYTES BLD QL SMEAR: ABNORMAL
MCH RBC QN AUTO: 31.6 PG (ref 27–31.3)
MCHC RBC AUTO-ENTMCNC: 33.9 % (ref 33–37)
MCV RBC AUTO: 93.4 FL (ref 79–92.2)
MONOCYTES # BLD: 0.6 K/UL (ref 0.2–0.8)
MONOCYTES NFR BLD: 10.3 %
NEUTROPHILS # BLD: 2.2 K/UL (ref 1.4–6.5)
NEUTS SEG NFR BLD: 41.2 %
PERFORMED ON: ABNORMAL
PLATELET # BLD AUTO: 366 K/UL (ref 130–400)
PLATELET BLD QL SMEAR: ADEQUATE
POIKILOCYTOSIS BLD QL SMEAR: ABNORMAL
POTASSIUM SERPL-SCNC: 3.2 MEQ/L (ref 3.4–4.9)
RBC # BLD AUTO: 2.72 M/UL (ref 4.7–6.1)
SLIDE REVIEW: ABNORMAL
SODIUM SERPL-SCNC: 136 MEQ/L (ref 135–144)
WBC # BLD AUTO: 5.4 K/UL (ref 4.8–10.8)

## 2024-08-04 PROCEDURE — G0378 HOSPITAL OBSERVATION PER HR: HCPCS

## 2024-08-04 PROCEDURE — 80048 BASIC METABOLIC PNL TOTAL CA: CPT

## 2024-08-04 PROCEDURE — 85025 COMPLETE CBC W/AUTO DIFF WBC: CPT

## 2024-08-04 PROCEDURE — 36415 COLL VENOUS BLD VENIPUNCTURE: CPT

## 2024-08-04 PROCEDURE — 6370000000 HC RX 637 (ALT 250 FOR IP): Performed by: INTERNAL MEDICINE

## 2024-08-04 RX ORDER — MIDODRINE HYDROCHLORIDE 5 MG/1
5 TABLET ORAL
Status: DISCONTINUED | OUTPATIENT
Start: 2024-08-04 | End: 2024-08-05

## 2024-08-04 RX ORDER — POTASSIUM CHLORIDE 20 MEQ/1
40 TABLET, EXTENDED RELEASE ORAL ONCE
Status: COMPLETED | OUTPATIENT
Start: 2024-08-04 | End: 2024-08-04

## 2024-08-04 RX ADMIN — POTASSIUM CHLORIDE 40 MEQ: 1500 TABLET, EXTENDED RELEASE ORAL at 12:25

## 2024-08-04 RX ADMIN — METRONIDAZOLE 500 MG: 500 TABLET ORAL at 14:02

## 2024-08-04 RX ADMIN — DIPHENOXYLATE HYDROCHLORIDE AND ATROPINE SULFATE 1 TABLET: 2.5; .025 TABLET ORAL at 21:36

## 2024-08-04 RX ADMIN — MIDODRINE HYDROCHLORIDE 2.5 MG: 5 TABLET ORAL at 08:19

## 2024-08-04 RX ADMIN — MIDODRINE HYDROCHLORIDE 5 MG: 5 TABLET ORAL at 17:07

## 2024-08-04 RX ADMIN — Medication 5 MG: at 21:36

## 2024-08-04 RX ADMIN — TRAMADOL HYDROCHLORIDE 50 MG: 50 TABLET ORAL at 05:38

## 2024-08-04 RX ADMIN — MIDODRINE HYDROCHLORIDE 5 MG: 5 TABLET ORAL at 12:25

## 2024-08-04 RX ADMIN — CHOLESTYRAMINE 4 G: 4 POWDER, FOR SUSPENSION ORAL at 08:20

## 2024-08-04 RX ADMIN — METOPROLOL TARTRATE 12.5 MG: 25 TABLET, FILM COATED ORAL at 21:36

## 2024-08-04 RX ADMIN — SODIUM BICARBONATE 650 MG TABLET 650 MG: at 08:19

## 2024-08-04 RX ADMIN — VALACYCLOVIR HYDROCHLORIDE 500 MG: 1 TABLET, FILM COATED ORAL at 17:07

## 2024-08-04 RX ADMIN — SODIUM BICARBONATE 650 MG TABLET 650 MG: at 21:36

## 2024-08-04 RX ADMIN — CHOLESTYRAMINE 4 G: 4 POWDER, FOR SUSPENSION ORAL at 21:36

## 2024-08-04 RX ADMIN — TAMSULOSIN HYDROCHLORIDE 0.4 MG: 0.4 CAPSULE ORAL at 08:19

## 2024-08-04 RX ADMIN — TRAMADOL HYDROCHLORIDE 50 MG: 50 TABLET ORAL at 21:49

## 2024-08-04 RX ADMIN — TRAMADOL HYDROCHLORIDE 50 MG: 50 TABLET ORAL at 14:01

## 2024-08-04 RX ADMIN — METRONIDAZOLE 500 MG: 500 TABLET ORAL at 05:38

## 2024-08-04 RX ADMIN — ANTACID TABLETS 500 MG: 500 TABLET, CHEWABLE ORAL at 23:23

## 2024-08-04 ASSESSMENT — PAIN DESCRIPTION - LOCATION
LOCATION: FOOT;LEG
LOCATION: SHOULDER

## 2024-08-04 ASSESSMENT — PAIN SCALES - GENERAL
PAINLEVEL_OUTOF10: 8
PAINLEVEL_OUTOF10: 8
PAINLEVEL_OUTOF10: 4
PAINLEVEL_OUTOF10: 8

## 2024-08-04 ASSESSMENT — PAIN DESCRIPTION - ORIENTATION: ORIENTATION: RIGHT;LEFT

## 2024-08-05 ENCOUNTER — APPOINTMENT (OUTPATIENT)
Dept: ULTRASOUND IMAGING | Age: 79
DRG: 673 | End: 2024-08-05
Attending: INTERNAL MEDICINE
Payer: MEDICARE

## 2024-08-05 LAB
ACANTHOCYTES BLD QL SMEAR: ABNORMAL
ANION GAP SERPL CALCULATED.3IONS-SCNC: 16 MEQ/L (ref 9–15)
BASOPHILS # BLD: 0.1 K/UL (ref 0–0.2)
BASOPHILS NFR BLD: 2 %
BUN SERPL-MCNC: 34 MG/DL (ref 8–23)
CALCIUM SERPL-MCNC: 8.1 MG/DL (ref 8.5–9.9)
CHLORIDE SERPL-SCNC: 100 MEQ/L (ref 95–107)
CO2 SERPL-SCNC: 12 MEQ/L (ref 20–31)
CREAT SERPL-MCNC: 3.68 MG/DL (ref 0.7–1.2)
EOSINOPHIL # BLD: 0.2 K/UL (ref 0–0.7)
EOSINOPHIL NFR BLD: 3 %
ERYTHROCYTE [DISTWIDTH] IN BLOOD BY AUTOMATED COUNT: 13.6 % (ref 11.5–14.5)
GLUCOSE BLD-MCNC: 134 MG/DL (ref 70–99)
GLUCOSE BLD-MCNC: 170 MG/DL (ref 70–99)
GLUCOSE BLD-MCNC: 176 MG/DL (ref 70–99)
GLUCOSE BLD-MCNC: 212 MG/DL (ref 70–99)
GLUCOSE SERPL-MCNC: 165 MG/DL (ref 70–99)
HCT VFR BLD AUTO: 26.5 % (ref 42–52)
HGB BLD-MCNC: 8.6 G/DL (ref 14–18)
LYMPHOCYTES # BLD: 0.7 K/UL (ref 1–4.8)
LYMPHOCYTES NFR BLD: 11 %
MCH RBC QN AUTO: 30.6 PG (ref 27–31.3)
MCHC RBC AUTO-ENTMCNC: 32.5 % (ref 33–37)
MCV RBC AUTO: 94.3 FL (ref 79–92.2)
METAMYELOCYTES NFR BLD MANUAL: 1 %
MONOCYTES # BLD: 0.5 K/UL (ref 0.2–0.8)
MONOCYTES NFR BLD: 7.6 %
NEUTROPHILS # BLD: 4.3 K/UL (ref 1.4–6.5)
NEUTS BAND NFR BLD MANUAL: 8 %
NEUTS SEG NFR BLD: 67 %
PERFORMED ON: ABNORMAL
PLATELET # BLD AUTO: 282 K/UL (ref 130–400)
PLATELET BLD QL SMEAR: ADEQUATE
POIKILOCYTOSIS BLD QL SMEAR: ABNORMAL
POTASSIUM SERPL-SCNC: 4.3 MEQ/L (ref 3.4–4.9)
RBC # BLD AUTO: 2.81 M/UL (ref 4.7–6.1)
SLIDE REVIEW: ABNORMAL
SODIUM SERPL-SCNC: 128 MEQ/L (ref 135–144)
VARIANT LYMPHS NFR BLD: 1 %
WBC # BLD AUTO: 5.7 K/UL (ref 4.8–10.8)

## 2024-08-05 PROCEDURE — 99221 1ST HOSP IP/OBS SF/LOW 40: CPT | Performed by: PHYSICIAN ASSISTANT

## 2024-08-05 PROCEDURE — 51702 INSERT TEMP BLADDER CATH: CPT

## 2024-08-05 PROCEDURE — 97167 OT EVAL HIGH COMPLEX 60 MIN: CPT

## 2024-08-05 PROCEDURE — 80048 BASIC METABOLIC PNL TOTAL CA: CPT

## 2024-08-05 PROCEDURE — 36415 COLL VENOUS BLD VENIPUNCTURE: CPT

## 2024-08-05 PROCEDURE — 85025 COMPLETE CBC W/AUTO DIFF WBC: CPT

## 2024-08-05 PROCEDURE — 1210000000 HC MED SURG R&B

## 2024-08-05 PROCEDURE — 6370000000 HC RX 637 (ALT 250 FOR IP): Performed by: INTERNAL MEDICINE

## 2024-08-05 PROCEDURE — 99231 SBSQ HOSP IP/OBS SF/LOW 25: CPT | Performed by: INTERNAL MEDICINE

## 2024-08-05 PROCEDURE — 93971 EXTREMITY STUDY: CPT

## 2024-08-05 PROCEDURE — 6370000000 HC RX 637 (ALT 250 FOR IP): Performed by: STUDENT IN AN ORGANIZED HEALTH CARE EDUCATION/TRAINING PROGRAM

## 2024-08-05 RX ORDER — TRAMADOL HYDROCHLORIDE 50 MG/1
100 TABLET ORAL
Status: DISCONTINUED | OUTPATIENT
Start: 2024-08-06 | End: 2024-08-17

## 2024-08-05 RX ORDER — TRAMADOL HYDROCHLORIDE 50 MG/1
100 TABLET ORAL EVERY 8 HOURS PRN
Status: DISCONTINUED | OUTPATIENT
Start: 2024-08-05 | End: 2024-08-13

## 2024-08-05 RX ORDER — CALCIUM CARBONATE 500 MG/1
1000 TABLET, CHEWABLE ORAL 3 TIMES DAILY PRN
Status: DISCONTINUED | OUTPATIENT
Start: 2024-08-05 | End: 2024-08-20 | Stop reason: HOSPADM

## 2024-08-05 RX ORDER — MIDODRINE HYDROCHLORIDE 10 MG/1
10 TABLET ORAL
Status: DISCONTINUED | OUTPATIENT
Start: 2024-08-05 | End: 2024-08-11

## 2024-08-05 RX ADMIN — TRAMADOL HYDROCHLORIDE 50 MG: 50 TABLET ORAL at 05:34

## 2024-08-05 RX ADMIN — SODIUM BICARBONATE 650 MG TABLET 650 MG: at 21:08

## 2024-08-05 RX ADMIN — INSULIN LISPRO 1 UNITS: 100 INJECTION, SOLUTION INTRAVENOUS; SUBCUTANEOUS at 18:09

## 2024-08-05 RX ADMIN — MIDODRINE HYDROCHLORIDE 10 MG: 5 TABLET ORAL at 18:10

## 2024-08-05 RX ADMIN — TAMSULOSIN HYDROCHLORIDE 0.4 MG: 0.4 CAPSULE ORAL at 08:41

## 2024-08-05 RX ADMIN — MIDODRINE HYDROCHLORIDE 10 MG: 5 TABLET ORAL at 11:59

## 2024-08-05 RX ADMIN — DIPHENOXYLATE HYDROCHLORIDE AND ATROPINE SULFATE 1 TABLET: 2.5; .025 TABLET ORAL at 21:08

## 2024-08-05 RX ADMIN — ANTACID TABLETS 1000 MG: 500 TABLET, CHEWABLE ORAL at 11:59

## 2024-08-05 RX ADMIN — MIDODRINE HYDROCHLORIDE 5 MG: 5 TABLET ORAL at 08:41

## 2024-08-05 RX ADMIN — Medication 5 MG: at 21:08

## 2024-08-05 RX ADMIN — VALACYCLOVIR HYDROCHLORIDE 500 MG: 1 TABLET, FILM COATED ORAL at 18:09

## 2024-08-05 RX ADMIN — CHOLESTYRAMINE 4 G: 4 POWDER, FOR SUSPENSION ORAL at 08:42

## 2024-08-05 RX ADMIN — TRAMADOL HYDROCHLORIDE 50 MG: 50 TABLET ORAL at 11:59

## 2024-08-05 RX ADMIN — CHOLESTYRAMINE 4 G: 4 POWDER, FOR SUSPENSION ORAL at 21:08

## 2024-08-05 RX ADMIN — SODIUM BICARBONATE 650 MG TABLET 650 MG: at 08:42

## 2024-08-05 ASSESSMENT — PAIN SCALES - GENERAL
PAINLEVEL_OUTOF10: 6
PAINLEVEL_OUTOF10: 8
PAINLEVEL_OUTOF10: 8
PAINLEVEL_OUTOF10: 0
PAINLEVEL_OUTOF10: 8
PAINLEVEL_OUTOF10: 7

## 2024-08-05 ASSESSMENT — PAIN DESCRIPTION - ORIENTATION
ORIENTATION: RIGHT;LEFT

## 2024-08-05 ASSESSMENT — PAIN DESCRIPTION - LOCATION
LOCATION: KNEE

## 2024-08-05 NOTE — PROGRESS NOTES
[facility]  Physical Therapy Acute Rehab Discharge Summary  [facility]    NAME: Km Garcia  : 1945  MRN: [id number]    Admission Date: 2024  9:23 PM  Discharge Date: 24    Rehab Diagnosis(es): Impaired mobility and ADL's due to Polyneuropathy. [facility] rehab admission 24  Patient Active Problem List    Diagnosis Date Noted    Syncope 2024    Dizziness 2024    Functional diarrhea 2024    Type 2 diabetes mellitus with chronic kidney disease on chronic dialysis, with long-term current use of insulin ([facility]) 2024    End stage renal disease ([facility]) 2024    HFrEF (heart failure with reduced ejection fraction) ([facility]) 2024    Atrial fibrillation ([facility]) 2024    Poorly controlled diabetes mellitus ([facility]) 2024    Late effect of brain injury ([facility]) 2024    SILVINA (acute kidney injury) ([facility]) 2024    UTI (urinary tract infection) 2024    Syncope and collapse 2024    History of DVT (deep vein thrombosis) 07/15/2024    History of traumatic brain injury 2024    Impaired mobility and activities of daily living 2024    Methotrexate renal toxicity 2024    Hypokalemia 05/10/2024    Methotrexate toxicity 05/10/2024    Primary osteoarthritis of both knees 2024    Cerebrovascular accident (CVA) ([facility]) 2024    Subdural hematoma ([facility]) 2024    Compression of brain ([facility]) 2024    Diffuse large B-cell lymphoma, unspecified site ([facility]) 2024    Dysphagia, oropharyngeal phase 2024    Obstructive and reflux uropathy, unspecified 2024    Primary pulmonary hypertension ([facility]) 2024    Macular degeneration of both eyes 2024    Pancreatic cyst 2024    Primary CNS lymphoma ([facility]) 2024    Cardiomyopathy ([facility]) 2024    Coronary artery disease involving native coronary artery of native heart without angina pectoris 2024    PAT (paroxysmal atrial tachycardia)  Office: [phone]       Fax: [phone]      Nephrology Initial Consult Note        Patient's Name: Rosa Maria Mclean  Date of Visit: 5/29/2022    Reason for Consult:  ESRD management  Requesting Physician:  Laya Pierce MD  PCP: No primary care provider on file. Chief Complaint:  fatigue     History of Present Illness:      Rosa Maria Mclean is a 60 y.o. male with PMHx of hypertension, DM, ch anticoagulation , TAVR, ESRD who was admitted on 5/28/2022 with complaints of fatigue  Missed last 2 dialysis sessions due to diarrhea  Had mild shortness of breath  On RA. Dialysis Location: [facility]  Schedule: TTS   EDW: 69.5  Last Dialysis: 5/24  Access: AVF       Past Medical History:   Diagnosis Date    Chronic kidney disease     Hypertension        No past surgical history on file. No family history on file. reports that he has been smoking. He has been smoking about 0.50 packs per day. He has never used smokeless tobacco. He reports previous alcohol use. He reports current drug use. Drug: Marijuana Eugenia Benavidez). Medications:        Allergies:  Naproxen  Scheduled Meds:   aspirin  81 mg Oral Daily    atorvastatin  40 mg Oral Daily    DULoxetine  60 mg Oral Daily    gabapentin  100 mg Oral Nightly    insulin glargine  5 Units SubCUTAneous Nightly    NIFEdipine  60 mg Oral BID    sevelamer  1,600 mg Oral TID WC    traZODone  50 mg Oral Nightly    Vitamin D  1,000 Units Oral Daily    sodium chloride flush  5-40 mL IntraVENous 2 times per day    heparin (porcine)  5,000 Units SubCUTAneous TID    insulin lispro  0-6 Units SubCUTAneous TID WC    insulin lispro  0-3 Units SubCUTAneous Nightly    sodium zirconium cyclosilicate  10 g Oral Once    carvedilol  25 mg Oral BID WC     Continuous Infusions:   sodium chloride      dextrose

## 2024-08-05 NOTE — ACP (ADVANCE CARE PLANNING)
Advance Care Planning   Healthcare Decision Maker:    Primary Decision Maker: Coby Hummel [facility] - [phone]    Click here to complete Healthcare Decision Makers including selection of the Healthcare Decision Maker Relationship (ie \"Primary\").

## 2024-08-06 LAB
ANION GAP SERPL CALCULATED.3IONS-SCNC: 17 MEQ/L (ref 9–15)
BUN SERPL-MCNC: 43 MG/DL (ref 8–23)
CALCIUM SERPL-MCNC: 8.1 MG/DL (ref 8.5–9.9)
CHLORIDE SERPL-SCNC: 98 MEQ/L (ref 95–107)
CO2 SERPL-SCNC: 12 MEQ/L (ref 20–31)
CREAT SERPL-MCNC: 4.58 MG/DL (ref 0.7–1.2)
GLUCOSE BLD-MCNC: 132 MG/DL (ref 70–99)
GLUCOSE BLD-MCNC: 171 MG/DL (ref 70–99)
GLUCOSE BLD-MCNC: 203 MG/DL (ref 70–99)
GLUCOSE SERPL-MCNC: 116 MG/DL (ref 70–99)
PERFORMED ON: ABNORMAL
POTASSIUM SERPL-SCNC: 4.4 MEQ/L (ref 3.4–4.9)
SODIUM SERPL-SCNC: 127 MEQ/L (ref 135–144)

## 2024-08-06 PROCEDURE — 8010000000 HC HEMODIALYSIS ACUTE INPT

## 2024-08-06 PROCEDURE — 99231 SBSQ HOSP IP/OBS SF/LOW 25: CPT | Performed by: PHYSICIAN ASSISTANT

## 2024-08-06 PROCEDURE — 6360000002 HC RX W HCPCS: Performed by: INTERNAL MEDICINE

## 2024-08-06 PROCEDURE — 6370000000 HC RX 637 (ALT 250 FOR IP): Performed by: STUDENT IN AN ORGANIZED HEALTH CARE EDUCATION/TRAINING PROGRAM

## 2024-08-06 PROCEDURE — 1210000000 HC MED SURG R&B

## 2024-08-06 PROCEDURE — 80048 BASIC METABOLIC PNL TOTAL CA: CPT

## 2024-08-06 PROCEDURE — 5A1D70Z PERFORMANCE OF URINARY FILTRATION, INTERMITTENT, LESS THAN 6 HOURS PER DAY: ICD-10-PCS | Performed by: STUDENT IN AN ORGANIZED HEALTH CARE EDUCATION/TRAINING PROGRAM

## 2024-08-06 PROCEDURE — 6370000000 HC RX 637 (ALT 250 FOR IP): Performed by: INTERNAL MEDICINE

## 2024-08-06 PROCEDURE — 99222 1ST HOSP IP/OBS MODERATE 55: CPT | Performed by: PHYSICAL MEDICINE & REHABILITATION

## 2024-08-06 PROCEDURE — 36415 COLL VENOUS BLD VENIPUNCTURE: CPT

## 2024-08-06 RX ORDER — SODIUM CHLORIDE 9 MG/ML
INJECTION, SOLUTION INTRAVENOUS
Status: DISPENSED
Start: 2024-08-06 | End: 2024-08-06

## 2024-08-06 RX ADMIN — MIDODRINE HYDROCHLORIDE 10 MG: 5 TABLET ORAL at 17:30

## 2024-08-06 RX ADMIN — CHOLESTYRAMINE 4 G: 4 POWDER, FOR SUSPENSION ORAL at 13:19

## 2024-08-06 RX ADMIN — CHOLESTYRAMINE 4 G: 4 POWDER, FOR SUSPENSION ORAL at 22:03

## 2024-08-06 RX ADMIN — Medication 5 MG: at 22:02

## 2024-08-06 RX ADMIN — MIDODRINE HYDROCHLORIDE 10 MG: 5 TABLET ORAL at 08:07

## 2024-08-06 RX ADMIN — SODIUM BICARBONATE 650 MG TABLET 650 MG: at 13:19

## 2024-08-06 RX ADMIN — TRAMADOL HYDROCHLORIDE 100 MG: 50 TABLET ORAL at 06:08

## 2024-08-06 RX ADMIN — SODIUM BICARBONATE 650 MG TABLET 650 MG: at 22:02

## 2024-08-06 RX ADMIN — HEPARIN SODIUM 2000 UNITS: 1000 INJECTION INTRAVENOUS; SUBCUTANEOUS at 12:13

## 2024-08-06 RX ADMIN — VALACYCLOVIR HYDROCHLORIDE 500 MG: 1 TABLET, FILM COATED ORAL at 17:30

## 2024-08-06 RX ADMIN — METOPROLOL TARTRATE 12.5 MG: 25 TABLET, FILM COATED ORAL at 09:00

## 2024-08-06 RX ADMIN — MIDODRINE HYDROCHLORIDE 10 MG: 5 TABLET ORAL at 13:19

## 2024-08-06 RX ADMIN — DIPHENOXYLATE HYDROCHLORIDE AND ATROPINE SULFATE 1 TABLET: 2.5; .025 TABLET ORAL at 22:04

## 2024-08-06 ASSESSMENT — PAIN SCALES - GENERAL
PAINLEVEL_OUTOF10: 0
PAINLEVEL_OUTOF10: 8

## 2024-08-06 ASSESSMENT — PAIN DESCRIPTION - ORIENTATION: ORIENTATION: RIGHT;LEFT

## 2024-08-06 ASSESSMENT — PAIN DESCRIPTION - LOCATION: LOCATION: FOOT

## 2024-08-06 NOTE — DIALYSIS
Hemodialysis completed as ordered. 500 ml removed with treatment. Please see dialysis flowsheet for treatment details

## 2024-08-06 NOTE — DIALYSIS
Hemodialysis completed. 200 ml removed with treatment. B/P to 89/40 first 30 min of treatment, UF goal reduced B/P stable remaining time. Please see dialysis flowsheet for treatment details

## 2024-08-07 LAB
ANION GAP SERPL CALCULATED.3IONS-SCNC: 15 MEQ/L (ref 9–15)
BUN SERPL-MCNC: 31 MG/DL (ref 8–23)
CALCIUM SERPL-MCNC: 8.1 MG/DL (ref 8.5–9.9)
CHLORIDE SERPL-SCNC: 96 MEQ/L (ref 95–107)
CO2 SERPL-SCNC: 20 MEQ/L (ref 20–31)
CREAT SERPL-MCNC: 4.13 MG/DL (ref 0.7–1.2)
GLUCOSE BLD-MCNC: 125 MG/DL (ref 70–99)
GLUCOSE BLD-MCNC: 148 MG/DL (ref 70–99)
GLUCOSE BLD-MCNC: 157 MG/DL (ref 70–99)
GLUCOSE BLD-MCNC: 169 MG/DL (ref 70–99)
GLUCOSE BLD-MCNC: 183 MG/DL (ref 70–99)
GLUCOSE SERPL-MCNC: 114 MG/DL (ref 70–99)
PERFORMED ON: ABNORMAL
POTASSIUM SERPL-SCNC: 4.1 MEQ/L (ref 3.4–4.9)
SODIUM SERPL-SCNC: 131 MEQ/L (ref 135–144)

## 2024-08-07 PROCEDURE — 6370000000 HC RX 637 (ALT 250 FOR IP): Performed by: STUDENT IN AN ORGANIZED HEALTH CARE EDUCATION/TRAINING PROGRAM

## 2024-08-07 PROCEDURE — 6370000000 HC RX 637 (ALT 250 FOR IP): Performed by: INTERNAL MEDICINE

## 2024-08-07 PROCEDURE — 99231 SBSQ HOSP IP/OBS SF/LOW 25: CPT | Performed by: PHYSICIAN ASSISTANT

## 2024-08-07 PROCEDURE — 1210000000 HC MED SURG R&B

## 2024-08-07 PROCEDURE — 2580000003 HC RX 258: Performed by: STUDENT IN AN ORGANIZED HEALTH CARE EDUCATION/TRAINING PROGRAM

## 2024-08-07 PROCEDURE — 80048 BASIC METABOLIC PNL TOTAL CA: CPT

## 2024-08-07 PROCEDURE — 99231 SBSQ HOSP IP/OBS SF/LOW 25: CPT | Performed by: PHYSICAL MEDICINE & REHABILITATION

## 2024-08-07 PROCEDURE — 36415 COLL VENOUS BLD VENIPUNCTURE: CPT

## 2024-08-07 PROCEDURE — 99231 SBSQ HOSP IP/OBS SF/LOW 25: CPT | Performed by: INTERNAL MEDICINE

## 2024-08-07 RX ORDER — SODIUM CHLORIDE 9 MG/ML
INJECTION, SOLUTION INTRAVENOUS CONTINUOUS
Status: DISPENSED | OUTPATIENT
Start: 2024-08-07 | End: 2024-08-07

## 2024-08-07 RX ADMIN — TRAMADOL HYDROCHLORIDE 100 MG: 50 TABLET ORAL at 06:17

## 2024-08-07 RX ADMIN — SODIUM CHLORIDE: 9 INJECTION, SOLUTION INTRAVENOUS at 10:30

## 2024-08-07 RX ADMIN — CHOLESTYRAMINE 4 G: 4 POWDER, FOR SUSPENSION ORAL at 20:35

## 2024-08-07 RX ADMIN — MIDODRINE HYDROCHLORIDE 10 MG: 5 TABLET ORAL at 17:34

## 2024-08-07 RX ADMIN — Medication 5 MG: at 20:36

## 2024-08-07 RX ADMIN — DIPHENOXYLATE HYDROCHLORIDE AND ATROPINE SULFATE 1 TABLET: 2.5; .025 TABLET ORAL at 20:35

## 2024-08-07 RX ADMIN — CHOLESTYRAMINE 4 G: 4 POWDER, FOR SUSPENSION ORAL at 09:44

## 2024-08-07 RX ADMIN — TRAMADOL HYDROCHLORIDE 100 MG: 50 TABLET ORAL at 20:34

## 2024-08-07 RX ADMIN — METOPROLOL TARTRATE 12.5 MG: 25 TABLET, FILM COATED ORAL at 09:44

## 2024-08-07 RX ADMIN — MIDODRINE HYDROCHLORIDE 10 MG: 5 TABLET ORAL at 12:31

## 2024-08-07 RX ADMIN — VALACYCLOVIR HYDROCHLORIDE 500 MG: 1 TABLET, FILM COATED ORAL at 17:34

## 2024-08-07 RX ADMIN — METOPROLOL TARTRATE 12.5 MG: 25 TABLET, FILM COATED ORAL at 20:35

## 2024-08-07 RX ADMIN — MIDODRINE HYDROCHLORIDE 10 MG: 5 TABLET ORAL at 09:44

## 2024-08-07 RX ADMIN — SODIUM BICARBONATE 650 MG TABLET 650 MG: at 20:36

## 2024-08-07 RX ADMIN — SODIUM BICARBONATE 650 MG TABLET 650 MG: at 09:44

## 2024-08-07 ASSESSMENT — PAIN SCALES - GENERAL
PAINLEVEL_OUTOF10: 8
PAINLEVEL_OUTOF10: 0

## 2024-08-07 ASSESSMENT — PAIN DESCRIPTION - LOCATION: LOCATION: OTHER (COMMENT)

## 2024-08-07 ASSESSMENT — PAIN DESCRIPTION - DESCRIPTORS: DESCRIPTORS: OTHER (COMMENT)

## 2024-08-07 ASSESSMENT — PAIN DESCRIPTION - ORIENTATION: ORIENTATION: OTHER (COMMENT)

## 2024-08-08 LAB
ACANTHOCYTES BLD QL SMEAR: ABNORMAL
ANION GAP SERPL CALCULATED.3IONS-SCNC: 14 MEQ/L (ref 9–15)
ANISOCYTOSIS BLD QL SMEAR: ABNORMAL
BASOPHILS # BLD: 0.1 K/UL (ref 0–0.2)
BASOPHILS NFR BLD: 2 %
BUN SERPL-MCNC: 43 MG/DL (ref 8–23)
CALCIUM SERPL-MCNC: 7.9 MG/DL (ref 8.5–9.9)
CHLORIDE SERPL-SCNC: 98 MEQ/L (ref 95–107)
CO2 SERPL-SCNC: 17 MEQ/L (ref 20–31)
CREAT SERPL-MCNC: 4.94 MG/DL (ref 0.7–1.2)
DOHLE BOD BLD QL SMEAR: ABNORMAL
EOSINOPHIL # BLD: 0.2 K/UL (ref 0–0.7)
EOSINOPHIL NFR BLD: 3 %
ERYTHROCYTE [DISTWIDTH] IN BLOOD BY AUTOMATED COUNT: 13.6 % (ref 11.5–14.5)
GLUCOSE BLD-MCNC: 153 MG/DL (ref 70–99)
GLUCOSE BLD-MCNC: 211 MG/DL (ref 70–99)
GLUCOSE BLD-MCNC: 252 MG/DL (ref 70–99)
GLUCOSE SERPL-MCNC: 137 MG/DL (ref 70–99)
HCT VFR BLD AUTO: 22.7 % (ref 42–52)
HGB BLD-MCNC: 7.6 G/DL (ref 14–18)
LYMPHOCYTES # BLD: 1.1 K/UL (ref 1–4.8)
LYMPHOCYTES NFR BLD: 19 %
MCH RBC QN AUTO: 31.5 PG (ref 27–31.3)
MCHC RBC AUTO-ENTMCNC: 33.5 % (ref 33–37)
MCV RBC AUTO: 94.2 FL (ref 79–92.2)
MONOCYTES # BLD: 0.5 K/UL (ref 0.2–0.8)
MONOCYTES NFR BLD: 8.7 %
NEUTROPHILS # BLD: 4 K/UL (ref 1.4–6.5)
NEUTS SEG NFR BLD: 67 %
OVALOCYTES BLD QL SMEAR: ABNORMAL
PERFORMED ON: ABNORMAL
PLATELET # BLD AUTO: 296 K/UL (ref 130–400)
PLATELET BLD QL SMEAR: ADEQUATE
POIKILOCYTOSIS BLD QL SMEAR: ABNORMAL
POTASSIUM SERPL-SCNC: 4 MEQ/L (ref 3.4–4.9)
RBC # BLD AUTO: 2.41 M/UL (ref 4.7–6.1)
SLIDE REVIEW: ABNORMAL
SMUDGE CELLS BLD QL SMEAR: 18.3
SODIUM SERPL-SCNC: 129 MEQ/L (ref 135–144)
WBC # BLD AUTO: 6 K/UL (ref 4.8–10.8)

## 2024-08-08 PROCEDURE — 6360000002 HC RX W HCPCS: Performed by: INTERNAL MEDICINE

## 2024-08-08 PROCEDURE — 97162 PT EVAL MOD COMPLEX 30 MIN: CPT

## 2024-08-08 PROCEDURE — 99231 SBSQ HOSP IP/OBS SF/LOW 25: CPT | Performed by: PHYSICIAN ASSISTANT

## 2024-08-08 PROCEDURE — 36415 COLL VENOUS BLD VENIPUNCTURE: CPT

## 2024-08-08 PROCEDURE — 1210000000 HC MED SURG R&B

## 2024-08-08 PROCEDURE — 6360000002 HC RX W HCPCS: Performed by: STUDENT IN AN ORGANIZED HEALTH CARE EDUCATION/TRAINING PROGRAM

## 2024-08-08 PROCEDURE — 8010000000 HC HEMODIALYSIS ACUTE INPT

## 2024-08-08 PROCEDURE — 6370000000 HC RX 637 (ALT 250 FOR IP): Performed by: STUDENT IN AN ORGANIZED HEALTH CARE EDUCATION/TRAINING PROGRAM

## 2024-08-08 PROCEDURE — 6370000000 HC RX 637 (ALT 250 FOR IP): Performed by: INTERNAL MEDICINE

## 2024-08-08 PROCEDURE — 80048 BASIC METABOLIC PNL TOTAL CA: CPT

## 2024-08-08 PROCEDURE — 85025 COMPLETE CBC W/AUTO DIFF WBC: CPT

## 2024-08-08 PROCEDURE — 99232 SBSQ HOSP IP/OBS MODERATE 35: CPT | Performed by: PHYSICAL MEDICINE & REHABILITATION

## 2024-08-08 RX ORDER — ENOXAPARIN SODIUM 100 MG/ML
30 INJECTION SUBCUTANEOUS DAILY
Status: DISCONTINUED | OUTPATIENT
Start: 2024-08-08 | End: 2024-08-08 | Stop reason: ALTCHOICE

## 2024-08-08 RX ORDER — HEPARIN SODIUM 5000 [USP'U]/ML
5000 INJECTION, SOLUTION INTRAVENOUS; SUBCUTANEOUS EVERY 8 HOURS SCHEDULED
Status: DISCONTINUED | OUTPATIENT
Start: 2024-08-08 | End: 2024-08-16

## 2024-08-08 RX ORDER — SODIUM CHLORIDE 9 MG/ML
INJECTION, SOLUTION INTRAVENOUS
Status: DISPENSED
Start: 2024-08-08 | End: 2024-08-08

## 2024-08-08 RX ORDER — FUROSEMIDE 10 MG/ML
100 INJECTION INTRAMUSCULAR; INTRAVENOUS ONCE
Status: COMPLETED | OUTPATIENT
Start: 2024-08-08 | End: 2024-08-08

## 2024-08-08 RX ADMIN — TRAMADOL HYDROCHLORIDE 100 MG: 50 TABLET ORAL at 06:01

## 2024-08-08 RX ADMIN — HEPARIN SODIUM 5000 UNITS: 5000 INJECTION INTRAVENOUS; SUBCUTANEOUS at 08:36

## 2024-08-08 RX ADMIN — MIDODRINE HYDROCHLORIDE 10 MG: 5 TABLET ORAL at 17:55

## 2024-08-08 RX ADMIN — HEPARIN SODIUM 5000 UNITS: 5000 INJECTION INTRAVENOUS; SUBCUTANEOUS at 15:27

## 2024-08-08 RX ADMIN — FUROSEMIDE 100 MG: 10 INJECTION, SOLUTION INTRAMUSCULAR; INTRAVENOUS at 17:55

## 2024-08-08 RX ADMIN — VALACYCLOVIR HYDROCHLORIDE 500 MG: 1 TABLET, FILM COATED ORAL at 17:55

## 2024-08-08 RX ADMIN — Medication 5 MG: at 22:25

## 2024-08-08 RX ADMIN — METOPROLOL TARTRATE 12.5 MG: 25 TABLET, FILM COATED ORAL at 08:36

## 2024-08-08 RX ADMIN — CHOLESTYRAMINE 4 G: 4 POWDER, FOR SUSPENSION ORAL at 22:25

## 2024-08-08 RX ADMIN — HEPARIN SODIUM 5000 UNITS: 5000 INJECTION INTRAVENOUS; SUBCUTANEOUS at 22:25

## 2024-08-08 RX ADMIN — INSULIN LISPRO 1 UNITS: 100 INJECTION, SOLUTION INTRAVENOUS; SUBCUTANEOUS at 17:54

## 2024-08-08 RX ADMIN — CHOLESTYRAMINE 4 G: 4 POWDER, FOR SUSPENSION ORAL at 08:36

## 2024-08-08 RX ADMIN — MIDODRINE HYDROCHLORIDE 10 MG: 5 TABLET ORAL at 08:35

## 2024-08-08 RX ADMIN — SODIUM BICARBONATE 650 MG TABLET 650 MG: at 08:35

## 2024-08-08 RX ADMIN — SODIUM BICARBONATE 650 MG TABLET 650 MG: at 22:25

## 2024-08-08 RX ADMIN — DIPHENOXYLATE HYDROCHLORIDE AND ATROPINE SULFATE 1 TABLET: 2.5; .025 TABLET ORAL at 22:25

## 2024-08-08 ASSESSMENT — PAIN SCALES - GENERAL: PAINLEVEL_OUTOF10: 8

## 2024-08-08 NOTE — DISCHARGE INSTR - COC
{Prognosis:5728890038}    Condition at Discharge: { Patient Condition:152038680}    Rehab Potential (if transferring to Rehab): {Prognosis:5712728020}    Recommended Labs or Other Treatments After Discharge: ***    Physician Certification: I certify the above information and transfer of Km Garcia  is necessary for the continuing treatment of the diagnosis listed and that he requires {Admit to Appropriate Level of Care:12971} for {GREATER/LESS:649786148} 30 days.   The individual is being discharged to a nursing facility directly from the hospital after receiving acute patient care at the hospital; and    Requires nursing facility services for the condition for which the patient received care in the hospital; and    As the attending physician, certifies no later than the date of discharge, the individual requires fewer than 30 days of nursing facility care   Update Admission H&P: {CHP DME Changes in HandP:268439041}    PHYSICIAN SIGNATURE:  {Esignature:[id number]}

## 2024-08-08 NOTE — DIALYSIS
Hemodialysis completed as ordered. 2000 ml removed with treatment, tolerated well. Please see dialysis flowsheet for treatment details

## 2024-08-08 NOTE — H&P
Hematology/Oncology  Attending Progress Note        CHIEF COMPLAINT/HPI:  LLE DVT.  LLE pain.    REVIEW OF SYSTEMS:    Unremarkable except for symptoms mentioned in HPI.    Current Inpatient Medications:    Current Facility-Administered Medications: calcium carbonate (TUMS) chewable tablet 1,000 mg, 1,000 mg, Oral, TID PRN  midodrine (PROAMATINE) tablet 10 mg, 10 mg, Oral, TID WC  traMADol (ULTRAM) tablet 100 mg, 100 mg, Oral, QAM AC  traMADol (ULTRAM) tablet 100 mg, 100 mg, Oral, Q8H PRN  cyclobenzaprine (FLEXERIL) tablet 5 mg, 5 mg, Oral, TID PRN  dextrose 10 % infusion, , IntraVENous, Continuous PRN  dextrose bolus 10% 125 mL, 125 mL, IntraVENous, PRN **OR** dextrose bolus 10% 250 mL, 250 mL, IntraVENous, PRN  glucagon injection 1 mg, 1 mg, SubCUTAneous, PRN  glucose chewable tablet 16 g, 4 tablet, Oral, PRN  heparin (porcine) injection 2,000 Units, 2,000 Units, IntraVENous, PRN  insulin lispro (HUMALOG,ADMELOG) injection vial 0-4 Units, 0-4 Units, SubCUTAneous, TID WC  insulin lispro (HUMALOG,ADMELOG) injection vial 0-4 Units, 0-4 Units, SubCUTAneous, Nightly  ondansetron (ZOFRAN-ODT) disintegrating tablet 4 mg, 4 mg, Oral, Q8H PRN **OR** ondansetron (ZOFRAN) injection 4 mg, 4 mg, IntraVENous, Q6H PRN  polyethylene glycol (GLYCOLAX) packet 17 g, 17 g, Oral, Daily PRN  valACYclovir (VALTREX) tablet 500 mg, 500 mg, Oral, Daily  acetaminophen (TYLENOL) tablet 650 mg, 650 mg, Oral, Q4H PRN  metoprolol tartrate (LOPRESSOR) tablet 12.5 mg, 12.5 mg, Oral, BID  cholestyramine light packet 4 g, 4 g, Oral, BID  diphenoxylate-atropine (LOMOTIL) 2.5-0.025 MG per tablet 1 tablet, 1 tablet, Oral, BID PRN  psyllium husk-aspartame (METAMUCIL FIBER) packet 1 packet, 1 packet, Oral, Daily PRN  diphenoxylate-atropine (LOMOTIL) 2.5-0.025 MG per tablet 1 tablet, 1 tablet, Oral, Daily  sodium bicarbonate tablet 650 mg, 650 mg, Oral, BID  melatonin disintegrating tablet 5 mg, 5 mg, Oral, Nightly    PHYSICAL EXAM:    VITALS:  BP (!) 
Please see H&P listed on the rehab encounter by error.  
Collection Time: 08/01/24  4:53 PM   Result Value Ref Range    POC Glucose 162 (H) 70 - 99 mg/dl    Performed on ACCU-CHEK    POCT Glucose    Collection Time: 08/01/24  8:11 PM   Result Value Ref Range    POC Glucose 170 (H) 70 - 99 mg/dl    Performed on ACCU-CHEK    FECAL LACTOFERRIN    Collection Time: 08/01/24 10:25 PM   Result Value Ref Range    Lactoferrin, Fecal POSITIVE (A) Negative   Basic Metabolic Panel    Collection Time: 08/02/24  4:40 AM   Result Value Ref Range    Sodium 133 (L) 135 - 144 mEq/L    Potassium 3.3 (L) 3.4 - 4.9 mEq/L    Chloride 106 95 - 107 mEq/L    CO2 16 (L) 20 - 31 mEq/L    Anion Gap 11 9 - 15 mEq/L    Glucose 129 (H) 70 - 99 mg/dL    BUN 24 (H) 8 - 23 mg/dL    Creatinine 1.87 (H) 0.70 - 1.20 mg/dL    Est, Glom Filt Rate 35.9 (L) >60    Calcium 7.6 (L) 8.5 - 9.9 mg/dL   POCT Glucose    Collection Time: 08/02/24  5:59 AM   Result Value Ref Range    POC Glucose 127 (H) 70 - 99 mg/dl    Performed on ACCU-CHEK    POCT Glucose    Collection Time: 08/02/24 12:06 PM   Result Value Ref Range    POC Glucose 196 (H) 70 - 99 mg/dl    Performed on ACCU-CHEK    COVID-19, Rapid    Collection Time: 08/02/24  1:57 PM   Result Value Ref Range    SARS-CoV-2, NAAT Not Detected Not Detected   POCT Glucose    Collection Time: 08/02/24  3:15 PM   Result Value Ref Range    POC Glucose 145 (H) 70 - 99 mg/dl    Performed on ACCU-CHEK            ASSESSMENT AND PLAN:    Dizziness-vitals are stable.  Will obtain stat CT head.  Check orthostatic vitals.  Glucose within appropriate range.  Monitor on telemetry.    SILVIAN-managed by nephrologist.  Initially required dialysis however improved.  Diabetes-resume insulin as ordered.  Hypoglycemia protocol  History of DVT/PE status post IVC filter in place  Urine retention-being followed by urology  Lymphoma  Pain followed by oncology       Code status: Full Code    Electronically signed by Anna Cuenca DO on 8/2/24 at 4:25 PM EDT

## 2024-08-09 LAB
ANION GAP SERPL CALCULATED.3IONS-SCNC: 16 MEQ/L (ref 9–15)
BUN SERPL-MCNC: 32 MG/DL (ref 8–23)
CALCIUM SERPL-MCNC: 7.5 MG/DL (ref 8.5–9.9)
CHLORIDE SERPL-SCNC: 95 MEQ/L (ref 95–107)
CO2 SERPL-SCNC: 21 MEQ/L (ref 20–31)
CREAT SERPL-MCNC: 4.15 MG/DL (ref 0.7–1.2)
ERYTHROCYTE [DISTWIDTH] IN BLOOD BY AUTOMATED COUNT: 13.5 % (ref 11.5–14.5)
GLUCOSE BLD-MCNC: 164 MG/DL (ref 70–99)
GLUCOSE BLD-MCNC: 166 MG/DL (ref 70–99)
GLUCOSE BLD-MCNC: 253 MG/DL (ref 70–99)
GLUCOSE SERPL-MCNC: 149 MG/DL (ref 70–99)
HCT VFR BLD AUTO: 26.3 % (ref 42–52)
HGB BLD-MCNC: 8.6 G/DL (ref 14–18)
MCH RBC QN AUTO: 30.8 PG (ref 27–31.3)
MCHC RBC AUTO-ENTMCNC: 32.7 % (ref 33–37)
MCV RBC AUTO: 94.3 FL (ref 79–92.2)
PERFORMED ON: ABNORMAL
PLATELET # BLD AUTO: 323 K/UL (ref 130–400)
POTASSIUM SERPL-SCNC: 3.8 MEQ/L (ref 3.4–4.9)
RBC # BLD AUTO: 2.79 M/UL (ref 4.7–6.1)
SODIUM SERPL-SCNC: 132 MEQ/L (ref 135–144)
WBC # BLD AUTO: 7.6 K/UL (ref 4.8–10.8)

## 2024-08-09 PROCEDURE — 36415 COLL VENOUS BLD VENIPUNCTURE: CPT

## 2024-08-09 PROCEDURE — 99231 SBSQ HOSP IP/OBS SF/LOW 25: CPT | Performed by: PHYSICAL MEDICINE & REHABILITATION

## 2024-08-09 PROCEDURE — 6370000000 HC RX 637 (ALT 250 FOR IP): Performed by: STUDENT IN AN ORGANIZED HEALTH CARE EDUCATION/TRAINING PROGRAM

## 2024-08-09 PROCEDURE — 85027 COMPLETE CBC AUTOMATED: CPT

## 2024-08-09 PROCEDURE — 1210000000 HC MED SURG R&B

## 2024-08-09 PROCEDURE — 90935 HEMODIALYSIS ONE EVALUATION: CPT

## 2024-08-09 PROCEDURE — 80048 BASIC METABOLIC PNL TOTAL CA: CPT

## 2024-08-09 PROCEDURE — 6370000000 HC RX 637 (ALT 250 FOR IP): Performed by: INTERNAL MEDICINE

## 2024-08-09 PROCEDURE — 99223 1ST HOSP IP/OBS HIGH 75: CPT | Performed by: INTERNAL MEDICINE

## 2024-08-09 PROCEDURE — 99231 SBSQ HOSP IP/OBS SF/LOW 25: CPT | Performed by: PHYSICIAN ASSISTANT

## 2024-08-09 PROCEDURE — 6360000002 HC RX W HCPCS: Performed by: INTERNAL MEDICINE

## 2024-08-09 RX ORDER — SODIUM CHLORIDE 9 MG/ML
INJECTION, SOLUTION INTRAVENOUS
Status: DISPENSED
Start: 2024-08-09 | End: 2024-08-09

## 2024-08-09 RX ADMIN — SODIUM BICARBONATE 650 MG TABLET 650 MG: at 07:49

## 2024-08-09 RX ADMIN — HEPARIN SODIUM 5000 UNITS: 5000 INJECTION INTRAVENOUS; SUBCUTANEOUS at 05:31

## 2024-08-09 RX ADMIN — CHOLESTYRAMINE 4 G: 4 POWDER, FOR SUSPENSION ORAL at 07:49

## 2024-08-09 RX ADMIN — MIDODRINE HYDROCHLORIDE 10 MG: 5 TABLET ORAL at 07:49

## 2024-08-09 RX ADMIN — Medication 5 MG: at 22:00

## 2024-08-09 RX ADMIN — TRAMADOL HYDROCHLORIDE 100 MG: 50 TABLET ORAL at 06:01

## 2024-08-09 RX ADMIN — INSULIN LISPRO 2 UNITS: 100 INJECTION, SOLUTION INTRAVENOUS; SUBCUTANEOUS at 17:25

## 2024-08-09 RX ADMIN — HEPARIN SODIUM 5000 UNITS: 5000 INJECTION INTRAVENOUS; SUBCUTANEOUS at 13:43

## 2024-08-09 RX ADMIN — ANTACID TABLETS 1000 MG: 500 TABLET, CHEWABLE ORAL at 09:54

## 2024-08-09 RX ADMIN — HEPARIN SODIUM 5000 UNITS: 5000 INJECTION INTRAVENOUS; SUBCUTANEOUS at 22:00

## 2024-08-09 RX ADMIN — CHOLESTYRAMINE 4 G: 4 POWDER, FOR SUSPENSION ORAL at 20:42

## 2024-08-09 RX ADMIN — ACETAMINOPHEN 325MG 650 MG: 325 TABLET ORAL at 14:06

## 2024-08-09 RX ADMIN — MIDODRINE HYDROCHLORIDE 10 MG: 5 TABLET ORAL at 17:23

## 2024-08-09 RX ADMIN — METOPROLOL TARTRATE 25 MG: 25 TABLET, FILM COATED ORAL at 07:49

## 2024-08-09 RX ADMIN — MIDODRINE HYDROCHLORIDE 10 MG: 5 TABLET ORAL at 13:43

## 2024-08-09 RX ADMIN — VALACYCLOVIR HYDROCHLORIDE 500 MG: 1 TABLET, FILM COATED ORAL at 17:24

## 2024-08-09 RX ADMIN — SODIUM BICARBONATE 650 MG TABLET 650 MG: at 22:00

## 2024-08-09 RX ADMIN — DIPHENOXYLATE HYDROCHLORIDE AND ATROPINE SULFATE 1 TABLET: 2.5; .025 TABLET ORAL at 22:00

## 2024-08-09 ASSESSMENT — PAIN DESCRIPTION - LOCATION
LOCATION: HEAD
LOCATION: BACK

## 2024-08-09 ASSESSMENT — PAIN SCALES - GENERAL
PAINLEVEL_OUTOF10: 6
PAINLEVEL_OUTOF10: 6
PAINLEVEL_OUTOF10: 5

## 2024-08-09 ASSESSMENT — PAIN DESCRIPTION - DESCRIPTORS: DESCRIPTORS: ACHING

## 2024-08-09 NOTE — ONCOLOGY
I discussed the case with patient's primary oncologist (Dr. Carroll) at [facility].  He agreed with prophylactic dose Heparin for now.  As renal failure improves, probably discharge him on adjusted dose Eliquis.  Now that CNS lymphoma responded, risk of recurrent subdural hematoma would be low.

## 2024-08-09 NOTE — FLOWSHEET NOTE
08/09/24 1255   Vital Signs   /61   Temp 97.3 °F (36.3 °C)   Pulse 69   Respirations 18   Post-Hemodialysis Assessment   Post-Treatment Procedures Blood returned;Catheter capped, clamped and heparinized x 2 ports   Machine Disinfection Process Exterior Machine Disinfection;Acid/Vinegar Clean;Heat Disinfect   Dialyzer Clearance Heavily streaked   Duration of Treatment (minutes) 255 minutes   Hemodialysis Intake (ml) 900 ml   Hemodialysis Output (ml) 3700 ml   NET Removed (ml) 2800   Tolerated Treatment Good   Patient Response to Treatment Pt tolerated tx well   Bilateral Breath Sounds Diminished   Edema Generalized   Edema Generalized +2   Time Off 1240   Patient Disposition Return to room   Observations & Evaluations   Level of Consciousness 0   Heart Rhythm Regular   Respiratory Quality/Effort Unlabored   Skin Condition/Temp Cool;Dry   Comments Pt transported back to room in stable condition

## 2024-08-10 LAB
ALBUMIN SERPL-MCNC: 2.6 G/DL (ref 3.5–4.6)
ALP SERPL-CCNC: 68 U/L (ref 35–104)
ALT SERPL-CCNC: 6 U/L (ref 0–41)
ANION GAP SERPL CALCULATED.3IONS-SCNC: 19 MEQ/L (ref 9–15)
AST SERPL-CCNC: 9 U/L (ref 0–40)
BILIRUB DIRECT SERPL-MCNC: <0.2 MG/DL (ref 0–0.4)
BILIRUB INDIRECT SERPL-MCNC: ABNORMAL MG/DL (ref 0–0.6)
BILIRUB SERPL-MCNC: <0.2 MG/DL (ref 0.2–0.7)
BUN SERPL-MCNC: 45 MG/DL (ref 8–23)
CALCIUM SERPL-MCNC: 8.3 MG/DL (ref 8.5–9.9)
CHLORIDE SERPL-SCNC: 92 MEQ/L (ref 95–107)
CO2 SERPL-SCNC: 16 MEQ/L (ref 20–31)
CREAT SERPL-MCNC: 5.24 MG/DL (ref 0.7–1.2)
ERYTHROCYTE [DISTWIDTH] IN BLOOD BY AUTOMATED COUNT: 13.7 % (ref 11.5–14.5)
GLUCOSE BLD-MCNC: 151 MG/DL (ref 70–99)
GLUCOSE BLD-MCNC: 211 MG/DL (ref 70–99)
GLUCOSE BLD-MCNC: 276 MG/DL (ref 70–99)
GLUCOSE SERPL-MCNC: 175 MG/DL (ref 70–99)
HCT VFR BLD AUTO: 28.8 % (ref 42–52)
HGB BLD-MCNC: 9.4 G/DL (ref 14–18)
MCH RBC QN AUTO: 30.8 PG (ref 27–31.3)
MCHC RBC AUTO-ENTMCNC: 32.6 % (ref 33–37)
MCV RBC AUTO: 94.4 FL (ref 79–92.2)
PERFORMED ON: ABNORMAL
PLATELET # BLD AUTO: 359 K/UL (ref 130–400)
POTASSIUM SERPL-SCNC: 4.3 MEQ/L (ref 3.4–4.9)
PROT SERPL-MCNC: 5.8 G/DL (ref 6.3–8)
RBC # BLD AUTO: 3.05 M/UL (ref 4.7–6.1)
SODIUM SERPL-SCNC: 127 MEQ/L (ref 135–144)
WBC # BLD AUTO: 12.1 K/UL (ref 4.8–10.8)

## 2024-08-10 PROCEDURE — 90935 HEMODIALYSIS ONE EVALUATION: CPT

## 2024-08-10 PROCEDURE — 6370000000 HC RX 637 (ALT 250 FOR IP): Performed by: STUDENT IN AN ORGANIZED HEALTH CARE EDUCATION/TRAINING PROGRAM

## 2024-08-10 PROCEDURE — 6370000000 HC RX 637 (ALT 250 FOR IP): Performed by: INTERNAL MEDICINE

## 2024-08-10 PROCEDURE — 2060000000 HC ICU INTERMEDIATE R&B

## 2024-08-10 PROCEDURE — 2500000003 HC RX 250 WO HCPCS: Performed by: INTERNAL MEDICINE

## 2024-08-10 PROCEDURE — 6360000002 HC RX W HCPCS: Performed by: INTERNAL MEDICINE

## 2024-08-10 PROCEDURE — 99233 SBSQ HOSP IP/OBS HIGH 50: CPT | Performed by: INTERNAL MEDICINE

## 2024-08-10 PROCEDURE — 83993 ASSAY FOR CALPROTECTIN FECAL: CPT

## 2024-08-10 PROCEDURE — 87507 IADNA-DNA/RNA PROBE TQ 12-25: CPT

## 2024-08-10 PROCEDURE — 80048 BASIC METABOLIC PNL TOTAL CA: CPT

## 2024-08-10 PROCEDURE — 2580000003 HC RX 258: Performed by: INTERNAL MEDICINE

## 2024-08-10 PROCEDURE — P9047 ALBUMIN (HUMAN), 25%, 50ML: HCPCS | Performed by: INTERNAL MEDICINE

## 2024-08-10 PROCEDURE — 85027 COMPLETE CBC AUTOMATED: CPT

## 2024-08-10 PROCEDURE — 99223 1ST HOSP IP/OBS HIGH 75: CPT | Performed by: INTERNAL MEDICINE

## 2024-08-10 PROCEDURE — 36415 COLL VENOUS BLD VENIPUNCTURE: CPT

## 2024-08-10 PROCEDURE — 87324 CLOSTRIDIUM AG IA: CPT

## 2024-08-10 PROCEDURE — 93005 ELECTROCARDIOGRAM TRACING: CPT | Performed by: INTERNAL MEDICINE

## 2024-08-10 PROCEDURE — 87449 NOS EACH ORGANISM AG IA: CPT

## 2024-08-10 PROCEDURE — 80076 HEPATIC FUNCTION PANEL: CPT

## 2024-08-10 RX ORDER — DILTIAZEM HYDROCHLORIDE 5 MG/ML
10 INJECTION INTRAVENOUS ONCE
Status: COMPLETED | OUTPATIENT
Start: 2024-08-10 | End: 2024-08-10

## 2024-08-10 RX ORDER — DILTIAZEM HYDROCHLORIDE 5 MG/ML
5 INJECTION INTRAVENOUS
Status: DISCONTINUED | OUTPATIENT
Start: 2024-08-10 | End: 2024-08-10

## 2024-08-10 RX ORDER — ALBUMIN (HUMAN) 12.5 G/50ML
25 SOLUTION INTRAVENOUS ONCE
Status: COMPLETED | OUTPATIENT
Start: 2024-08-10 | End: 2024-08-10

## 2024-08-10 RX ADMIN — CHOLESTYRAMINE 4 G: 4 POWDER, FOR SUSPENSION ORAL at 20:39

## 2024-08-10 RX ADMIN — TRAMADOL HYDROCHLORIDE 100 MG: 50 TABLET ORAL at 18:36

## 2024-08-10 RX ADMIN — MIDODRINE HYDROCHLORIDE 10 MG: 5 TABLET ORAL at 09:14

## 2024-08-10 RX ADMIN — METOPROLOL TARTRATE 25 MG: 25 TABLET, FILM COATED ORAL at 02:11

## 2024-08-10 RX ADMIN — SODIUM BICARBONATE 650 MG TABLET 650 MG: at 20:39

## 2024-08-10 RX ADMIN — MIDODRINE HYDROCHLORIDE 10 MG: 5 TABLET ORAL at 18:36

## 2024-08-10 RX ADMIN — DILTIAZEM HYDROCHLORIDE 5 MG: 5 INJECTION, SOLUTION INTRAVENOUS at 03:31

## 2024-08-10 RX ADMIN — METOPROLOL TARTRATE 25 MG: 25 TABLET, FILM COATED ORAL at 16:33

## 2024-08-10 RX ADMIN — TRAMADOL HYDROCHLORIDE 100 MG: 50 TABLET ORAL at 09:14

## 2024-08-10 RX ADMIN — HEPARIN SODIUM 5000 UNITS: 5000 INJECTION INTRAVENOUS; SUBCUTANEOUS at 18:37

## 2024-08-10 RX ADMIN — HEPARIN SODIUM 5000 UNITS: 5000 INJECTION INTRAVENOUS; SUBCUTANEOUS at 04:36

## 2024-08-10 RX ADMIN — DILTIAZEM HYDROCHLORIDE 5 MG: 5 INJECTION INTRAVENOUS at 03:32

## 2024-08-10 RX ADMIN — MIDODRINE HYDROCHLORIDE 10 MG: 5 TABLET ORAL at 13:06

## 2024-08-10 RX ADMIN — ALBUMIN (HUMAN) 25 G: 0.25 INJECTION, SOLUTION INTRAVENOUS at 04:56

## 2024-08-10 RX ADMIN — DIPHENOXYLATE HYDROCHLORIDE AND ATROPINE SULFATE 1 TABLET: 2.5; .025 TABLET ORAL at 20:39

## 2024-08-10 RX ADMIN — VALACYCLOVIR HYDROCHLORIDE 500 MG: 1 TABLET, FILM COATED ORAL at 18:35

## 2024-08-10 RX ADMIN — CHOLESTYRAMINE 4 G: 4 POWDER, FOR SUSPENSION ORAL at 09:14

## 2024-08-10 RX ADMIN — Medication 5 MG: at 20:39

## 2024-08-10 RX ADMIN — SODIUM BICARBONATE 650 MG TABLET 650 MG: at 09:14

## 2024-08-10 RX ADMIN — DILTIAZEM HYDROCHLORIDE 5 MG/HR: 5 INJECTION, SOLUTION INTRAVENOUS at 04:32

## 2024-08-10 ASSESSMENT — PAIN DESCRIPTION - LOCATION
LOCATION: FOOT
LOCATION: GENERALIZED

## 2024-08-10 ASSESSMENT — PAIN DESCRIPTION - DESCRIPTORS
DESCRIPTORS: ACHING
DESCRIPTORS: ACHING

## 2024-08-10 ASSESSMENT — PAIN SCALES - GENERAL
PAINLEVEL_OUTOF10: 0
PAINLEVEL_OUTOF10: 6
PAINLEVEL_OUTOF10: 6

## 2024-08-10 ASSESSMENT — PAIN DESCRIPTION - ORIENTATION: ORIENTATION: OTHER (COMMENT)

## 2024-08-10 NOTE — SIGNIFICANT EVENT
Notified by 4 W. room nurse that patient is demonstrating atrial fibrillation with rapid ventricular response, reportedly heart rates currently as high as 180 bpm.  Has new onset atrial fibrillation this admission.  Scheduled twice a day metoprolol tartrate for rate control.  Rates were initially controlled at shift change this evening, with HR <100 and evening metoprolol dose held per order parameters.  Patiently subsequently became more tachycardic, and previously held evening dose of metoprolol was administered.  Tachycardia worsened despite that.     Given the patient is already received beta-blockade and is having uncontrolled tachycardia despite that, we will administer one-time 10 mg IV diltiazem followed by transfer to [facility] cardiac unit and initiation of diltiazem infusion for heart rate control, goal <120 bpm.           Electronically signed by Sonam Hazel DO on 8/10/2024 at 3:16 AM

## 2024-08-11 LAB
ADV 40+41 DNA STL QL NAA+NON-PROBE: NOT DETECTED
ANION GAP SERPL CALCULATED.3IONS-SCNC: 16 MEQ/L (ref 9–15)
BUN SERPL-MCNC: 37 MG/DL (ref 8–23)
C CAYETANENSIS DNA STL QL NAA+NON-PROBE: NOT DETECTED
C COLI+JEJ+UPSA DNA STL QL NAA+NON-PROBE: NOT DETECTED
CALCIUM SERPL-MCNC: 8 MG/DL (ref 8.5–9.9)
CHLORIDE SERPL-SCNC: 95 MEQ/L (ref 95–107)
CO2 SERPL-SCNC: 21 MEQ/L (ref 20–31)
CREAT SERPL-MCNC: 4.56 MG/DL (ref 0.7–1.2)
CRYPTOSP DNA STL QL NAA+NON-PROBE: NOT DETECTED
E HISTOLYT DNA STL QL NAA+NON-PROBE: NOT DETECTED
EAEC PAA PLAS AGGR+AATA ST NAA+NON-PRB: NOT DETECTED
EC STX1+STX2 GENES STL QL NAA+NON-PROBE: NOT DETECTED
EPEC EAE GENE STL QL NAA+NON-PROBE: NOT DETECTED
ERYTHROCYTE [DISTWIDTH] IN BLOOD BY AUTOMATED COUNT: 13.4 % (ref 11.5–14.5)
ETEC LTA+ST1A+ST1B TOX ST NAA+NON-PROBE: NOT DETECTED
G LAMBLIA DNA STL QL NAA+NON-PROBE: NOT DETECTED
GI PATH DNA+RNA PNL STL NAA+NON-PROBE: NOT DETECTED
GLUCOSE BLD-MCNC: 134 MG/DL (ref 70–99)
GLUCOSE BLD-MCNC: 160 MG/DL (ref 70–99)
GLUCOSE BLD-MCNC: 165 MG/DL (ref 70–99)
GLUCOSE BLD-MCNC: 233 MG/DL (ref 70–99)
GLUCOSE SERPL-MCNC: 155 MG/DL (ref 70–99)
HCT VFR BLD AUTO: 26.2 % (ref 42–52)
HGB BLD-MCNC: 8.8 G/DL (ref 14–18)
MCH RBC QN AUTO: 31.2 PG (ref 27–31.3)
MCHC RBC AUTO-ENTMCNC: 33.6 % (ref 33–37)
MCV RBC AUTO: 92.9 FL (ref 79–92.2)
NOROVIRUS GI+II RNA STL QL NAA+NON-PROBE: DETECTED
P SHIGELLOIDES DNA STL QL NAA+NON-PROBE: NOT DETECTED
PERFORMED ON: ABNORMAL
PLATELET # BLD AUTO: 346 K/UL (ref 130–400)
POTASSIUM SERPL-SCNC: 4.2 MEQ/L (ref 3.4–4.9)
RBC # BLD AUTO: 2.82 M/UL (ref 4.7–6.1)
RVA RNA STL QL NAA+NON-PROBE: NOT DETECTED
S ENT+BONG DNA STL QL NAA+NON-PROBE: NOT DETECTED
SAPO I+II+IV+V RNA STL QL NAA+NON-PROBE: NOT DETECTED
SHIGELLA SP+EIEC IPAH ST NAA+NON-PROBE: NOT DETECTED
SODIUM SERPL-SCNC: 132 MEQ/L (ref 135–144)
V CHOL+PARA+VUL DNA STL QL NAA+NON-PROBE: NOT DETECTED
V CHOLERAE DNA STL QL NAA+NON-PROBE: NOT DETECTED
WBC # BLD AUTO: 10.8 K/UL (ref 4.8–10.8)
Y ENTEROCOL DNA STL QL NAA+NON-PROBE: NOT DETECTED

## 2024-08-11 PROCEDURE — 99232 SBSQ HOSP IP/OBS MODERATE 35: CPT | Performed by: INTERNAL MEDICINE

## 2024-08-11 PROCEDURE — 6370000000 HC RX 637 (ALT 250 FOR IP): Performed by: INTERNAL MEDICINE

## 2024-08-11 PROCEDURE — 99213 OFFICE O/P EST LOW 20 MIN: CPT

## 2024-08-11 PROCEDURE — 97535 SELF CARE MNGMENT TRAINING: CPT

## 2024-08-11 PROCEDURE — 6360000002 HC RX W HCPCS: Performed by: INTERNAL MEDICINE

## 2024-08-11 PROCEDURE — 36415 COLL VENOUS BLD VENIPUNCTURE: CPT

## 2024-08-11 PROCEDURE — 97110 THERAPEUTIC EXERCISES: CPT

## 2024-08-11 PROCEDURE — 85027 COMPLETE CBC AUTOMATED: CPT

## 2024-08-11 PROCEDURE — 6370000000 HC RX 637 (ALT 250 FOR IP): Performed by: STUDENT IN AN ORGANIZED HEALTH CARE EDUCATION/TRAINING PROGRAM

## 2024-08-11 PROCEDURE — 80048 BASIC METABOLIC PNL TOTAL CA: CPT

## 2024-08-11 PROCEDURE — 2060000000 HC ICU INTERMEDIATE R&B

## 2024-08-11 RX ORDER — OXYCODONE HYDROCHLORIDE 5 MG/1
5 TABLET ORAL ONCE
Status: COMPLETED | OUTPATIENT
Start: 2024-08-11 | End: 2024-08-11

## 2024-08-11 RX ADMIN — INSULIN LISPRO 1 UNITS: 100 INJECTION, SOLUTION INTRAVENOUS; SUBCUTANEOUS at 12:04

## 2024-08-11 RX ADMIN — Medication 5 MG: at 22:46

## 2024-08-11 RX ADMIN — DIPHENOXYLATE HYDROCHLORIDE AND ATROPINE SULFATE 1 TABLET: 2.5; .025 TABLET ORAL at 08:48

## 2024-08-11 RX ADMIN — MIDODRINE HYDROCHLORIDE 15 MG: 10 TABLET ORAL at 16:58

## 2024-08-11 RX ADMIN — DIPHENOXYLATE HYDROCHLORIDE AND ATROPINE SULFATE 1 TABLET: 2.5; .025 TABLET ORAL at 22:24

## 2024-08-11 RX ADMIN — HEPARIN SODIUM 5000 UNITS: 5000 INJECTION INTRAVENOUS; SUBCUTANEOUS at 06:42

## 2024-08-11 RX ADMIN — HEPARIN SODIUM 5000 UNITS: 5000 INJECTION INTRAVENOUS; SUBCUTANEOUS at 00:18

## 2024-08-11 RX ADMIN — OXYCODONE HYDROCHLORIDE 5 MG: 5 TABLET ORAL at 13:12

## 2024-08-11 RX ADMIN — MIDODRINE HYDROCHLORIDE 10 MG: 5 TABLET ORAL at 12:04

## 2024-08-11 RX ADMIN — METOPROLOL TARTRATE 25 MG: 25 TABLET, FILM COATED ORAL at 10:39

## 2024-08-11 RX ADMIN — VALACYCLOVIR HYDROCHLORIDE 500 MG: 1 TABLET, FILM COATED ORAL at 22:34

## 2024-08-11 RX ADMIN — TRAMADOL HYDROCHLORIDE 100 MG: 50 TABLET ORAL at 06:43

## 2024-08-11 RX ADMIN — SODIUM BICARBONATE 650 MG TABLET 650 MG: at 08:46

## 2024-08-11 RX ADMIN — CHOLESTYRAMINE 4 G: 4 POWDER, FOR SUSPENSION ORAL at 08:46

## 2024-08-11 RX ADMIN — MIDODRINE HYDROCHLORIDE 10 MG: 5 TABLET ORAL at 08:46

## 2024-08-11 RX ADMIN — SODIUM BICARBONATE 650 MG TABLET 650 MG: at 22:43

## 2024-08-11 RX ADMIN — HEPARIN SODIUM 5000 UNITS: 5000 INJECTION INTRAVENOUS; SUBCUTANEOUS at 13:12

## 2024-08-11 RX ADMIN — HEPARIN SODIUM 5000 UNITS: 5000 INJECTION INTRAVENOUS; SUBCUTANEOUS at 22:22

## 2024-08-11 RX ADMIN — CHOLESTYRAMINE 4 G: 4 POWDER, FOR SUSPENSION ORAL at 22:22

## 2024-08-11 ASSESSMENT — PAIN DESCRIPTION - DESCRIPTORS: DESCRIPTORS: ACHING;CRAMPING;DISCOMFORT

## 2024-08-11 ASSESSMENT — PAIN SCALES - GENERAL
PAINLEVEL_OUTOF10: 0
PAINLEVEL_OUTOF10: 0
PAINLEVEL_OUTOF10: 1
PAINLEVEL_OUTOF10: 0
PAINLEVEL_OUTOF10: 0
PAINLEVEL_OUTOF10: 9

## 2024-08-11 ASSESSMENT — PAIN DESCRIPTION - LOCATION: LOCATION: LEG;FOOT

## 2024-08-12 LAB
ANION GAP SERPL CALCULATED.3IONS-SCNC: 18 MEQ/L (ref 9–15)
BUN SERPL-MCNC: 51 MG/DL (ref 8–23)
CALCIUM SERPL-MCNC: 8.2 MG/DL (ref 8.5–9.9)
CHLORIDE SERPL-SCNC: 92 MEQ/L (ref 95–107)
CO2 SERPL-SCNC: 19 MEQ/L (ref 20–31)
CREAT SERPL-MCNC: 5.78 MG/DL (ref 0.7–1.2)
ERYTHROCYTE [DISTWIDTH] IN BLOOD BY AUTOMATED COUNT: 13.6 % (ref 11.5–14.5)
GLUCOSE BLD-MCNC: 129 MG/DL (ref 70–99)
GLUCOSE BLD-MCNC: 148 MG/DL (ref 70–99)
GLUCOSE BLD-MCNC: 173 MG/DL (ref 70–99)
GLUCOSE BLD-MCNC: 189 MG/DL (ref 70–99)
GLUCOSE BLD-MCNC: 233 MG/DL (ref 70–99)
GLUCOSE SERPL-MCNC: 128 MG/DL (ref 70–99)
HCT VFR BLD AUTO: 28.7 % (ref 42–52)
HGB BLD-MCNC: 9.5 G/DL (ref 14–18)
MCH RBC QN AUTO: 31.1 PG (ref 27–31.3)
MCHC RBC AUTO-ENTMCNC: 33.1 % (ref 33–37)
MCV RBC AUTO: 94.1 FL (ref 79–92.2)
PERFORMED ON: ABNORMAL
PLATELET # BLD AUTO: 353 K/UL (ref 130–400)
POTASSIUM SERPL-SCNC: 5.2 MEQ/L (ref 3.4–4.9)
RBC # BLD AUTO: 3.05 M/UL (ref 4.7–6.1)
SODIUM SERPL-SCNC: 129 MEQ/L (ref 135–144)
WBC # BLD AUTO: 11.6 K/UL (ref 4.8–10.8)

## 2024-08-12 PROCEDURE — 85027 COMPLETE CBC AUTOMATED: CPT

## 2024-08-12 PROCEDURE — 97110 THERAPEUTIC EXERCISES: CPT

## 2024-08-12 PROCEDURE — 6370000000 HC RX 637 (ALT 250 FOR IP): Performed by: STUDENT IN AN ORGANIZED HEALTH CARE EDUCATION/TRAINING PROGRAM

## 2024-08-12 PROCEDURE — 99231 SBSQ HOSP IP/OBS SF/LOW 25: CPT | Performed by: PHYSICIAN ASSISTANT

## 2024-08-12 PROCEDURE — 99232 SBSQ HOSP IP/OBS MODERATE 35: CPT | Performed by: PHYSICAL MEDICINE & REHABILITATION

## 2024-08-12 PROCEDURE — 99232 SBSQ HOSP IP/OBS MODERATE 35: CPT | Performed by: INTERNAL MEDICINE

## 2024-08-12 PROCEDURE — 97535 SELF CARE MNGMENT TRAINING: CPT

## 2024-08-12 PROCEDURE — 2500000003 HC RX 250 WO HCPCS: Performed by: INTERNAL MEDICINE

## 2024-08-12 PROCEDURE — 6370000000 HC RX 637 (ALT 250 FOR IP): Performed by: INTERNAL MEDICINE

## 2024-08-12 PROCEDURE — 2060000000 HC ICU INTERMEDIATE R&B

## 2024-08-12 PROCEDURE — 80048 BASIC METABOLIC PNL TOTAL CA: CPT

## 2024-08-12 PROCEDURE — 6360000002 HC RX W HCPCS: Performed by: INTERNAL MEDICINE

## 2024-08-12 PROCEDURE — 99291 CRITICAL CARE FIRST HOUR: CPT | Performed by: INTERNAL MEDICINE

## 2024-08-12 PROCEDURE — 36415 COLL VENOUS BLD VENIPUNCTURE: CPT

## 2024-08-12 PROCEDURE — 51702 INSERT TEMP BLADDER CATH: CPT

## 2024-08-12 PROCEDURE — 82533 TOTAL CORTISOL: CPT

## 2024-08-12 RX ORDER — METOPROLOL TARTRATE 1 MG/ML
5 INJECTION, SOLUTION INTRAVENOUS ONCE
Status: COMPLETED | OUTPATIENT
Start: 2024-08-12 | End: 2024-08-12

## 2024-08-12 RX ORDER — 0.9 % SODIUM CHLORIDE 0.9 %
250 INTRAVENOUS SOLUTION INTRAVENOUS ONCE
Status: COMPLETED | OUTPATIENT
Start: 2024-08-12 | End: 2024-08-14

## 2024-08-12 RX ADMIN — DIPHENOXYLATE HYDROCHLORIDE AND ATROPINE SULFATE 1 TABLET: 2.5; .025 TABLET ORAL at 21:57

## 2024-08-12 RX ADMIN — SODIUM BICARBONATE 650 MG TABLET 650 MG: at 21:57

## 2024-08-12 RX ADMIN — MIDODRINE HYDROCHLORIDE 15 MG: 10 TABLET ORAL at 17:58

## 2024-08-12 RX ADMIN — SODIUM BICARBONATE 650 MG TABLET 650 MG: at 11:39

## 2024-08-12 RX ADMIN — CHOLESTYRAMINE 4 G: 4 POWDER, FOR SUSPENSION ORAL at 21:58

## 2024-08-12 RX ADMIN — HEPARIN SODIUM 5000 UNITS: 5000 INJECTION INTRAVENOUS; SUBCUTANEOUS at 21:57

## 2024-08-12 RX ADMIN — HEPARIN SODIUM 5000 UNITS: 5000 INJECTION INTRAVENOUS; SUBCUTANEOUS at 06:02

## 2024-08-12 RX ADMIN — MIDODRINE HYDROCHLORIDE 15 MG: 10 TABLET ORAL at 11:39

## 2024-08-12 RX ADMIN — TRAMADOL HYDROCHLORIDE 100 MG: 50 TABLET ORAL at 06:02

## 2024-08-12 RX ADMIN — VALACYCLOVIR HYDROCHLORIDE 500 MG: 1 TABLET, FILM COATED ORAL at 17:58

## 2024-08-12 RX ADMIN — INSULIN LISPRO 1 UNITS: 100 INJECTION, SOLUTION INTRAVENOUS; SUBCUTANEOUS at 17:58

## 2024-08-12 RX ADMIN — Medication 5 MG: at 22:02

## 2024-08-12 RX ADMIN — METOROPROLOL TARTRATE 5 MG: 5 INJECTION, SOLUTION INTRAVENOUS at 06:47

## 2024-08-12 RX ADMIN — HEPARIN SODIUM 5000 UNITS: 5000 INJECTION INTRAVENOUS; SUBCUTANEOUS at 13:57

## 2024-08-12 RX ADMIN — CHOLESTYRAMINE 4 G: 4 POWDER, FOR SUSPENSION ORAL at 11:39

## 2024-08-12 RX ADMIN — TRAMADOL HYDROCHLORIDE 100 MG: 50 TABLET ORAL at 14:30

## 2024-08-12 RX ADMIN — METOPROLOL TARTRATE 25 MG: 25 TABLET, FILM COATED ORAL at 06:02

## 2024-08-12 RX ADMIN — DIPHENOXYLATE HYDROCHLORIDE AND ATROPINE SULFATE 1 TABLET: 2.5; .025 TABLET ORAL at 13:58

## 2024-08-12 ASSESSMENT — PAIN SCALES - GENERAL
PAINLEVEL_OUTOF10: 0

## 2024-08-12 ASSESSMENT — PAIN SCALES - WONG BAKER
WONGBAKER_NUMERICALRESPONSE: NO HURT

## 2024-08-12 ASSESSMENT — PAIN DESCRIPTION - LOCATION: LOCATION: LEG

## 2024-08-12 NOTE — DISCHARGE INSTR - DIET

## 2024-08-13 LAB
ALBUMIN SERPL-MCNC: 2.4 G/DL (ref 3.5–4.6)
ALP SERPL-CCNC: 74 U/L (ref 35–104)
ALT SERPL-CCNC: 6 U/L (ref 0–41)
ANION GAP SERPL CALCULATED.3IONS-SCNC: 18 MEQ/L (ref 9–15)
ANISOCYTOSIS BLD QL SMEAR: ABNORMAL
AST SERPL-CCNC: 13 U/L (ref 0–40)
BASE EXCESS ARTERIAL: -1 (ref -3–3)
BASOPHILS # BLD: 0.1 K/UL (ref 0–0.2)
BASOPHILS NFR BLD: 1 %
BILIRUB DIRECT SERPL-MCNC: <0.2 MG/DL (ref 0–0.4)
BILIRUB INDIRECT SERPL-MCNC: ABNORMAL MG/DL (ref 0–0.6)
BILIRUB SERPL-MCNC: <0.2 MG/DL (ref 0.2–0.7)
BUN SERPL-MCNC: 63 MG/DL (ref 8–23)
CALCIUM IONIZED: 1.1 MMOL/L (ref 1.12–1.32)
CALCIUM SERPL-MCNC: 8 MG/DL (ref 8.5–9.9)
CHLORIDE SERPL-SCNC: 91 MEQ/L (ref 95–107)
CO2 SERPL-SCNC: 19 MEQ/L (ref 20–31)
CORTISOL COLLECTION INFO: ABNORMAL
CORTISOL: 32.2 UG/DL (ref 2.5–19.5)
CREAT SERPL-MCNC: 6.29 MG/DL (ref 0.7–1.2)
DOHLE BOD BLD QL SMEAR: ABNORMAL
EOSINOPHIL # BLD: 0.2 K/UL (ref 0–0.7)
EOSINOPHIL NFR BLD: 2 %
ERYTHROCYTE [DISTWIDTH] IN BLOOD BY AUTOMATED COUNT: 13.8 % (ref 11.5–14.5)
GLUCOSE BLD-MCNC: 136 MG/DL (ref 70–99)
GLUCOSE BLD-MCNC: 148 MG/DL (ref 70–99)
GLUCOSE BLD-MCNC: 148 MG/DL (ref 70–99)
GLUCOSE BLD-MCNC: 154 MG/DL (ref 70–99)
GLUCOSE SERPL-MCNC: 112 MG/DL (ref 70–99)
HCO3 ARTERIAL: 23.5 MMOL/L (ref 21–29)
HCT VFR BLD AUTO: 27 % (ref 41–53)
HCT VFR BLD AUTO: 28.7 % (ref 42–52)
HGB BLD CALC-MCNC: 9.3 GM/DL (ref 13.5–17.5)
HGB BLD-MCNC: 9.3 G/DL (ref 14–18)
HYPOCHROMIA BLD QL SMEAR: ABNORMAL
LACTATE: 2.31 MMOL/L (ref 0.4–2)
LYMPHOCYTES # BLD: 1.5 K/UL (ref 1–4.8)
LYMPHOCYTES NFR BLD: 11 %
MCH RBC QN AUTO: 30.5 PG (ref 27–31.3)
MCHC RBC AUTO-ENTMCNC: 32.4 % (ref 33–37)
MCV RBC AUTO: 94.1 FL (ref 79–92.2)
METAMYELOCYTES NFR BLD MANUAL: 2 %
MONOCYTES # BLD: 1.5 K/UL (ref 0.2–0.8)
MONOCYTES NFR BLD: 12.4 %
MYELOCYTES NFR BLD MANUAL: 3 %
NEUTROPHILS # BLD: 9 K/UL (ref 1.4–6.5)
NEUTS SEG NFR BLD: 68 %
O2 SAT, ARTERIAL: 96 % (ref 93–100)
OVALOCYTES BLD QL SMEAR: ABNORMAL
PCO2 ARTERIAL: 35 MM HG (ref 35–45)
PERFORMED ON: ABNORMAL
PH ARTERIAL: 7.44 (ref 7.35–7.45)
PLATELET # BLD AUTO: 375 K/UL (ref 130–400)
PLATELET BLD QL SMEAR: ABNORMAL
PO2 ARTERIAL: 80 MM HG (ref 75–108)
POC CHLORIDE: 94 MEQ/L (ref 99–110)
POC CREATININE: 5 MG/DL (ref 0.8–1.3)
POC FIO2: 21
POC SAMPLE TYPE: ABNORMAL
POIKILOCYTOSIS BLD QL SMEAR: ABNORMAL
POLYCHROMASIA BLD QL SMEAR: ABNORMAL
POTASSIUM SERPL-SCNC: 4.7 MEQ/L (ref 3.5–5.1)
POTASSIUM SERPL-SCNC: 5 MEQ/L (ref 3.4–4.9)
PROT SERPL-MCNC: 5 G/DL (ref 6.3–8)
RBC # BLD AUTO: 3.05 M/UL (ref 4.7–6.1)
SLIDE REVIEW: ABNORMAL
SMUDGE CELLS BLD QL SMEAR: 9.5
SODIUM BLD-SCNC: 127 MEQ/L (ref 136–145)
SODIUM SERPL-SCNC: 128 MEQ/L (ref 135–144)
TCO2 ARTERIAL: 25 MMOL/L (ref 21–32)
VARIANT LYMPHS NFR BLD: 1 %
WBC # BLD AUTO: 12.3 K/UL (ref 4.8–10.8)

## 2024-08-13 PROCEDURE — 84132 ASSAY OF SERUM POTASSIUM: CPT

## 2024-08-13 PROCEDURE — 82948 REAGENT STRIP/BLOOD GLUCOSE: CPT

## 2024-08-13 PROCEDURE — 90945 DIALYSIS ONE EVALUATION: CPT

## 2024-08-13 PROCEDURE — 80048 BASIC METABOLIC PNL TOTAL CA: CPT

## 2024-08-13 PROCEDURE — 6360000002 HC RX W HCPCS: Performed by: INTERNAL MEDICINE

## 2024-08-13 PROCEDURE — 82330 ASSAY OF CALCIUM: CPT

## 2024-08-13 PROCEDURE — 6360000002 HC RX W HCPCS: Performed by: STUDENT IN AN ORGANIZED HEALTH CARE EDUCATION/TRAINING PROGRAM

## 2024-08-13 PROCEDURE — 84295 ASSAY OF SERUM SODIUM: CPT

## 2024-08-13 PROCEDURE — 6370000000 HC RX 637 (ALT 250 FOR IP): Performed by: INTERNAL MEDICINE

## 2024-08-13 PROCEDURE — 85025 COMPLETE CBC W/AUTO DIFF WBC: CPT

## 2024-08-13 PROCEDURE — 99233 SBSQ HOSP IP/OBS HIGH 50: CPT | Performed by: INTERNAL MEDICINE

## 2024-08-13 PROCEDURE — 6370000000 HC RX 637 (ALT 250 FOR IP): Performed by: STUDENT IN AN ORGANIZED HEALTH CARE EDUCATION/TRAINING PROGRAM

## 2024-08-13 PROCEDURE — 82435 ASSAY OF BLOOD CHLORIDE: CPT

## 2024-08-13 PROCEDURE — 83605 ASSAY OF LACTIC ACID: CPT

## 2024-08-13 PROCEDURE — 99231 SBSQ HOSP IP/OBS SF/LOW 25: CPT | Performed by: PHYSICIAN ASSISTANT

## 2024-08-13 PROCEDURE — 36415 COLL VENOUS BLD VENIPUNCTURE: CPT

## 2024-08-13 PROCEDURE — 99231 SBSQ HOSP IP/OBS SF/LOW 25: CPT | Performed by: PHYSICAL MEDICINE & REHABILITATION

## 2024-08-13 PROCEDURE — 2580000003 HC RX 258: Performed by: INTERNAL MEDICINE

## 2024-08-13 PROCEDURE — 2000000000 HC ICU R&B

## 2024-08-13 PROCEDURE — 2580000003 HC RX 258: Performed by: STUDENT IN AN ORGANIZED HEALTH CARE EDUCATION/TRAINING PROGRAM

## 2024-08-13 PROCEDURE — 85014 HEMATOCRIT: CPT

## 2024-08-13 PROCEDURE — 82803 BLOOD GASES ANY COMBINATION: CPT

## 2024-08-13 PROCEDURE — 99291 CRITICAL CARE FIRST HOUR: CPT | Performed by: INTERNAL MEDICINE

## 2024-08-13 PROCEDURE — P9047 ALBUMIN (HUMAN), 25%, 50ML: HCPCS | Performed by: STUDENT IN AN ORGANIZED HEALTH CARE EDUCATION/TRAINING PROGRAM

## 2024-08-13 PROCEDURE — 99231 SBSQ HOSP IP/OBS SF/LOW 25: CPT | Performed by: INTERNAL MEDICINE

## 2024-08-13 PROCEDURE — 36600 WITHDRAWAL OF ARTERIAL BLOOD: CPT

## 2024-08-13 PROCEDURE — 8010000000 HC HEMODIALYSIS ACUTE INPT

## 2024-08-13 PROCEDURE — 80076 HEPATIC FUNCTION PANEL: CPT

## 2024-08-13 PROCEDURE — 2500000003 HC RX 250 WO HCPCS: Performed by: NURSE PRACTITIONER

## 2024-08-13 PROCEDURE — 82565 ASSAY OF CREATININE: CPT

## 2024-08-13 RX ORDER — SODIUM CHLORIDE 9 MG/ML
INJECTION, SOLUTION INTRAVENOUS
Status: DISPENSED
Start: 2024-08-13 | End: 2024-08-13

## 2024-08-13 RX ORDER — NOREPINEPHRINE BITARTRATE 0.06 MG/ML
1-100 INJECTION, SOLUTION INTRAVENOUS CONTINUOUS
Status: DISCONTINUED | OUTPATIENT
Start: 2024-08-13 | End: 2024-08-15

## 2024-08-13 RX ORDER — ALBUMIN (HUMAN) 12.5 G/50ML
25 SOLUTION INTRAVENOUS ONCE
Status: COMPLETED | OUTPATIENT
Start: 2024-08-13 | End: 2024-08-13

## 2024-08-13 RX ORDER — HEPARIN SODIUM 1000 [USP'U]/ML
INJECTION, SOLUTION INTRAVENOUS; SUBCUTANEOUS PRN
Status: DISCONTINUED | OUTPATIENT
Start: 2024-08-13 | End: 2024-08-15

## 2024-08-13 RX ORDER — 0.9 % SODIUM CHLORIDE 0.9 %
500 INTRAVENOUS SOLUTION INTRAVENOUS ONCE
Status: COMPLETED | OUTPATIENT
Start: 2024-08-13 | End: 2024-08-13

## 2024-08-13 RX ORDER — TRAMADOL HYDROCHLORIDE 50 MG/1
100 TABLET ORAL DAILY PRN
Status: DISCONTINUED | OUTPATIENT
Start: 2024-08-13 | End: 2024-08-17

## 2024-08-13 RX ORDER — 0.9 % SODIUM CHLORIDE 0.9 %
1000 INTRAVENOUS SOLUTION INTRAVENOUS
Status: DISPENSED | OUTPATIENT
Start: 2024-08-13 | End: 2024-08-14

## 2024-08-13 RX ADMIN — ALBUMIN (HUMAN) 25 G: 0.25 INJECTION, SOLUTION INTRAVENOUS at 09:45

## 2024-08-13 RX ADMIN — DIPHENOXYLATE HYDROCHLORIDE AND ATROPINE SULFATE 1 TABLET: 2.5; .025 TABLET ORAL at 21:55

## 2024-08-13 RX ADMIN — Medication: at 22:59

## 2024-08-13 RX ADMIN — Medication 1000 ML: at 22:45

## 2024-08-13 RX ADMIN — DIPHENOXYLATE HYDROCHLORIDE AND ATROPINE SULFATE 1 TABLET: 2.5; .025 TABLET ORAL at 08:23

## 2024-08-13 RX ADMIN — AMIODARONE HYDROCHLORIDE 1 MG/MIN: 50 INJECTION, SOLUTION INTRAVENOUS at 23:42

## 2024-08-13 RX ADMIN — HEPARIN SODIUM 2000 UNITS: 1000 INJECTION INTRAVENOUS; SUBCUTANEOUS at 10:30

## 2024-08-13 RX ADMIN — MIDODRINE HYDROCHLORIDE 15 MG: 10 TABLET ORAL at 12:20

## 2024-08-13 RX ADMIN — MIDODRINE HYDROCHLORIDE 15 MG: 10 TABLET ORAL at 08:23

## 2024-08-13 RX ADMIN — HEPARIN SODIUM 5000 UNITS: 5000 INJECTION INTRAVENOUS; SUBCUTANEOUS at 21:55

## 2024-08-13 RX ADMIN — SODIUM BICARBONATE 650 MG TABLET 650 MG: at 08:23

## 2024-08-13 RX ADMIN — VALACYCLOVIR HYDROCHLORIDE 500 MG: 1 TABLET, FILM COATED ORAL at 20:06

## 2024-08-13 RX ADMIN — TRAMADOL HYDROCHLORIDE 100 MG: 50 TABLET ORAL at 05:58

## 2024-08-13 RX ADMIN — CHOLESTYRAMINE 4 G: 4 POWDER, FOR SUSPENSION ORAL at 23:00

## 2024-08-13 RX ADMIN — METOPROLOL TARTRATE 25 MG: 25 TABLET, FILM COATED ORAL at 05:48

## 2024-08-13 RX ADMIN — HEPARIN SODIUM 5000 UNITS: 5000 INJECTION INTRAVENOUS; SUBCUTANEOUS at 14:55

## 2024-08-13 RX ADMIN — AMIODARONE HYDROCHLORIDE 150 MG: 50 INJECTION, SOLUTION INTRAVENOUS at 23:22

## 2024-08-13 RX ADMIN — HEPARIN SODIUM 5000 UNITS: 5000 INJECTION INTRAVENOUS; SUBCUTANEOUS at 05:48

## 2024-08-13 RX ADMIN — SODIUM CHLORIDE 500 ML: 9 INJECTION, SOLUTION INTRAVENOUS at 16:36

## 2024-08-13 RX ADMIN — SODIUM BICARBONATE 650 MG TABLET 650 MG: at 21:55

## 2024-08-13 RX ADMIN — Medication 5 MG: at 21:55

## 2024-08-13 RX ADMIN — Medication 4 MCG/MIN: at 16:35

## 2024-08-13 RX ADMIN — MIDODRINE HYDROCHLORIDE 15 MG: 10 TABLET ORAL at 17:58

## 2024-08-13 ASSESSMENT — PAIN SCALES - GENERAL
PAINLEVEL_OUTOF10: 0

## 2024-08-13 ASSESSMENT — PAIN SCALES - WONG BAKER
WONGBAKER_NUMERICALRESPONSE: NO HURT

## 2024-08-13 NOTE — ACP (ADVANCE CARE PLANNING)
Advance Care Planning     Advance Care Planning Inpatient Note  Spiritual Care Department    Today's Date: 8/13/2024  Unit: [facility] 1W TELEMETRY    Received request from HealthCare Provider.  Upon review of chart and communication with care team, patient's decision making abilities are not in question.. Patient was/were present in the room during visit.Conversation with spouse afterward in person.     Goals of ACP Conversation:  Facilitate a discussion related to patient's goals of care as they align with the patient's values and beliefs.    Health Care Decision Makers:       Primary Decision Maker: JosegageCoby - Spouse - [phone]  Summary:  Verified Healthcare Decision Maker    Advance Care Planning Documents (Patient Wishes):  Healthcare Power of /Advance Directive Appointment of Health Care Agent     Assessment:  Please see consult note. Pt and his wife begin to come together to let go of the need for aggressive care/treatment more and more. Now with a limited code status, and unsuccessful dialysis, they welcome palliative care to discuss further choices.     Interventions:  Encouraged ongoing ACP conversation with future decision makers and loved ones    Care Preferences Communicated:   Discussion continuing with pt and family    Outcomes/Plan:  Palliative care visit strongly desired by family today.     Electronically signed by ERIK Rodriguez on 8/13/2024 at 12:00 PM

## 2024-08-13 NOTE — DIALYSIS
unable to complete hemodialysis d/t hypotension. Dr. Robert notified. Received Albumin & normal saline during treatment.102ml removed. Please see dialysis flowsheet for treatment details

## 2024-08-14 ENCOUNTER — ANESTHESIA EVENT (OUTPATIENT)
Age: 79
End: 2024-08-14
Payer: MEDICARE

## 2024-08-14 ENCOUNTER — APPOINTMENT (OUTPATIENT)
Dept: GENERAL RADIOLOGY | Age: 79
DRG: 673 | End: 2024-08-14
Attending: INTERNAL MEDICINE
Payer: MEDICARE

## 2024-08-14 ENCOUNTER — PREP FOR PROCEDURE (OUTPATIENT)
Dept: SURGERY | Age: 79
End: 2024-08-14

## 2024-08-14 ENCOUNTER — ANESTHESIA (OUTPATIENT)
Age: 79
End: 2024-08-14
Payer: MEDICARE

## 2024-08-14 ENCOUNTER — HOSPITAL ENCOUNTER (INPATIENT)
Age: 79
Discharge: HOME | DRG: 673 | End: 2024-08-16
Attending: INTERNAL MEDICINE
Payer: MEDICARE

## 2024-08-14 VITALS
SYSTOLIC BLOOD PRESSURE: 76 MMHG | HEIGHT: 73 IN | DIASTOLIC BLOOD PRESSURE: 57 MMHG | WEIGHT: 250 LBS | BODY MASS INDEX: 33.13 KG/M2

## 2024-08-14 PROBLEM — I48.19 PERSISTENT ATRIAL FIBRILLATION WITH RVR (HCC): Status: ACTIVE | Noted: 2024-07-22

## 2024-08-14 PROBLEM — R57.9 SHOCK (HCC): Status: ACTIVE | Noted: 2024-08-14

## 2024-08-14 PROBLEM — T82.9XXA COMPLICATION OF CENTRAL VENOUS CATHETER: Status: ACTIVE | Noted: 2024-08-14

## 2024-08-14 LAB
ANION GAP SERPL CALCULATED.3IONS-SCNC: 19 MEQ/L (ref 9–15)
ANISOCYTOSIS BLD QL SMEAR: ABNORMAL
ANTI-XA UNFRAC HEPARIN: <0.1 IU/ML
BASOPHILS # BLD: 0 K/UL (ref 0–0.2)
BASOPHILS NFR BLD: 0.5 %
BUN SERPL-MCNC: 54 MG/DL (ref 8–23)
BURR CELLS: ABNORMAL
CALCIUM SERPL-MCNC: 7.6 MG/DL (ref 8.5–9.9)
CHLORIDE SERPL-SCNC: 92 MEQ/L (ref 95–107)
CO2 SERPL-SCNC: 19 MEQ/L (ref 20–31)
CREAT SERPL-MCNC: 5.05 MG/DL (ref 0.7–1.2)
DOHLE BOD BLD QL SMEAR: ABNORMAL
EKG ATRIAL RATE: 89 BPM
EKG P AXIS: 35 DEGREES
EKG P-R INTERVAL: 190 MS
EKG Q-T INTERVAL: 376 MS
EKG QRS DURATION: 96 MS
EKG QTC CALCULATION (BAZETT): 457 MS
EKG R AXIS: -35 DEGREES
EKG T AXIS: 113 DEGREES
EKG VENTRICULAR RATE: 89 BPM
EOSINOPHIL # BLD: 0 K/UL (ref 0–0.7)
EOSINOPHIL NFR BLD: 0.5 %
ERYTHROCYTE [DISTWIDTH] IN BLOOD BY AUTOMATED COUNT: 14.1 % (ref 11.5–14.5)
GLUCOSE BLD-MCNC: 197 MG/DL (ref 70–99)
GLUCOSE BLD-MCNC: 200 MG/DL (ref 70–99)
GLUCOSE BLD-MCNC: 213 MG/DL (ref 70–99)
GLUCOSE SERPL-MCNC: 187 MG/DL (ref 70–99)
HCT VFR BLD AUTO: 26.1 % (ref 42–52)
HGB BLD-MCNC: 8.4 G/DL (ref 14–18)
HYPOCHROMIA BLD QL SMEAR: ABNORMAL
LYMPHOCYTES # BLD: 1.2 K/UL (ref 1–4.8)
LYMPHOCYTES NFR BLD: 6 %
MACROCYTES BLD QL SMEAR: ABNORMAL
MCH RBC QN AUTO: 30.9 PG (ref 27–31.3)
MCHC RBC AUTO-ENTMCNC: 32.2 % (ref 33–37)
MCV RBC AUTO: 96 FL (ref 79–92.2)
METAMYELOCYTES NFR BLD MANUAL: 6 %
MONOCYTES # BLD: 1.5 K/UL (ref 0.2–0.8)
MONOCYTES NFR BLD: 9.7 %
MYELOCYTES NFR BLD MANUAL: 6 %
NEUTROPHILS # BLD: 12.6 K/UL (ref 1.4–6.5)
NEUTS BAND NFR BLD MANUAL: 1 %
NEUTS SEG NFR BLD: 70 %
NRBC BLD-RTO: 1 /100 WBC
PATH INTERP BLD-IMP: YES
PERFORMED ON: ABNORMAL
PLATELET # BLD AUTO: 328 K/UL (ref 130–400)
PLATELET BLD QL SMEAR: ADEQUATE
POIKILOCYTOSIS BLD QL SMEAR: ABNORMAL
POLYCHROMASIA BLD QL SMEAR: ABNORMAL
POTASSIUM SERPL-SCNC: 4.8 MEQ/L (ref 3.4–4.9)
PROCALCITONIN SERPL IA-MCNC: 1.4 NG/ML (ref 0–0.15)
RBC # BLD AUTO: 2.72 M/UL (ref 4.7–6.1)
SLIDE REVIEW: ABNORMAL
SMUDGE CELLS BLD QL SMEAR: 14.6
SODIUM SERPL-SCNC: 130 MEQ/L (ref 135–144)
TOXIC GRANULATION: ABNORMAL
VARIANT LYMPHS NFR BLD: 2 %
WBC # BLD AUTO: 15.2 K/UL (ref 4.8–10.8)

## 2024-08-14 PROCEDURE — 93312 ECHO TRANSESOPHAGEAL: CPT | Performed by: INTERNAL MEDICINE

## 2024-08-14 PROCEDURE — 2580000003 HC RX 258: Performed by: INTERNAL MEDICINE

## 2024-08-14 PROCEDURE — 87040 BLOOD CULTURE FOR BACTERIA: CPT

## 2024-08-14 PROCEDURE — 3E033XZ INTRODUCTION OF VASOPRESSOR INTO PERIPHERAL VEIN, PERCUTANEOUS APPROACH: ICD-10-PCS | Performed by: INTERNAL MEDICINE

## 2024-08-14 PROCEDURE — 99231 SBSQ HOSP IP/OBS SF/LOW 25: CPT | Performed by: PHYSICIAN ASSISTANT

## 2024-08-14 PROCEDURE — 76937 US GUIDE VASCULAR ACCESS: CPT | Performed by: INTERNAL MEDICINE

## 2024-08-14 PROCEDURE — 93312 ECHO TRANSESOPHAGEAL: CPT

## 2024-08-14 PROCEDURE — 85025 COMPLETE CBC W/AUTO DIFF WBC: CPT

## 2024-08-14 PROCEDURE — 92960 CARDIOVERSION ELECTRIC EXT: CPT | Performed by: INTERNAL MEDICINE

## 2024-08-14 PROCEDURE — 6360000002 HC RX W HCPCS: Performed by: INTERNAL MEDICINE

## 2024-08-14 PROCEDURE — 71045 X-RAY EXAM CHEST 1 VIEW: CPT

## 2024-08-14 PROCEDURE — B24BZZ4 ULTRASONOGRAPHY OF HEART WITH AORTA, TRANSESOPHAGEAL: ICD-10-PCS | Performed by: INTERNAL MEDICINE

## 2024-08-14 PROCEDURE — 80048 BASIC METABOLIC PNL TOTAL CA: CPT

## 2024-08-14 PROCEDURE — 93325 DOPPLER ECHO COLOR FLOW MAPG: CPT | Performed by: INTERNAL MEDICINE

## 2024-08-14 PROCEDURE — 36556 INSERT NON-TUNNEL CV CATH: CPT | Performed by: INTERNAL MEDICINE

## 2024-08-14 PROCEDURE — 93320 DOPPLER ECHO COMPLETE: CPT | Performed by: INTERNAL MEDICINE

## 2024-08-14 PROCEDURE — 3700000001 HC ADD 15 MINUTES (ANESTHESIA)

## 2024-08-14 PROCEDURE — 93005 ELECTROCARDIOGRAM TRACING: CPT | Performed by: INTERNAL MEDICINE

## 2024-08-14 PROCEDURE — 6360000002 HC RX W HCPCS

## 2024-08-14 PROCEDURE — 2000000000 HC ICU R&B

## 2024-08-14 PROCEDURE — 6370000000 HC RX 637 (ALT 250 FOR IP): Performed by: STUDENT IN AN ORGANIZED HEALTH CARE EDUCATION/TRAINING PROGRAM

## 2024-08-14 PROCEDURE — 2580000003 HC RX 258

## 2024-08-14 PROCEDURE — 02HV33Z INSERTION OF INFUSION DEVICE INTO SUPERIOR VENA CAVA, PERCUTANEOUS APPROACH: ICD-10-PCS | Performed by: INTERNAL MEDICINE

## 2024-08-14 PROCEDURE — 85520 HEPARIN ASSAY: CPT

## 2024-08-14 PROCEDURE — 2580000003 HC RX 258: Performed by: STUDENT IN AN ORGANIZED HEALTH CARE EDUCATION/TRAINING PROGRAM

## 2024-08-14 PROCEDURE — 6360000002 HC RX W HCPCS: Performed by: STUDENT IN AN ORGANIZED HEALTH CARE EDUCATION/TRAINING PROGRAM

## 2024-08-14 PROCEDURE — 99231 SBSQ HOSP IP/OBS SF/LOW 25: CPT | Performed by: PHYSICAL MEDICINE & REHABILITATION

## 2024-08-14 PROCEDURE — 2580000003 HC RX 258: Performed by: COLON & RECTAL SURGERY

## 2024-08-14 PROCEDURE — 99291 CRITICAL CARE FIRST HOUR: CPT | Performed by: INTERNAL MEDICINE

## 2024-08-14 PROCEDURE — 84145 PROCALCITONIN (PCT): CPT

## 2024-08-14 PROCEDURE — 5A2204Z RESTORATION OF CARDIAC RHYTHM, SINGLE: ICD-10-PCS | Performed by: INTERNAL MEDICINE

## 2024-08-14 PROCEDURE — 3700000000 HC ANESTHESIA ATTENDED CARE

## 2024-08-14 PROCEDURE — 36556 INSERT NON-TUNNEL CV CATH: CPT

## 2024-08-14 PROCEDURE — 99233 SBSQ HOSP IP/OBS HIGH 50: CPT | Performed by: INTERNAL MEDICINE

## 2024-08-14 PROCEDURE — 6370000000 HC RX 637 (ALT 250 FOR IP): Performed by: INTERNAL MEDICINE

## 2024-08-14 PROCEDURE — 36415 COLL VENOUS BLD VENIPUNCTURE: CPT

## 2024-08-14 RX ORDER — SODIUM CHLORIDE 9 MG/ML
INJECTION, SOLUTION INTRAVENOUS PRN
Status: DISCONTINUED | OUTPATIENT
Start: 2024-08-14 | End: 2024-08-15 | Stop reason: HOSPADM

## 2024-08-14 RX ORDER — SODIUM CHLORIDE 9 MG/ML
INJECTION, SOLUTION INTRAVENOUS PRN
Status: CANCELLED | OUTPATIENT
Start: 2024-08-14

## 2024-08-14 RX ORDER — SODIUM CHLORIDE 0.9 % (FLUSH) 0.9 %
5-40 SYRINGE (ML) INJECTION PRN
Status: DISCONTINUED | OUTPATIENT
Start: 2024-08-14 | End: 2024-08-15 | Stop reason: HOSPADM

## 2024-08-14 RX ORDER — HEPARIN SODIUM 1000 [USP'U]/ML
INJECTION, SOLUTION INTRAVENOUS; SUBCUTANEOUS PRN
Status: DISCONTINUED | OUTPATIENT
Start: 2024-08-14 | End: 2024-08-15 | Stop reason: SDUPTHER

## 2024-08-14 RX ORDER — SODIUM CHLORIDE 0.9 % (FLUSH) 0.9 %
5-40 SYRINGE (ML) INJECTION PRN
Status: CANCELLED | OUTPATIENT
Start: 2024-08-14

## 2024-08-14 RX ORDER — ONDANSETRON 2 MG/ML
4 INJECTION INTRAMUSCULAR; INTRAVENOUS
Status: CANCELLED | OUTPATIENT
Start: 2024-08-14 | End: 2024-08-15

## 2024-08-14 RX ORDER — SODIUM CHLORIDE 0.9 % (FLUSH) 0.9 %
5-40 SYRINGE (ML) INJECTION EVERY 12 HOURS SCHEDULED
Status: DISCONTINUED | OUTPATIENT
Start: 2024-08-14 | End: 2024-08-15 | Stop reason: HOSPADM

## 2024-08-14 RX ORDER — SODIUM CHLORIDE 0.9 % (FLUSH) 0.9 %
5-40 SYRINGE (ML) INJECTION EVERY 12 HOURS SCHEDULED
Status: CANCELLED | OUTPATIENT
Start: 2024-08-14

## 2024-08-14 RX ORDER — PROPOFOL 10 MG/ML
INJECTION, EMULSION INTRAVENOUS PRN
Status: DISCONTINUED | OUTPATIENT
Start: 2024-08-14 | End: 2024-08-14 | Stop reason: SDUPTHER

## 2024-08-14 RX ORDER — 0.9 % SODIUM CHLORIDE 0.9 %
250 INTRAVENOUS SOLUTION INTRAVENOUS ONCE
Status: DISCONTINUED | OUTPATIENT
Start: 2024-08-14 | End: 2024-08-20 | Stop reason: HOSPADM

## 2024-08-14 RX ORDER — HEPARIN SODIUM 1000 [USP'U]/ML
INJECTION, SOLUTION INTRAVENOUS; SUBCUTANEOUS PRN
Status: DISCONTINUED | OUTPATIENT
Start: 2024-08-14 | End: 2024-08-15

## 2024-08-14 RX ORDER — LIDOCAINE HYDROCHLORIDE 10 MG/ML
1 INJECTION, SOLUTION EPIDURAL; INFILTRATION; INTRACAUDAL; PERINEURAL
Status: DISCONTINUED | OUTPATIENT
Start: 2024-08-14 | End: 2024-08-15 | Stop reason: HOSPADM

## 2024-08-14 RX ORDER — ONDANSETRON 2 MG/ML
4 INJECTION INTRAMUSCULAR; INTRAVENOUS
Status: DISCONTINUED | OUTPATIENT
Start: 2024-08-14 | End: 2024-08-15 | Stop reason: HOSPADM

## 2024-08-14 RX ORDER — NALOXONE HYDROCHLORIDE 0.4 MG/ML
INJECTION, SOLUTION INTRAMUSCULAR; INTRAVENOUS; SUBCUTANEOUS PRN
Status: DISCONTINUED | OUTPATIENT
Start: 2024-08-14 | End: 2024-08-15 | Stop reason: HOSPADM

## 2024-08-14 RX ORDER — LIDOCAINE HYDROCHLORIDE 10 MG/ML
1 INJECTION, SOLUTION EPIDURAL; INFILTRATION; INTRACAUDAL; PERINEURAL
Status: CANCELLED | OUTPATIENT
Start: 2024-08-14 | End: 2024-08-15

## 2024-08-14 RX ORDER — NALOXONE HYDROCHLORIDE 0.4 MG/ML
INJECTION, SOLUTION INTRAMUSCULAR; INTRAVENOUS; SUBCUTANEOUS PRN
Status: CANCELLED | OUTPATIENT
Start: 2024-08-14

## 2024-08-14 RX ADMIN — ONDANSETRON 4 MG: 2 INJECTION INTRAMUSCULAR; INTRAVENOUS at 20:53

## 2024-08-14 RX ADMIN — PROPOFOL 50 MG: 10 INJECTION, EMULSION INTRAVENOUS at 12:28

## 2024-08-14 RX ADMIN — HEPARIN SODIUM 5000 UNITS: 5000 INJECTION INTRAVENOUS; SUBCUTANEOUS at 06:29

## 2024-08-14 RX ADMIN — MIDODRINE HYDROCHLORIDE 15 MG: 10 TABLET ORAL at 16:40

## 2024-08-14 RX ADMIN — SODIUM CHLORIDE, PRESERVATIVE FREE 10 ML: 5 INJECTION INTRAVENOUS at 20:55

## 2024-08-14 RX ADMIN — ALTEPLASE 2 MG: 2.2 INJECTION, POWDER, LYOPHILIZED, FOR SOLUTION INTRAVENOUS at 04:36

## 2024-08-14 RX ADMIN — ALTEPLASE 2 MG: 2.2 INJECTION, POWDER, LYOPHILIZED, FOR SOLUTION INTRAVENOUS at 04:32

## 2024-08-14 RX ADMIN — SODIUM BICARBONATE 650 MG TABLET 650 MG: at 21:10

## 2024-08-14 RX ADMIN — PIPERACILLIN AND TAZOBACTAM 3375 MG: 3; .375 INJECTION, POWDER, LYOPHILIZED, FOR SOLUTION INTRAVENOUS at 20:52

## 2024-08-14 RX ADMIN — HEPARIN SODIUM 5000 UNITS: 5000 INJECTION INTRAVENOUS; SUBCUTANEOUS at 22:35

## 2024-08-14 RX ADMIN — CHOLESTYRAMINE 4 G: 4 POWDER, FOR SUSPENSION ORAL at 09:46

## 2024-08-14 RX ADMIN — Medication: at 08:31

## 2024-08-14 RX ADMIN — HEPARIN SODIUM 5000 UNITS: 5000 INJECTION INTRAVENOUS; SUBCUTANEOUS at 14:09

## 2024-08-14 RX ADMIN — TRAMADOL HYDROCHLORIDE 100 MG: 50 TABLET ORAL at 07:33

## 2024-08-14 RX ADMIN — Medication: at 08:30

## 2024-08-14 RX ADMIN — HEPARIN SODIUM 2300 UNITS: 1000 INJECTION INTRAVENOUS; SUBCUTANEOUS at 03:16

## 2024-08-14 RX ADMIN — Medication 5 MG: at 20:53

## 2024-08-14 RX ADMIN — HEPARIN SODIUM: 5000 INJECTION INTRAVENOUS; SUBCUTANEOUS at 08:32

## 2024-08-14 RX ADMIN — SODIUM CHLORIDE 250 ML: 9 INJECTION, SOLUTION INTRAVENOUS at 13:04

## 2024-08-14 RX ADMIN — HEPARIN SODIUM 2200 UNITS: 1000 INJECTION INTRAVENOUS; SUBCUTANEOUS at 03:21

## 2024-08-14 RX ADMIN — CHOLESTYRAMINE 4 G: 4 POWDER, FOR SUSPENSION ORAL at 21:13

## 2024-08-14 RX ADMIN — DIPHENOXYLATE HYDROCHLORIDE AND ATROPINE SULFATE 1 TABLET: 2.5; .025 TABLET ORAL at 20:53

## 2024-08-14 RX ADMIN — PROPOFOL 50 MG: 10 INJECTION, EMULSION INTRAVENOUS at 12:35

## 2024-08-14 RX ADMIN — PIPERACILLIN AND TAZOBACTAM 4500 MG: 4; .5 INJECTION, POWDER, LYOPHILIZED, FOR SOLUTION INTRAVENOUS at 14:15

## 2024-08-14 RX ADMIN — VANCOMYCIN HYDROCHLORIDE 2500 MG: 500 INJECTION, POWDER, LYOPHILIZED, FOR SOLUTION INTRAVENOUS at 13:11

## 2024-08-14 RX ADMIN — VALACYCLOVIR HYDROCHLORIDE 500 MG: 1 TABLET, FILM COATED ORAL at 21:11

## 2024-08-14 RX ADMIN — SODIUM CHLORIDE, PRESERVATIVE FREE 10 ML: 5 INJECTION INTRAVENOUS at 20:54

## 2024-08-14 RX ADMIN — PROPOFOL 50 MG: 10 INJECTION, EMULSION INTRAVENOUS at 12:21

## 2024-08-14 RX ADMIN — AMIODARONE HYDROCHLORIDE 0.5 MG/MIN: 50 INJECTION, SOLUTION INTRAVENOUS at 07:49

## 2024-08-14 RX ADMIN — MIDODRINE HYDROCHLORIDE 15 MG: 10 TABLET ORAL at 07:33

## 2024-08-14 RX ADMIN — HEPARIN SODIUM 2000 UNITS: 1000 INJECTION INTRAVENOUS; SUBCUTANEOUS at 14:48

## 2024-08-14 RX ADMIN — PHENYLEPHRINE HYDROCHLORIDE 100 MCG: 10 INJECTION INTRAVENOUS at 12:31

## 2024-08-14 RX ADMIN — SODIUM BICARBONATE 650 MG TABLET 650 MG: at 07:33

## 2024-08-14 RX ADMIN — PROPOFOL 50 MG: 10 INJECTION, EMULSION INTRAVENOUS at 12:24

## 2024-08-14 RX ADMIN — INSULIN LISPRO 1 UNITS: 100 INJECTION, SOLUTION INTRAVENOUS; SUBCUTANEOUS at 16:40

## 2024-08-14 ASSESSMENT — ENCOUNTER SYMPTOMS: SHORTNESS OF BREATH: 1

## 2024-08-14 ASSESSMENT — PAIN SCALES - GENERAL
PAINLEVEL_OUTOF10: 0

## 2024-08-14 NOTE — FLOWSHEET NOTE
Pt was a transfer from the floor about 1600. Pt has had two failed attempts in the last two days for hemodialysis due to severe hypotension during treatment. It is the patients wishes as well as his spouse to be transferred to the ICU for vasopressors and CRRT to see if renal function can be restored. Pt is oriented x4 and generally drowsy. Pt follows all commands after a few prompts. Pt is on room air. Pt has about a 4+ pitting edema in his lower extremities. Will continue to follow pt condition.

## 2024-08-14 NOTE — ANESTHESIA POSTPROCEDURE EVALUATION
Department of Anesthesiology  Postprocedure Note    Patient: Km Garcia  MRN: [id number]  YOB: 1945  Date of evaluation: 8/14/2024    Procedure Summary       Date: 08/14/24 Room / Location: [facility] Cardiac Cath/EP Lab    Anesthesia Start: 1214 Anesthesia Stop:     Procedure: JEANNIE (PRN CONTRAST/BUBBLE/3D) Diagnosis:       Atrial fibrillation, unspecified type (HCC)            Dizziness      SILVINA (acute kidney injury) ([facility])    Scheduled Providers:  Responsible Provider: Collin Mcgill DO    Anesthesia Type: MAC ASA Status: 3            Anesthesia Type: MAC    Micaela Phase I:   10    Micaela Phase II:      Anesthesia Post Evaluation    Patient location during evaluation: bedside  Patient participation: complete - patient participated  Level of consciousness: awake and awake and alert  Pain score: 1  Airway patency: patent  Nausea & Vomiting: no nausea and no vomiting  Cardiovascular status: blood pressure returned to baseline and hemodynamically stable  Respiratory status: acceptable  Hydration status: euvolemic  Pain management: adequate        No notable events documented.

## 2024-08-14 NOTE — BRIEF OP NOTE
Brief post JEANNIE Cardioversion Note    Date: 8/14/24    Procedure: JEANNIE guided cardioversion     Sedation: 200 mg IV propofol via Anesthesia    Complications: None    EBL: None    Findings:   No thrombus in the left atrium or left atrial appendage  Mild left atrial enlargement  Mild to moderate mitral regurgitation   Hypermobile intra atrial septum (mostly with respirations) with no definitive PFO or ASD by bubble study or color flow  Mild tricuspid regurgitation   Low normal LVEF 50%    DCCV: Initial shock at 250 J unsuccessful. Repeat shock at 300 J successful with conversion to sinus rhythm with PACs/PVCs.     Recommendations:  Continue IV amiodarone.  Case discussed with Dr. Obando. Continue SQ heparin for now.   Transition back to Eliquis low dose if/when he can tolerate.           Thank you for allowing me to participate in your patient's cardiac care. Should you have questions, please do not hesitate to contact me.     Vivien Lam DO, FACC, FACOI  [facility]

## 2024-08-14 NOTE — PROCEDURES
PROCEDURE NOTE  Date: 8/14/2024   Name: Km Garcia  YOB: 1945    Procedures    Internal jugular central venous catheter; Ultrasound guided.  [id number]                                                             [id number]    INDICATION:   Sepsis      CONSENT:  The spouse was counseled regarding the procedure, it's indications, risks, potential complications and alternatives and any questions were answered. Consent was obtained.      PROCEDURE SUMMARY:   A time-out was performed. The patient's left neck region was prepped and draped in sterile fashion. The left internal jugular vein was accessed under ultrasound guidance using a finder needle and sheath. U/S images were permanently documented.Anesthesia was achieved with 1% lidocaine. The finder needle was inserted into the left neck under direct ultrasound guidance, and venous blood was withdrawn. The introducer needle was then inserted under direct ultrasound guidance and venous blood was withdrawn into the syringe. The syringe was removed and the guidewire was advanced through the introducer needle. A small incision was made with a scalpel and the introducer needle was removed. A dilator was advanced over the guidewire until appropriate dilation was obtained. The dilator was removed and an central venous  catheter was advanced over the guidewire and secured into place with 2 sutures at 20 cm, the line terminates in the superior vena cava central venous system. At time of procedure completion, all ports aspirated and flushed properly.       Estimated Blood loss   less than 5 cc    COMPLICATIONS:  None

## 2024-08-14 NOTE — ANESTHESIA PRE PROCEDURE
Department of Anesthesiology  Preprocedure Note       Name:  Km Garcia   Age:  79 y.o.  :  1945                                          MRN:  [id number]         Date:  2024      Surgeon: Dr. Lam    Procedure: JEANNIE, cardioversion    Medications prior to admission:   Prior to Admission medications    Medication Sig Start Date End Date Taking? Authorizing Provider   acetaminophen (TYLENOL) 500 MG tablet Take 1 tablet by mouth as needed for Pain    Weston Briseno MD   albuterol (PROVENTIL) (2.5 MG/3ML) 0.083% nebulizer solution Take 3 mLs by nebulization as needed for Wheezing    Weston Briseno MD   carvedilol (COREG) 25 MG tablet Take 1 tablet by mouth 2 times daily (with meals)    Weston Briseno MD   enoxaparin (LOVENOX) 120 MG/0.8ML injection Inject into the skin 2 times daily  Patient not taking: Reported on 8/3/2024    Weston Briseno MD   furosemide (LASIX) 20 MG tablet Take 1 tablet by mouth daily 24   Weston Briseno MD   hydrALAZINE (APRESOLINE) 25 MG tablet Take 1 tablet by mouth as needed (prn if SBP >120)  Patient not taking: Reported on 8/3/2024    Weston Briseno MD   zinc oxide (TRIAD HYDROPHILIC) PSTE paste Apply topically daily    Weston Briseno MD   ibrutinib (IMBRUVICA) 280 MG tablet Take 2 tablets by mouth daily  Patient not taking: Reported on 8/3/2024    Weston Briseno MD   empagliflozin (JARDIANCE) 25 MG tablet Take 1 tablet by mouth daily  Patient not taking: Reported on 8/3/2024    Weston Briseno MD   lenalidomide (REVLIMID) 10 MG chemo capsule Take 1 capsule by mouth daily    Weston Briseno MD   ondansetron (ZOFRAN-ODT) 4 MG disintegrating tablet Take 1 tablet by mouth every 12 hours as needed for Nausea or Vomiting  Patient not taking: Reported on 8/3/2024    Weston Briseno MD   sacubitril-valsartan (ENTRESTO) 49-51 MG per tablet Take 1 tablet by mouth 2 times daily    Weston Briseno MD

## 2024-08-14 NOTE — DIALYSIS
CRRT restarted in CVVH mode with anticoagulation. Lines reversed d/t inability to aspirate arterial lumen. Please see flowsheet for pressure recordings

## 2024-08-15 ENCOUNTER — ANESTHESIA EVENT (OUTPATIENT)
Dept: OPERATING ROOM | Age: 79
End: 2024-08-15
Payer: MEDICARE

## 2024-08-15 ENCOUNTER — APPOINTMENT (OUTPATIENT)
Dept: GENERAL RADIOLOGY | Age: 79
DRG: 673 | End: 2024-08-15
Attending: INTERNAL MEDICINE
Payer: MEDICARE

## 2024-08-15 ENCOUNTER — ANESTHESIA (OUTPATIENT)
Dept: OPERATING ROOM | Age: 79
End: 2024-08-15
Payer: MEDICARE

## 2024-08-15 PROBLEM — Z51.5 PALLIATIVE CARE ENCOUNTER: Status: ACTIVE | Noted: 2024-08-15

## 2024-08-15 PROBLEM — Z71.89 ADVANCED CARE PLANNING/COUNSELING DISCUSSION: Status: ACTIVE | Noted: 2024-08-15

## 2024-08-15 PROBLEM — Z71.89 GOALS OF CARE, COUNSELING/DISCUSSION: Status: ACTIVE | Noted: 2024-08-15

## 2024-08-15 LAB
A BAUMANNII DNA BLD POS QL NAA+NON-PROBE: NOT DETECTED
ACANTHOCYTES BLD QL SMEAR: ABNORMAL
ALBUMIN SERPL-MCNC: 2.3 G/DL (ref 3.5–4.6)
ALBUMIN SERPL-MCNC: 2.5 G/DL (ref 3.5–4.6)
ALP SERPL-CCNC: 74 U/L (ref 35–104)
ALP SERPL-CCNC: 79 U/L (ref 35–104)
ALT SERPL-CCNC: <5 U/L (ref 0–41)
ALT SERPL-CCNC: <5 U/L (ref 0–41)
ANION GAP SERPL CALCULATED.3IONS-SCNC: 12 MEQ/L (ref 9–15)
ANION GAP SERPL CALCULATED.3IONS-SCNC: 15 MEQ/L (ref 9–15)
ANTI-XA UNFRAC HEPARIN: <0.1 IU/ML
ANTI-XA UNFRAC HEPARIN: <0.1 IU/ML
AST SERPL-CCNC: 10 U/L (ref 0–40)
AST SERPL-CCNC: 10 U/L (ref 0–40)
BACTERIA BLD CULT ORG #2: ABNORMAL
BASOPHILS # BLD: 0 K/UL (ref 0–0.2)
BASOPHILS NFR BLD: 0.3 %
BILIRUB SERPL-MCNC: <0.2 MG/DL (ref 0.2–0.7)
BILIRUB SERPL-MCNC: <0.2 MG/DL (ref 0.2–0.7)
BUN SERPL-MCNC: 47 MG/DL (ref 8–23)
BUN SERPL-MCNC: 54 MG/DL (ref 8–23)
BURR CELLS: ABNORMAL
C ALBICANS DNA BLD POS QL NAA+NON-PROBE: NOT DETECTED
C AURIS DNA BLD POS QL NAA+PROBE: NOT DETECTED
C GLABRATA DNA BLD POS QL NAA+NON-PROBE: NOT DETECTED
C KRUSEI DNA BLD POS QL NAA+NON-PROBE: NOT DETECTED
C PARAP DNA BLD POS QL NAA+NON-PROBE: NOT DETECTED
C TROPICLS DNA BLD POS QL NAA+NON-PROBE: NOT DETECTED
CALCIUM SERPL-MCNC: 7.6 MG/DL (ref 8.5–9.9)
CALCIUM SERPL-MCNC: 8 MG/DL (ref 8.5–9.9)
CALPROTECTIN STL-MCNT: 286 UG/G
CHLORIDE SERPL-SCNC: 95 MEQ/L (ref 95–107)
CHLORIDE SERPL-SCNC: 97 MEQ/L (ref 95–107)
CO2 SERPL-SCNC: 21 MEQ/L (ref 20–31)
CO2 SERPL-SCNC: 23 MEQ/L (ref 20–31)
CREAT SERPL-MCNC: 4.15 MG/DL (ref 0.7–1.2)
CREAT SERPL-MCNC: 4.47 MG/DL (ref 0.7–1.2)
CRYPTOCOCCUS NEOFORMANS/GATTII BY PCR: NOT DETECTED
E CLOAC COMP DNA BLD POS NAA+NON-PROBE: NOT DETECTED
E COLI DNA BLD POS QL NAA+NON-PROBE: NOT DETECTED
E FAECALIS DNA BLD POS QL NAA+PROBE: NOT DETECTED
E FAECIUM DNA BLD POS QL NAA+PROBE: NOT DETECTED
ENTEROBACT DNA BLD POS QL NAA+NON-PROBE: NOT DETECTED
ENTEROCOC DNA BLD POS QL NAA+NON-PROBE: NOT DETECTED
EOSINOPHIL # BLD: 0.3 K/UL (ref 0–0.7)
EOSINOPHIL NFR BLD: 2 %
ERYTHROCYTE [DISTWIDTH] IN BLOOD BY AUTOMATED COUNT: 14.2 % (ref 11.5–14.5)
GLOBULIN SER CALC-MCNC: 2.3 G/DL (ref 2.3–3.5)
GLOBULIN SER CALC-MCNC: 2.4 G/DL (ref 2.3–3.5)
GLUCOSE BLD-MCNC: 180 MG/DL (ref 70–99)
GLUCOSE BLD-MCNC: 233 MG/DL (ref 70–99)
GLUCOSE SERPL-MCNC: 167 MG/DL (ref 70–99)
GLUCOSE SERPL-MCNC: 185 MG/DL (ref 70–99)
GN BLD CULTURE PNL BLD POS NAA+PROBE: NOT DETECTED
GP B STREP DNA BLD POS QL NAA+NON-PROBE: NOT DETECTED
HCT VFR BLD AUTO: 23.4 % (ref 42–52)
HGB BLD-MCNC: 7.4 G/DL (ref 14–18)
HYPOCHROMIA BLD QL SMEAR: ABNORMAL
K OXYTOCA DNA BLD POS QL NAA+NON-PROBE: NOT DETECTED
K PNEUMON DNA SPEC QL NAA+PROBE: NOT DETECTED
K. AEROGENES DNA SPEC QL NAA+PROBE: NOT DETECTED
L MONOCYTOG DNA BLD POS QL NAA+NON-PROBE: NOT DETECTED
LYMPHOCYTES # BLD: 2.2 K/UL (ref 1–4.8)
LYMPHOCYTES NFR BLD: 15 %
MAGNESIUM SERPL-MCNC: 2.3 MG/DL (ref 1.7–2.4)
MAGNESIUM SERPL-MCNC: 2.4 MG/DL (ref 1.7–2.4)
MCH RBC QN AUTO: 30.6 PG (ref 27–31.3)
MCHC RBC AUTO-ENTMCNC: 31.6 % (ref 33–37)
MCV RBC AUTO: 96.7 FL (ref 79–92.2)
MECA BLD POS QL NAA+NON-PROBE: DETECTED
METAMYELOCYTES NFR BLD MANUAL: 4 %
MONOCYTES # BLD: 1.4 K/UL (ref 0.2–0.8)
MONOCYTES NFR BLD: 9.5 %
MYELOCYTES NFR BLD MANUAL: 1 %
N MEN DNA BLD POS QL NAA+NON-PROBE: NOT DETECTED
NEUTROPHILS # BLD: 10.7 K/UL (ref 1.4–6.5)
NEUTS BAND NFR BLD MANUAL: 5 %
NEUTS SEG NFR BLD: 64 %
NRBC BLD-RTO: 1 /100 WBC
P AERUGINOSA DNA BLD POS NAA+NON-PROBE: NOT DETECTED
PATH INTERP BLD-IMP: NORMAL
PERFORMED ON: ABNORMAL
PERFORMED ON: ABNORMAL
PHOSPHATE SERPL-MCNC: 5.3 MG/DL (ref 2.3–4.8)
PLATELET # BLD AUTO: 265 K/UL (ref 130–400)
PLATELET BLD QL SMEAR: ADEQUATE
POIKILOCYTOSIS BLD QL SMEAR: ABNORMAL
POTASSIUM SERPL-SCNC: 4.8 MEQ/L (ref 3.4–4.9)
POTASSIUM SERPL-SCNC: 5 MEQ/L (ref 3.4–4.9)
PROT SERPL-MCNC: 4.6 G/DL (ref 6.3–8)
PROT SERPL-MCNC: 4.9 G/DL (ref 6.3–8)
PROTEUS SP DNA BLD POS QL NAA+NON-PROBE: NOT DETECTED
RBC # BLD AUTO: 2.42 M/UL (ref 4.7–6.1)
S AUREUS DNA BLD POS QL NAA+NON-PROBE: NOT DETECTED
S AUREUS+CONS DNA BLD POS NAA+NON-PROBE: DETECTED
S EPIDERMIDIS DNA BLD POS QL NAA+PROBE: DETECTED
S LUGDUNENSIS DNA BLD POS QL NAA+PROBE: NOT DETECTED
S MALTOPH DNA BLD POS QL NAA+PROBE: NOT DETECTED
S MARCESCENS DNA BLD POS NAA+NON-PROBE: NOT DETECTED
S PNEUM DNA BLD POS QL NAA+NON-PROBE: NOT DETECTED
S PYO DNA BLD POS QL NAA+NON-PROBE: NOT DETECTED
SALMONELLA DNA BLD POS QL NAA+PROBE: NOT DETECTED
SODIUM SERPL-SCNC: 131 MEQ/L (ref 135–144)
SODIUM SERPL-SCNC: 132 MEQ/L (ref 135–144)
STREPTOCOCCUS DNA BLD POS NAA+NON-PROBE: NOT DETECTED
VANCOMYCIN SERPL-MCNC: 21.2 UG/ML (ref 10–40)
WBC # BLD AUTO: 14.5 K/UL (ref 4.8–10.8)

## 2024-08-15 PROCEDURE — 3600000004 HC SURGERY LEVEL 4 BASE: Performed by: COLON & RECTAL SURGERY

## 2024-08-15 PROCEDURE — 2580000003 HC RX 258: Performed by: COLON & RECTAL SURGERY

## 2024-08-15 PROCEDURE — 6360000002 HC RX W HCPCS: Performed by: COLON & RECTAL SURGERY

## 2024-08-15 PROCEDURE — 85025 COMPLETE CBC W/AUTO DIFF WBC: CPT

## 2024-08-15 PROCEDURE — 85520 HEPARIN ASSAY: CPT

## 2024-08-15 PROCEDURE — 2580000003 HC RX 258: Performed by: INTERNAL MEDICINE

## 2024-08-15 PROCEDURE — 2580000003 HC RX 258

## 2024-08-15 PROCEDURE — 36556 INSERT NON-TUNNEL CV CATH: CPT

## 2024-08-15 PROCEDURE — 6370000000 HC RX 637 (ALT 250 FOR IP): Performed by: STUDENT IN AN ORGANIZED HEALTH CARE EDUCATION/TRAINING PROGRAM

## 2024-08-15 PROCEDURE — 99291 CRITICAL CARE FIRST HOUR: CPT | Performed by: INTERNAL MEDICINE

## 2024-08-15 PROCEDURE — 2580000003 HC RX 258: Performed by: STUDENT IN AN ORGANIZED HEALTH CARE EDUCATION/TRAINING PROGRAM

## 2024-08-15 PROCEDURE — 99233 SBSQ HOSP IP/OBS HIGH 50: CPT | Performed by: INTERNAL MEDICINE

## 2024-08-15 PROCEDURE — 02HV33Z INSERTION OF INFUSION DEVICE INTO SUPERIOR VENA CAVA, PERCUTANEOUS APPROACH: ICD-10-PCS | Performed by: COLON & RECTAL SURGERY

## 2024-08-15 PROCEDURE — 6370000000 HC RX 637 (ALT 250 FOR IP): Performed by: INTERNAL MEDICINE

## 2024-08-15 PROCEDURE — 2709999900 HC NON-CHARGEABLE SUPPLY: Performed by: COLON & RECTAL SURGERY

## 2024-08-15 PROCEDURE — 77001 FLUOROGUIDE FOR VEIN DEVICE: CPT | Performed by: COLON & RECTAL SURGERY

## 2024-08-15 PROCEDURE — 3600000014 HC SURGERY LEVEL 4 ADDTL 15MIN: Performed by: COLON & RECTAL SURGERY

## 2024-08-15 PROCEDURE — 6370000000 HC RX 637 (ALT 250 FOR IP): Performed by: COLON & RECTAL SURGERY

## 2024-08-15 PROCEDURE — 6360000002 HC RX W HCPCS: Performed by: STUDENT IN AN ORGANIZED HEALTH CARE EDUCATION/TRAINING PROGRAM

## 2024-08-15 PROCEDURE — C1750 CATH, HEMODIALYSIS,LONG-TERM: HCPCS | Performed by: COLON & RECTAL SURGERY

## 2024-08-15 PROCEDURE — 6360000002 HC RX W HCPCS: Performed by: INTERNAL MEDICINE

## 2024-08-15 PROCEDURE — 3700000001 HC ADD 15 MINUTES (ANESTHESIA): Performed by: COLON & RECTAL SURGERY

## 2024-08-15 PROCEDURE — 80053 COMPREHEN METABOLIC PANEL: CPT

## 2024-08-15 PROCEDURE — 36561 INSERT TUNNELED CV CATH: CPT | Performed by: COLON & RECTAL SURGERY

## 2024-08-15 PROCEDURE — 2500000003 HC RX 250 WO HCPCS: Performed by: COLON & RECTAL SURGERY

## 2024-08-15 PROCEDURE — 3700000000 HC ANESTHESIA ATTENDED CARE: Performed by: COLON & RECTAL SURGERY

## 2024-08-15 PROCEDURE — A4217 STERILE WATER/SALINE, 500 ML: HCPCS | Performed by: COLON & RECTAL SURGERY

## 2024-08-15 PROCEDURE — 80202 ASSAY OF VANCOMYCIN: CPT

## 2024-08-15 PROCEDURE — 0JH63XZ INSERTION OF TUNNELED VASCULAR ACCESS DEVICE INTO CHEST SUBCUTANEOUS TISSUE AND FASCIA, PERCUTANEOUS APPROACH: ICD-10-PCS | Performed by: COLON & RECTAL SURGERY

## 2024-08-15 PROCEDURE — 76000 FLUOROSCOPY <1 HR PHYS/QHP: CPT

## 2024-08-15 PROCEDURE — 71045 X-RAY EXAM CHEST 1 VIEW: CPT

## 2024-08-15 PROCEDURE — 99231 SBSQ HOSP IP/OBS SF/LOW 25: CPT | Performed by: PHYSICAL MEDICINE & REHABILITATION

## 2024-08-15 PROCEDURE — 6360000002 HC RX W HCPCS

## 2024-08-15 PROCEDURE — 84100 ASSAY OF PHOSPHORUS: CPT

## 2024-08-15 PROCEDURE — 99232 SBSQ HOSP IP/OBS MODERATE 35: CPT

## 2024-08-15 PROCEDURE — 87150 DNA/RNA AMPLIFIED PROBE: CPT

## 2024-08-15 PROCEDURE — 86403 PARTICLE AGGLUT ANTBDY SCRN: CPT

## 2024-08-15 PROCEDURE — 2000000000 HC ICU R&B

## 2024-08-15 PROCEDURE — 2500000003 HC RX 250 WO HCPCS: Performed by: INTERNAL MEDICINE

## 2024-08-15 PROCEDURE — 83735 ASSAY OF MAGNESIUM: CPT

## 2024-08-15 PROCEDURE — 99231 SBSQ HOSP IP/OBS SF/LOW 25: CPT | Performed by: PHYSICIAN ASSISTANT

## 2024-08-15 PROCEDURE — 6360000002 HC RX W HCPCS: Performed by: NURSE ANESTHETIST, CERTIFIED REGISTERED

## 2024-08-15 RX ORDER — NOREPINEPHRINE BITARTRATE 0.06 MG/ML
1-100 INJECTION, SOLUTION INTRAVENOUS CONTINUOUS
Status: DISCONTINUED | OUTPATIENT
Start: 2024-08-15 | End: 2024-08-20 | Stop reason: HOSPADM

## 2024-08-15 RX ORDER — LIDOCAINE HYDROCHLORIDE 20 MG/ML
INJECTION, SOLUTION INTRAVENOUS PRN
Status: DISCONTINUED | OUTPATIENT
Start: 2024-08-15 | End: 2024-08-15 | Stop reason: SDUPTHER

## 2024-08-15 RX ORDER — BUPIVACAINE HYDROCHLORIDE AND EPINEPHRINE 5; 5 MG/ML; UG/ML
INJECTION, SOLUTION EPIDURAL; INTRACAUDAL; PERINEURAL PRN
Status: DISCONTINUED | OUTPATIENT
Start: 2024-08-15 | End: 2024-08-15 | Stop reason: ALTCHOICE

## 2024-08-15 RX ORDER — HEPARIN SODIUM 1000 [USP'U]/ML
INJECTION, SOLUTION INTRAVENOUS; SUBCUTANEOUS PRN
Status: DISCONTINUED | OUTPATIENT
Start: 2024-08-15 | End: 2024-08-16

## 2024-08-15 RX ORDER — HEPARIN 100 UNIT/ML
SYRINGE INTRAVENOUS PRN
Status: DISCONTINUED | OUTPATIENT
Start: 2024-08-15 | End: 2024-08-15 | Stop reason: ALTCHOICE

## 2024-08-15 RX ORDER — MAGNESIUM HYDROXIDE 1200 MG/15ML
LIQUID ORAL CONTINUOUS PRN
Status: COMPLETED | OUTPATIENT
Start: 2024-08-15 | End: 2024-08-15

## 2024-08-15 RX ORDER — LACTOBACILLUS RHAMNOSUS GG 10B CELL
1 CAPSULE ORAL
Status: DISCONTINUED | OUTPATIENT
Start: 2024-08-16 | End: 2024-08-20 | Stop reason: HOSPADM

## 2024-08-15 RX ORDER — SODIUM CHLORIDE 9 MG/ML
INJECTION, SOLUTION INTRAVENOUS CONTINUOUS
Status: DISCONTINUED | OUTPATIENT
Start: 2024-08-15 | End: 2024-08-15

## 2024-08-15 RX ORDER — PROPOFOL 10 MG/ML
INJECTION, EMULSION INTRAVENOUS CONTINUOUS PRN
Status: DISCONTINUED | OUTPATIENT
Start: 2024-08-15 | End: 2024-08-15 | Stop reason: SDUPTHER

## 2024-08-15 RX ORDER — CEFAZOLIN SODIUM 1 G/3ML
INJECTION, POWDER, FOR SOLUTION INTRAMUSCULAR; INTRAVENOUS PRN
Status: DISCONTINUED | OUTPATIENT
Start: 2024-08-15 | End: 2024-08-15 | Stop reason: SDUPTHER

## 2024-08-15 RX ADMIN — Medication: at 00:05

## 2024-08-15 RX ADMIN — MIDODRINE HYDROCHLORIDE 15 MG: 10 TABLET ORAL at 07:59

## 2024-08-15 RX ADMIN — SODIUM BICARBONATE 650 MG TABLET 650 MG: at 07:59

## 2024-08-15 RX ADMIN — PHENYLEPHRINE HYDROCHLORIDE 100 MCG: 10 INJECTION INTRAVENOUS at 11:29

## 2024-08-15 RX ADMIN — Medication: at 00:08

## 2024-08-15 RX ADMIN — PHENYLEPHRINE HYDROCHLORIDE 100 MCG: 10 INJECTION INTRAVENOUS at 11:27

## 2024-08-15 RX ADMIN — Medication 5 MG: at 21:53

## 2024-08-15 RX ADMIN — HEPARIN SODIUM 2300 UNITS: 1000 INJECTION INTRAVENOUS; SUBCUTANEOUS at 00:23

## 2024-08-15 RX ADMIN — HEPARIN SODIUM 2200 UNITS: 1000 INJECTION INTRAVENOUS; SUBCUTANEOUS at 00:26

## 2024-08-15 RX ADMIN — CHOLESTYRAMINE 4 G: 4 POWDER, FOR SUSPENSION ORAL at 21:52

## 2024-08-15 RX ADMIN — HEPARIN SODIUM 2300 UNITS: 1000 INJECTION INTRAVENOUS; SUBCUTANEOUS at 23:54

## 2024-08-15 RX ADMIN — LIDOCAINE HYDROCHLORIDE 50 MG: 20 INJECTION, SOLUTION INTRAVENOUS at 10:58

## 2024-08-15 RX ADMIN — HEPARIN SODIUM 2200 UNITS: 1000 INJECTION INTRAVENOUS; SUBCUTANEOUS at 23:51

## 2024-08-15 RX ADMIN — VALACYCLOVIR HYDROCHLORIDE 500 MG: 1 TABLET, FILM COATED ORAL at 22:29

## 2024-08-15 RX ADMIN — AMIODARONE HYDROCHLORIDE 0.5 MG/MIN: 50 INJECTION, SOLUTION INTRAVENOUS at 13:24

## 2024-08-15 RX ADMIN — SODIUM BICARBONATE 650 MG TABLET 650 MG: at 21:52

## 2024-08-15 RX ADMIN — PHENYLEPHRINE HYDROCHLORIDE 100 MCG: 10 INJECTION INTRAVENOUS at 11:17

## 2024-08-15 RX ADMIN — PROPOFOL 50 MCG/KG/MIN: 10 INJECTION, EMULSION INTRAVENOUS at 10:58

## 2024-08-15 RX ADMIN — Medication: at 17:36

## 2024-08-15 RX ADMIN — Medication 5 MCG/MIN: at 12:38

## 2024-08-15 RX ADMIN — HEPARIN SODIUM: 5000 INJECTION INTRAVENOUS; SUBCUTANEOUS at 17:30

## 2024-08-15 RX ADMIN — INSULIN LISPRO 1 UNITS: 100 INJECTION, SOLUTION INTRAVENOUS; SUBCUTANEOUS at 16:04

## 2024-08-15 RX ADMIN — HEPARIN SODIUM 5000 UNITS: 5000 INJECTION INTRAVENOUS; SUBCUTANEOUS at 14:40

## 2024-08-15 RX ADMIN — AMIODARONE HYDROCHLORIDE 0.5 MG/MIN: 50 INJECTION, SOLUTION INTRAVENOUS at 00:45

## 2024-08-15 RX ADMIN — PIPERACILLIN AND TAZOBACTAM 3375 MG: 3; .375 INJECTION, POWDER, LYOPHILIZED, FOR SOLUTION INTRAVENOUS at 21:52

## 2024-08-15 RX ADMIN — PIPERACILLIN AND TAZOBACTAM 3375 MG: 3; .375 INJECTION, POWDER, LYOPHILIZED, FOR SOLUTION INTRAVENOUS at 12:42

## 2024-08-15 RX ADMIN — DIPHENOXYLATE HYDROCHLORIDE AND ATROPINE SULFATE 1 TABLET: 2.5; .025 TABLET ORAL at 21:52

## 2024-08-15 RX ADMIN — TRAMADOL HYDROCHLORIDE 100 MG: 50 TABLET ORAL at 06:52

## 2024-08-15 RX ADMIN — PIPERACILLIN AND TAZOBACTAM 3375 MG: 3; .375 INJECTION, POWDER, LYOPHILIZED, FOR SOLUTION INTRAVENOUS at 05:25

## 2024-08-15 RX ADMIN — VANCOMYCIN HYDROCHLORIDE 750 MG: 750 INJECTION, POWDER, LYOPHILIZED, FOR SOLUTION INTRAVENOUS at 22:15

## 2024-08-15 RX ADMIN — CEFAZOLIN 2 G: 1 INJECTION, POWDER, FOR SOLUTION INTRAMUSCULAR; INTRAVENOUS at 11:09

## 2024-08-15 RX ADMIN — HEPARIN SODIUM 5000 UNITS: 5000 INJECTION INTRAVENOUS; SUBCUTANEOUS at 22:15

## 2024-08-15 RX ADMIN — VANCOMYCIN HYDROCHLORIDE 1000 MG: 1 INJECTION, POWDER, LYOPHILIZED, FOR SOLUTION INTRAVENOUS at 02:47

## 2024-08-15 ASSESSMENT — PAIN SCALES - GENERAL
PAINLEVEL_OUTOF10: 0

## 2024-08-15 ASSESSMENT — ENCOUNTER SYMPTOMS: SHORTNESS OF BREATH: 1

## 2024-08-15 NOTE — OP NOTE
[facility]                   [address] [place] [address]                            OPERATIVE REPORT      PATIENT NAME: VLADIMIR LYNN               : 1945  MED REC NO: [id number]                        ROOM: [facility]  ACCOUNT NO: [id number]                       ADMIT DATE: 2024  PROVIDER: Vahid Hu MD      DATE OF PROCEDURE:  08/15/2024    SURGEON:  Vahid Hu MD    ASSISTANT:  Ms. Campbell.    PREOPERATIVE DIAGNOSIS:  Malfunctioning hemodialysis catheter.    POSTOPERATIVE DIAGNOSIS:  Malfunctioning hemodialysis catheter.    PROCEDURE:  Replacement of right internal jugular tunneled hemodialysis catheter.    ANESTHESIA:    1. MAC.  2. Local.    ESTIMATED BLOOD LOSS:  30.    SPECIMEN:  None.    COMPLICATIONS:  None.    INDICATIONS:  An 79-year-old male, who was having problems with his hemodialysis catheter, and I was asked to replace it for assistance with CRRT.  Risks and benefits explained to the wife.  She wished to proceed.  Consent obtained.    DESCRIPTION OF PROCEDURE:  He was taken to the operating room, placed in a supine position.  MAC anesthesia was administered.  The catheter on the right chest wall was prepped and draped including the neck with ChloraPrep-containing solution.  Appropriate drying times were waited.    0.25% Marcaine was injected at the neck incision for local analgesia.  Incision was made.  The catheter was exteriorized at the neck incision and cut.  A guidewire was inserted through the catheter and confirmed in position on fluoroscopy.    The remainder of the catheter was removed completely.  A new site on the right anterior chest wall was made using a local and an 11 blade.  A new 28 cm dialysis catheter was tunneled from the chest wall to the exit site of the guidewire.  A dilator and sheath were inserted over the wire.  The dilator and wire were removed.  The catheter was inserted through the sheath as it was

## 2024-08-15 NOTE — BRIEF OP NOTE
Brief Postoperative Note      Patient: Km Garcia  YOB: 1945  MRN: [id number]    Date of Procedure: 8/15/2024    Pre-Op Diagnosis Codes:      * Complication of central venous catheter [T82.9XXA]    Post-Op Diagnosis: Same       Procedure(s):  Replacement of right internal jugular tunneled dialysis catheter    Surgeon(s):  Vahid Hu MD    Assistant:  First Assistant: Gloria Campbell    Anesthesia: Monitor Anesthesia Care, local    Estimated Blood Loss (mL): Minimal    Complications: None    Specimens:   * No specimens in log *    Implants:  * No implants in log *      Drains:   Rectal Tube (Active)   Site Assessment Clean, dry & intact 08/15/24 0400   Stool Appearance Loose;Watery 08/15/24 0400   Stool Color Brown 08/15/24 0400   Stool Amount Medium 08/15/24 0400       Urinary Catheter 08/05/24 Coude (Active)   $ Urethral catheter insertion $ Not inserted for procedure 08/12/24 1915   Catheter Indications Urinary retention (acute or chronic), continuous bladder irrigation or bladder outlet obstruction;Assist in healing of open sacral or perineal wounds (Stage III, IV, or unstageable documented by a provider or enterostomal therapy) and/or full thickness perineal and lower extremity burns in incontinent patients 08/15/24 0800   Site Assessment No urethral drainage 08/15/24 0800   Urine Color Terri 08/15/24 0800   Urine Appearance Cloudy 08/15/24 0800   Collection Container Standard 08/15/24 0800   Securement Method Securing device (Describe) 08/15/24 0800   Catheter Care  Soap and water 08/15/24 0800   Catheter Best Practices  Drainage tube clipped to bed;Catheter secured to thigh;Tamper seal intact;Bag below bladder;Bag not on floor;Lack of dependent loop in tubing;Drainage bag less than half full 08/15/24 0800   Status Draining 08/15/24 0800   Output (mL) 0 mL 08/15/24 0538       Fecal Management System 08/14/24 (Active)   Fecal Management Tube Output 700 ml 08/15/24 0538

## 2024-08-15 NOTE — ANESTHESIA PRE PROCEDURE
Status (If Applicable):  No results found for: \"HIV\", \"HEPCAB\"    COVID-19 Screening (If Applicable):   Lab Results   Component Value Date/Time    COVID19 Not Detected 08/02/2024 01:57 PM    COVID19 Not Detected 11/30/2020 11:13 AM           Anesthesia Evaluation  Patient summary reviewed and Nursing notes reviewed  Airway: Mallampati: Unable to assess / NA     Neck ROM: full     Dental: normal exam         Pulmonary:Negative Pulmonary ROS and normal exam    (+)   shortness of breath:         asthma:                            Cardiovascular:  Exercise tolerance: poor (<4 METS)  (+) hypertension:, CAD:, dysrhythmias: atrial fibrillation, CHF: systolic and diastolic, pulmonary hypertension:, hyperlipidemia      ECG reviewed  Rhythm: regular  Rate: normal  Echocardiogram reviewed         Beta Blocker:  Not on Beta Blocker         Neuro/Psych:   (+) CVA:            GI/Hepatic/Renal:   (+) renal disease: ESRD, dialysis and kidney stones          Endo/Other:    (+) DiabetesType II DM, blood dyscrasia: anemia:., electrolyte abnormalities, malignancy/cancer.          Pt had no PAT visit       Abdominal:   (+) obese          Vascular:   + DVT.       Other Findings: Patient AO1-2, wife at bedside to provide information      Anesthesia Plan      MAC     ASA 4     (MAC    Patient unable to provide consent due to AMS, wife at bedside. Plan discussed with wife and she is agreeable to proceed. )  Induction: intravenous.      Anesthetic plan and risks discussed with spouse.    Use of blood products discussed with spouse whom consented to blood products.    Plan discussed with CRNA.    Attending anesthesiologist reviewed and agrees with Preprocedure content            Elsa Monaco MD   8/15/2024

## 2024-08-15 NOTE — ANESTHESIA POSTPROCEDURE EVALUATION
Department of Anesthesiology  Postprocedure Note    Patient: Km Garcia  MRN: [id number]  YOB: 1945  Date of evaluation: 8/15/2024    Procedure Summary       Date: 08/15/24 Room / Location: [address]    Anesthesia Start: 1050 Anesthesia Stop: 1151    Procedure: Replacement of right internal jugular tunneled dialysis catheter (Right: Chest) Diagnosis:       Complication of central venous catheter      (Complication of central venous catheter [T82.9XXA])    Surgeons: Vahid Hu MD Responsible Provider: Elsa Monaco MD    Anesthesia Type: MAC ASA Status: 4            Anesthesia Type: No value filed.    Micaela Phase I: Micaela Score: 9    Micaela Phase II:      Anesthesia Post Evaluation    Patient location during evaluation: ICU  Level of consciousness: awake  Pain score: 0  Airway patency: patent  Nausea & Vomiting: no vomiting and no nausea  Cardiovascular status: hemodynamically stable  Respiratory status: acceptable  Hydration status: stable  Pain management: adequate and satisfactory to patient        No notable events documented.

## 2024-08-16 ENCOUNTER — APPOINTMENT (OUTPATIENT)
Dept: GENERAL RADIOLOGY | Age: 79
DRG: 673 | End: 2024-08-16
Attending: INTERNAL MEDICINE
Payer: MEDICARE

## 2024-08-16 ENCOUNTER — APPOINTMENT (OUTPATIENT)
Dept: CT IMAGING | Age: 79
DRG: 673 | End: 2024-08-16
Attending: INTERNAL MEDICINE
Payer: MEDICARE

## 2024-08-16 PROBLEM — N39.0 UTI (URINARY TRACT INFECTION): Status: RESOLVED | Noted: 2024-07-17 | Resolved: 2024-08-16

## 2024-08-16 LAB
ALBUMIN SERPL-MCNC: 2.4 G/DL (ref 3.5–4.6)
ALBUMIN SERPL-MCNC: 2.4 G/DL (ref 3.5–4.6)
ALP SERPL-CCNC: 80 U/L (ref 35–104)
ALP SERPL-CCNC: 81 U/L (ref 35–104)
ALT SERPL-CCNC: <5 U/L (ref 0–41)
ALT SERPL-CCNC: <5 U/L (ref 0–41)
ANION GAP SERPL CALCULATED.3IONS-SCNC: 14 MEQ/L (ref 9–15)
ANION GAP SERPL CALCULATED.3IONS-SCNC: 15 MEQ/L (ref 9–15)
ANISOCYTOSIS BLD QL SMEAR: ABNORMAL
ANTI-XA UNFRAC HEPARIN: 0.18 IU/ML
ANTI-XA UNFRAC HEPARIN: <0.1 IU/ML
ANTI-XA UNFRAC HEPARIN: <0.1 IU/ML
APTT PPP: 38.2 SEC (ref 24.4–36.8)
AST SERPL-CCNC: 10 U/L (ref 0–40)
AST SERPL-CCNC: 9 U/L (ref 0–40)
BASOPHILS # BLD: 0 K/UL (ref 0–0.2)
BASOPHILS NFR BLD: 0.4 %
BILIRUB SERPL-MCNC: 0.3 MG/DL (ref 0.2–0.7)
BILIRUB SERPL-MCNC: <0.2 MG/DL (ref 0.2–0.7)
BUN SERPL-MCNC: 50 MG/DL (ref 8–23)
BUN SERPL-MCNC: 52 MG/DL (ref 8–23)
CALCIUM SERPL-MCNC: 7.7 MG/DL (ref 8.5–9.9)
CALCIUM SERPL-MCNC: 7.7 MG/DL (ref 8.5–9.9)
CHLORIDE SERPL-SCNC: 94 MEQ/L (ref 95–107)
CHLORIDE SERPL-SCNC: 99 MEQ/L (ref 95–107)
CO2 SERPL-SCNC: 21 MEQ/L (ref 20–31)
CO2 SERPL-SCNC: 21 MEQ/L (ref 20–31)
CREAT SERPL-MCNC: 4.39 MG/DL (ref 0.7–1.2)
CREAT SERPL-MCNC: 4.44 MG/DL (ref 0.7–1.2)
EKG ATRIAL RATE: 133 BPM
EKG Q-T INTERVAL: 322 MS
EKG QRS DURATION: 98 MS
EKG QTC CALCULATION (BAZETT): 480 MS
EKG R AXIS: -39 DEGREES
EKG T AXIS: 108 DEGREES
EKG VENTRICULAR RATE: 134 BPM
EOSINOPHIL # BLD: 0 K/UL (ref 0–0.7)
EOSINOPHIL NFR BLD: 0.4 %
ERYTHROCYTE [DISTWIDTH] IN BLOOD BY AUTOMATED COUNT: 14.7 % (ref 11.5–14.5)
GLOBULIN SER CALC-MCNC: 2.4 G/DL (ref 2.3–3.5)
GLOBULIN SER CALC-MCNC: 2.5 G/DL (ref 2.3–3.5)
GLUCOSE BLD-MCNC: 189 MG/DL (ref 70–99)
GLUCOSE BLD-MCNC: 190 MG/DL (ref 70–99)
GLUCOSE BLD-MCNC: 200 MG/DL (ref 70–99)
GLUCOSE BLD-MCNC: 214 MG/DL (ref 70–99)
GLUCOSE BLD-MCNC: 218 MG/DL (ref 70–99)
GLUCOSE SERPL-MCNC: 197 MG/DL (ref 70–99)
GLUCOSE SERPL-MCNC: 206 MG/DL (ref 70–99)
HCT VFR BLD AUTO: 23.3 % (ref 42–52)
HGB BLD-MCNC: 7.5 G/DL (ref 14–18)
HYPOCHROMIA BLD QL SMEAR: ABNORMAL
INR PPP: 1.7
LYMPHOCYTES # BLD: 1.2 K/UL (ref 1–4.8)
LYMPHOCYTES NFR BLD: 7 %
MAGNESIUM SERPL-MCNC: 2.4 MG/DL (ref 1.7–2.4)
MAGNESIUM SERPL-MCNC: 2.4 MG/DL (ref 1.7–2.4)
MCH RBC QN AUTO: 31.6 PG (ref 27–31.3)
MCHC RBC AUTO-ENTMCNC: 32.2 % (ref 33–37)
MCV RBC AUTO: 98.3 FL (ref 79–92.2)
METAMYELOCYTES NFR BLD MANUAL: 4 %
MONOCYTES # BLD: 1.7 K/UL (ref 0.2–0.8)
MONOCYTES NFR BLD: 9.6 %
MYELOCYTES NFR BLD MANUAL: 1 %
NEUTROPHILS # BLD: 14.1 K/UL (ref 1.4–6.5)
NEUTS BAND NFR BLD MANUAL: 8 %
NEUTS SEG NFR BLD: 71 %
NRBC BLD-RTO: 2 /100 WBC
PERFORMED ON: ABNORMAL
PHOSPHATE SERPL-MCNC: 5.1 MG/DL (ref 2.3–4.8)
PHOSPHATE SERPL-MCNC: 5.2 MG/DL (ref 2.3–4.8)
PLATELET # BLD AUTO: 230 K/UL (ref 130–400)
PLATELET BLD QL SMEAR: ADEQUATE
POTASSIUM SERPL-SCNC: 4.8 MEQ/L (ref 3.4–4.9)
POTASSIUM SERPL-SCNC: 4.8 MEQ/L (ref 3.4–4.9)
POTASSIUM SERPL-SCNC: 5.2 MEQ/L (ref 3.4–4.9)
PROCALCITONIN SERPL IA-MCNC: 1.66 NG/ML (ref 0–0.15)
PROT SERPL-MCNC: 4.8 G/DL (ref 6.3–8)
PROT SERPL-MCNC: 4.9 G/DL (ref 6.3–8)
PROTHROMBIN TIME: 19.8 SEC (ref 12.3–14.9)
RBC # BLD AUTO: 2.37 M/UL (ref 4.7–6.1)
SODIUM SERPL-SCNC: 130 MEQ/L (ref 135–144)
SODIUM SERPL-SCNC: 134 MEQ/L (ref 135–144)
VANCOMYCIN SERPL-MCNC: 23.9 UG/ML (ref 10–40)
WBC # BLD AUTO: 16.8 K/UL (ref 4.8–10.8)

## 2024-08-16 PROCEDURE — 6370000000 HC RX 637 (ALT 250 FOR IP): Performed by: INTERNAL MEDICINE

## 2024-08-16 PROCEDURE — 99231 SBSQ HOSP IP/OBS SF/LOW 25: CPT | Performed by: PHYSICAL MEDICINE & REHABILITATION

## 2024-08-16 PROCEDURE — 82784 ASSAY IGA/IGD/IGG/IGM EACH: CPT

## 2024-08-16 PROCEDURE — 85520 HEPARIN ASSAY: CPT

## 2024-08-16 PROCEDURE — 2580000003 HC RX 258: Performed by: COLON & RECTAL SURGERY

## 2024-08-16 PROCEDURE — 85610 PROTHROMBIN TIME: CPT

## 2024-08-16 PROCEDURE — 84165 PROTEIN E-PHORESIS SERUM: CPT

## 2024-08-16 PROCEDURE — 2500000003 HC RX 250 WO HCPCS: Performed by: INTERNAL MEDICINE

## 2024-08-16 PROCEDURE — 85025 COMPLETE CBC W/AUTO DIFF WBC: CPT

## 2024-08-16 PROCEDURE — 85730 THROMBOPLASTIN TIME PARTIAL: CPT

## 2024-08-16 PROCEDURE — 99291 CRITICAL CARE FIRST HOUR: CPT | Performed by: INTERNAL MEDICINE

## 2024-08-16 PROCEDURE — 80202 ASSAY OF VANCOMYCIN: CPT

## 2024-08-16 PROCEDURE — 84145 PROCALCITONIN (PCT): CPT

## 2024-08-16 PROCEDURE — 2000000000 HC ICU R&B

## 2024-08-16 PROCEDURE — 84100 ASSAY OF PHOSPHORUS: CPT

## 2024-08-16 PROCEDURE — 83735 ASSAY OF MAGNESIUM: CPT

## 2024-08-16 PROCEDURE — 2580000003 HC RX 258: Performed by: INTERNAL MEDICINE

## 2024-08-16 PROCEDURE — 99231 SBSQ HOSP IP/OBS SF/LOW 25: CPT | Performed by: PHYSICIAN ASSISTANT

## 2024-08-16 PROCEDURE — 6360000002 HC RX W HCPCS: Performed by: COLON & RECTAL SURGERY

## 2024-08-16 PROCEDURE — 80053 COMPREHEN METABOLIC PANEL: CPT

## 2024-08-16 PROCEDURE — 71045 X-RAY EXAM CHEST 1 VIEW: CPT

## 2024-08-16 PROCEDURE — 6370000000 HC RX 637 (ALT 250 FOR IP): Performed by: COLON & RECTAL SURGERY

## 2024-08-16 PROCEDURE — 6360000002 HC RX W HCPCS: Performed by: INTERNAL MEDICINE

## 2024-08-16 PROCEDURE — 86060 ANTISTREPTOLYSIN O TITER: CPT

## 2024-08-16 PROCEDURE — 84155 ASSAY OF PROTEIN SERUM: CPT

## 2024-08-16 PROCEDURE — 83516 IMMUNOASSAY NONANTIBODY: CPT

## 2024-08-16 PROCEDURE — 86334 IMMUNOFIX E-PHORESIS SERUM: CPT

## 2024-08-16 PROCEDURE — 6370000000 HC RX 637 (ALT 250 FOR IP)

## 2024-08-16 PROCEDURE — 74176 CT ABD & PELVIS W/O CONTRAST: CPT

## 2024-08-16 RX ORDER — HEPARIN SODIUM 1000 [USP'U]/ML
2000 INJECTION, SOLUTION INTRAVENOUS; SUBCUTANEOUS PRN
Status: DISCONTINUED | OUTPATIENT
Start: 2024-08-16 | End: 2024-08-18

## 2024-08-16 RX ORDER — SODIUM CHLORIDE 9 MG/ML
INJECTION, SOLUTION INTRAVENOUS
Status: DISPENSED
Start: 2024-08-16 | End: 2024-08-16

## 2024-08-16 RX ORDER — HEPARIN SODIUM 1000 [USP'U]/ML
2200-2300 INJECTION, SOLUTION INTRAVENOUS; SUBCUTANEOUS PRN
Status: DISCONTINUED | OUTPATIENT
Start: 2024-08-16 | End: 2024-08-20 | Stop reason: HOSPADM

## 2024-08-16 RX ORDER — HEPARIN SODIUM 10000 [USP'U]/100ML
5-30 INJECTION, SOLUTION INTRAVENOUS CONTINUOUS
Status: DISCONTINUED | OUTPATIENT
Start: 2024-08-16 | End: 2024-08-18

## 2024-08-16 RX ORDER — SODIUM CHLORIDE 9 MG/ML
INJECTION, SOLUTION INTRAVENOUS
Status: DISPENSED
Start: 2024-08-16 | End: 2024-08-17

## 2024-08-16 RX ORDER — AMIODARONE HYDROCHLORIDE 200 MG/1
200 TABLET ORAL EVERY 8 HOURS
Status: DISCONTINUED | OUTPATIENT
Start: 2024-08-16 | End: 2024-08-20 | Stop reason: HOSPADM

## 2024-08-16 RX ORDER — HEPARIN SODIUM 1000 [USP'U]/ML
4000 INJECTION, SOLUTION INTRAVENOUS; SUBCUTANEOUS PRN
Status: DISCONTINUED | OUTPATIENT
Start: 2024-08-16 | End: 2024-08-18

## 2024-08-16 RX ORDER — HEPARIN SODIUM 1000 [USP'U]/ML
4000 INJECTION, SOLUTION INTRAVENOUS; SUBCUTANEOUS ONCE
Status: DISCONTINUED | OUTPATIENT
Start: 2024-08-16 | End: 2024-08-16

## 2024-08-16 RX ADMIN — HEPARIN SODIUM 4000 UNITS: 1000 INJECTION INTRAVENOUS; SUBCUTANEOUS at 14:48

## 2024-08-16 RX ADMIN — CHOLESTYRAMINE 4 G: 4 POWDER, FOR SUSPENSION ORAL at 08:23

## 2024-08-16 RX ADMIN — HEPARIN SODIUM 5000 UNITS: 5000 INJECTION INTRAVENOUS; SUBCUTANEOUS at 06:20

## 2024-08-16 RX ADMIN — INSULIN LISPRO 1 UNITS: 100 INJECTION, SOLUTION INTRAVENOUS; SUBCUTANEOUS at 08:33

## 2024-08-16 RX ADMIN — MIDODRINE HYDROCHLORIDE 15 MG: 10 TABLET ORAL at 17:33

## 2024-08-16 RX ADMIN — Medication: at 20:15

## 2024-08-16 RX ADMIN — INSULIN LISPRO 1 UNITS: 100 INJECTION, SOLUTION INTRAVENOUS; SUBCUTANEOUS at 12:59

## 2024-08-16 RX ADMIN — MIDODRINE HYDROCHLORIDE 15 MG: 10 TABLET ORAL at 08:23

## 2024-08-16 RX ADMIN — TRAMADOL HYDROCHLORIDE 100 MG: 50 TABLET ORAL at 06:20

## 2024-08-16 RX ADMIN — Medication 5 MG: at 21:05

## 2024-08-16 RX ADMIN — INSULIN LISPRO 1 UNITS: 100 INJECTION, SOLUTION INTRAVENOUS; SUBCUTANEOUS at 17:39

## 2024-08-16 RX ADMIN — HEPARIN SODIUM 8 UNITS/KG/HR: 10000 INJECTION, SOLUTION INTRAVENOUS at 08:18

## 2024-08-16 RX ADMIN — SODIUM BICARBONATE 650 MG TABLET 650 MG: at 21:06

## 2024-08-16 RX ADMIN — AMIODARONE HYDROCHLORIDE 0.5 MG/MIN: 50 INJECTION, SOLUTION INTRAVENOUS at 06:20

## 2024-08-16 RX ADMIN — Medication 9 MCG/MIN: at 15:51

## 2024-08-16 RX ADMIN — CEFEPIME 1000 MG: 1 INJECTION, POWDER, FOR SOLUTION INTRAMUSCULAR; INTRAVENOUS at 10:54

## 2024-08-16 RX ADMIN — ALTEPLASE 4 MG: 2.2 INJECTION, POWDER, LYOPHILIZED, FOR SOLUTION INTRAVENOUS at 11:11

## 2024-08-16 RX ADMIN — MIDODRINE HYDROCHLORIDE 15 MG: 10 TABLET ORAL at 13:00

## 2024-08-16 RX ADMIN — SODIUM BICARBONATE 650 MG TABLET 650 MG: at 08:23

## 2024-08-16 RX ADMIN — CHOLESTYRAMINE 4 G: 4 POWDER, FOR SUSPENSION ORAL at 21:06

## 2024-08-16 RX ADMIN — Medication 1 CAPSULE: at 08:23

## 2024-08-16 RX ADMIN — DIPHENOXYLATE HYDROCHLORIDE AND ATROPINE SULFATE 1 TABLET: 2.5; .025 TABLET ORAL at 21:06

## 2024-08-16 RX ADMIN — HEPARIN SODIUM 4000 UNITS: 1000 INJECTION INTRAVENOUS; SUBCUTANEOUS at 22:22

## 2024-08-16 RX ADMIN — AMIODARONE HYDROCHLORIDE 200 MG: 200 TABLET ORAL at 09:26

## 2024-08-16 RX ADMIN — VANCOMYCIN HYDROCHLORIDE 500 MG: 500 INJECTION, POWDER, LYOPHILIZED, FOR SOLUTION INTRAVENOUS at 23:04

## 2024-08-16 RX ADMIN — VALACYCLOVIR HYDROCHLORIDE 500 MG: 1 TABLET, FILM COATED ORAL at 17:33

## 2024-08-16 RX ADMIN — PIPERACILLIN AND TAZOBACTAM 3375 MG: 3; .375 INJECTION, POWDER, LYOPHILIZED, FOR SOLUTION INTRAVENOUS at 05:26

## 2024-08-16 RX ADMIN — AMIODARONE HYDROCHLORIDE 200 MG: 200 TABLET ORAL at 17:33

## 2024-08-16 ASSESSMENT — PAIN SCALES - GENERAL
PAINLEVEL_OUTOF10: 0
PAINLEVEL_OUTOF10: 2
PAINLEVEL_OUTOF10: 0

## 2024-08-16 NOTE — SIGNIFICANT EVENT
Notified by room nurse that patient has newly positive blood cultures (1 out of 2) for Staphylococcus species with positive methicillin-resistant status, concerning for MRSA bacteremia.  Patient is already appropriately on vancomycin antibiotic therapy.    Electronically signed by Sonam Hazel DO on 8/15/2024 at 9:25 PM

## 2024-08-16 NOTE — FLOWSHEET NOTE
AM assessment completed. Pt is generally drowsy, he does follow commands and is oriented to self somewhat situation. CRRT has been on hold due to clotting of filter and pt line being positional. Pt is in and out of afib. Received orders to place pt on a heparin drip both for afib and clotting of dialysis filter. Right chest tunnel cath needed cathflo for line patency. Discussed pt case during 0900 ICU rounds. Pt was changed from IV zosyn to cefepime due to increasing inflammatory markers. A CT abdomen was ordered. The pt's wife voiced concern for the pt's hygiene. She was also concerned that the pt's nutrition level has been less adequate as time goes on. Received an order to place a Corpak to supplement oral nutrition. All of her questions were answered at this time. CT abdomen mentions possible misplacement of the pt's ca catheter. Discussed these finding with urology and was decided ca catheter has been in satisfactory position. CRRT was resumed about 1400 after cathflo instillation. Line appears to be positional at this time. Levophed has been titrated down throughout the day. Will continue to monitor pt's condition.

## 2024-08-16 NOTE — DIALYSIS
CRRT restarted using right  chest tunneled catheter. Unable to aspirate from arterial lumen, lines reversed

## 2024-08-17 ENCOUNTER — APPOINTMENT (OUTPATIENT)
Dept: GENERAL RADIOLOGY | Age: 79
DRG: 673 | End: 2024-08-17
Attending: INTERNAL MEDICINE
Payer: MEDICARE

## 2024-08-17 LAB
ALBUMIN SERPL-MCNC: 2.6 G/DL (ref 3.5–4.6)
ALBUMIN SERPL-MCNC: 2.6 G/DL (ref 3.5–4.6)
ALP SERPL-CCNC: 81 U/L (ref 35–104)
ALP SERPL-CCNC: 85 U/L (ref 35–104)
ALT SERPL-CCNC: <5 U/L (ref 0–41)
ALT SERPL-CCNC: <5 U/L (ref 0–41)
ANION GAP SERPL CALCULATED.3IONS-SCNC: 12 MEQ/L (ref 9–15)
ANION GAP SERPL CALCULATED.3IONS-SCNC: 15 MEQ/L (ref 9–15)
ANISOCYTOSIS BLD QL SMEAR: ABNORMAL
ANTI-XA UNFRAC HEPARIN: 0.19 IU/ML
ANTI-XA UNFRAC HEPARIN: 0.31 IU/ML
ANTI-XA UNFRAC HEPARIN: 0.35 IU/ML
ANTI-XA UNFRAC HEPARIN: 0.42 IU/ML
ANTISTREPTOLYSIN-O: <20 IU/ML (ref 0–150)
AST SERPL-CCNC: 11 U/L (ref 0–40)
AST SERPL-CCNC: 9 U/L (ref 0–40)
BASE EXCESS ARTERIAL: -5 (ref -3–3)
BASOPHILS # BLD: 0 K/UL (ref 0–0.2)
BASOPHILS NFR BLD: 0.2 %
BILIRUB SERPL-MCNC: 0.3 MG/DL (ref 0.2–0.7)
BILIRUB SERPL-MCNC: 0.3 MG/DL (ref 0.2–0.7)
BUN SERPL-MCNC: 45 MG/DL (ref 8–23)
BUN SERPL-MCNC: 45 MG/DL (ref 8–23)
CALCIUM IONIZED: 1.09 MMOL/L (ref 1.12–1.32)
CALCIUM SERPL-MCNC: 7.5 MG/DL (ref 8.5–9.9)
CALCIUM SERPL-MCNC: 7.5 MG/DL (ref 8.5–9.9)
CHLORIDE SERPL-SCNC: 98 MEQ/L (ref 95–107)
CHLORIDE SERPL-SCNC: 98 MEQ/L (ref 95–107)
CO2 SERPL-SCNC: 20 MEQ/L (ref 20–31)
CO2 SERPL-SCNC: 21 MEQ/L (ref 20–31)
CREAT SERPL-MCNC: 3.61 MG/DL (ref 0.7–1.2)
CREAT SERPL-MCNC: 3.8 MG/DL (ref 0.7–1.2)
DOHLE BOD BLD QL SMEAR: ABNORMAL
EOSINOPHIL # BLD: 0 K/UL (ref 0–0.7)
EOSINOPHIL NFR BLD: 0.3 %
ERYTHROCYTE [DISTWIDTH] IN BLOOD BY AUTOMATED COUNT: 15.1 % (ref 11.5–14.5)
GLOBULIN SER CALC-MCNC: 2.2 G/DL (ref 2.3–3.5)
GLOBULIN SER CALC-MCNC: 2.2 G/DL (ref 2.3–3.5)
GLUCOSE BLD-MCNC: 187 MG/DL (ref 70–99)
GLUCOSE BLD-MCNC: 212 MG/DL (ref 70–99)
GLUCOSE BLD-MCNC: 226 MG/DL (ref 70–99)
GLUCOSE BLD-MCNC: 228 MG/DL (ref 70–99)
GLUCOSE BLD-MCNC: 240 MG/DL (ref 70–99)
GLUCOSE BLD-MCNC: 242 MG/DL (ref 70–99)
GLUCOSE SERPL-MCNC: 210 MG/DL (ref 70–99)
GLUCOSE SERPL-MCNC: 228 MG/DL (ref 70–99)
HCO3 ARTERIAL: 19.8 MMOL/L (ref 21–29)
HCT VFR BLD AUTO: 22 % (ref 41–53)
HCT VFR BLD AUTO: 23.5 % (ref 42–52)
HGB BLD CALC-MCNC: 7.5 GM/DL (ref 13.5–17.5)
HGB BLD-MCNC: 7.3 G/DL (ref 14–18)
LACTATE: 1.71 MMOL/L (ref 0.4–2)
LYMPHOCYTES # BLD: 1.3 K/UL (ref 1–4.8)
LYMPHOCYTES NFR BLD: 8 %
MACROCYTES BLD QL SMEAR: ABNORMAL
MAGNESIUM SERPL-MCNC: 2.4 MG/DL (ref 1.7–2.4)
MAGNESIUM SERPL-MCNC: 2.4 MG/DL (ref 1.7–2.4)
MCH RBC QN AUTO: 31.1 PG (ref 27–31.3)
MCHC RBC AUTO-ENTMCNC: 31.1 % (ref 33–37)
MCV RBC AUTO: 100 FL (ref 79–92.2)
MONOCYTES # BLD: 2.4 K/UL (ref 0.2–0.8)
MONOCYTES NFR BLD: 13.7 %
MYELOCYTES NFR BLD MANUAL: 10 %
NEUTROPHILS # BLD: 13.3 K/UL (ref 1.4–6.5)
NEUTS BAND NFR BLD MANUAL: 3 %
NEUTS SEG NFR BLD: 66 %
NRBC BLD-RTO: 7 /100 WBC
O2 SAT, ARTERIAL: 97 % (ref 93–100)
OVALOCYTES BLD QL SMEAR: ABNORMAL
PCO2 ARTERIAL: 30 MM HG (ref 35–45)
PERFORMED ON: ABNORMAL
PH ARTERIAL: 7.43 (ref 7.35–7.45)
PHOSPHATE SERPL-MCNC: 4.2 MG/DL (ref 2.3–4.8)
PHOSPHATE SERPL-MCNC: 4.5 MG/DL (ref 2.3–4.8)
PLATELET # BLD AUTO: 206 K/UL (ref 130–400)
PLATELET BLD QL SMEAR: ADEQUATE
PO2 ARTERIAL: 84 MM HG (ref 75–108)
POC CHLORIDE: 101 MEQ/L (ref 99–110)
POC CREATININE: 3.5 MG/DL (ref 0.8–1.3)
POC FIO2: 21
POC SAMPLE TYPE: ABNORMAL
POIKILOCYTOSIS BLD QL SMEAR: ABNORMAL
POLYCHROMASIA BLD QL SMEAR: ABNORMAL
POTASSIUM SERPL-SCNC: 4.9 MEQ/L (ref 3.4–4.9)
POTASSIUM SERPL-SCNC: 5 MEQ/L (ref 3.5–5.1)
POTASSIUM SERPL-SCNC: 5.1 MEQ/L (ref 3.4–4.9)
POTASSIUM SERPL-SCNC: 5.1 MEQ/L (ref 3.4–4.9)
PROT SERPL-MCNC: 4.8 G/DL (ref 6.3–8)
PROT SERPL-MCNC: 4.8 G/DL (ref 6.3–8)
RBC # BLD AUTO: 2.35 M/UL (ref 4.7–6.1)
SLIDE REVIEW: ABNORMAL
SODIUM BLD-SCNC: 135 MEQ/L (ref 136–145)
SODIUM SERPL-SCNC: 131 MEQ/L (ref 135–144)
SODIUM SERPL-SCNC: 133 MEQ/L (ref 135–144)
TCO2 ARTERIAL: 21 MMOL/L (ref 21–32)
VANCOMYCIN SERPL-MCNC: 18.4 UG/ML (ref 10–40)
WBC # BLD AUTO: 16.8 K/UL (ref 4.8–10.8)

## 2024-08-17 PROCEDURE — 82330 ASSAY OF CALCIUM: CPT

## 2024-08-17 PROCEDURE — 51798 US URINE CAPACITY MEASURE: CPT

## 2024-08-17 PROCEDURE — 85014 HEMATOCRIT: CPT

## 2024-08-17 PROCEDURE — 85025 COMPLETE CBC W/AUTO DIFF WBC: CPT

## 2024-08-17 PROCEDURE — 84100 ASSAY OF PHOSPHORUS: CPT

## 2024-08-17 PROCEDURE — 6370000000 HC RX 637 (ALT 250 FOR IP): Performed by: INTERNAL MEDICINE

## 2024-08-17 PROCEDURE — 2500000003 HC RX 250 WO HCPCS: Performed by: PHYSICAL MEDICINE & REHABILITATION

## 2024-08-17 PROCEDURE — 71045 X-RAY EXAM CHEST 1 VIEW: CPT

## 2024-08-17 PROCEDURE — 36600 WITHDRAWAL OF ARTERIAL BLOOD: CPT

## 2024-08-17 PROCEDURE — 82565 ASSAY OF CREATININE: CPT

## 2024-08-17 PROCEDURE — 99232 SBSQ HOSP IP/OBS MODERATE 35: CPT | Performed by: INTERNAL MEDICINE

## 2024-08-17 PROCEDURE — 83735 ASSAY OF MAGNESIUM: CPT

## 2024-08-17 PROCEDURE — 6370000000 HC RX 637 (ALT 250 FOR IP): Performed by: COLON & RECTAL SURGERY

## 2024-08-17 PROCEDURE — 2500000003 HC RX 250 WO HCPCS: Performed by: INTERNAL MEDICINE

## 2024-08-17 PROCEDURE — 2580000003 HC RX 258: Performed by: STUDENT IN AN ORGANIZED HEALTH CARE EDUCATION/TRAINING PROGRAM

## 2024-08-17 PROCEDURE — 85520 HEPARIN ASSAY: CPT

## 2024-08-17 PROCEDURE — 6370000000 HC RX 637 (ALT 250 FOR IP)

## 2024-08-17 PROCEDURE — 83605 ASSAY OF LACTIC ACID: CPT

## 2024-08-17 PROCEDURE — 2000000000 HC ICU R&B

## 2024-08-17 PROCEDURE — 80202 ASSAY OF VANCOMYCIN: CPT

## 2024-08-17 PROCEDURE — 6360000002 HC RX W HCPCS: Performed by: INTERNAL MEDICINE

## 2024-08-17 PROCEDURE — 80053 COMPREHEN METABOLIC PANEL: CPT

## 2024-08-17 PROCEDURE — 99291 CRITICAL CARE FIRST HOUR: CPT | Performed by: INTERNAL MEDICINE

## 2024-08-17 PROCEDURE — 82435 ASSAY OF BLOOD CHLORIDE: CPT

## 2024-08-17 PROCEDURE — 82803 BLOOD GASES ANY COMBINATION: CPT

## 2024-08-17 PROCEDURE — 84295 ASSAY OF SERUM SODIUM: CPT

## 2024-08-17 PROCEDURE — 2580000003 HC RX 258: Performed by: INTERNAL MEDICINE

## 2024-08-17 PROCEDURE — 6360000002 HC RX W HCPCS: Performed by: STUDENT IN AN ORGANIZED HEALTH CARE EDUCATION/TRAINING PROGRAM

## 2024-08-17 PROCEDURE — 2580000003 HC RX 258: Performed by: COLON & RECTAL SURGERY

## 2024-08-17 PROCEDURE — 82948 REAGENT STRIP/BLOOD GLUCOSE: CPT

## 2024-08-17 PROCEDURE — 84132 ASSAY OF SERUM POTASSIUM: CPT

## 2024-08-17 PROCEDURE — 6370000000 HC RX 637 (ALT 250 FOR IP): Performed by: PHYSICAL MEDICINE & REHABILITATION

## 2024-08-17 PROCEDURE — 99232 SBSQ HOSP IP/OBS MODERATE 35: CPT | Performed by: PHYSICAL MEDICINE & REHABILITATION

## 2024-08-17 RX ORDER — POVIDONE-IODINE 7.5 MG/ML
SOLUTION TOPICAL 3 TIMES DAILY
Status: DISCONTINUED | OUTPATIENT
Start: 2024-08-17 | End: 2024-08-20 | Stop reason: HOSPADM

## 2024-08-17 RX ORDER — LANSOPRAZOLE 30 MG/1
30 TABLET, ORALLY DISINTEGRATING, DELAYED RELEASE ORAL
Status: DISCONTINUED | OUTPATIENT
Start: 2024-08-17 | End: 2024-08-20 | Stop reason: HOSPADM

## 2024-08-17 RX ORDER — SODIUM CHLORIDE 9 MG/ML
INJECTION, SOLUTION INTRAVENOUS
Status: DISPENSED
Start: 2024-08-17 | End: 2024-08-18

## 2024-08-17 RX ORDER — CHLORHEXIDINE GLUCONATE 40 MG/ML
SOLUTION TOPICAL DAILY PRN
Status: DISCONTINUED | OUTPATIENT
Start: 2024-08-17 | End: 2024-08-20 | Stop reason: HOSPADM

## 2024-08-17 RX ORDER — TRAMADOL HYDROCHLORIDE 50 MG/1
100 TABLET ORAL EVERY 6 HOURS PRN
Status: DISCONTINUED | OUTPATIENT
Start: 2024-08-17 | End: 2024-08-20 | Stop reason: HOSPADM

## 2024-08-17 RX ADMIN — VALACYCLOVIR HYDROCHLORIDE 500 MG: 1 TABLET, FILM COATED ORAL at 17:27

## 2024-08-17 RX ADMIN — HEPARIN SODIUM 2200 UNITS: 1000 INJECTION INTRAVENOUS; SUBCUTANEOUS at 19:13

## 2024-08-17 RX ADMIN — INSULIN LISPRO 1 UNITS: 100 INJECTION, SOLUTION INTRAVENOUS; SUBCUTANEOUS at 08:12

## 2024-08-17 RX ADMIN — Medication 5 MG: at 20:37

## 2024-08-17 RX ADMIN — MIDODRINE HYDROCHLORIDE 15 MG: 10 TABLET ORAL at 08:12

## 2024-08-17 RX ADMIN — Medication: at 16:23

## 2024-08-17 RX ADMIN — CHOLESTYRAMINE 4 G: 4 POWDER, FOR SUSPENSION ORAL at 20:38

## 2024-08-17 RX ADMIN — ALTEPLASE 1 MG: 2.2 INJECTION, POWDER, LYOPHILIZED, FOR SOLUTION INTRAVENOUS at 14:44

## 2024-08-17 RX ADMIN — HEPARIN SODIUM 2300 UNITS: 1000 INJECTION INTRAVENOUS; SUBCUTANEOUS at 13:15

## 2024-08-17 RX ADMIN — AMIODARONE HYDROCHLORIDE 200 MG: 200 TABLET ORAL at 17:09

## 2024-08-17 RX ADMIN — DIPHENOXYLATE HYDROCHLORIDE AND ATROPINE SULFATE 1 TABLET: 2.5; .025 TABLET ORAL at 20:37

## 2024-08-17 RX ADMIN — MIDODRINE HYDROCHLORIDE 15 MG: 10 TABLET ORAL at 17:09

## 2024-08-17 RX ADMIN — LANSOPRAZOLE 30 MG: 30 TABLET, ORALLY DISINTEGRATING, DELAYED RELEASE ORAL at 11:26

## 2024-08-17 RX ADMIN — AMIODARONE HYDROCHLORIDE 200 MG: 200 TABLET ORAL at 08:12

## 2024-08-17 RX ADMIN — MUPIROCIN: 20 OINTMENT TOPICAL at 11:07

## 2024-08-17 RX ADMIN — SODIUM BICARBONATE 650 MG TABLET 650 MG: at 20:37

## 2024-08-17 RX ADMIN — HEPARIN SODIUM 14 UNITS/KG/HR: 10000 INJECTION, SOLUTION INTRAVENOUS at 04:40

## 2024-08-17 RX ADMIN — TRAMADOL HYDROCHLORIDE 100 MG: 50 TABLET ORAL at 10:26

## 2024-08-17 RX ADMIN — Medication: at 09:23

## 2024-08-17 RX ADMIN — Medication 1 CAPSULE: at 08:29

## 2024-08-17 RX ADMIN — PROVIDONE IODINE: 7.5 STICK TOPICAL at 12:34

## 2024-08-17 RX ADMIN — Medication: at 01:23

## 2024-08-17 RX ADMIN — HEPARIN SODIUM 16 UNITS/KG/HR: 10000 INJECTION, SOLUTION INTRAVENOUS at 20:36

## 2024-08-17 RX ADMIN — Medication: at 07:03

## 2024-08-17 RX ADMIN — HEPARIN SODIUM 2300 UNITS: 1000 INJECTION INTRAVENOUS; SUBCUTANEOUS at 19:12

## 2024-08-17 RX ADMIN — MUPIROCIN: 20 OINTMENT TOPICAL at 20:43

## 2024-08-17 RX ADMIN — CEFEPIME 1000 MG: 1 INJECTION, POWDER, FOR SOLUTION INTRAMUSCULAR; INTRAVENOUS at 08:33

## 2024-08-17 RX ADMIN — INSULIN LISPRO 1 UNITS: 100 INJECTION, SOLUTION INTRAVENOUS; SUBCUTANEOUS at 11:26

## 2024-08-17 RX ADMIN — HEPARIN SODIUM 2000 UNITS: 1000 INJECTION INTRAVENOUS; SUBCUTANEOUS at 09:58

## 2024-08-17 RX ADMIN — MIDODRINE HYDROCHLORIDE 15 MG: 10 TABLET ORAL at 11:26

## 2024-08-17 RX ADMIN — CHLORHEXIDINE GLUCONATE: 213 SOLUTION TOPICAL at 11:07

## 2024-08-17 RX ADMIN — HEPARIN SODIUM 2200 UNITS: 1000 INJECTION INTRAVENOUS; SUBCUTANEOUS at 13:16

## 2024-08-17 RX ADMIN — CHOLESTYRAMINE 4 G: 4 POWDER, FOR SUSPENSION ORAL at 12:25

## 2024-08-17 RX ADMIN — AMIODARONE HYDROCHLORIDE 200 MG: 200 TABLET ORAL at 01:02

## 2024-08-17 RX ADMIN — SODIUM BICARBONATE 650 MG TABLET 650 MG: at 08:21

## 2024-08-17 RX ADMIN — PROVIDONE IODINE: 7.5 STICK TOPICAL at 20:43

## 2024-08-17 RX ADMIN — Medication: at 12:50

## 2024-08-17 RX ADMIN — FUROSEMIDE 120 MG: 10 INJECTION, SOLUTION INTRAMUSCULAR; INTRAVENOUS at 20:33

## 2024-08-17 RX ADMIN — Medication 14 MCG/MIN: at 20:34

## 2024-08-17 ASSESSMENT — PAIN SCALES - GENERAL
PAINLEVEL_OUTOF10: 0
PAINLEVEL_OUTOF10: 2

## 2024-08-18 LAB
ALBUMIN SERPL-MCNC: 2.3 G/DL (ref 3.5–4.6)
ALP SERPL-CCNC: 76 U/L (ref 35–104)
ALT SERPL-CCNC: <5 U/L (ref 0–41)
ANION GAP SERPL CALCULATED.3IONS-SCNC: 12 MEQ/L (ref 9–15)
ANTI-XA UNFRAC HEPARIN: 0.24 IU/ML
AST SERPL-CCNC: 10 U/L (ref 0–40)
B PARAP IS1001 DNA NPH QL NAA+NON-PROBE: NOT DETECTED
B PERT.PT PRMT NPH QL NAA+NON-PROBE: NOT DETECTED
BASOPHILS # BLD: 0 K/UL (ref 0–0.2)
BASOPHILS NFR BLD: 0.3 %
BILIRUB SERPL-MCNC: <0.2 MG/DL (ref 0.2–0.7)
BUN SERPL-MCNC: 51 MG/DL (ref 8–23)
C PNEUM DNA NPH QL NAA+NON-PROBE: NOT DETECTED
CALCIUM SERPL-MCNC: 7.3 MG/DL (ref 8.5–9.9)
CHLORIDE SERPL-SCNC: 99 MEQ/L (ref 95–107)
CO2 SERPL-SCNC: 22 MEQ/L (ref 20–31)
CREAT SERPL-MCNC: 3.98 MG/DL (ref 0.7–1.2)
CULTURE, BLOOD ID SENSITIVITY: ABNORMAL
CULTURE, BLOOD ID SENSITIVITY: ABNORMAL
EOSINOPHIL # BLD: 0.1 K/UL (ref 0–0.7)
EOSINOPHIL NFR BLD: 0.4 %
ERYTHROCYTE [DISTWIDTH] IN BLOOD BY AUTOMATED COUNT: 15.7 % (ref 11.5–14.5)
FLUAV RNA NPH QL NAA+NON-PROBE: NOT DETECTED
FLUBV RNA NPH QL NAA+NON-PROBE: NOT DETECTED
GLOBULIN SER CALC-MCNC: 2.3 G/DL (ref 2.3–3.5)
GLUCOSE BLD-MCNC: 212 MG/DL (ref 70–99)
GLUCOSE BLD-MCNC: 221 MG/DL (ref 70–99)
GLUCOSE BLD-MCNC: 222 MG/DL (ref 70–99)
GLUCOSE SERPL-MCNC: 237 MG/DL (ref 70–99)
HADV DNA NPH QL NAA+NON-PROBE: NOT DETECTED
HCOV 229E RNA NPH QL NAA+NON-PROBE: NOT DETECTED
HCOV HKU1 RNA NPH QL NAA+NON-PROBE: NOT DETECTED
HCOV NL63 RNA NPH QL NAA+NON-PROBE: NOT DETECTED
HCOV OC43 RNA NPH QL NAA+NON-PROBE: NOT DETECTED
HCT VFR BLD AUTO: 21.8 % (ref 42–52)
HGB BLD-MCNC: 6.8 G/DL (ref 14–18)
HMPV RNA NPH QL NAA+NON-PROBE: NOT DETECTED
HPIV1 RNA NPH QL NAA+NON-PROBE: NOT DETECTED
HPIV2 RNA NPH QL NAA+NON-PROBE: NOT DETECTED
HPIV3 RNA NPH QL NAA+NON-PROBE: NOT DETECTED
HPIV4 RNA NPH QL NAA+NON-PROBE: NOT DETECTED
LYMPHOCYTES # BLD: 1.3 K/UL (ref 1–4.8)
LYMPHOCYTES NFR BLD: 9.4 %
M PNEUMO DNA NPH QL NAA+NON-PROBE: NOT DETECTED
MAGNESIUM SERPL-MCNC: 2.5 MG/DL (ref 1.7–2.4)
MCH RBC QN AUTO: 31.5 PG (ref 27–31.3)
MCHC RBC AUTO-ENTMCNC: 31.2 % (ref 33–37)
MCV RBC AUTO: 100.9 FL (ref 79–92.2)
MONOCYTES # BLD: 1.7 K/UL (ref 0.2–0.8)
MONOCYTES NFR BLD: 12.8 %
NEUTROPHILS # BLD: 8.1 K/UL (ref 1.4–6.5)
NEUTS SEG NFR BLD: 61.3 %
ORGANISM: ABNORMAL
PERFORMED ON: ABNORMAL
PHOSPHATE SERPL-MCNC: 4.5 MG/DL (ref 2.3–4.8)
PLATELET # BLD AUTO: 211 K/UL (ref 130–400)
POTASSIUM SERPL-SCNC: 5.2 MEQ/L (ref 3.4–4.9)
POTASSIUM SERPL-SCNC: 5.2 MEQ/L (ref 3.4–4.9)
PROT SERPL-MCNC: 4.6 G/DL (ref 6.3–8)
RBC # BLD AUTO: 2.16 M/UL (ref 4.7–6.1)
RSV RNA NPH QL NAA+NON-PROBE: NOT DETECTED
RV+EV RNA NPH QL NAA+NON-PROBE: NOT DETECTED
SARS-COV-2 RNA NPH QL NAA+NON-PROBE: NOT DETECTED
SODIUM SERPL-SCNC: 133 MEQ/L (ref 135–144)
VANCOMYCIN SERPL-MCNC: 16.8 UG/ML (ref 10–40)
WBC # BLD AUTO: 13.3 K/UL (ref 4.8–10.8)

## 2024-08-18 PROCEDURE — 99232 SBSQ HOSP IP/OBS MODERATE 35: CPT | Performed by: PHYSICAL MEDICINE & REHABILITATION

## 2024-08-18 PROCEDURE — 0202U NFCT DS 22 TRGT SARS-COV-2: CPT

## 2024-08-18 PROCEDURE — 6370000000 HC RX 637 (ALT 250 FOR IP): Performed by: INTERNAL MEDICINE

## 2024-08-18 PROCEDURE — 2580000003 HC RX 258: Performed by: INTERNAL MEDICINE

## 2024-08-18 PROCEDURE — 6360000002 HC RX W HCPCS: Performed by: INTERNAL MEDICINE

## 2024-08-18 PROCEDURE — 99222 1ST HOSP IP/OBS MODERATE 55: CPT | Performed by: INTERNAL MEDICINE

## 2024-08-18 PROCEDURE — 99291 CRITICAL CARE FIRST HOUR: CPT | Performed by: INTERNAL MEDICINE

## 2024-08-18 PROCEDURE — 84100 ASSAY OF PHOSPHORUS: CPT

## 2024-08-18 PROCEDURE — 2500000003 HC RX 250 WO HCPCS: Performed by: PHYSICAL MEDICINE & REHABILITATION

## 2024-08-18 PROCEDURE — 51798 US URINE CAPACITY MEASURE: CPT

## 2024-08-18 PROCEDURE — 6370000000 HC RX 637 (ALT 250 FOR IP)

## 2024-08-18 PROCEDURE — 85520 HEPARIN ASSAY: CPT

## 2024-08-18 PROCEDURE — 6370000000 HC RX 637 (ALT 250 FOR IP): Performed by: COLON & RECTAL SURGERY

## 2024-08-18 PROCEDURE — 85025 COMPLETE CBC W/AUTO DIFF WBC: CPT

## 2024-08-18 PROCEDURE — 80053 COMPREHEN METABOLIC PANEL: CPT

## 2024-08-18 PROCEDURE — 2000000000 HC ICU R&B

## 2024-08-18 PROCEDURE — 99232 SBSQ HOSP IP/OBS MODERATE 35: CPT | Performed by: INTERNAL MEDICINE

## 2024-08-18 PROCEDURE — 83735 ASSAY OF MAGNESIUM: CPT

## 2024-08-18 PROCEDURE — 80202 ASSAY OF VANCOMYCIN: CPT

## 2024-08-18 PROCEDURE — 2500000003 HC RX 250 WO HCPCS: Performed by: INTERNAL MEDICINE

## 2024-08-18 RX ADMIN — PROVIDONE IODINE: 7.5 STICK TOPICAL at 20:02

## 2024-08-18 RX ADMIN — MIDODRINE HYDROCHLORIDE 15 MG: 10 TABLET ORAL at 11:29

## 2024-08-18 RX ADMIN — CHOLESTYRAMINE 4 G: 4 POWDER, FOR SUSPENSION ORAL at 09:37

## 2024-08-18 RX ADMIN — MUPIROCIN: 20 OINTMENT TOPICAL at 11:29

## 2024-08-18 RX ADMIN — SODIUM BICARBONATE 650 MG TABLET 650 MG: at 07:42

## 2024-08-18 RX ADMIN — MIDODRINE HYDROCHLORIDE 15 MG: 10 TABLET ORAL at 07:41

## 2024-08-18 RX ADMIN — INSULIN LISPRO 1 UNITS: 100 INJECTION, SOLUTION INTRAVENOUS; SUBCUTANEOUS at 18:24

## 2024-08-18 RX ADMIN — DIPHENOXYLATE HYDROCHLORIDE AND ATROPINE SULFATE 1 TABLET: 2.5; .025 TABLET ORAL at 20:40

## 2024-08-18 RX ADMIN — CHOLESTYRAMINE 4 G: 4 POWDER, FOR SUSPENSION ORAL at 20:02

## 2024-08-18 RX ADMIN — MIDODRINE HYDROCHLORIDE 15 MG: 10 TABLET ORAL at 17:58

## 2024-08-18 RX ADMIN — CEFEPIME 1000 MG: 1 INJECTION, POWDER, FOR SOLUTION INTRAMUSCULAR; INTRAVENOUS at 09:42

## 2024-08-18 RX ADMIN — Medication 5 MG: at 20:02

## 2024-08-18 RX ADMIN — ACETAMINOPHEN 325MG 650 MG: 325 TABLET ORAL at 20:01

## 2024-08-18 RX ADMIN — LANSOPRAZOLE 30 MG: 30 TABLET, ORALLY DISINTEGRATING, DELAYED RELEASE ORAL at 07:41

## 2024-08-18 RX ADMIN — INSULIN LISPRO 1 UNITS: 100 INJECTION, SOLUTION INTRAVENOUS; SUBCUTANEOUS at 11:36

## 2024-08-18 RX ADMIN — AMIODARONE HYDROCHLORIDE 200 MG: 200 TABLET ORAL at 17:58

## 2024-08-18 RX ADMIN — AMIODARONE HYDROCHLORIDE 200 MG: 200 TABLET ORAL at 07:42

## 2024-08-18 RX ADMIN — INSULIN LISPRO 1 UNITS: 100 INJECTION, SOLUTION INTRAVENOUS; SUBCUTANEOUS at 08:15

## 2024-08-18 RX ADMIN — SODIUM BICARBONATE 650 MG TABLET 650 MG: at 20:02

## 2024-08-18 RX ADMIN — AMIODARONE HYDROCHLORIDE 200 MG: 200 TABLET ORAL at 04:37

## 2024-08-18 RX ADMIN — Medication 1 CAPSULE: at 07:42

## 2024-08-18 RX ADMIN — Medication 15 MCG/MIN: at 11:37

## 2024-08-18 RX ADMIN — VALACYCLOVIR HYDROCHLORIDE 500 MG: 1 TABLET, FILM COATED ORAL at 17:58

## 2024-08-18 RX ADMIN — MUPIROCIN: 20 OINTMENT TOPICAL at 20:01

## 2024-08-18 ASSESSMENT — PAIN SCALES - GENERAL
PAINLEVEL_OUTOF10: 0
PAINLEVEL_OUTOF10: 1
PAINLEVEL_OUTOF10: 0
PAINLEVEL_OUTOF10: 2
PAINLEVEL_OUTOF10: 0

## 2024-08-19 ENCOUNTER — APPOINTMENT (OUTPATIENT)
Dept: GENERAL RADIOLOGY | Age: 79
DRG: 673 | End: 2024-08-19
Attending: INTERNAL MEDICINE
Payer: MEDICARE

## 2024-08-19 PROBLEM — A08.11 ENTERITIS DUE TO NOROVIRUS: Status: ACTIVE | Noted: 2024-08-19

## 2024-08-19 LAB
ALBUMIN SERPL-MCNC: 2.2 G/DL (ref 3.5–4.6)
ALP SERPL-CCNC: 69 U/L (ref 35–104)
ALT SERPL-CCNC: <5 U/L (ref 0–41)
ANION GAP SERPL CALCULATED.3IONS-SCNC: 16 MEQ/L (ref 9–15)
ANISOCYTOSIS BLD QL SMEAR: ABNORMAL
AST SERPL-CCNC: 13 U/L (ref 0–40)
BACTERIA BLD CULT: NORMAL
BASOPHILS # BLD: 0.1 K/UL (ref 0–0.2)
BASOPHILS NFR BLD: 1 %
BILIRUB SERPL-MCNC: 0.3 MG/DL (ref 0.2–0.7)
BUN SERPL-MCNC: 64 MG/DL (ref 8–23)
CALCIUM SERPL-MCNC: 7.5 MG/DL (ref 8.5–9.9)
CHLORIDE SERPL-SCNC: 99 MEQ/L (ref 95–107)
CO2 SERPL-SCNC: 18 MEQ/L (ref 20–31)
CREAT SERPL-MCNC: 4.79 MG/DL (ref 0.7–1.2)
EOSINOPHIL # BLD: 0.2 K/UL (ref 0–0.7)
EOSINOPHIL NFR BLD: 2 %
ERYTHROCYTE [DISTWIDTH] IN BLOOD BY AUTOMATED COUNT: 16.6 % (ref 11.5–14.5)
GLOBULIN SER CALC-MCNC: 2.4 G/DL (ref 2.3–3.5)
GLUCOSE BLD-MCNC: 202 MG/DL (ref 70–99)
GLUCOSE BLD-MCNC: 206 MG/DL (ref 70–99)
GLUCOSE BLD-MCNC: 210 MG/DL (ref 70–99)
GLUCOSE BLD-MCNC: 248 MG/DL (ref 70–99)
GLUCOSE SERPL-MCNC: 224 MG/DL (ref 70–99)
HCT VFR BLD AUTO: 23.5 % (ref 42–52)
HGB BLD-MCNC: 7.3 G/DL (ref 14–18)
LYMPHOCYTES # BLD: 1.3 K/UL (ref 1–4.8)
LYMPHOCYTES NFR BLD: 11 %
MACROCYTES BLD QL SMEAR: ABNORMAL
MAGNESIUM SERPL-MCNC: 2.7 MG/DL (ref 1.7–2.4)
MCH RBC QN AUTO: 31.6 PG (ref 27–31.3)
MCHC RBC AUTO-ENTMCNC: 31.1 % (ref 33–37)
MCV RBC AUTO: 101.7 FL (ref 79–92.2)
METAMYELOCYTES NFR BLD MANUAL: 4 %
MONOCYTES # BLD: 1.2 K/UL (ref 0.2–0.8)
MONOCYTES NFR BLD: 9.8 %
MYELOCYTES NFR BLD MANUAL: 7 %
NEUTROPHILS # BLD: 8.9 K/UL (ref 1.4–6.5)
NEUTS SEG NFR BLD: 63 %
NRBC BLD-RTO: 10 /100 WBC
OVALOCYTES BLD QL SMEAR: ABNORMAL
PERFORMED ON: ABNORMAL
PLATELET # BLD AUTO: 116 K/UL (ref 130–400)
PLATELET BLD QL SMEAR: ABNORMAL
POIKILOCYTOSIS BLD QL SMEAR: ABNORMAL
POLYCHROMASIA BLD QL SMEAR: ABNORMAL
POTASSIUM SERPL-SCNC: 5.8 MEQ/L (ref 3.4–4.9)
PROMYELOCYTES NFR BLD MANUAL: 3 %
PROT SERPL-MCNC: 4.6 G/DL (ref 6.3–8)
RBC # BLD AUTO: 2.31 M/UL (ref 4.7–6.1)
SLIDE REVIEW: ABNORMAL
SODIUM SERPL-SCNC: 133 MEQ/L (ref 135–144)
WBC # BLD AUTO: 11.6 K/UL (ref 4.8–10.8)

## 2024-08-19 PROCEDURE — 6360000002 HC RX W HCPCS: Performed by: INTERNAL MEDICINE

## 2024-08-19 PROCEDURE — 99233 SBSQ HOSP IP/OBS HIGH 50: CPT | Performed by: INTERNAL MEDICINE

## 2024-08-19 PROCEDURE — 99291 CRITICAL CARE FIRST HOUR: CPT | Performed by: INTERNAL MEDICINE

## 2024-08-19 PROCEDURE — 80053 COMPREHEN METABOLIC PANEL: CPT

## 2024-08-19 PROCEDURE — 2500000003 HC RX 250 WO HCPCS: Performed by: PHYSICAL MEDICINE & REHABILITATION

## 2024-08-19 PROCEDURE — 83735 ASSAY OF MAGNESIUM: CPT

## 2024-08-19 PROCEDURE — 99231 SBSQ HOSP IP/OBS SF/LOW 25: CPT | Performed by: PHYSICIAN ASSISTANT

## 2024-08-19 PROCEDURE — 99231 SBSQ HOSP IP/OBS SF/LOW 25: CPT | Performed by: PHYSICAL MEDICINE & REHABILITATION

## 2024-08-19 PROCEDURE — 85025 COMPLETE CBC W/AUTO DIFF WBC: CPT

## 2024-08-19 PROCEDURE — 6370000000 HC RX 637 (ALT 250 FOR IP): Performed by: COLON & RECTAL SURGERY

## 2024-08-19 PROCEDURE — 2500000003 HC RX 250 WO HCPCS: Performed by: INTERNAL MEDICINE

## 2024-08-19 PROCEDURE — 6370000000 HC RX 637 (ALT 250 FOR IP)

## 2024-08-19 PROCEDURE — 71045 X-RAY EXAM CHEST 1 VIEW: CPT

## 2024-08-19 PROCEDURE — 2580000003 HC RX 258: Performed by: INTERNAL MEDICINE

## 2024-08-19 PROCEDURE — 6370000000 HC RX 637 (ALT 250 FOR IP): Performed by: INTERNAL MEDICINE

## 2024-08-19 PROCEDURE — 97164 PT RE-EVAL EST PLAN CARE: CPT

## 2024-08-19 PROCEDURE — 97168 OT RE-EVAL EST PLAN CARE: CPT

## 2024-08-19 PROCEDURE — 2000000000 HC ICU R&B

## 2024-08-19 PROCEDURE — 97162 PT EVAL MOD COMPLEX 30 MIN: CPT

## 2024-08-19 RX ORDER — HEPARIN SODIUM 5000 [USP'U]/ML
5000 INJECTION, SOLUTION INTRAVENOUS; SUBCUTANEOUS EVERY 8 HOURS SCHEDULED
Status: DISCONTINUED | OUTPATIENT
Start: 2024-08-19 | End: 2024-08-20 | Stop reason: HOSPADM

## 2024-08-19 RX ADMIN — AMIODARONE HYDROCHLORIDE 200 MG: 200 TABLET ORAL at 03:46

## 2024-08-19 RX ADMIN — CEFEPIME 1000 MG: 1 INJECTION, POWDER, FOR SOLUTION INTRAMUSCULAR; INTRAVENOUS at 09:17

## 2024-08-19 RX ADMIN — MUPIROCIN: 20 OINTMENT TOPICAL at 20:06

## 2024-08-19 RX ADMIN — HEPARIN SODIUM 5000 UNITS: 5000 INJECTION INTRAVENOUS; SUBCUTANEOUS at 15:30

## 2024-08-19 RX ADMIN — TRAMADOL HYDROCHLORIDE 100 MG: 50 TABLET ORAL at 12:49

## 2024-08-19 RX ADMIN — MUPIROCIN: 20 OINTMENT TOPICAL at 08:57

## 2024-08-19 RX ADMIN — PROVIDONE IODINE: 7.5 STICK TOPICAL at 15:29

## 2024-08-19 RX ADMIN — PROVIDONE IODINE: 7.5 STICK TOPICAL at 09:03

## 2024-08-19 RX ADMIN — PROVIDONE IODINE: 7.5 STICK TOPICAL at 20:05

## 2024-08-19 RX ADMIN — Medication 1 CAPSULE: at 08:56

## 2024-08-19 RX ADMIN — Medication 15 MCG/MIN: at 07:24

## 2024-08-19 RX ADMIN — INSULIN LISPRO 1 UNITS: 100 INJECTION, SOLUTION INTRAVENOUS; SUBCUTANEOUS at 17:36

## 2024-08-19 RX ADMIN — MIDODRINE HYDROCHLORIDE 15 MG: 10 TABLET ORAL at 12:29

## 2024-08-19 RX ADMIN — SODIUM BICARBONATE 650 MG TABLET 650 MG: at 20:07

## 2024-08-19 RX ADMIN — INSULIN LISPRO 1 UNITS: 100 INJECTION, SOLUTION INTRAVENOUS; SUBCUTANEOUS at 09:26

## 2024-08-19 RX ADMIN — SODIUM BICARBONATE 650 MG TABLET 650 MG: at 08:56

## 2024-08-19 RX ADMIN — CHOLESTYRAMINE 4 G: 4 POWDER, FOR SUSPENSION ORAL at 08:56

## 2024-08-19 RX ADMIN — VALACYCLOVIR HYDROCHLORIDE 500 MG: 1 TABLET, FILM COATED ORAL at 17:38

## 2024-08-19 RX ADMIN — MIDODRINE HYDROCHLORIDE 15 MG: 10 TABLET ORAL at 08:56

## 2024-08-19 RX ADMIN — DIPHENOXYLATE HYDROCHLORIDE AND ATROPINE SULFATE 1 TABLET: 2.5; .025 TABLET ORAL at 20:09

## 2024-08-19 RX ADMIN — ACETAMINOPHEN 325MG 650 MG: 325 TABLET ORAL at 20:10

## 2024-08-19 RX ADMIN — INSULIN LISPRO 1 UNITS: 100 INJECTION, SOLUTION INTRAVENOUS; SUBCUTANEOUS at 12:35

## 2024-08-19 RX ADMIN — AMIODARONE HYDROCHLORIDE 200 MG: 200 TABLET ORAL at 17:36

## 2024-08-19 RX ADMIN — Medication 5 MG: at 20:06

## 2024-08-19 RX ADMIN — CHOLESTYRAMINE 4 G: 4 POWDER, FOR SUSPENSION ORAL at 21:48

## 2024-08-19 RX ADMIN — MIDODRINE HYDROCHLORIDE 15 MG: 10 TABLET ORAL at 17:36

## 2024-08-19 RX ADMIN — AMIODARONE HYDROCHLORIDE 200 MG: 200 TABLET ORAL at 08:56

## 2024-08-19 RX ADMIN — LANSOPRAZOLE 30 MG: 30 TABLET, ORALLY DISINTEGRATING, DELAYED RELEASE ORAL at 08:56

## 2024-08-19 RX ADMIN — HEPARIN SODIUM 5000 UNITS: 5000 INJECTION INTRAVENOUS; SUBCUTANEOUS at 21:48

## 2024-08-19 ASSESSMENT — PAIN DESCRIPTION - LOCATION: LOCATION: SACRUM

## 2024-08-19 ASSESSMENT — PAIN SCALES - GENERAL
PAINLEVEL_OUTOF10: 0
PAINLEVEL_OUTOF10: 0

## 2024-08-19 NOTE — INTERDISCIPLINARY ROUNDS
Spiritual Care Services     Summary of Visit:       presence with patient and patient's wife, Coby, as member of Interdisciplinary Rounds team member.      Patient on room air, seemed to be sleeping.  Patient's wife took active role in rounds and asked needed questions.      Patient's wife engaged in conversation with other team member, post rounds.   will follow up later today or tomorrow.      Encounter Summary  Encounter Overview/Reason: Interdisciplinary rounds  Service Provided For: Patient, Family  Referral/Consult From: Physician  Support System: Spouse  Last Encounter : 08/13/24  Complexity of Encounter: Low  Begin Time: 1000  End Time : 1010  Total Time Calculated: 10 min     Crisis  Type: Rapid Response  Spiritual/Emotional needs  Type: Emotional Distress  Rituals, Rites and Sacraments  Type: Blessings           Palliative Care  Type: Palliative Care, Interdisciplinary Rounds  Advance Care Planning  Type: ACP conversation    Spiritual Assessment/Intervention/Outcomes:    Assessment: Unable to assess    Intervention: Prayer (assurance of)/[place]    Outcome: Comfort      Care Plan:    Plan and Referrals  Plan/Referrals: Continue to visit, (comment)          Spiritual Care Services   Electronically signed by ERIK Jean on 8/16/2024 at 11:24 AM.    To reach a  for emotional and spiritual support, place an EPIC consult request.   If a  is needed immediately, dial “0” and ask to page the on-call .   
Spiritual Care Services     Summary of Visit:   participated in ICU rounds. Patient's wife present. Patient and family receiving support from patient's [employer]. Patient to possibly transfer out of ICU later today.    Encounter Summary  Encounter Overview/Reason: Interdisciplinary rounds, Palliative Care  Service Provided For: Patient  Referral/Consult From: Physician  Support System: Spouse  Last Encounter : 08/13/24  Complexity of Encounter: Moderate  Begin Time: 1640  End Time : 1650  Total Time Calculated: 10 min     Crisis  Type: Rapid Response  Spiritual/Emotional needs  Type: Emotional Distress  Rituals, Rites and Sacraments  Type: Blessings           Palliative Care  Type: Palliative Care, Interdisciplinary Rounds  Advance Care Planning  Type: ACP conversation    Spiritual Assessment/Intervention/Outcomes:    Assessment: Unable to assess    Intervention: Prayer (assurance of)/[place]    Outcome: Comfort      Care Plan:    Plan and Referrals  Plan/Referrals: Continue to visit, (comment) (if requested by pt.)    Provide spiritual support as requested      Spiritual Care Services   Electronically signed by Chaplain Thierno on 8/15/2024 at 10:45 AM.    To reach a  for emotional and spiritual support, place an EPIC consult request.   If a  is needed immediately, dial “0” and ask to page the on-call .   
by Chaplain Mele on 8/19/2024 at 11:29 AM

## 2024-08-19 NOTE — FLOWSHEET NOTE
AM assessment completed. Pt remains generally lethargic. He does follow basic commands after frequent prompting. He remains on vasopressor support. Pt has refused all food and drink today. Pt's corpak became occluded today during med passing. It was replaced successfully with no issues. Pt's wife had a meeting with hospice today since she states it is the pt's wishes to not receive another tunnel catheter for dialysis. She also states to not escalate care with increased pressors or medications. Wife denied hospice admission and stated pt does not want hospice at this time. Goals of care are unclear at this time. Palliative is planning to discuss goals of care with pt and wife in the morning. Will continue to follow pt condition.

## 2024-08-20 VITALS
SYSTOLIC BLOOD PRESSURE: 108 MMHG | HEIGHT: 73 IN | WEIGHT: 253.09 LBS | DIASTOLIC BLOOD PRESSURE: 39 MMHG | OXYGEN SATURATION: 100 % | TEMPERATURE: 98.2 F | BODY MASS INDEX: 33.54 KG/M2 | HEART RATE: 97 BPM | RESPIRATION RATE: 25 BRPM

## 2024-08-20 PROBLEM — Z71.89 ENCOUNTER FOR HOSPICE CARE DISCUSSION: Status: ACTIVE | Noted: 2024-08-20

## 2024-08-20 PROBLEM — C85.10 B-CELL LYMPHOMA (HCC): Status: ACTIVE | Noted: 2024-08-20

## 2024-08-20 LAB
ALBUMIN SERPL-MCNC: 2.4 G/DL (ref 3.5–4.6)
ALP SERPL-CCNC: 75 U/L (ref 35–104)
ALT SERPL-CCNC: <5 U/L (ref 0–41)
ANION GAP SERPL CALCULATED.3IONS-SCNC: 17 MEQ/L (ref 9–15)
ANION GAP SERPL CALCULATED.3IONS-SCNC: 17 MEQ/L (ref 9–15)
ANION GAP SERPL CALCULATED.3IONS-SCNC: 21 MEQ/L (ref 9–15)
ANISOCYTOSIS BLD QL SMEAR: ABNORMAL
AST SERPL-CCNC: 10 U/L (ref 0–40)
BASOPHILS # BLD: 0 K/UL (ref 0–0.2)
BASOPHILS NFR BLD: 0.4 %
BILIRUB SERPL-MCNC: 0.3 MG/DL (ref 0.2–0.7)
BUN SERPL-MCNC: 82 MG/DL (ref 8–23)
BUN SERPL-MCNC: 86 MG/DL (ref 8–23)
BUN SERPL-MCNC: 87 MG/DL (ref 8–23)
CALCIUM SERPL-MCNC: 7.8 MG/DL (ref 8.5–9.9)
CALCIUM SERPL-MCNC: 7.8 MG/DL (ref 8.5–9.9)
CALCIUM SERPL-MCNC: 8.4 MG/DL (ref 8.5–9.9)
CHLORIDE SERPL-SCNC: 101 MEQ/L (ref 95–107)
CHLORIDE SERPL-SCNC: 98 MEQ/L (ref 95–107)
CHLORIDE SERPL-SCNC: 99 MEQ/L (ref 95–107)
CO2 SERPL-SCNC: 16 MEQ/L (ref 20–31)
CO2 SERPL-SCNC: 16 MEQ/L (ref 20–31)
CO2 SERPL-SCNC: 18 MEQ/L (ref 20–31)
CREAT SERPL-MCNC: 5.92 MG/DL (ref 0.7–1.2)
CREAT SERPL-MCNC: 6.02 MG/DL (ref 0.7–1.2)
CREAT SERPL-MCNC: 6.04 MG/DL (ref 0.7–1.2)
DOHLE BOD BLD QL SMEAR: ABNORMAL
EOSINOPHIL # BLD: 0 K/UL (ref 0–0.7)
EOSINOPHIL NFR BLD: 0.2 %
ERYTHROCYTE [DISTWIDTH] IN BLOOD BY AUTOMATED COUNT: 17.2 % (ref 11.5–14.5)
GLOBULIN SER CALC-MCNC: 2.5 G/DL (ref 2.3–3.5)
GLUCOSE BLD-MCNC: 256 MG/DL (ref 70–99)
GLUCOSE SERPL-MCNC: 228 MG/DL (ref 70–99)
GLUCOSE SERPL-MCNC: 231 MG/DL (ref 70–99)
GLUCOSE SERPL-MCNC: 247 MG/DL (ref 70–99)
HCT VFR BLD AUTO: 25.8 % (ref 42–52)
HGB BLD-MCNC: 8 G/DL (ref 14–18)
HYPOCHROMIA BLD QL SMEAR: ABNORMAL
LYMPHOCYTES # BLD: 1 K/UL (ref 1–4.8)
LYMPHOCYTES NFR BLD: 6 %
MACROCYTES BLD QL SMEAR: ABNORMAL
MAGNESIUM SERPL-MCNC: 3.1 MG/DL (ref 1.7–2.4)
MCH RBC QN AUTO: 31.6 PG (ref 27–31.3)
MCHC RBC AUTO-ENTMCNC: 31 % (ref 33–37)
MCV RBC AUTO: 102 FL (ref 79–92.2)
MONOCYTES # BLD: 1.9 K/UL (ref 0.2–0.8)
MONOCYTES NFR BLD: 13.7 %
MYELOPEROXIDASE AB SER-ACNC: 0 AU/ML (ref 0–19)
NEUTROPHILS # BLD: 10.9 K/UL (ref 1.4–6.5)
NEUTS SEG NFR BLD: 79 %
NRBC BLD-RTO: 26 /100 WBC
OVALOCYTES BLD QL SMEAR: ABNORMAL
PATH INTERP BLD-IMP: YES
PERFORMED ON: ABNORMAL
PLATELET # BLD AUTO: 129 K/UL (ref 130–400)
PLATELET BLD QL SMEAR: ABNORMAL
POIKILOCYTOSIS BLD QL SMEAR: ABNORMAL
POLYCHROMASIA BLD QL SMEAR: ABNORMAL
POTASSIUM SERPL-SCNC: 6.4 MEQ/L (ref 3.4–4.9)
POTASSIUM SERPL-SCNC: 6.5 MEQ/L (ref 3.4–4.9)
POTASSIUM SERPL-SCNC: 6.6 MEQ/L (ref 3.4–4.9)
PROT SERPL-MCNC: 4.9 G/DL (ref 6.3–8)
PROTEINASE3 AB SER-ACNC: 0 AU/ML (ref 0–19)
RBC # BLD AUTO: 2.53 M/UL (ref 4.7–6.1)
SLIDE REVIEW: ABNORMAL
SMUDGE CELLS BLD QL SMEAR: 16.7
SODIUM SERPL-SCNC: 134 MEQ/L (ref 135–144)
SODIUM SERPL-SCNC: 134 MEQ/L (ref 135–144)
SODIUM SERPL-SCNC: 135 MEQ/L (ref 135–144)
STOMATOCYTES BLD QL SMEAR: ABNORMAL
TOXIC GRANULATION: ABNORMAL
VARIANT LYMPHS NFR BLD: 1 %
WBC # BLD AUTO: 13.8 K/UL (ref 4.8–10.8)

## 2024-08-20 PROCEDURE — 99231 SBSQ HOSP IP/OBS SF/LOW 25: CPT | Performed by: PHYSICIAN ASSISTANT

## 2024-08-20 PROCEDURE — 2580000003 HC RX 258: Performed by: INTERNAL MEDICINE

## 2024-08-20 PROCEDURE — 85025 COMPLETE CBC W/AUTO DIFF WBC: CPT

## 2024-08-20 PROCEDURE — 6370000000 HC RX 637 (ALT 250 FOR IP): Performed by: STUDENT IN AN ORGANIZED HEALTH CARE EDUCATION/TRAINING PROGRAM

## 2024-08-20 PROCEDURE — 6370000000 HC RX 637 (ALT 250 FOR IP): Performed by: INTERNAL MEDICINE

## 2024-08-20 PROCEDURE — 2500000003 HC RX 250 WO HCPCS: Performed by: PHYSICAL MEDICINE & REHABILITATION

## 2024-08-20 PROCEDURE — 99232 SBSQ HOSP IP/OBS MODERATE 35: CPT | Performed by: INTERNAL MEDICINE

## 2024-08-20 PROCEDURE — 6370000000 HC RX 637 (ALT 250 FOR IP): Performed by: COLON & RECTAL SURGERY

## 2024-08-20 PROCEDURE — 83735 ASSAY OF MAGNESIUM: CPT

## 2024-08-20 PROCEDURE — 2500000003 HC RX 250 WO HCPCS: Performed by: NURSE PRACTITIONER

## 2024-08-20 PROCEDURE — 99291 CRITICAL CARE FIRST HOUR: CPT | Performed by: INTERNAL MEDICINE

## 2024-08-20 PROCEDURE — 80053 COMPREHEN METABOLIC PANEL: CPT

## 2024-08-20 PROCEDURE — 99233 SBSQ HOSP IP/OBS HIGH 50: CPT | Performed by: NURSE PRACTITIONER

## 2024-08-20 PROCEDURE — 6370000000 HC RX 637 (ALT 250 FOR IP): Performed by: NURSE PRACTITIONER

## 2024-08-20 PROCEDURE — 6360000002 HC RX W HCPCS: Performed by: INTERNAL MEDICINE

## 2024-08-20 PROCEDURE — 6370000000 HC RX 637 (ALT 250 FOR IP)

## 2024-08-20 PROCEDURE — 2500000003 HC RX 250 WO HCPCS: Performed by: INTERNAL MEDICINE

## 2024-08-20 RX ORDER — MECOBALAMIN 5000 MCG
5 TABLET,DISINTEGRATING ORAL NIGHTLY
OUTPATIENT
Start: 2024-08-20

## 2024-08-20 RX ORDER — INSULIN LISPRO 100 [IU]/ML
0-4 INJECTION, SOLUTION INTRAVENOUS; SUBCUTANEOUS
OUTPATIENT
Start: 2024-08-20

## 2024-08-20 RX ORDER — DEXTROSE MONOHYDRATE 100 MG/ML
INJECTION, SOLUTION INTRAVENOUS CONTINUOUS PRN
OUTPATIENT
Start: 2024-08-20

## 2024-08-20 RX ORDER — VALACYCLOVIR HYDROCHLORIDE 1 G/1
500 TABLET, FILM COATED ORAL DAILY
OUTPATIENT
Start: 2024-08-20

## 2024-08-20 RX ORDER — CALCIUM CHLORIDE 100 MG/ML
1000 INJECTION INTRAVENOUS; INTRAVENTRICULAR ONCE
Status: COMPLETED | OUTPATIENT
Start: 2024-08-20 | End: 2024-08-20

## 2024-08-20 RX ORDER — ONDANSETRON 4 MG/1
4 TABLET, ORALLY DISINTEGRATING ORAL EVERY 8 HOURS PRN
OUTPATIENT
Start: 2024-08-20

## 2024-08-20 RX ORDER — CHOLESTYRAMINE LIGHT 4 G/5.7G
4 POWDER, FOR SUSPENSION ORAL 2 TIMES DAILY
OUTPATIENT
Start: 2024-08-20

## 2024-08-20 RX ORDER — POVIDONE-IODINE 7.5 MG/ML
SOLUTION TOPICAL 3 TIMES DAILY
OUTPATIENT
Start: 2024-08-20

## 2024-08-20 RX ORDER — AMIODARONE HYDROCHLORIDE 200 MG/1
200 TABLET ORAL EVERY 8 HOURS
OUTPATIENT
Start: 2024-08-20

## 2024-08-20 RX ORDER — DEXTROSE MONOHYDRATE 25 G/50ML
25 INJECTION, SOLUTION INTRAVENOUS ONCE
Status: COMPLETED | OUTPATIENT
Start: 2024-08-20 | End: 2024-08-20

## 2024-08-20 RX ORDER — SODIUM BICARBONATE 650 MG/1
650 TABLET ORAL 2 TIMES DAILY
OUTPATIENT
Start: 2024-08-20

## 2024-08-20 RX ORDER — NOREPINEPHRINE BITARTRATE 0.06 MG/ML
1-100 INJECTION, SOLUTION INTRAVENOUS CONTINUOUS
OUTPATIENT
Start: 2024-08-20

## 2024-08-20 RX ORDER — DIPHENOXYLATE HYDROCHLORIDE AND ATROPINE SULFATE 2.5; .025 MG/1; MG/1
1 TABLET ORAL 2 TIMES DAILY PRN
OUTPATIENT
Start: 2024-08-20

## 2024-08-20 RX ORDER — ACETAMINOPHEN 325 MG/1
650 TABLET ORAL EVERY 4 HOURS PRN
OUTPATIENT
Start: 2024-08-20

## 2024-08-20 RX ORDER — GLUCAGON 1 MG/ML
1 KIT INJECTION PRN
OUTPATIENT
Start: 2024-08-20

## 2024-08-20 RX ORDER — INSULIN LISPRO 100 [IU]/ML
0-4 INJECTION, SOLUTION INTRAVENOUS; SUBCUTANEOUS NIGHTLY
OUTPATIENT
Start: 2024-08-20

## 2024-08-20 RX ORDER — CYCLOBENZAPRINE HCL 10 MG
5 TABLET ORAL 3 TIMES DAILY PRN
OUTPATIENT
Start: 2024-08-20

## 2024-08-20 RX ORDER — DIPHENOXYLATE HYDROCHLORIDE AND ATROPINE SULFATE 2.5; .025 MG/1; MG/1
1 TABLET ORAL DAILY
OUTPATIENT
Start: 2024-08-20

## 2024-08-20 RX ORDER — LACTOBACILLUS RHAMNOSUS GG 10B CELL
1 CAPSULE ORAL
OUTPATIENT
Start: 2024-08-21

## 2024-08-20 RX ORDER — POLYETHYLENE GLYCOL 3350 17 G/17G
17 POWDER, FOR SOLUTION ORAL DAILY PRN
OUTPATIENT
Start: 2024-08-20

## 2024-08-20 RX ORDER — LANSOPRAZOLE 30 MG/1
30 TABLET, ORALLY DISINTEGRATING, DELAYED RELEASE ORAL
OUTPATIENT
Start: 2024-08-21

## 2024-08-20 RX ORDER — CHLORHEXIDINE GLUCONATE 40 MG/ML
SOLUTION TOPICAL DAILY PRN
OUTPATIENT
Start: 2024-08-20

## 2024-08-20 RX ORDER — ONDANSETRON 2 MG/ML
4 INJECTION INTRAMUSCULAR; INTRAVENOUS EVERY 6 HOURS PRN
OUTPATIENT
Start: 2024-08-20

## 2024-08-20 RX ORDER — TRAMADOL HYDROCHLORIDE 50 MG/1
100 TABLET ORAL EVERY 6 HOURS PRN
OUTPATIENT
Start: 2024-08-20

## 2024-08-20 RX ORDER — CALCIUM CARBONATE 500 MG/1
1000 TABLET, CHEWABLE ORAL 3 TIMES DAILY PRN
OUTPATIENT
Start: 2024-08-20

## 2024-08-20 RX ADMIN — SODIUM ZIRCONIUM CYCLOSILICATE 10 G: 10 POWDER, FOR SUSPENSION ORAL at 13:45

## 2024-08-20 RX ADMIN — INSULIN LISPRO 1 UNITS: 100 INJECTION, SOLUTION INTRAVENOUS; SUBCUTANEOUS at 13:23

## 2024-08-20 RX ADMIN — INSULIN LISPRO 1 UNITS: 100 INJECTION, SOLUTION INTRAVENOUS; SUBCUTANEOUS at 08:20

## 2024-08-20 RX ADMIN — SODIUM BICARBONATE 650 MG TABLET 650 MG: at 20:01

## 2024-08-20 RX ADMIN — VALACYCLOVIR HYDROCHLORIDE 500 MG: 1 TABLET, FILM COATED ORAL at 16:42

## 2024-08-20 RX ADMIN — PROVIDONE IODINE: 7.5 STICK TOPICAL at 08:27

## 2024-08-20 RX ADMIN — SODIUM BICARBONATE 650 MG TABLET 650 MG: at 08:33

## 2024-08-20 RX ADMIN — CHOLESTYRAMINE 4 G: 4 POWDER, FOR SUSPENSION ORAL at 15:29

## 2024-08-20 RX ADMIN — AMIODARONE HYDROCHLORIDE 200 MG: 200 TABLET ORAL at 08:25

## 2024-08-20 RX ADMIN — HEPARIN SODIUM 5000 UNITS: 5000 INJECTION INTRAVENOUS; SUBCUTANEOUS at 13:45

## 2024-08-20 RX ADMIN — MIDODRINE HYDROCHLORIDE 15 MG: 10 TABLET ORAL at 08:25

## 2024-08-20 RX ADMIN — SODIUM BICARBONATE 50 MEQ: 84 INJECTION INTRAVENOUS at 09:05

## 2024-08-20 RX ADMIN — PROVIDONE IODINE: 7.5 STICK TOPICAL at 20:01

## 2024-08-20 RX ADMIN — DEXTROSE MONOHYDRATE 25 G: 25 INJECTION, SOLUTION INTRAVENOUS at 09:01

## 2024-08-20 RX ADMIN — HEPARIN SODIUM 5000 UNITS: 5000 INJECTION INTRAVENOUS; SUBCUTANEOUS at 05:30

## 2024-08-20 RX ADMIN — LANSOPRAZOLE 30 MG: 30 TABLET, ORALLY DISINTEGRATING, DELAYED RELEASE ORAL at 05:30

## 2024-08-20 RX ADMIN — MIDODRINE HYDROCHLORIDE 15 MG: 10 TABLET ORAL at 13:23

## 2024-08-20 RX ADMIN — INSULIN LISPRO 1 UNITS: 100 INJECTION, SOLUTION INTRAVENOUS; SUBCUTANEOUS at 16:40

## 2024-08-20 RX ADMIN — MIDODRINE HYDROCHLORIDE 15 MG: 10 TABLET ORAL at 16:43

## 2024-08-20 RX ADMIN — Medication 1 CAPSULE: at 08:25

## 2024-08-20 RX ADMIN — AMIODARONE HYDROCHLORIDE 200 MG: 200 TABLET ORAL at 16:42

## 2024-08-20 RX ADMIN — CALCIUM CHLORIDE INJECTION 1000 MG: 100 INJECTION, SOLUTION INTRAVENOUS at 09:05

## 2024-08-20 RX ADMIN — Medication 15 MCG/MIN: at 03:29

## 2024-08-20 RX ADMIN — SODIUM ZIRCONIUM CYCLOSILICATE 10 G: 10 POWDER, FOR SUSPENSION ORAL at 20:01

## 2024-08-20 RX ADMIN — MUPIROCIN: 20 OINTMENT TOPICAL at 20:01

## 2024-08-20 RX ADMIN — HUMAN INSULIN 10 UNITS: 100 INJECTION, SOLUTION SUBCUTANEOUS at 09:06

## 2024-08-20 RX ADMIN — SODIUM ZIRCONIUM CYCLOSILICATE 10 G: 10 POWDER, FOR SUSPENSION ORAL at 10:21

## 2024-08-20 RX ADMIN — CEFEPIME 1000 MG: 1 INJECTION, POWDER, FOR SOLUTION INTRAMUSCULAR; INTRAVENOUS at 08:24

## 2024-08-20 ASSESSMENT — PAIN SCALES - GENERAL
PAINLEVEL_OUTOF10: 0
PAINLEVEL_OUTOF10: 1
PAINLEVEL_OUTOF10: 0
PAINLEVEL_OUTOF10: 0

## 2024-08-20 NOTE — PLAN OF CARE
Problem: Cardiovascular - Adult  Goal: Maintains optimal cardiac output and hemodynamic stability  Recent Flowsheet Documentation  Taken 8/18/2024 2000 by Edwige Leung RN  Maintains optimal cardiac output and hemodynamic stability:   Monitor blood pressure and heart rate   Monitor urine output and notify Licensed Independent Practitioner for values outside of normal range  8/18/2024 1106 by Viviana Mckeon RN  Outcome: Not Progressing  Flowsheets (Taken 8/18/2024 0730)  Maintains optimal cardiac output and hemodynamic stability: Monitor blood pressure and heart rate     Problem: Metabolic/Fluid and Electrolytes - Adult  Goal: Electrolytes maintained within normal limits  Recent Flowsheet Documentation  Taken 8/18/2024 2000 by Edwige Leung RN  Electrolytes maintained within normal limits:   Monitor labs and assess patient for signs and symptoms of electrolyte imbalances   Administer electrolyte replacement as ordered  8/18/2024 1106 by Viviana Mckeon RN  Outcome: Not Progressing  Flowsheets (Taken 8/18/2024 0730)  Electrolytes maintained within normal limits: Monitor labs and assess patient for signs and symptoms of electrolyte imbalances  Goal: Hemodynamic stability and optimal renal function maintained  Recent Flowsheet Documentation  Taken 8/18/2024 2000 by Edwige Leung RN  Hemodynamic stability and optimal renal function maintained:   Monitor labs and assess for signs and symptoms of volume excess or deficit   Monitor intake, output and patient weight  8/18/2024 1106 by Viviana Mckeon RN  Outcome: Not Progressing  Flowsheets (Taken 8/18/2024 0730)  Hemodynamic stability and optimal renal function maintained: Monitor labs and assess for signs and symptoms of volume excess or deficit     Problem: Chronic Conditions and Co-morbidities  Goal: Patient's chronic conditions and co-morbidity symptoms are monitored and maintained or improved  Recent Flowsheet Documentation  Taken 8/18/2024 2000 by Edwige Leung 
  Problem: Discharge Planning  Goal: Discharge to home or other facility with appropriate resources  Outcome: Progressing  Flowsheets (Taken 8/2/2024 2149)  Discharge to home or other facility with appropriate resources:   Identify barriers to discharge with patient and caregiver   Arrange for needed discharge resources and transportation as appropriate   Identify discharge learning needs (meds, wound care, etc)     Problem: Cardiovascular - Adult  Goal: Absence of cardiac dysrhythmias or at baseline  Outcome: Progressing  Flowsheets (Taken 8/2/2024 2149)  Absence of cardiac dysrhythmias or at baseline:   Monitor cardiac rate and rhythm   Assess for signs of decreased cardiac output     Problem: Metabolic/Fluid and Electrolytes - Adult  Goal: Electrolytes maintained within normal limits  Outcome: Progressing  Flowsheets (Taken 8/2/2024 2149)  Electrolytes maintained within normal limits:   Monitor labs and assess patient for signs and symptoms of electrolyte imbalances   Administer electrolyte replacement as ordered   Monitor response to electrolyte replacements, including repeat lab results as appropriate  Goal: Hemodynamic stability and optimal renal function maintained  Outcome: Progressing  Flowsheets (Taken 8/2/2024 2149)  Hemodynamic stability and optimal renal function maintained:   Monitor labs and assess for signs and symptoms of volume excess or deficit   Monitor intake, output and patient weight   Monitor response to interventions for patient's volume status, including labs, urine output, blood pressure (other measures as available)   Encourage oral intake as appropriate  Goal: Glucose maintained within prescribed range  Outcome: Progressing  Flowsheets (Taken 8/2/2024 2149)  Glucose maintained within prescribed range:   Monitor blood glucose as ordered   Assess for signs and symptoms of hyperglycemia and hypoglycemia   Administer ordered medications to maintain glucose within target range   Assess 
  Problem: Nutrition Deficit:  Goal: Optimize nutritional status  Outcome: Not Progressing  Flowsheets (Taken 8/12/2024 1318 by Gaby Montenegro, MS, RD, LD)  Nutrient intake appropriate for improving, restoring, or maintaining nutritional needs:   Assess nutritional status and recommend course of action   Recommend appropriate diets, oral nutritional supplements, and vitamin/mineral supplements   Provide specific nutrition education to patient or family as appropriate   Recommend, monitor, and adjust tube feedings and TPN/PPN based on assessed needs   Monitor oral intake, labs, and treatment plans     
  Problem: Pain  Goal: Verbalizes/displays adequate comfort level or baseline comfort level  8/7/2024 0041 by Maddi Sheridan RN  Outcome: Progressing     
  Problem: Safety - Adult  Goal: Free from fall injury  8/11/2024 1042 by Sarina Lua RN  Outcome: Progressing  8/11/2024 0053 by Maddi Sheridan RN  Outcome: Progressing     Problem: Discharge Planning  Goal: Discharge to home or other facility with appropriate resources  8/11/2024 1042 by Sarina Lua RN  Outcome: Progressing  Flowsheets (Taken 8/11/2024 0845)  Discharge to home or other facility with appropriate resources:   Identify barriers to discharge with patient and caregiver   Arrange for needed discharge resources and transportation as appropriate   Identify discharge learning needs (meds, wound care, etc)  8/11/2024 0053 by Maddi Sheridan RN  Outcome: Progressing  Flowsheets (Taken 8/10/2024 1924)  Discharge to home or other facility with appropriate resources:   Identify barriers to discharge with patient and caregiver   Arrange for needed discharge resources and transportation as appropriate   Identify discharge learning needs (meds, wound care, etc)     Problem: ABCDS Injury Assessment  Goal: Absence of physical injury  8/11/2024 1042 by Sarina Lua RN  Outcome: Progressing  8/11/2024 0053 by Maddi Sheridan RN  Outcome: Progressing     Problem: Pain  Goal: Verbalizes/displays adequate comfort level or baseline comfort level  8/11/2024 1042 by Sarina Lua RN  Outcome: Progressing  8/11/2024 0053 by Maddi Sheridan RN  Outcome: Progressing  Flowsheets (Taken 8/10/2024 1924)  Verbalizes/displays adequate comfort level or baseline comfort level: Encourage patient to monitor pain and request assistance     Problem: Cardiovascular - Adult  Goal: Absence of cardiac dysrhythmias or at baseline  8/11/2024 1042 by Sarina Lua RN  Outcome: Progressing  Flowsheets (Taken 8/11/2024 0845)  Absence of cardiac dysrhythmias or at baseline:   Monitor cardiac rate and rhythm   Assess for signs of decreased cardiac output  8/11/2024 0053 by Maddi Sheridan RN  Outcome: 
  Problem: Safety - Adult  Goal: Free from fall injury  8/12/2024 0022 by Maddi Sheridan RN  Outcome: Progressing     Problem: Discharge Planning  Goal: Discharge to home or other facility with appropriate resources  8/12/2024 0022 by Maddi Sheridan RN  Outcome: Progressing  Flowsheets (Taken 8/11/2024 2029)  Discharge to home or other facility with appropriate resources:   Identify barriers to discharge with patient and caregiver   Arrange for needed discharge resources and transportation as appropriate   Identify discharge learning needs (meds, wound care, etc)     Problem: ABCDS Injury Assessment  Goal: Absence of physical injury  8/12/2024 0022 by Maddi Sheridan RN  Outcome: Progressing     Problem: Pain  Goal: Verbalizes/displays adequate comfort level or baseline comfort level  8/12/2024 0022 by Maddi Sheridan RN  Outcome: Progressing  Flowsheets (Taken 8/11/2024 2029)  Verbalizes/displays adequate comfort level or baseline comfort level: Encourage patient to monitor pain and request assistance     Problem: Cardiovascular - Adult  Goal: Absence of cardiac dysrhythmias or at baseline  8/12/2024 0022 by Maddi Sheridan RN  Outcome: Progressing     Problem: Metabolic/Fluid and Electrolytes - Adult  Goal: Electrolytes maintained within normal limits  8/12/2024 0022 by Maddi Sheridan RN  Outcome: Progressing     Problem: Chronic Conditions and Co-morbidities  Goal: Patient's chronic conditions and co-morbidity symptoms are monitored and maintained or improved  8/12/2024 0022 by Maddi Sheridan RN  Outcome: Progressing  Flowsheets (Taken 8/11/2024 2029)  Care Plan - Patient's Chronic Conditions and Co-Morbidity Symptoms are Monitored and Maintained or Improved:   Monitor and assess patient's chronic conditions and comorbid symptoms for stability, deterioration, or improvement   Collaborate with multidisciplinary team to address chronic and comorbid conditions and prevent exacerbation or 
  Problem: Safety - Adult  Goal: Free from fall injury  8/12/2024 1126 by Cecilia Bragg RN  Outcome: Progressing  8/12/2024 0022 by Maddi Sheridan RN  Outcome: Progressing     Problem: Discharge Planning  Goal: Discharge to home or other facility with appropriate resources  8/12/2024 1126 by Cecilia Bragg RN  Outcome: Progressing  Flowsheets (Taken 8/12/2024 0852)  Discharge to home or other facility with appropriate resources:   Identify barriers to discharge with patient and caregiver   Arrange for needed discharge resources and transportation as appropriate   Identify discharge learning needs (meds, wound care, etc)   Refer to discharge planning if patient needs post-hospital services based on physician order or complex needs related to functional status, cognitive ability or social support system  8/12/2024 0022 by aMddi Sheridan RN  Outcome: Progressing  Flowsheets (Taken 8/11/2024 2029)  Discharge to home or other facility with appropriate resources:   Identify barriers to discharge with patient and caregiver   Arrange for needed discharge resources and transportation as appropriate   Identify discharge learning needs (meds, wound care, etc)     Problem: ABCDS Injury Assessment  Goal: Absence of physical injury  8/12/2024 1126 by Cecilia Bragg RN  Outcome: Progressing  8/12/2024 0022 by Maddi Sheridan RN  Outcome: Progressing     Problem: Pain  Goal: Verbalizes/displays adequate comfort level or baseline comfort level  8/12/2024 1126 by Cecilia Bragg RN  Outcome: Progressing  8/12/2024 0022 by Maddi Sheridan RN  Outcome: Progressing  Flowsheets (Taken 8/11/2024 2029)  Verbalizes/displays adequate comfort level or baseline comfort level: Encourage patient to monitor pain and request assistance     Problem: Cardiovascular - Adult  Goal: Absence of cardiac dysrhythmias or at baseline  8/12/2024 1126 by Cecilia Bragg RN  Outcome: Progressing  Flowsheets (Taken 8/12/2024 
  Problem: Safety - Adult  Goal: Free from fall injury  8/18/2024 2121 by Edwige Leung RN  Outcome: Not Progressing  8/18/2024 1106 by Viviana Mckeon RN  Outcome: Progressing     Problem: Discharge Planning  Goal: Discharge to home or other facility with appropriate resources  8/18/2024 2121 by Ewdige Leung RN  Outcome: Not Progressing  Flowsheets (Taken 8/18/2024 2000)  Discharge to home or other facility with appropriate resources: Identify barriers to discharge with patient and caregiver  8/18/2024 1106 by Viviana Mckeon RN  Outcome: Progressing  Flowsheets (Taken 8/18/2024 0730)  Discharge to home or other facility with appropriate resources: Identify barriers to discharge with patient and caregiver     Problem: ABCDS Injury Assessment  Goal: Absence of physical injury  8/18/2024 2121 by Edwige Leung RN  Outcome: Not Progressing  8/18/2024 1106 by Viviana Mckeon RN  Outcome: Progressing     Problem: Pain  Goal: Verbalizes/displays adequate comfort level or baseline comfort level  8/18/2024 2121 by Edwige Leung RN  Outcome: Not Progressing  Flowsheets (Taken 8/18/2024 2000)  Verbalizes/displays adequate comfort level or baseline comfort level:   Encourage patient to monitor pain and request assistance   Assess pain using appropriate pain scale  8/18/2024 1106 by Viviana Mckeon RN  Outcome: Progressing     Problem: Cardiovascular - Adult  Goal: Absence of cardiac dysrhythmias or at baseline  8/18/2024 2121 by Edwige Leung RN  Outcome: Not Progressing  Flowsheets (Taken 8/18/2024 2000)  Absence of cardiac dysrhythmias or at baseline: Monitor cardiac rate and rhythm  8/18/2024 1106 by Viviana Mckeon RN  Outcome: Progressing  Flowsheets (Taken 8/18/2024 0730)  Absence of cardiac dysrhythmias or at baseline: Monitor cardiac rate and rhythm  Goal: Maintains optimal cardiac output and hemodynamic stability  8/18/2024 2121 by Edwige Leung RN  Outcome: Not Progressing  Flowsheets (Taken 8/18/2024 
  Problem: Safety - Adult  Goal: Free from fall injury  8/19/2024 1040 by Abdon White RN  Outcome: Progressing  8/18/2024 2121 by Edwige Leung RN  Outcome: Not Progressing     Problem: Discharge Planning  Goal: Discharge to home or other facility with appropriate resources  8/19/2024 1040 by Abdon White RN  Outcome: Progressing  Flowsheets (Taken 8/19/2024 0830)  Discharge to home or other facility with appropriate resources:   Identify barriers to discharge with patient and caregiver   Arrange for needed discharge resources and transportation as appropriate  8/18/2024 2121 by Edwige Leung RN  Outcome: Not Progressing  Flowsheets (Taken 8/18/2024 2000)  Discharge to home or other facility with appropriate resources: Identify barriers to discharge with patient and caregiver     Problem: Safety - Adult  Goal: Free from fall injury  8/19/2024 1040 by Abdon White RN  Outcome: Progressing  8/18/2024 2121 by Edwige Leung RN  Outcome: Not Progressing     Problem: Discharge Planning  Goal: Discharge to home or other facility with appropriate resources  8/19/2024 1040 by Abdon White RN  Outcome: Progressing  Flowsheets (Taken 8/19/2024 0830)  Discharge to home or other facility with appropriate resources:   Identify barriers to discharge with patient and caregiver   Arrange for needed discharge resources and transportation as appropriate  8/18/2024 2121 by Edwige Leung RN  Outcome: Not Progressing  Flowsheets (Taken 8/18/2024 2000)  Discharge to home or other facility with appropriate resources: Identify barriers to discharge with patient and caregiver     Problem: ABCDS Injury Assessment  Goal: Absence of physical injury  8/19/2024 1040 by Abdon White RN  Outcome: Progressing  8/18/2024 2121 by Edwige Leung RN  Outcome: Not Progressing     Problem: Pain  Goal: Verbalizes/displays adequate comfort level or baseline comfort level  8/19/2024 1040 by Abdon White RN  Outcome: 
  Problem: Safety - Adult  Goal: Free from fall injury  8/4/2024 0147 by Kandis Rios RN  Outcome: Progressing  8/3/2024 1350 by Sarina Lua RN  Outcome: Progressing     Problem: Discharge Planning  Goal: Discharge to home or other facility with appropriate resources  8/4/2024 0147 by Kandis Rios RN  Outcome: Progressing  8/3/2024 1350 by Sarina Lua RN  Outcome: Progressing  Flowsheets (Taken 8/3/2024 0910)  Discharge to home or other facility with appropriate resources:   Identify barriers to discharge with patient and caregiver   Arrange for needed discharge resources and transportation as appropriate     Problem: ABCDS Injury Assessment  Goal: Absence of physical injury  8/3/2024 1350 by Sarina Lua RN  Outcome: Progressing     Problem: Pain  Goal: Verbalizes/displays adequate comfort level or baseline comfort level  8/4/2024 0147 by Kandis Rios RN  Outcome: Progressing  8/3/2024 1350 by Sarina Lua RN  Outcome: Progressing     Problem: Cardiovascular - Adult  Goal: Absence of cardiac dysrhythmias or at baseline  8/4/2024 0147 by Kandis Rios RN  Outcome: Progressing  8/3/2024 1350 by Sarina Lua RN  Outcome: Progressing  Flowsheets (Taken 8/3/2024 0910)  Absence of cardiac dysrhythmias or at baseline:   Monitor cardiac rate and rhythm   Assess for signs of decreased cardiac output     Problem: Metabolic/Fluid and Electrolytes - Adult  Goal: Electrolytes maintained within normal limits  8/4/2024 0147 by Kandis Rios RN  Outcome: Progressing  8/3/2024 1350 by Sarina Lua RN  Outcome: Progressing  Flowsheets (Taken 8/3/2024 0910)  Electrolytes maintained within normal limits:   Monitor labs and assess patient for signs and symptoms of electrolyte imbalances   Administer electrolyte replacement as ordered   Monitor response to electrolyte replacements, including repeat lab results as appropriate  Goal: Hemodynamic stability and optimal renal function 
  Problem: Safety - Adult  Goal: Free from fall injury  8/6/2024 1132 by Diamante Da Silva RN  Outcome: Progressing  8/5/2024 2228 by Tona Grande RN  Outcome: Progressing     Problem: Discharge Planning  Goal: Discharge to home or other facility with appropriate resources  8/6/2024 1132 by Diamante Da Silva RN  Outcome: Progressing  Flowsheets (Taken 8/6/2024 0805)  Discharge to home or other facility with appropriate resources:   Identify barriers to discharge with patient and caregiver   Arrange for needed discharge resources and transportation as appropriate   Identify discharge learning needs (meds, wound care, etc)  8/5/2024 2228 by Tona Grande RN  Outcome: Progressing     Problem: ABCDS Injury Assessment  Goal: Absence of physical injury  8/6/2024 1132 by Diamante Da Silva RN  Outcome: Progressing  8/5/2024 2228 by Tona Grande RN  Outcome: Progressing     Problem: Pain  Goal: Verbalizes/displays adequate comfort level or baseline comfort level  8/6/2024 1132 by Diamante Da Silva RN  Outcome: Progressing  8/5/2024 2228 by Tona Grande RN  Outcome: Progressing     Problem: Cardiovascular - Adult  Goal: Absence of cardiac dysrhythmias or at baseline  Recent Flowsheet Documentation  Taken 8/6/2024 0805 by Diamante Da Silva RN  Absence of cardiac dysrhythmias or at baseline: Monitor cardiac rate and rhythm  8/5/2024 2228 by Tona Grande RN  Outcome: Progressing     Problem: Metabolic/Fluid and Electrolytes - Adult  Goal: Electrolytes maintained within normal limits  8/6/2024 1132 by Diamante Da Silva RN  Outcome: Progressing  Flowsheets (Taken 8/6/2024 0805)  Electrolytes maintained within normal limits:   Monitor labs and assess patient for signs and symptoms of electrolyte imbalances   Administer electrolyte replacement as ordered  8/5/2024 2228 by Tnoa Grande RN  Outcome: Progressing  Goal: Hemodynamic stability and optimal renal function maintained  Recent Flowsheet Documentation  Taken 
  Problem: Safety - Adult  Goal: Free from fall injury  8/7/2024 0041 by Maddi Sheridan RN  Outcome: Progressing     Problem: Discharge Planning  Goal: Discharge to home or other facility with appropriate resources  8/7/2024 0041 by Maddi Sheridan RN  Outcome: Progressing     Problem: ABCDS Injury Assessment  Goal: Absence of physical injury  8/7/2024 0041 by Maddi Sheridan RN  Outcome: Progressing     Problem: Pain  Goal: Verbalizes/displays adequate comfort level or baseline comfort level  8/7/2024 0041 by Maddi Sheridan RN  Outcome: Progressing     Problem: Cardiovascular - Adult  Goal: Absence of cardiac dysrhythmias or at baseline  Outcome: Progressing     Problem: Metabolic/Fluid and Electrolytes - Adult  Goal: Electrolytes maintained within normal limits  8/7/2024 0041 by Maddi Sheridan RN  Outcome: Progressing     Problem: Chronic Conditions and Co-morbidities  Goal: Patient's chronic conditions and co-morbidity symptoms are monitored and maintained or improved  8/7/2024 0041 by Maddi Sheridan RN  Outcome: Progressing     Problem: Skin/Tissue Integrity  Goal: Absence of new skin breakdown  Description: 1.  Monitor for areas of redness and/or skin breakdown  2.  Assess vascular access sites hourly  3.  Every 4-6 hours minimum:  Change oxygen saturation probe site  4.  Every 4-6 hours:  If on nasal continuous positive airway pressure, respiratory therapy assess nares and determine need for appliance change or resting period.  8/7/2024 0041 by Maddi Sheridan RN  Outcome: Progressing     
  Problem: Safety - Adult  Goal: Free from fall injury  8/8/2024 1149 by Corinna Mejia RN  Outcome: Progressing  8/8/2024 0417 by Kandis Rios RN  Outcome: Progressing     Problem: Discharge Planning  Goal: Discharge to home or other facility with appropriate resources  8/8/2024 1149 by Corinna Mejia RN  Outcome: Progressing  Flowsheets (Taken 8/8/2024 0851)  Discharge to home or other facility with appropriate resources: Identify barriers to discharge with patient and caregiver  8/8/2024 0417 by Kandis Rios RN  Outcome: Progressing     Problem: ABCDS Injury Assessment  Goal: Absence of physical injury  8/8/2024 1149 by Corinna Mejia RN  Outcome: Progressing  8/8/2024 0417 by Kandis Rios RN  Outcome: Progressing     Problem: Pain  Goal: Verbalizes/displays adequate comfort level or baseline comfort level  8/8/2024 1149 by Corinna Mejia RN  Outcome: Progressing  8/8/2024 0417 by Kandis Rios RN  Outcome: Progressing     Problem: Cardiovascular - Adult  Goal: Absence of cardiac dysrhythmias or at baseline  8/8/2024 1149 by Corinna Mejia RN  Outcome: Progressing  Flowsheets (Taken 8/8/2024 0851)  Absence of cardiac dysrhythmias or at baseline: Monitor cardiac rate and rhythm  8/8/2024 0417 by Kandis Rios RN  Outcome: Progressing     Problem: Metabolic/Fluid and Electrolytes - Adult  Goal: Electrolytes maintained within normal limits  8/8/2024 1149 by Corinna Mejia RN  Outcome: Progressing  Flowsheets (Taken 8/8/2024 0851)  Electrolytes maintained within normal limits: Monitor labs and assess patient for signs and symptoms of electrolyte imbalances  8/8/2024 0417 by Kandis Rios RN  Outcome: Progressing  Goal: Hemodynamic stability and optimal renal function maintained  8/8/2024 1149 by Corinna Mejia RN  Outcome: Progressing  Flowsheets (Taken 8/8/2024 0851)  Hemodynamic stability and optimal renal function maintained: Monitor labs and assess for signs and symptoms of volume excess or 
  Problem: Safety - Adult  Goal: Free from fall injury  Outcome: Progressing     Problem: ABCDS Injury Assessment  Goal: Absence of physical injury  Outcome: Progressing     Problem: Pain  Goal: Verbalizes/displays adequate comfort level or baseline comfort level  Outcome: Progressing     Problem: Cardiovascular - Adult  Goal: Absence of cardiac dysrhythmias or at baseline  Outcome: Progressing     Problem: Metabolic/Fluid and Electrolytes - Adult  Goal: Electrolytes maintained within normal limits  Outcome: Progressing  Goal: Hemodynamic stability and optimal renal function maintained  Outcome: Progressing  Goal: Glucose maintained within prescribed range  Outcome: Progressing     Problem: Chronic Conditions and Co-morbidities  Goal: Patient's chronic conditions and co-morbidity symptoms are monitored and maintained or improved  Outcome: Progressing     Problem: Skin/Tissue Integrity  Goal: Absence of new skin breakdown  Description: 1.  Monitor for areas of redness and/or skin breakdown  2.  Assess vascular access sites hourly  3.  Every 4-6 hours minimum:  Change oxygen saturation probe site  4.  Every 4-6 hours:  If on nasal continuous positive airway pressure, respiratory therapy assess nares and determine need for appliance change or resting period.  Outcome: Progressing     Problem: Discharge Planning  Goal: Discharge to home or other facility with appropriate resources  Outcome: Not Progressing     
  Problem: Safety - Adult  Goal: Free from fall injury  Outcome: Progressing     Problem: Discharge Planning  Goal: Discharge to home or other facility with appropriate resources  8/3/2024 1350 by Sarina Lua RN  Outcome: Progressing  Flowsheets (Taken 8/3/2024 0910)  Discharge to home or other facility with appropriate resources:   Identify barriers to discharge with patient and caregiver   Arrange for needed discharge resources and transportation as appropriate  8/3/2024 0223 by Veena West RN  Outcome: Progressing  Flowsheets (Taken 8/2/2024 2149)  Discharge to home or other facility with appropriate resources:   Identify barriers to discharge with patient and caregiver   Arrange for needed discharge resources and transportation as appropriate   Identify discharge learning needs (meds, wound care, etc)     Problem: ABCDS Injury Assessment  Goal: Absence of physical injury  Outcome: Progressing     Problem: Pain  Goal: Verbalizes/displays adequate comfort level or baseline comfort level  Outcome: Progressing     Problem: Cardiovascular - Adult  Goal: Absence of cardiac dysrhythmias or at baseline  8/3/2024 1350 by Sarina Lua RN  Outcome: Progressing  Flowsheets (Taken 8/3/2024 0910)  Absence of cardiac dysrhythmias or at baseline:   Monitor cardiac rate and rhythm   Assess for signs of decreased cardiac output  8/3/2024 0223 by Veena West RN  Outcome: Progressing  Flowsheets (Taken 8/2/2024 2149)  Absence of cardiac dysrhythmias or at baseline:   Monitor cardiac rate and rhythm   Assess for signs of decreased cardiac output     Problem: Metabolic/Fluid and Electrolytes - Adult  Goal: Electrolytes maintained within normal limits  8/3/2024 1350 by Sarina Lua RN  Outcome: Progressing  Flowsheets (Taken 8/3/2024 0910)  Electrolytes maintained within normal limits:   Monitor labs and assess patient for signs and symptoms of electrolyte imbalances   Administer electrolyte replacement as 
  Problem: Safety - Adult  Goal: Free from fall injury  Outcome: Progressing     Problem: Discharge Planning  Goal: Discharge to home or other facility with appropriate resources  Outcome: Progressing     Problem: ABCDS Injury Assessment  Goal: Absence of physical injury  Outcome: Progressing     Problem: Pain  Goal: Verbalizes/displays adequate comfort level or baseline comfort level  Outcome: Progressing     Problem: Cardiovascular - Adult  Goal: Absence of cardiac dysrhythmias or at baseline  Outcome: Progressing     Problem: Metabolic/Fluid and Electrolytes - Adult  Goal: Electrolytes maintained within normal limits  Outcome: Progressing  Goal: Hemodynamic stability and optimal renal function maintained  Outcome: Progressing  Goal: Glucose maintained within prescribed range  Outcome: Progressing     Problem: Chronic Conditions and Co-morbidities  Goal: Patient's chronic conditions and co-morbidity symptoms are monitored and maintained or improved  Outcome: Progressing     
  Problem: Safety - Adult  Goal: Free from fall injury  Outcome: Progressing     Problem: Discharge Planning  Goal: Discharge to home or other facility with appropriate resources  Outcome: Progressing     Problem: ABCDS Injury Assessment  Goal: Absence of physical injury  Outcome: Progressing     Problem: Pain  Goal: Verbalizes/displays adequate comfort level or baseline comfort level  Outcome: Progressing     Problem: Cardiovascular - Adult  Goal: Absence of cardiac dysrhythmias or at baseline  Outcome: Progressing     Problem: Metabolic/Fluid and Electrolytes - Adult  Goal: Electrolytes maintained within normal limits  Outcome: Progressing  Goal: Hemodynamic stability and optimal renal function maintained  Outcome: Progressing  Goal: Glucose maintained within prescribed range  Outcome: Progressing     Problem: Chronic Conditions and Co-morbidities  Goal: Patient's chronic conditions and co-morbidity symptoms are monitored and maintained or improved  Outcome: Progressing     
  Problem: Safety - Adult  Goal: Free from fall injury  Outcome: Progressing     Problem: Discharge Planning  Goal: Discharge to home or other facility with appropriate resources  Outcome: Progressing     Problem: ABCDS Injury Assessment  Goal: Absence of physical injury  Outcome: Progressing     Problem: Pain  Goal: Verbalizes/displays adequate comfort level or baseline comfort level  Outcome: Progressing     Problem: Cardiovascular - Adult  Goal: Absence of cardiac dysrhythmias or at baseline  Outcome: Progressing     Problem: Metabolic/Fluid and Electrolytes - Adult  Goal: Electrolytes maintained within normal limits  Outcome: Progressing  Goal: Hemodynamic stability and optimal renal function maintained  Outcome: Progressing  Goal: Glucose maintained within prescribed range  Outcome: Progressing     Problem: Chronic Conditions and Co-morbidities  Goal: Patient's chronic conditions and co-morbidity symptoms are monitored and maintained or improved  Outcome: Progressing     Problem: Skin/Tissue Integrity  Goal: Absence of new skin breakdown  Description: 1.  Monitor for areas of redness and/or skin breakdown  2.  Assess vascular access sites hourly  3.  Every 4-6 hours minimum:  Change oxygen saturation probe site  4.  Every 4-6 hours:  If on nasal continuous positive airway pressure, respiratory therapy assess nares and determine need for appliance change or resting period.  Outcome: Progressing     
  Problem: Safety - Adult  Goal: Free from fall injury  Outcome: Progressing     Problem: Discharge Planning  Goal: Discharge to home or other facility with appropriate resources  Outcome: Progressing     Problem: ABCDS Injury Assessment  Goal: Absence of physical injury  Outcome: Progressing     Problem: Pain  Goal: Verbalizes/displays adequate comfort level or baseline comfort level  Outcome: Progressing     Problem: Cardiovascular - Adult  Goal: Absence of cardiac dysrhythmias or at baseline  Outcome: Progressing     Problem: Metabolic/Fluid and Electrolytes - Adult  Goal: Electrolytes maintained within normal limits  Outcome: Progressing  Goal: Hemodynamic stability and optimal renal function maintained  Outcome: Progressing  Goal: Glucose maintained within prescribed range  Outcome: Progressing     Problem: Chronic Conditions and Co-morbidities  Goal: Patient's chronic conditions and co-morbidity symptoms are monitored and maintained or improved  Outcome: Progressing     Problem: Skin/Tissue Integrity  Goal: Absence of new skin breakdown  Description: 1.  Monitor for areas of redness and/or skin breakdown  2.  Assess vascular access sites hourly  3.  Every 4-6 hours minimum:  Change oxygen saturation probe site  4.  Every 4-6 hours:  If on nasal continuous positive airway pressure, respiratory therapy assess nares and determine need for appliance change or resting period.  Outcome: Progressing     
  Problem: Safety - Adult  Goal: Free from fall injury  Outcome: Progressing     Problem: Discharge Planning  Goal: Discharge to home or other facility with appropriate resources  Outcome: Progressing  Flowsheets (Taken 8/13/2024 1600 by Abdon White RN)  Discharge to home or other facility with appropriate resources:   Identify barriers to discharge with patient and caregiver   Arrange for needed discharge resources and transportation as appropriate     Problem: ABCDS Injury Assessment  Goal: Absence of physical injury  Outcome: Progressing     Problem: Pain  Goal: Verbalizes/displays adequate comfort level or baseline comfort level  Outcome: Progressing     Problem: Cardiovascular - Adult  Goal: Absence of cardiac dysrhythmias or at baseline  Outcome: Progressing  Flowsheets (Taken 8/13/2024 1600 by Abdon White RN)  Absence of cardiac dysrhythmias or at baseline:   Monitor cardiac rate and rhythm   Assess for signs of decreased cardiac output  Goal: Maintains optimal cardiac output and hemodynamic stability  Outcome: Progressing  Flowsheets (Taken 8/13/2024 1600 by Abdon White RN)  Maintains optimal cardiac output and hemodynamic stability:   Monitor blood pressure and heart rate   Monitor urine output and notify Licensed Independent Practitioner for values outside of normal range     Problem: Metabolic/Fluid and Electrolytes - Adult  Goal: Electrolytes maintained within normal limits  Outcome: Progressing  Flowsheets (Taken 8/13/2024 1600 by Abdon White RN)  Electrolytes maintained within normal limits:   Monitor labs and assess patient for signs and symptoms of electrolyte imbalances   Administer electrolyte replacement as ordered  Goal: Hemodynamic stability and optimal renal function maintained  Outcome: Progressing  Flowsheets (Taken 8/13/2024 1600 by Abdon White RN)  Hemodynamic stability and optimal renal function maintained:   Monitor labs and assess for signs and symptoms of 
Nutrition Problem #1: Altered nutrition-related lab values  Intervention: Food and/or Nutrient Delivery: Continue Current Diet    Problem: Nutrition Deficit:  Goal: Optimize nutritional status  Flowsheets (Taken 8/12/2024 1318)  Nutrient intake appropriate for improving, restoring, or maintaining nutritional needs:   Assess nutritional status and recommend course of action   Recommend appropriate diets, oral nutritional supplements, and vitamin/mineral supplements   Provide specific nutrition education to patient or family as appropriate   Recommend, monitor, and adjust tube feedings and TPN/PPN based on assessed needs   Monitor oral intake, labs, and treatment plans       
See OT evaluation for all goals and OT POC. Electronically signed by Mignon Lundy OTR/L on 8/19/2024 at 2:33 PM    
Monitor for areas of redness and/or skin breakdown  2.  Assess vascular access sites hourly  3.  Every 4-6 hours minimum:  Change oxygen saturation probe site  4.  Every 4-6 hours:  If on nasal continuous positive airway pressure, respiratory therapy assess nares and determine need for appliance change or resting period.  8/9/2024 0829 by Corinna Mejia, RN  Outcome: Progressing  8/9/2024 0248 by Nuris Ureña RN  Outcome: Progressing     Problem: Discharge Planning  Goal: Discharge to home or other facility with appropriate resources  8/9/2024 0829 by Corinna Mejia, RN  Outcome: Progressing  8/9/2024 0248 by Nuris Ureña RN  Outcome: Not Progressing     
Nutrition Deficit:  Goal: Optimize nutritional status  Outcome: Progressing     Problem: Neurosensory - Adult  Goal: Achieves stable or improved neurological status  Outcome: Progressing     Problem: Respiratory - Adult  Goal: Achieves optimal ventilation and oxygenation  Outcome: Progressing     Problem: Musculoskeletal - Adult  Goal: Return mobility to safest level of function  Outcome: Progressing     Problem: Gastrointestinal - Adult  Goal: Minimal or absence of nausea and vomiting  Outcome: Progressing  Goal: Maintains adequate nutritional intake  Outcome: Progressing     Problem: Infection - Adult  Goal: Absence of infection at discharge  Outcome: Progressing     Problem: Hematologic - Adult  Goal: Maintains hematologic stability  Outcome: Progressing     Problem: Cardiovascular - Adult  Goal: Maintains optimal cardiac output and hemodynamic stability  Outcome: Not Progressing     Problem: Metabolic/Fluid and Electrolytes - Adult  Goal: Hemodynamic stability and optimal renal function maintained  Outcome: Not Progressing     Problem: Skin/Tissue Integrity  Goal: Absence of new skin breakdown  Description: 1.  Monitor for areas of redness and/or skin breakdown  2.  Assess vascular access sites hourly  3.  Every 4-6 hours minimum:  Change oxygen saturation probe site  4.  Every 4-6 hours:  If on nasal continuous positive airway pressure, respiratory therapy assess nares and determine need for appliance change or resting period.  Outcome: Not Progressing     Problem: Skin/Tissue Integrity - Adult  Goal: Skin integrity remains intact  Outcome: Not Progressing     Problem: Gastrointestinal - Adult  Goal: Maintains or returns to baseline bowel function  Outcome: Not Progressing     Problem: Genitourinary - Adult  Goal: Absence of urinary retention  Outcome: Not Progressing   Continue plan of care  
and symptoms of volume excess or deficit     Problem: Metabolic/Fluid and Electrolytes - Adult  Goal: Glucose maintained within prescribed range  8/11/2024 0053 by Maddi Sheridan, RN  Outcome: Progressing  Flowsheets (Taken 8/10/2024 1924)  Glucose maintained within prescribed range: Monitor blood glucose as ordered     Problem: Chronic Conditions and Co-morbidities  Goal: Patient's chronic conditions and co-morbidity symptoms are monitored and maintained or improved  8/11/2024 0053 by Maddi Sheridan, RN  Outcome: Progressing  Flowsheets (Taken 8/10/2024 1924)  Care Plan - Patient's Chronic Conditions and Co-Morbidity Symptoms are Monitored and Maintained or Improved:   Monitor and assess patient's chronic conditions and comorbid symptoms for stability, deterioration, or improvement   Collaborate with multidisciplinary team to address chronic and comorbid conditions and prevent exacerbation or deterioration   Update acute care plan with appropriate goals if chronic or comorbid symptoms are exacerbated and prevent overall improvement and discharge     Problem: Skin/Tissue Integrity  Goal: Absence of new skin breakdown  Description: 1.  Monitor for areas of redness and/or skin breakdown  2.  Assess vascular access sites hourly  3.  Every 4-6 hours minimum:  Change oxygen saturation probe site  4.  Every 4-6 hours:  If on nasal continuous positive airway pressure, respiratory therapy assess nares and determine need for appliance change or resting period.  8/11/2024 0053 by Maddi Sheridan, RN  Outcome: Progressing     
Integrity  Goal: Absence of new skin breakdown  Description: 1.  Monitor for areas of redness and/or skin breakdown  2.  Assess vascular access sites hourly  3.  Every 4-6 hours minimum:  Change oxygen saturation probe site  4.  Every 4-6 hours:  If on nasal continuous positive airway pressure, respiratory therapy assess nares and determine need for appliance change or resting period.  8/10/2024 1317 by Sarina Lua, RN  Outcome: Progressing  8/10/2024 0218 by Alma Baum, RN  Outcome: Progressing     
patient’s ability to void and empty bladder     Problem: Infection - Adult  Goal: Absence of infection at discharge  8/16/2024 0158 by Edwige Leung RN  Outcome: Progressing  8/16/2024 0156 by dEwige Leung RN  Outcome: Not Progressing  Flowsheets (Taken 8/15/2024 2000)  Absence of infection at discharge:   Assess and monitor for signs and symptoms of infection   Monitor lab/diagnostic results     Problem: Hematologic - Adult  Goal: Maintains hematologic stability  8/16/2024 0158 by Edwige Leung RN  Outcome: Progressing  8/16/2024 0156 by Edwige Leung RN  Outcome: Not Progressing  Flowsheets (Taken 8/15/2024 2000)  Maintains hematologic stability:   Assess for signs and symptoms of bleeding or hemorrhage   Administer blood products/factors as ordered     Problem: Decision Making  Goal: Pt/Family able to effectively weigh alternatives and participate in decision making related to treatment and care  Description: INTERVENTIONS:  1. Determine when there are differences between patient's view, family's view, and healthcare provider's view of condition  2. Facilitate patient and family articulation of goals for care  3. Help patient and family identify pros/cons of alternative solutions  4. Provide information as requested by patient/family  5. Respect patient/family right to receive or not to receive information  6. Serve as a liaison between patient and family and health care team  7. Initiate Consults from Ethics, Palliative Care or initiate Family Care Conference as is appropriate  8/16/2024 0158 by Edwige Leung RN  Outcome: Progressing  8/16/2024 0156 by Edwige Leung RN  Outcome: Not Progressing  Flowsheets (Taken 8/15/2024 2000)  Patient/family able to effectively weigh alternatives and participate in decision making related to treatment and care:   Determine when there are differences between patient's view, family's view, and healthcare provider's view of condition   Facilitate patient and family articulation 
cardiac output and hemodynamic stability:   Monitor blood pressure and heart rate   Monitor urine output and notify Licensed Independent Practitioner for values outside of normal range     Problem: Metabolic/Fluid and Electrolytes - Adult  Goal: Electrolytes maintained within normal limits  8/18/2024 1106 by Viviana Mckeon RN  Outcome: Not Progressing  Flowsheets (Taken 8/18/2024 0730)  Electrolytes maintained within normal limits: Monitor labs and assess patient for signs and symptoms of electrolyte imbalances  8/18/2024 0447 by Edwige Leung RN  Outcome: Not Progressing  8/18/2024 0447 by Edwige Leung RN  Outcome: Not Progressing  Flowsheets (Taken 8/17/2024 2000)  Electrolytes maintained within normal limits:   Monitor labs and assess patient for signs and symptoms of electrolyte imbalances   Administer electrolyte replacement as ordered  Goal: Hemodynamic stability and optimal renal function maintained  8/18/2024 1106 by Viviana Mckeon RN  Outcome: Not Progressing  Flowsheets (Taken 8/18/2024 0730)  Hemodynamic stability and optimal renal function maintained: Monitor labs and assess for signs and symptoms of volume excess or deficit  8/18/2024 0447 by Edwige Leung RN  Outcome: Not Progressing  8/18/2024 0447 by Edwige Leung RN  Outcome: Not Progressing  Flowsheets (Taken 8/17/2024 2000)  Hemodynamic stability and optimal renal function maintained:   Monitor labs and assess for signs and symptoms of volume excess or deficit   Monitor intake, output and patient weight  Goal: Glucose maintained within prescribed range  8/18/2024 1106 by Viviana Mckeon RN  Outcome: Progressing  Flowsheets (Taken 8/18/2024 0730)  Glucose maintained within prescribed range: Monitor blood glucose as ordered  8/18/2024 0447 by Edwige Leung RN  Outcome: Not Progressing  8/18/2024 0447 by Edwige Leung RN  Outcome: Not Progressing  Flowsheets (Taken 8/17/2024 2000)  Glucose maintained within prescribed range:   Monitor blood 
decision making related to treatment and care:   Determine when there are differences between patient's view, family's view, and healthcare provider's view of condition   Facilitate patient and family articulation of goals for care  8/16/2024 0158 by Edwige Leung, RN  Outcome: Progressing  8/16/2024 0156 by Edwige Leung, RN  Outcome: Not Progressing  Flowsheets (Taken 8/15/2024 2000)  Patient/family able to effectively weigh alternatives and participate in decision making related to treatment and care:   Determine when there are differences between patient's view, family's view, and healthcare provider's view of condition   Facilitate patient and family articulation of goals for care

## 2024-08-20 NOTE — DISCHARGE SUMMARY
information.     XR CHEST PORTABLE    Result Date: 8/15/2024  EXAMINATION: ONE XRAY VIEW OF THE CHEST 8/15/2024 11:54 am COMPARISON: August 14, 2024 1159 hours. HISTORY: ORDERING SYSTEM PROVIDED HISTORY: catheter placement TECHNOLOGIST PROVIDED HISTORY: Reason for exam:->catheter placement What reading provider will be dictating this exam?->CRC FINDINGS: There is a right-sided dialysis catheter.  The tip overlies the mid SVC. There is a left-sided central venous catheter.  The tip is within the mid to cyst/distal SVC.  Unchanged The cardiac silhouette is enlarged. Point vascular unremarkable. Right-sided trachea. Atelectasis, patchy ground-glass infiltrate right lower lobe. No effusion No pneumothoraces.     . 1. Atelectasis, patchy ground-glass infiltrate right lower lobe.     XR CHEST PORTABLE    Result Date: 8/14/2024  EXAMINATION: ONE XRAY VIEW OF THE CHEST 8/14/2024 11:59 am COMPARISON: 08/25/2024 HISTORY: ORDERING SYSTEM PROVIDED HISTORY: line placement TECHNOLOGIST PROVIDED HISTORY: Reason for exam:->line placement What reading provider will be dictating this exam?->CRC FINDINGS: Single AP upright portable chest demonstrates a new left-sided IJ catheter in position with the tip above the caval atrial junction.  There is no evidence of a pneumothorax.  The right-sided the dialysis catheter remains in place. The cardiac silhouette is normal in size.     New left-sided IJ catheter in position with the tip above the caval atrial junction. No evidence of a pneumothorax.       Discharge Medications:         Medication List        ASK your doctor about these medications      acetaminophen 500 MG tablet  Commonly known as: TYLENOL     carvedilol 25 MG tablet  Commonly known as: COREG     enoxaparin 120 MG/0.8ML injection  Commonly known as: LOVENOX     Entresto 49-51 MG per tablet  Generic drug: sacubitril-valsartan     furosemide 20 MG tablet  Commonly known as: LASIX     hydrALAZINE 25 MG tablet  Commonly known as:

## 2024-08-20 NOTE — CONSULTS
[facility]                   [address] [place] [address]                              CONSULTATION      PATIENT NAME: VLADIMIR LYNN               : 1945  MED REC NO: [id number]                        ROOM: W486  ACCOUNT NO: [id number]                       ADMIT DATE: 2024  PROVIDER: [place] DO    RENAL CONSULT    CONSULT DATE: 2024      HISTORY OF PRESENT ILLNESS:  A 78-year-old  male with an extremely complicated history regarding his kidneys. Back in 2024, he had neurological changes and was eventually sent to [facility], where a CT scan of the head showed evidence of 2 lesions in the brain.  This ended to be a lymphoma.  He had a craniotomy with biopsy to confirm that diagnosis.  Subsequent to that, the patient was given IV methotrexate by Oncology there.  He was receiving this and developed acute renal failure.  The patient had this discontinued and did not have treatment there for dialysis to patient's recollection.  The patient then was transferred to a rehab center, where his renal function was noted to be improving.  He then had further neurological changes and was sent back to [facility], where he had evidence of an intracranial bleed.  This responded by holding his Eliquis therapy that was started on for possibility of hypercoagulable state.  The patient again then did go home with improving renal function.  He was returned to the hospital at [facility], having then received Rituxan therapy IV and oral Lendolidimide and Imubixab orally by his oncologist at [facility].  There seemed to be a dysfunction in his kidney function once again, and he was admitted for need of dialysis support.  The patient did receive 6 treatments of dialysis at that time in the intensive care unit with hypotension and possible heart failure.  The patient was sent to rehab therapy, and a rapid was called.  The patient improved spontaneously with 
[facility] referral meeting scheduled for [id number] tomorrow 8/19/2024 per wife's request  
Comprehensive Nutrition Assessment    Type and Reason for Visit:  Reassess    Nutrition Recommendations/Plan:   Honor goals of care  If TF to continue, recommend renal formula to decrease potassium content     Malnutrition Assessment:  Malnutrition Status:  At risk for malnutrition (Comment) (08/12/24 1314)    Context:  Acute Illness     Findings of the 6 clinical characteristics of malnutrition:  Energy Intake:  50% or less of estimated energy requirements for 5 or more days  Weight Loss:  Unable to assess     Body Fat Loss:  No significant body fat loss     Muscle Mass Loss:  No significant muscle mass loss    Fluid Accumulation:  Unable to assess     Strength:  Not Performed    Nutrition Assessment:    Noted pt/wife want no furhter dialysis, no escalation of care, reconsidering hospice, Too lethargic for po, Corpak remains in, has clogged and needed replaced, de anda tube feeding and advance to goal rate, if in alignment with goals of care    Nutrition Related Findings:    PMH: DM, HTN, heart failure, 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 SILVINA. HD started during admission., ( 8/19) no further treatments , access needs replaced, wife/pt decline. Declined hospice 8/19, however pt continues to decline, meeting again today. Corpak placed for supplemental tube feeding ( 8/16), Multiple lab abnormalites noted, not making urine, significant weeping edema Wound Type: Pressure Injury, Stage II (sacrum)       Current Nutrition Intake & Therapies:    Average Meal Intake: 1-25%  Average Supplements Intake: Unable to assess  ADULT ORAL NUTRITION SUPPLEMENT; Breakfast, Lunch, Dinner; Diabetic Oral Supplement  ADULT TUBE FEEDING; Nasogastric; Diabetic; Continuous; 20; No; 50; Q 4 hours  ADULT DIET; Dysphagia - Soft and Bite Sized; 4 carb choices (60 gm/meal); Low Potassium (Less than 3000 mg/day)    Anthropometric Measures:  Height: 185.4 cm (6' 1\")  Ideal Body Weight (IBW): 184 lbs (84 kg)    Admission Body Weight: 121.2 kg 
Infectious Disease     Patient Name: Km Garcia  Date: 2024  YOB: 1945  Medical Record Number: [id number]        No chief complaint on file.          History of Present Illness:      79 y.o. 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 admitted about a month ago.Patient also was having diarrhea when he passed out that his wife described as soft stool moderate amount with no blood.. After period of stability Went to acute rehab and eventually back inpatient it appears    Treated here for SILVINA needing dialysis ,  dvt found LLE. Last week transferred to ICU due to hypotension, and need for CRRT    He was found to have norovirus. Still having large amounts of stool. Pt encephalopathic. Recent blood cultures 1/2+ for GPCs            Review of Systems: can not be obtained from patient          Social History     Tobacco Use    Smoking status: Former     Current packs/day: 0.00     Types: Cigarettes     Quit date:      Years since quittin.6    Smokeless tobacco: Never   Vaping Use    Vaping status: Never Used   Substance Use Topics    Alcohol use: Yes     Comment: Drinks 1 drink every other day    Drug use: No         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    Cerebrovascular accident (CVA) (HCC) 2024    Charcot ankle, left     wears brace    Diabetes mellitus (HCC)     takes metformin    History of DVT (deep vein thrombosis) 7/15/2024    Hypertension     on meds > 10 yrs    Osteoarthritis            Past Surgical History:   Procedure Laterality Date    COLONOSCOPY      > 30 yrs ago    INVASIVE VASCULAR N/A 2024    Vena cava filter insertion - cath lab performed by Dev Magallon DO at [facility] 
Inpatient consult to GI  Consult performed by: Victorino Garza MD  Consult ordered by: Sonam Hazel DO      Patient Name: Km Garcia  Admit Date: 2024  7:10 PM  MR #: [id number]  : 1945    Attending Physician: Temi Cooley MD    History of Presenting Illness:      Km Garcia is a 78 y.o. male on hospital day 5, admitted with left lower extremity DVT. Patient with  has a past medical history of AMD (age related macular degeneration), Asthma, Athscl heart disease of native coronary artery w/o ang pctrs, Cerebrovascular accident (CVA) (HCC), Charcot ankle, left, Diabetes mellitus (HCC), History of DVT (deep vein thrombosis), Hypertension, and Osteoarthritis.    Reason for GI consult: Acute on chronic \"functional\" diarrhea, requiring FMS.  Chemotherapy.  Recent CNS lymphoma with subdural hematoma.    History Obtained From:  patient, electronic medical record    Patient is a 78-year-old male with recent history of central nervous system lymphoma patient is receiving chemotherapy.  Patient was placed on Eliquis which was discontinued due to subdural hematoma.  Patient had post craniotomy DVT after DC of Eliquis.  Hemodialysis 3 times a week.  Patient is currently on heparin.  Patient reports history of loose brown stools for the last 10 days.  Patient denies any abdominal pain, nausea nor vomiting.  No blood or mucus reported in the stool.  No fevers chills or rigors.  No sick contact.    Previous endoscopic evaluation:  No reports available in Care Everywhere    History:      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    Cerebrovascular accident (CVA) (HCC) 2024    Charcot ankle, left     wears brace    Diabetes mellitus (HCC)     takes metformin    History of DVT (deep vein thrombosis) 7/15/2024 
[facility] is scheduled to re-meet with pt and spouse today at [phone]  
Palliative Care Consult Note  Patient: Km Garcia  Gender: male  YOB: 1945  Unit/Bed: W167/W167-01  CodeStatus: Limited  Inpatient Treatment Team: Treatment Team:   Anna Cuenca DO Jeet, Anant, MD Sodeinde, Adedeji, MD Haas, Christopher A, MD Sidloski, Jay E, DO Cho, Donald I, MD Brock, Ole MATSON, MIRTA Bowman, Alfonso, MIRTA Frias, Vivian BENITEZ, Janina Arevalo, MIRTA Sumner, MIRTA Chin Amanda, MIRTA Gaviria, Stephanie, RN  Admit Date:  8/2/2024    Chief Complaint: Dizziness    History of Presenting Illness:      Km Garcia is a 79 y.o. male on hospital day 8 with a history of lymphoma, craniotomy, cardiomyopathy, hypertension diabetes, DVT.    Patient was admitted to acute rehab after a syncope medical admission. Patient had rapid response called today for dizziness. His vitals were stable. Patient is being seen by multiple consultants including nephrology for SILVINA with possible obstructive component. . Patient was admitted in the setting of B/L PE and LLE, dizziness, atrial fibrillation, SILVINA.    Palliative care was consulted by Dr. Cuenca for goals of care.     Upon entering the room I find the patient alert and oriented with periods of confusion. He is attempting to feed himself. Wife is at bedside.  Patient unable to tolerate dialysis, recommend comfort approach or CRRT in ICU with pressor support. Discussed overall poor prognosis. Code status discussion held with hospitalist for limited code with no cpr, no intubation. Wife and patient would like to attempt CRRT with pressor support. 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 [facility] due [facility]. Per chart review, encounter on 7/16/24 from [facility] oncology reports chemotherapy on hold for now given acute medical complications. Will plan to restart treatment when he is feeling better.       Most recent notable labs: 
Physical Medicine & Rehabilitation  Consult Note      Admitting Physician: Temi Cooley MD    Primary Care Provider: Dawood Darby MD     Reason for Consult:  Asses rehab needs, promote physical and mental function, analyze level of care to determine rehab needs, improve ability to actively participate in the rehabilitation process, and decrease likelihood of re-admit to the hospital after discharge.      History of Present Illness:    Km Garcia is a 78 y.o. male admitted to [facility] on 8/2/2024.     Patient was transferred off of rehab floor on Friday when his functional status declined.  CAT scan of the brain was negative.    He was recently found to have a large clot in his left lower extremity.  He has been off his Lovenox due to his renal failure.  He continues to need hemodialysis.    Fatigue  This is a recurrent problem. The current episode started more than 1 month ago. The problem occurs constantly. The problem has been unchanged. Associated symptoms include fatigue.         I reviewed recent nursing notes discussed care with acute care providers, \" 150 cc of urine emptied from catheter this shift. Dr. Cuenca notified about low urine output  \".   Events from the previous 24 hours reviewed and discussed .      Their inpatient work up has included:    Imaging:  Imaging and other studies reviewed and discussed with patient and staff    CT HEAD   8/2/2024  BRAIN/VENTRICLES: There is no acute intracranial hemorrhage, mass effect or midline shift.  No abnormal extra-axial fluid collection.  The gray-white differentiation is maintained without evidence of an acute infarct.  There is no evidence of hydrocephalus. Insert old brain Note is made of encephalomalacia within the right frontal lobe consistent old subcortical infarct. ORBITS: The visualized portion of the orbits demonstrate no acute abnormality. SINUSES: The visualized paranasal sinuses and mastoid air cells demonstrate no 
Pulmonary and Critical Care Medicine  Consult Note  Encounter Date: 2024 4:18 PM    Mr. Km Garcia is a 79 y.o. male  : 1945  Requesting Provider: Anna Cuenca DO    Reason for request: ICU management            HISTORY OF PRESENT ILLNESS:    Patient is 79 y.o. presents with acute encephalopathy and shock patient is laying in bed, eyes open, does not answer question, he is encephalopathic.  Patient has history of brain tumor found to be lymphoma, he was treated with methotrexate, his hospital course was complicated with  intracranial bleed and acute renal failure  eventually requiring dialysis.  He is currently admitted due to left lower extremity DVT and worsening swelling.  Transferred to ICU due to hypotension and need for CRRT.        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 2024    Cerebrovascular accident (CVA) (HCC) 2024    Charcot ankle, left     wears brace    Diabetes mellitus (HCC)     takes metformin    History of DVT (deep vein thrombosis) 7/15/2024    Hypertension     on meds > 10 yrs    Osteoarthritis        Past Surgical History:        Procedure Laterality Date    COLONOSCOPY      > 30 yrs ago    INVASIVE VASCULAR N/A 2024    Vena cava filter insertion - cath lab performed by Dev Magallon DO at [facility] CARDIAC CATH LAB    IR NONTUNNELED VASCULAR CATHETER Right 2024    Medcomp 11.5F x 15 cm Raulerson Duo-Flow IJ double lumen catheter (LOT# [id number],  exp ) performed by Dr. Osborne    IR NONTUNNELED VASCULAR CATHETER  2024    IR NONTUNNELED VASCULAR CATHETER 2024 [facility] SPECIAL PROCEDURE    LITHOTRIPSY N/A 2018    WITH HOLMIUM LASER performed by Antonio Petit MD at [facility] OR    VASCULAR SURGERY Right 2024    Ultrasound-guided right internal 
Referral meeting with [facility] done with patient's wife Coby at bedside, patient found appropriate for hospice per Dr Robetr. Wife and patient are not ready for hospice at this time, all information provided. Please reach out to [facility] if they would like to sign on to service.  
Renal consult dictated  SILVINA improving  complex history regarding lymphoma and toxicity from methotrexate than needed 6 dialysis treatment after kidneys recovered from [facility] till now. Now better  was receiving from oncology Rutaxin IV and lendolidimide & Imubixab possibly contributing torecurrent SILVINA  
Spiritual Health Assessment/Progress Note  [facility]    Spiritual/Emotional Needs, Advance Care Planning, Rapid Response,  ,      Name: Km Garcia MRN: [id number]    Age: 79 y.o.     Sex: male   Language: English   Rastafarian: Qatari Taoist   Dizziness     Date: 8/13/2024            Total Time Calculated: 30 min        Referral for ACP. Request Palliative Care to assist with further discerning future path of care for the patient in collaboration with his wife and DANIEL Tenorio, who is a former nursing instructor. Pt's dialysis unsuccessful again today per dialysis technician.     Pt was trying to be strong for his wife, she said, but has since consented to a limited code status. Coby trying to be strong for him, she said, tearful today. Spoke of the strength of vulnerability.     Strong connection to leslee community.  Fr. Ulises Santiago of [facility] aware of hospitalization and has visited pt.         Coby also anxious about insurance costs while in hospital and with potential move from inpatient to another level of care or another setting. SW/CM notified.     Spiritual Assessment continued in [facility] 1W TELEMETRY        Referral/Consult From: Physician   Encounter Overview/Reason: Spiritual/Emotional Needs, Advance Care Planning  Service Provided For: Patient, Family    Leslee, Belief, Meaning:   Patient is connected with a leslee tradition or spiritual practice  Family/Friends are connected with a leslee tradition or spiritual practice      Importance and Influence:  Patient has spiritual/personal beliefs that influence decisions regarding their health  Family/Friends have spiritual/personal beliefs that influence decisions regarding the patient's health    Community:  Patient is connected with a spiritual community and feels well-supported. Support system includes: Spouse/Partner and Leslee Community  Family/Friends feel well-supported. Support system includes: 
Negative for fever.   HENT:  Negative for congestion.    Respiratory:  Negative for apnea.    Genitourinary:  Negative for hematuria.   Neurological:  Negative for speech difficulty.       Current Meds: calcium carbonate (TUMS) chewable tablet 1,000 mg, TID PRN  midodrine (PROAMATINE) tablet 10 mg, TID WC  acetaminophen (TYLENOL) tablet 650 mg, Q6H PRN   Or  acetaminophen (TYLENOL) suppository 650 mg, Q6H PRN  cyclobenzaprine (FLEXERIL) tablet 5 mg, TID PRN  dextrose 10 % infusion, Continuous PRN  dextrose bolus 10% 125 mL, PRN   Or  dextrose bolus 10% 250 mL, PRN  glucagon injection 1 mg, PRN  glucose chewable tablet 16 g, PRN  heparin (porcine) injection 2,000 Units, PRN  insulin lispro (HUMALOG,ADMELOG) injection vial 0-4 Units, TID WC  insulin lispro (HUMALOG,ADMELOG) injection vial 0-4 Units, Nightly  ondansetron (ZOFRAN-ODT) disintegrating tablet 4 mg, Q8H PRN   Or  ondansetron (ZOFRAN) injection 4 mg, Q6H PRN  polyethylene glycol (GLYCOLAX) packet 17 g, Daily PRN  tamsulosin (FLOMAX) capsule 0.4 mg, Daily  valACYclovir (VALTREX) tablet 500 mg, Daily  acetaminophen (TYLENOL) tablet 650 mg, Q4H PRN  metoprolol tartrate (LOPRESSOR) tablet 12.5 mg, BID  cholestyramine light packet 4 g, BID  diphenoxylate-atropine (LOMOTIL) 2.5-0.025 MG per tablet 1 tablet, BID PRN  traMADol (ULTRAM) tablet 50 mg, Q6H PRN  psyllium husk-aspartame (METAMUCIL FIBER) packet 1 packet, Daily PRN  diphenoxylate-atropine (LOMOTIL) 2.5-0.025 MG per tablet 1 tablet, Daily  traMADol (ULTRAM) tablet 50 mg, QAM AC  sodium bicarbonate tablet 650 mg, BID  melatonin disintegrating tablet 5 mg, Nightly          Vitals:  BP (!) 108/45   Pulse (!) 43   Temp 97.7 °F (36.5 °C) (Oral)   Resp 18   Ht 1.854 m (6' 1\")   Wt 95.8 kg (211 lb 4.8 oz)   SpO2 100%   BMI 27.88 kg/m²   I/O (24Hr):    Intake/Output Summary (Last 24 hours) at 8/5/2024 1238  Last data filed at 8/5/2024 0849  Gross per 24 hour   Intake 900 ml   Output 185 ml   Net 715 ml 
medical condition->Emergency Medical Condition (MA) What reading provider will be dictating this exam?->CRC FINDINGS: BRAIN/VENTRICLES: There is no acute intracranial hemorrhage, mass effect or midline shift.  No abnormal extra-axial fluid collection.  The gray-white differentiation is maintained without evidence of an acute infarct.  There is no evidence of hydrocephalus. ORBITS: The visualized portion of the orbits demonstrate no acute abnormality. SINUSES: The visualized paranasal sinuses and mastoid air cells demonstrate no acute abnormality. SOFT TISSUES/SKULL:  No acute abnormality of the visualized skull or soft tissues.     No acute intracranial abnormality.     XR CHEST PORTABLE    Result Date: 7/15/2024  EXAMINATION: ONE XRAY VIEW OF THE CHEST 7/15/2024 7:28 pm COMPARISON: 04/06/2024 HISTORY: ORDERING SYSTEM PROVIDED HISTORY: syncope TECHNOLOGIST PROVIDED HISTORY: Reason for exam:->syncope What reading provider will be dictating this exam?->CRC FINDINGS: The lungs are without acute focal process.  There is no effusion or pneumothorax.  Stable heart size.  The osseous structures are without acute process.     No acute process.       Active Hospital Problems    Diagnosis Date Noted    Dizziness [R42] 08/03/2024     Priority: Low    Atrial fibrillation (HCC) [I48.91] 07/22/2024     Priority: Low    SILVINA (acute kidney injury) (HCC) [N17.9] 07/17/2024     Priority: Low    Impaired mobility and activities of daily living [Z74.09, Z78.9] 05/22/2024     Priority: Low         Impression/Plan:   B/L PE and LLE DVT 7/2024.  S/p IVCF 7/25.  LLE DVT burden appears to have extended. I will discuss w Dr. Magallon regarding potential Thrombectomy.  On SQ Heparin  CNS Lymphoma w recent Spontaneous SDH. - apparently Lymphoma responded well to Chemo and at lower risk of further Cerebral bleed.   Mild CAD - no CP.  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   AF - rate control is limited due to low

## 2024-08-20 NOTE — CARE COORDINATION
Attempted to meet with patient to discuss rehab- off unit at time of entering patient's room. It should be noted patient was on rehab prior and was not able to meet minutes for various reasons. Concerns noted for patient's ability to actively participate. Requirement for acute rehab is ability to actively participate in three hours of therapy and minutes do need to be met to continue to stay on rehab unit. Patient was a RR 8/2 and subsequently transferred to . Wanted to follow up with patient and wife in regards to factors/ reasons why patient had refusals of therapy prior and if those concerns have been addressed so that patient can actively participate in rehab program. Appropriateness for acute rehab TBD at this time. Awaiting PT eval.   Electronically signed by Cesia Lee on 8/8/2024 at 10:21 AM    Arrived at patient's room to discuss rehab. Wife present. Patient sleeping, appeared very tired as he received HD today- barely opened eyes during encounter. Wife reports patient was down there for a very long time. Discussed rehab. Discussed concerns that patient was not meeting minutes on prior rehab stay. Discussed factors of why patient was not able to meet minutes. Wife reports ultimately, her goal is for patient to return to our rehab and be able to make the gains to return back home. We discussed wants vs realistic goals at this time in regards to patient's true capability of being able to actively participate in and tolerate three hours of therapy. Advised how tired patient is today after HD and that the HD will continue while on rehab likely impacting his ability to participate d/t fatigue. Wife admits he is not bouncing back as quick this hospitalization as he has done with prior encounters. Wife does acknowledge this but also voices her hope of patient being able to return. She points out how swollen legs are and voices concern right leg may have clot as well. She reports that if patient could 
CALL PLACED TO [facility]. SPOKE WITH SYLVESTER. PATIENT IS ON HOLD STATUS. WILL NEED LUIS FOR @ D/C.   
Case Management Assessment  Initial Evaluation    Date/Time of Evaluation: 8/5/2024 3:05 PM  Assessment Completed by: Tanya Tirado RN    If patient is discharged prior to next notation, then this note serves as note for discharge by case management.    Patient Name: Km Garcia                   YOB: 1945  Diagnosis: Dizziness [R42]                   Date / Time: 8/2/2024  7:10 PM    Patient Admission Status: Observation   Readmission Risk (Low < 19, Mod (19-27), High > 27): Readmission Risk Score: 25.4    Current PCP: Dawood Darby MD  PCP verified by CM? Yes    Chart Reviewed: Yes      History Provided by: Patient  Patient Orientation: Alert and Oriented    Patient Cognition: Alert    Hospitalization in the last 30 days (Readmission):  Yes    If yes, Readmission Assessment in  Navigator will be completed.    Advance Directives:      Code Status: Full Code   Patient's Primary Decision Maker is: Named in Scanned ACP Document    Primary Decision Maker: Coby Hummel - Spouse - [phone]    Discharge Planning:    Patient lives with: Spouse/Significant Other Type of Home: House  Primary Care Giver: Self  Patient Support Systems include: Spouse/Significant Other   Current Financial resources:    Current community resources:    Current services prior to admission: None            Current DME: Walker            Type of Home Care services:  None    ADLS  Prior functional level: Assistance with the following:, Bathing, Mobility  Current functional level: Assistance with the following:, Bathing, Mobility    PT AM-PAC:   /24  OT AM-PAC: 15 /24    Family can provide assistance at DC: Yes  Would you like Case Management to discuss the discharge plan with any other family members/significant others, and if so, who? Yes (WIFE AS NEEDED)  Plans to Return to Present Housing: Yes  Other Identified Issues/Barriers to RETURNING to current housing: NA  Potential Assistance needed at discharge: Skilled 
LSW spoke with [facility] admissions to follow up on referral. They said they do not currently have a bed available. LSW will check  to DC to check if a bed becomes open. LSW met with patient and spouse to update them on this and discuss secondary SNF choice. Spouse stated patient was having a bad day today but that they would discuss it when he is feeling better. LSW will continue to follow.   
MET W/PT AND SPOUSE TO DISCUSS SNF CHOICES. THEY WOULD PREFER [facility]. NO OTHER CHOICES OFFERED AT THIS TIME. INFORMED LSW. CM/LSW  TO FOLLOW. NO PRECERT NEEDED.   
[facility] meeting scheduled today between 11:30 AM -12PM.     Pending family decision.     LSW will follow.     Electronically signed by ABBY Silva on 8/19/2024 at 8:58 AM    Per [facility], family declined hospice at this time.     LSW met with pt's wife at bedside. Discussed DC options at this time. LSW had a long extensive conversation with wife.    Explained [facility] to pt's wife. Wife declined Hosp[facility] and [facility]. Wife report, LTAC is out of the picture. Would like pt to stay in ICU and continue LEVO.   Wife report, pt is still able to make decision for himself at this time. Would like to discuss it with pt about goals of care.     Pall care following.     LSW updated RN. Will need ETHIC Consult if able.     LSW will continue to follow.     Electronically signed by ABBY Silva on 8/19/2024 at 3:07 PM    
PER YENIFER AT [facility], PT IS NOT A CANDIDATE.  SPOKE TO WIFE, THEY ARE LOOKING AT [facility].  CM WILL CONTINUE TO MONITOR.  MSG TO ON-CALL LSW.   
PT IS OFF THE FLOOR FOR HD.  CM WILL RETURN.   
PT UNABLE TO TOLERATE HD, PALL CARE CONSULTED FOR GOC.  PER NOTES POOR PROGNOSIS.  MAY NEED CRRT VS HOSPICE.   
PT'S DC PLAN REMAINS UNCHANGED. [facility] FOLLOWING.   
Quality round completed with care management team. Per pall care family is ready for hospice at this time.   LSW called [facility] and updated NL.     [facility] will follow up with family.     LSW will follow.     Electronically signed by ABBY Silva on 8/20/2024 at 9:47 AM    Per BRIGITTE De La Cruz scheduled to re meet with family at 11:45 AM.     Electronically signed by ABBY Silva on 8/20/2024 at 10:49 AM    PER [facility].  TIME 7PM. PT GOING TO INPATIENT HOSPICE C[facility].     LSW WILL FOLLOW.     Electronically signed by ABBY Silva on 8/20/2024 at 4:15 PM    
Referral sent to [facility]. Pending acceptance. No pre-cert is need it.     LSW will follow.     Electronically signed by ABBY Silva on 8/10/2024 at 10:33 AM    
Spoke to Dr. Stevens regarding new Inpatient Rehab Facility (IRF) referral. Patient was on IRF from 7/25/24 - 8/2/24 and had difficulty participating in therapies due to fatigue. Patient was transferred from IRF to medical on 8/2 due to dizziness when attempting to participate in therapy. We do not feel the patient will be able to tolerate 3 hours of intensive therapy per day and therefore recommend a lower level of care for rehabilitation at this time. Recommendation was provided to CM. Electronically signed by Ole Moncada RN on 8/4/24 at 10:15 AM EDT    
Spoke with 4W LSW and advised patient not appropriate for rehab.  Electronically signed by Cesia Lee on 8/9/2024 at 11:22 AM    
This CLARISAW completed rounds with the ICU Team.  Patient resting, wife at bedside.  Possible transfer of ICU today.  Referral made to [place].  Awaiting callback.  ABBY/CAREY to follow.  Electronically signed by ABBY Pereira on 8/15/24 at 3:19 PM EDT    
This LSW met with patient and his wife at bedside.  Patient on Levo, and they added and NG tube and patient is getting nutrition.  Patients wife said she hada  conversation with her  privately and he wants to continue to fight.  She said she will carry out his wishes.  Patient was alert and active in conversation.  Information was given to Marsha at [facility] per patient/family request.  LSW/CM will follow.  Electronically signed by ABBY Pereira on 8/16/24 at 2:34 PM EDT    
This LSW received call from Radha at [facility]. Per Radha patient will be able to receive dialysis through [facility]. Radha to send new location and new dialysis schedule to this LSW. I have updated patient and wife with this information.  Patient is being followed by [facility] program. Wife and patient are also reviewing SNF list.  LSW/CM to follow.  Electronically signed by ABBY Almendarez on 8/9/24 at 3:31 PM EDT         
This LSW visited patient and patients wife, Coby at bedside this afternoon. Discharge plans discussed. Patient is being followed by [facility] program.   Should patient not be appropriate for [facility] program, wife and patient will provide LSW/CM SNF choices.  Patient has been  cleared financially and medically for community dialysis.  Patient has been given chair at [facility] - [address] [place] [address] as patients 1st choice , [facility] does not have any available chairs.  Dialysis Schedule is:  Tu-TH- Sat. At 12:50pm.  LSW/CM to follow.  Electronically signed by ABBY Almendarez on 8/8/24 at 3:36 PM EDT   
This LSW visited patient at bedside this afternoon. I also spoke with  patients wife, Coby via phone. Community dialysis discussed. Wife requesting that dialysis referral be sent to [facility]. Patient and wife are requesting to have Dr. Robert follow.  Discharge destination also discussed. Patients wife stated that patient is not able to come home unless he is independent with walker. First choice is for patient to be transferred to [facility] Rehabilitation program. I also left SNF list at bedside for patient and wife to review.  Electronically signed by ABBY Alemndarez on 8/7/24 at 3:07 PM EDT     This LSW initiated new community dialysis referral at [facility]. Awaiting financial and medical clearance at thi time.  Electronically signed by ABBY Almendarez on 8/7/24 at 3:40 PM EDT   
7.6* 8.6*   HCT  --  22.7* 26.3*   PLT  --  296 323   BUN 31* 43* 32*   CREATININE 4.13* 4.94* 4.15*   GLUCOSE 114* 137* 149*   * 129* 132*   K 4.1 4.0 3.8   CALCIUM 8.1* 7.9* 7.5*      Blood cultures:  No results for input(s): \"BC\" in the last 72 hours.    Urinalysis/C&S:  No results for input(s): \"NITRITE\", \"LABCAST\", \"WBCUA\", \"RBCUA\", \"MUCUS\", \"TRICHOMONAS\", \"YEAST\", \"BACTERIA\", \"LEUKOCYTESUR\", \"BLOODU\", \"GLUCOSEU\", \"KETUA\", \"AMORPHOUS\", \"LABURIN\" in the last 72 hours.    Radiology:  Vascular duplex lower extremity venous left  Result Date: 8/5/2024  Extensive acute clot throughout the left lower extremity venous system. The results were communicated to the ordering service     CT HEAD WO CONTRAST  Result Date: 8/2/2024  1.  There is no acute intracranial abnormality.  Specifically, there is no intracranial hemorrhage. 2. Atrophy and periventricular leukomalacia, 3. 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 .     Cardiac procedure  Result Date: 7/31/2024    Status post successful Cook Celect IVC filter placement,     FLUORO FOR SURGICAL PROCEDURES  Result Date: 7/25/2024  EXAMINATION: SPOT FLUOROSCOPIC IMAGES 7/25/2024 10:39 am TECHNIQUE: Fluoroscopy was provided by the radiology department for procedure. Radiologist was not present during examination. FLUOROSCOPY DOSE AND TYPE: Radiation Exposure Index: 0.97 mGy, COMPARISON: None HISTORY: ORDERING SYSTEM PROVIDED HISTORY: pain TECHNOLOGIST PROVIDED HISTORY: Reason for exam:->pain What reading provider will be dictating this exam?->CRC Intraprocedural imaging. FINDINGS: Intraoperative fluoroscopy utilized for port placement.  Distal tip of the central venous catheter appears at level of the superior vena cava.   Intraprocedural fluoroscopic spot images as above.  See separate procedure report for more information.     XR CHEST (SINGLE VIEW FRONTAL)  Result Date: 7/25/2024  Right internal jugular central venous catheter tip in the right atrium.

## 2024-08-21 LAB
ALBUMIN SERPL-MCNC: 2.08 G/DL (ref 3.75–5.01)
ALPHA1 GLOB SERPL ELPH-MCNC: 0.75 G/DL (ref 0.19–0.46)
ALPHA2 GLOB SERPL ELPH-MCNC: 0.67 G/DL (ref 0.48–1.05)
B-GLOBULIN SERPL ELPH-MCNC: 0.58 G/DL (ref 0.48–1.1)
GAMMA GLOB SERPL ELPH-MCNC: 0.42 G/DL (ref 0.62–1.51)
IGA SERPL-MCNC: 217 MG/DL (ref 68–408)
IGG SERPL-MCNC: 445 MG/DL (ref 768–1632)
IGM SERPL-MCNC: 39 MG/DL (ref 35–263)
INTERPRETATION SERPL IFE-IMP: ABNORMAL
MONOCLON BAND OBS SERPL: ABNORMAL
PROT SERPL-MCNC: 4.5 G/DL (ref 6.3–8.2)

## 2024-08-22 LAB — PATH INTERP BLD-IMP: NORMAL

## 2024-08-22 NOTE — PROGRESS NOTES
[facility] Occupational Therapy      Date: 8/15/2024  Patient Name: Km Garcia        MRN: [id number]  Account: [id number]   : 1945  (79 y.o.)  Room: [place] [address]    Chart reviewed, attempted OT at 1005 for eval. Patient not seen 2° to:    Pt planned for HD catheter replacement today.    Spoke to MIRTA Lion RN aware. Will attempt again when able.    Electronically signed by MIKA Kang/L on 8/15/2024 at 12:54 PM   
    0023 Blood [id number] Skin assessed with Amisha VASQUEZ RN.Buttocks and coccyx reddened. Zinc paste applied. Gluteal cleft has open slit- Zinc paste applied. Abrasion on L upper thigh. Bruising scattered on BUE. Pt also has blistered area on L thigh    2000 Pt is alert but drowsy. Follows commands. Answers some questions appropriately. Moves x4 but weak. Pt is in sinus rhythm with occ. PVCs. CRRT continues via R tunnelled catheter.     2053 Zofran 4 mg IV for c/o nausea.     2100 Pt able to take pills without difficulty.     2200 Pt repositioned q2hrs.     0000 CRRT alarming that filter is clotting. High pressure access. Blood returned per R tunnelled catheter.     0030 Both arterial and venous ports heparinized and capped- see EMR.     0200 Zinc paste reapplied to buttocks and gluteal fold. New Mepilex applied to sacrum.     0550 Pt remains on Amiodarone drip. Monitor shows RSR with occ. PVC  
  HEMODIALYSIS PRE-TREATMENT NOTE    Patient Identifiers prior to treatment: Name,     Isolation Required: No                      Isolation Type: N/A       (please document if patient is being managed as a PUI/COVID-19 patient)        Hepatitis status: Susceptible                          Date Drawn                             Result  Hepatitis B Surface Antigen 24     Neg                     Hepatitis B Surface Antibody 24 Neg        Hepatitis B Core Antibody N/A N/A          How was Hepatitis Status verified: Epic     Was a copy of the labs you documented provided to facility for the patient's chart: Epic    Hemodialysis orders verified: Yes    Access Within normal limits ( I.e. s/s of infection,...): WNL, no signs or symptoms of infection noted     Pre-Assessment completed: Yes    Pre-dialysis report received from: MIRTA Branch                      Time: 11:50    
  Pharmacy Vancomycin Consult     Vancomycin Day: 2  Current Dosing: HD dosing    Recent Labs     08/14/24  0452 08/15/24  0533   BUN 54* 47*   CREATININE 5.05* 4.15*   WBC 15.2* 14.5*       Intake/Output Summary (Last 24 hours) at 8/15/2024 1506  Last data filed at 8/15/2024 0800  Gross per 24 hour   Intake 1977.88 ml   Output 1168 ml   Net 809.88 ml     Cultures  Recent Labs     08/14/24  1221 08/02/24  1357 07/29/24  1704   BC No Growth to date.  Any change in status will be called.  --   --    BLOODCULT2 No Growth to date.  Any change in status will be called.  --   --    LABURIN  --   --  Cult,Urine:  NO GROWTH  Performed at [facility] [address] [place] [id number]  [phone]     COVID19  --  Not Detected  --      Height: 185.4 cm (6' 1\"), Weight - Scale: 113.8 kg (250 lb 14.1 oz), Body mass index is 33.11 kg/m².    Estimated Creatinine Clearance: 19 mL/min (A) (based on SCr of 4.15 mg/dL (H)).Hemodialysis Intake (ml): 500 mlDialyzer Clearance: Heavily streaked.    Trough:  Recent Labs     08/15/24  1347   VANCORANDOM 21.2      Assessment/Plan:  Patient changing to HD first session tonight. Pre-HD level slightly above goal. Will order reduced dose of 750 mg post HD tonight. Plan repeat level prior to next HD session. Timing of future trough levels may be adjusted based on culture results, renal function, and clinical response.    Thank you,  Jyoti Bella, [facility] PharmD  
 Net UF: 1000 mL    Pre weight: 95.8 kg  Post weight: 94.8 kg  EDW: N/A    Access used: CVC R chest  Access function: good    Medications or blood products given: none    Regular outpatient schedule: N/A    Summary of response to treatment: Patient tolerated treatment fairly, system clotted 11 minutes before treatment scheduled to end, UF reduced and eventually turned off due to soft BPs and patient running in C profile, writer messaged Dr. Robert to update him during treatment, no response received during treatment, will continue TTS schedule, report given to MIRTA Branch.    Copy of dialysis treatment record placed in chart, to be scanned into EMR.  
0700AM Report from Virginie RN. Alert and able to follow commands. Slow to respond.  VSS MP-AF RVR. Amnio @ 0.5 Levo @ 3meq infusing via peropheral lines. CRRT off at this time. Contreras to CD. 4+ pitting edema in bilateral lower extremities.   08:45am Dialysis here to reset up CRRT  0908am CRRT up and running per dialysis  09:35am Critical care rounds wit Milly Doll and team Wife present for rounds  1000am Dr Robert in and updated on status. Message sent to DR Obando to inform of HR. Dr Doll updated on status  10:18am Wero ZULUGAA urology in and updated on status  10:45am CRRT remains running without issues.     AF/RVR  11am DR Obando in and updated spoke with  wife at bedside  11:45am DR Doll in and placed CVC line in L IJ. Site without redness or swelling. Dressing dry and intact. Blood cultures drawn per Dr Doll's order  12 noon JEANNIE/CV with Dr Lam/ DR Gagnon Anesthesia Echo Respiratory. CV x2 250 then 300 jouoles then converted to SR   1300pm Dr Cuenca in and updated on status  14:15PM Wife at bedside and updated   13:50pm CRRT Clotted off Dialysis and DR Robert aware  1600PM DR Hu in and spoke to patient and wife about replacing dialysis catheter. Patient and wife agreeable  16:30pm Spoke with Dialysis. We be in to restart dialysis this pm  17:30pm REctal tube inserted due to much incontinence of stool. Cream applied to wounds.  17:38pm CRRT restarted per dialysis  
0700am Report from Valery RN. Able to nod head and follow commands. VSS MP_SR. Levo at 10meq. Infusing via CVC line L REBECA. Contreras to CD. Dialysis cather R upper chest. Corpak T.F. without issues FSM  09:40am Critical care rounds with Dr Fregoso and team. Billie NP from [facility] in to see patient and spoke with wife at bedside  09:50am DR Robert in and updated on status.   12 noon Billie Hospice nurse in room to speak with wife  12;32PM Denita NP made aware of K+ 0f 6.4 orders  16:15PM K+ results given to Dr Fregoso. No new orders.   1800pm Levo @ 3meqs. Tube Feeding infusing via corpak. FSM.   
0730- Patient remains lethargic. Did open eyes very briefly to voice, and did follow commands. Very weakly nods head to questions. Able to mouth name, but difficulty speaking. CRRT off. Pt. On levophed and heparin drips.     Let Dr. Robert know of increased levo need off of CRRT, and no urine output after Lasix last night.    Discussed pt. Condition with Dr. Rios regarding low Hgb 6.8, need for temporary hemodialysis catheter to resume CRRT, as well as need for arterial line for increase in levo off of CRRT and difficulty at times obtaining accurate BP.   Then called wife to obtain consents, and she stated she would be in soon to speak with pt. Regarding wishes.     Wife at bedside. Dr. Rios and this RN also present at bedside. Code status changed to limited code. Wife and pt. Agree no more dialysis, and no escalation of care or increase in levo dose. Palliative care and hospice consults ordered.     Spoke with Jyoti VIGIL with [facility], who also spoke with wife over phone.   Hospice meeting planned for tomorrow 11am.     Let Dr. Robert know of decision for no more dialysis.     1130- pt. Remains lethargic. Refusing anything to eat or drink, even when offered by wife. Remains on levo.     1600- assessment unchanged. Pt. Given bath.     Pt. Remains on same dose of levo, still lethargic and withdrawn. Handoff report given at bedside.   
07OOAM Report from Virginie RN. Alert delayed responses. VSS MP-SR.Amnio @ 0.5/16.7cc/hr. CRRT off at this time. NPO for surgery. Contreras to CD. Dialysis cath site dry and intact. L IJ CVC dressing dry intact.    09:30am Critical care rounds with Dr Doll and team.  10:20am to Surgery via bed  11am remains in surgery  12:30 pm Received from surgery via bed. Alert remains slow to respond. BP low Dr Doll aware. Dialysis catheter site dry and intact.  Orders  12:38pm BP in 80's Levo restarted. Awaiting dialysis. Wife at bedside  1400pm More awake able to take po without issues. Levo @ 3meqs. Wife at bedside  1600pm Dialysis here to set up for HD. Dr arias called per dialysis RN. Change to CRRT  17:30pm Dialysis here to set up CRRT  17:50pm CRRT restarted. Levo @9meqs. VSS  MP-SR    
0812: Pt off the floor to Dialysis at this time.    Electronically signed by MARIA DEL ROSARIO YOUNG RN on 8/9/24 at 8:17 AM EDT    
150 cc of urine emptied from catheter this shift. Dr. Cuenca notified about low urine output.    Electronically signed by Cecilia Holder RN on 8/4/2024 at 6:24 PM    
1900 skin assessed with Gilbert JONES RN.Pt has red slit at gluteal cleft. Zinc paste applied. Mepilex applied to sacrum. Mepilex to both heels. Pt has bruising to lower abdomen. Pt has abrasion to L upper thigh.     2000 Pt is lethargic but opens eyes when name is called. Speech slightly slurred. Pt can state his name and date of birth. When asked where he is, pt states \"[place]\". Pt moves x4 and follows commands. There is some tremoring of both hands. Pt has pitting edema to both feet and legs. Pt is in atrial fib with rate of 110-120.     2100 Pt passed swallow screen. Is able to take his oral meds with water.     2110 [facility] was called to make sure someone is coming in to set up CRRT. They relayed that they know about the CRRT and someone will be there.     2215 Janet  from [facility] here to set up CRRT. Pt is in atrial fib but heart rate is beginning to increase to 130s.     2230 dr. Smith was perfect served about pts heart rate. He ordered Amiodarone bolus and drip and stated to have Hospitalist address heart rate if needed.     2259 CRRT initiated per R IJ tunnelled catheter. Arterial and venous ports had to be reversed due to poor blood return from arterial port.     2322 Amiodarone 150 mg IV bolus.     2342 Amiodarone drip at 1mg/min.     0030 CRRT alarms are reading to check return. Pt has hiccups.     0200 pt remains in atrial fib with rate in 120s.     0245 CRRT alarming that filter is clotting and return pressure high. Blood returned.     2.3 cc Heparin instilled into blue port of dialysis catheter. 2.2 cc Heparin instilled into red line of dialysis catheter.     0330 Arpita from [facility] notified that CRRT stopped due to filter clotting. She stated dialysis nurse will be there at 0700.     0339 Dr. Jakob mendiola served regarding discontinuation of CRRT and that there is no blood return from red port of dialysis catheter.     0432 Cath flow 2mg placed into the arterial port and the venoos port 
Attempted to see patient this morning, patient off floor for dialysis. Due to Wound Ostomy RN workflow - will be unable to follow up with patient today. Will attempt to see patient at earliest availability.     MATEUS MartinezN, RN, Wound/Ostomy Nurse on 8/9/2024 at 2:14 PM    
CRRT INITIATED USING M150 AS PRESCRIBED. MACHINE SETTINGS AND ORDERS REVIEWED WITH ROOSEVELT GOMES RN. PT STABLE AT THIS TIME.   
CRRT restarted after CVC stopped working. Cath miley placed in line to dwell for 60mins. Arterial lumen still has no pull. Venous lumen flushes well. Hemosafes applied x 2. Pt stable  
CRRT restarted green line 750 purple line 750 whit line 500 tolerating well pressures wnl  
CRRT restarted using previous orders. m150 dialyzer. CVC has good push/pull on venous lumen but hard/sluggish pull on arterial. Lines reversed. Hemostats applied x 2. Pt stable  
CRRT, CVVH tx resumed following New orders. Filter ,   UF 75mls/hr as tolerated.   Pre pump rate at 900mls  Post replacement 350mls  Hemodsafety device on x 2 ports.    Lines reversed due to poor flows through arterial port.    Report given.     
[facility]  Occupational Therapy        NAME:  Km Garcia  ROOM: IC11/IC11-01  :  1945  DATE: 2024    Attempted to see Km Garcia at 1315 on this date for:   [x]  Initial Evaluation   []  Treatment       Patient was unable to be seen due to:   [] Off unit for testing/procedure    [] Patient refused, stating \"    [] Therapy on hold due to     [x] Nursing deferred due to pt not yet ready to participate in therapy at this time. OK to check in AM, will continue to follow.   [] Other:      Discussed with MIRTA Lion    Electronically signed by NAOMI Kang on 24 at 1:17 PM EDT  
Comprehensive Nutrition Assessment    Type and Reason for Visit:  Consult, Reassess (TF order and manage)    Nutrition Recommendations/Plan:   Diet dysphagia soft and bite sized   Diabetic oral supplement TID   Begin trophic TF Diabetic formual (Glucerna 1.5) @ 20 ml/hr   50 ml water flush every 4 hrs      Malnutrition Assessment:  Malnutrition Status:  At risk for malnutrition (Comment) (08/12/24 1314)    Context:  Acute Illness     Findings of the 6 clinical characteristics of malnutrition:  Energy Intake:  50% or less of estimated energy requirements for 5 or more days  Weight Loss:  Unable to assess     Body Fat Loss:  No significant body fat loss     Muscle Mass Loss:  No significant muscle mass loss    Fluid Accumulation:  Unable to assess     Strength:  Not Performed    Nutrition Assessment:    CorPak placed today due to poor po intakes, will start trophic TF and monitor adequacy of intakes.  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 SILVINA. HD started during admission. 'Renal fx was improving with plan to remove dialysis catheter 8/2 but fx worsening due to hypotension so now needing dialysis again' +loose BMs. Multiple lab abnormalities noted. +4 pitting/weeping BLE edema noted. Rapid response called was on rehab now back on medical floor. Wound Type: Pressure Injury (sacrum)       Current Nutrition Intake & Therapies:    Average Meal Intake: 1-25%  Average Supplements Intake: Unable to assess  ADULT DIET; Dysphagia - Soft and Bite Sized; 4 carb choices (60 gm/meal)  ADULT ORAL NUTRITION SUPPLEMENT; Breakfast, Lunch, Dinner; Diabetic Oral Supplement  ADULT TUBE FEEDING; Nasogastric; Diabetic; Continuous; 20; No; 50; Q 4 hours  Current Tube Feeding (TF) Orders:  Feeding Route: Nasogastric  Formula: Diabetic (Glucerna 1.5)  Schedule: 
Comprehensive Nutrition Assessment    Type and Reason for Visit:  Initial, Positive Nutrition Screen    Nutrition Recommendations/Plan:   Continue ADULT DIET; Regular; 5 carb choices (75 gm/meal); No Added Salt (3-4 gm)  D/c supplements per pt  When intakes are consistently >50%, will modify diet to 2G sodium and add K+ restriction      Malnutrition Assessment:  Malnutrition Status:  At risk for malnutrition (Comment) (08/12/24 1314)    Context:  Acute Illness     Findings of the 6 clinical characteristics of malnutrition:  Energy Intake:  50% or less of estimated energy requirements for 5 or more days  Weight Loss:  Unable to assess     Body Fat Loss:  No significant body fat loss     Muscle Mass Loss:  No significant muscle mass loss    Fluid Accumulation:  Unable to assess     Strength:  Not Performed    Nutrition Assessment:    Pt declining from a nutritional standpoint. Intakes remain suboptimal (<50%). Fluid status varies making it difficult to assess wt trends. Significantly altered labs noted. RD to continue current diet and d/c supplements per pt. If intakes begin to improve will consider more renal dietary restrictions. Continue to monitor.    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 SILVINA. HD started during admission. 'Renal fx was improving with plan to remove dialysis catheter 8/2 but fx worsening due to hypotension so now needing dialysis again' +loose BMs. Multiple lab abnormalities noted. +4 pitting/weeping BLE edema noted. Rapid response called was on rehab now back on medical floor. Wound Type: Pressure Injury (sacrum)       Current Nutrition Intake & Therapies:    Average Meal Intake: 26-50%, 1-25%  Average Supplements Intake: Refusing to take  ADULT DIET; Regular; 5 carb choices (75 gm/meal); No Added Salt (3-4 gm)  ADULT ORAL NUTRITION SUPPLEMENT; Breakfast, Dinner; Wound Healing Oral Supplement  ADULT ORAL NUTRITION SUPPLEMENT; Lunch; 
[facility]   Pharmacy Dose Adjustment Per Protocol:  Zosyn Extended Interval Interchange      Km Garcia is a 79 y.o. male.     The following ordered dose of Zosyn has been changed to optimize its pharmacodynamic profile per [facility] pharmacy policy approved by [employer].    Recent Labs     08/13/24  0523 08/13/24  1625 08/14/24  0452   CREATININE 6.29* 5.0* 5.05*   BUN 63*  --  54*   WBC 12.3*  --  15.2*   Hemodialysis Intake (ml): 500 mlDialyzer Clearance: Heavily streaked.  Height: 185.4 cm (6' 1\"), Weight - Scale: 113.8 kg (250 lb 14.1 oz), Body mass index is 33.1 kg/m².  Estimated Creatinine Clearance: 15 mL/min (A) (based on SCr of 5.05 mg/dL (H)).    Medication Ordered:   Traditional Dosing   [x] Zosyn 2.25 gm q6-8 hr   [] Zosyn 3.375 gm q6-8 hr   [] Zosyn 4.5 gm q6-8 hr   [] Zosyn 2.25 gm q6-8 hr   [] Zosyn 3.375 gm q6-8 hr   [] Zosyn 4.5 gm q6-8 hr     New Dose      Piperacillin/Tazobactam - Extended Infusion Dosing (4-hour infusion) - Preferred Dosing Strategy       Renal Function (CrCl mL/min)  ? 20  < 20, HD, PD  CRRT    All Indications - Loading dose of 4500 milligrams x 1 over 30 minutes or via IV push. Maintenance dose  should begin 6 hours after load for CrCl > 20 and 8 hours after load for CrCl < 20.    Maintenance dosing for all indications except as outlined below  [] 3375mg q8h  [] 3375mg q12h  [x] 3375mg q8h    Febrile neutropenia, Cystic fibrosis, BMI > 40*, local Pseudomonas susceptibility < 80% (refer to page 3 for indications)     [] 4500mg q8h  [] 4500 q12h  [] 4500mg q8h    *Consider 3375mg q8h for indication of Urinary tract infections in BMI > 40. Adjust for decreased renal function.     Thank You,  [facility], located within [facility]  8/14/2024 12:20 PM   
[facility]   Pharmacy Pharmacokinetic Monitoring Service - Vancomycin    Km Garcia is a 79 y.o. male starting on vancomycin therapy for sepsis of unknown etiology. Pharmacy consulted by Dr. Doll for monitoring and adjustment.    Target Concentration:  concentration 15-20 mg/L while on CRRT  Additional Antimicrobials: Zosyn    Pertinent Laboratory Values:   Wt Readings from Last 1 Encounters:   08/14/24 113.8 kg (250 lb 14.1 oz)     Temp Readings from Last 1 Encounters:   08/14/24 98.5 °F (36.9 °C) (Oral)     Recent Labs     08/13/24  0523 08/13/24  1625 08/14/24  0452   CREATININE 6.29* 5.0* 5.05*   BUN 63*  --  54*   WBC 12.3*  --  15.2*     Procalcitonin: 1.40    Pertinent Cultures:  Culture Date Source Results   8/14/24 Blood x 2 pending   MRSA Nasal Swab: N/A. Non-respiratory infection.    Plan:  Concentration-guided dosing due to renal impairment and CRRT  Start vancomycin 2500 mg (~22 mg/kg) x 1 loading dose followed by 1000 mg (~8.8 mg/kg) Q12H while patient is on CRRT  Vancomycin concentration ordered for 08/15/24 @ 1200  Pharmacy will continue to monitor patient and adjust therapy as indicated    Thank you for the consult,  Alicia Salguero RPH  8/14/2024 12:23 PM   
[facility]   Pharmacy Dose Adjustment Per Protocol:  Cefepime Extended Interval Interchange    Km Garcia is a 79 y.o. male.     The following ordered dose of Cefepime has been changed to optimize its pharmacodynamic profile per [facility] pharmacy policy approved by [employer].    Recent Labs     08/15/24  0533 08/15/24  1738 08/16/24  0538 08/16/24  0540   CREATININE 4.15* 4.47*  --  4.44*   BUN 47* 54*  --  50*   WBC 14.5*  --  16.8*  --    Hemodialysis Intake (ml): 500 mlDialyzer Clearance: Heavily streaked.  Height: 185.4 cm (6' 1\"), Weight - Scale: 116.6 kg (257 lb 0.9 oz), Body mass index is 33.92 kg/m².  Estimated Creatinine Clearance: 18 mL/min (A) (based on SCr of 4.44 mg/dL (H)).    Ordered Dose    __ 1 gm IV every 8-12 hrs  (30 minute infusion)      _x_ 2 gm  IV every 8-12 hrs (30 minute infusion)    New Dose    Cefepime - Extended Infusion (4-hour infusion) - Preferred Dosing Strategy     Renal Function (CrCl mL/min)       ? 60        30 - 59   11 - 29     ? 10, HD        PD             CRRT    All indications - Loading dose of 2000 milligrams x 1 over 30 minutes or via IV push. Maintenance dose  should begin at the next regularly scheduled dosing interval based on indication/renal function.    Intra-abdominal infections, Skin and soft tissue infections, Urinary tract infections  2000mg q12h ¨ 2000mg q24h ¨ 1000mg q24h ¨ 500mg q24h ¨ 1000mg q24h ¨  2000mg q24h^ ¨   Bacteremia, CNS infections, Cystic fibrosis, Diabetic foot infections, Endocarditis, Febrile neutropenia, Healthcareassociated infections, Osteomyelitis/joint infections,   Pneumonia, Sepsis, BMI > 40*  2000mg q8h ¨ 2000mg q12h ¨ 1000mg q12h ¨ 1000mg q24h x 1000mg q24h ¨  2000mg q12h† ¨   *Consider 2000mg q12h for indication of Urinary tract infections in BMI > 40. Adjust for decreased renal function.   ^Consider 2000mg q12h for CRRT effluent rates > 3L/h   †Consider 2000mg q8h for CRRT effluent rates ? 3L/h         Pharmacists 
[facility]   Pharmacy Pharmacokinetic Monitoring Service - Vancomycin    Consulting Provider: Dr. Doll   Indication: sepsis of unknown etiology  Target Concentration: concentration 15-20 mg/L  Day of Therapy: 4  Additional Antimicrobials: cefepime    Pertinent Laboratory Values:   Wt Readings from Last 1 Encounters:   08/16/24 116.6 kg (257 lb 0.9 oz)     Temp Readings from Last 1 Encounters:   08/17/24 97.6 °F (36.4 °C) (Axillary)     Recent Labs     08/16/24  0538 08/16/24  0540 08/16/24  1857 08/17/24  0621   CREATININE  --  4.44* 4.39*  --    BUN  --  50* 52*  --    WBC 16.8*  --   --  16.8*     Procalcitonin: 1.66 on 08/16    Pertinent Cultures:  ProcedureComponentValueUnitsDate/TimeCulture, Blood ID Sensitivity [[id number]]Collected: 08/14/24 1221Order Status: No resultUpdated: 08/15/24 2045Blood ID, Molecular [[id number]] (Abnormal)Collected: 08/14/24 1221Order Status: CompletedUpdated: 08/15/24 2042Acinetobacter calcoac baumannii complex by PCRNot DetectedBacteroides fragilis by PCRNot DetectedEnterobacteriaceae by PCRNot DetectedEnterobacter cloacae complex by PCRNot DetectedEnterococcus faecalis by PCRNot DetectedEnterococcus faecium by PCRNot DetectedEscherichia coli by PCRNot DetectedHaemophilus Influenzae by PCRNot DetectedKlebsiella aerogenes by PCRNot DetectedKlebsiella oxytoca by PCRNot DetectedKlebsiella pneumoniae group by PCRNot DetectedListeria monocytogenes by PCRNot DetectedNeisseria meningitidis by PCRNot DetectedProteus species by PCRNot DetectedPseudomonas aeruginosa by PCRNot DetectedSalmonella species by PCRNot DetectedStreptococcus agalactiae by PCRNot DetectedStaphylococcus aureus by PCRNot DetectedStaphylococcus epidermidis by PCRDETECTED Abnormal Staphylococcus lugdunensis by PCRNot DetectedStaphylococcus species by PCRDETECTED Abnormal Serratia marcescens by PCRNot DetectedStreptococcus pneumoniae by PCRNot DetectedStreptococcus pyogenes  by PCRNot 
[facility]   Pharmacy Pharmacokinetic Monitoring Service - Vancomycin    Consulting Provider: Dr. Doll   Indication: Sepsis  Target Concentration: Pre-Dialysis Concentration 15-20 mg/L  Day of Therapy: 3  Additional Antimicrobials: Maxipime    Pertinent Laboratory Values:   Wt Readings from Last 1 Encounters:   08/16/24 116.6 kg (257 lb 0.9 oz)     Temp Readings from Last 1 Encounters:   08/16/24 97.7 °F (36.5 °C) (Oral)     Recent Labs     08/15/24  0533 08/15/24  1738 08/16/24  0538 08/16/24  0540   CREATININE 4.15* 4.47*  --  4.44*   BUN 47* 54*  --  50*   WBC 14.5*  --  16.8*  --      Procalcitonin:   Procalcitonin   Date Value Ref Range Status   08/16/2024 1.66 (H) 0.00 - 0.15 ng/mL Final     Comment:     Suspected Sepsis:  Low likelihood of sepsis  <.50 ng/mL    Increased likelihood of sepsis 0.50-2.00 ng/mL  Antibiotics encouraged    High risk of sepsis/shock   >2.00 ng/mL  Antibiotics strongly encouraged    Suspected Lower Respiratory Tract Infections:  Low likelihood of bacterial infection  <0.24 ng/mL    Increased likelihood of bacterial infection >0.24 ng/mL  Antibiotics encouraged    With successful antibiotic therapy, PCT levels should decrease  rapidly. (Half-life of 24 to 36 hours.)    Procalcitonin values from samples collected within the first  6 hours of systemic infection may still be low.  Retesting may be indicated.  Values from day 1 and day 4 can be entered into the Change in  Procalcitonin Calculator to determine the patient's  Mortality Risk Prognosis  ([email or web address])    In healthy neonates, plasma Procalcitonin (PCT) concentrations  increase gradually after birth, reaching peak values at about  24 hours of age then decrease to normal values below 0.5 ng/mL  by 48-72 hours of age.           Pertinent Cultures:  ProcedureComponentValueUnitsDate/TimeCulture, Blood ID Sensitivity [[id number]]Collected: 08/14/24 1221Order Status: No resultUpdated: 08/15/24 
DVT / VTE PROPHYLAXIS EVALUATION    Recent Labs     08/06/24  0514 08/07/24  0544 08/08/24  0638   BUN 43* 31* 43*   CREATININE 4.58* 4.13* 4.94*   PLT  --   --  296   HGB  --   --  7.6*   HCT  --   --  22.7*     ADMITTING DX OR CHIEF COMPLAINT? dizziness  WARFARIN? DOAC'S? no  ANY APPARENT BLEEDING? no  SCHEDULED SURGERY? no     If yes to following, excluded from auto adjustment in Table 1 of policy - please contact provider with recommendations as appropriate.  Include condition/exception in scratch notes. Yes No   Trauma Service or Ortho Surgery []  []    Pregnancy []  []        Current order:  Enoxaparin 30 mg SUBQ once daily      Height: 185.4 cm (6' 1\"), Weight - Scale: 95.8 kg (211 lb 4.8 oz)  Estimated Creatinine Clearance: 14 mL/min (A) (based on SCr of 4.94 mg/dL (H)).    Plan:  Pharmacologic VTE prophylaxis modified based on patient renal function per [facility]/P&T approved protocol     Patient Weight (kg)      50.9 and below .9 101-150.9 151-174.9 175 or greater   Estimated   CrCl  (ml/min) 30 or greater []   30 mg   SUBQ daily   []   40 mg   SUBQ daily (or 30 mg BID for orthopedic cases) []  30 mg SUBQ   BID*  []  40 mg   SUBQ   BID []  60mg SUBQ BID    15-29.9 []  UFH 5000   units SUBQ BID []  30 mg   SUBQ daily [] 30 mg SUBQ   daily []  40 mg SUBQ   daily [] 60 mg SUBQ   Daily*    Less than 15 or dialysis []  UFH 5000   units SUBQ BID [x] UFH 5000 units SUBQ TID []  UFH 7500   units   SUBQ TID*   *Do not exceed enoxaparin 40mg daily or UFH 5000 units SUBQ TID in patients with epidurals,   lumbar drains, or external ventricular drains      
Gastroenterology Progress Note    Km Garcia is a 79 y.o. male patient.  Hospitalization Day:6    Chief C/O: Diarrhea    SUBJECTIVE: Patient tolerating regular diet without difficulty.  Patient denies any nausea, vomiting nor abdominal pain.  Patient continues to have loose brown stools.    ROS:  Gastrointestinal ROS: no abdominal pain, change in bowel habits, or black or bloody stools    Physical    VITALS:  BP (!) 90/48   Pulse 87   Temp 98 °F (36.7 °C) (Axillary)   Resp 18   Ht 1.854 m (6' 1\")   Wt 95.8 kg (211 lb 4.8 oz)   SpO2 99%   BMI 27.88 kg/m²   TEMPERATURE:  Current - Temp: 98 °F (36.7 °C); Max - Temp  Av °F (36.7 °C)  Min: 96.8 °F (36 °C)  Max: 98.6 °F (37 °C)    General -no acute distress  Eyes -no icterus, no pallor  Cardiovascular -rhythm irregular  Lungs -clear to auscultation bilaterally  Abdomen - non-distended,  non-tender, no organomegaly, Bowel sounds normal  Extremities - ++ bilateral lower extremity edema  Skin -warm and dry  Neuro: No asterixis     Data    Data Review:    Recent Labs     24  0548 08/10/24  0541 24  0551   WBC 7.6 12.1* 10.8   HGB 8.6* 9.4* 8.8*   HCT 26.3* 28.8* 26.2*   MCV 94.3* 94.4* 92.9*    359 346     Recent Labs     24  0548 08/10/24  0541 24  0551   * 127* 132*   K 3.8 4.3 4.2   CL 95 92* 95   CO2 21 16* 21   BUN 32* 45* 37*   CREATININE 4.15* 5.24* 4.56*     Recent Labs     08/10/24  0541   AST 9   ALT 6   BILIDIR <0.2   BILITOT <0.2   ALKPHOS 68     No results for input(s): \"LIPASE\", \"AMYLASE\" in the last 72 hours.  No results for input(s): \"PROTIME\", \"INR\" in the last 72 hours.    Radiology Review:  reviewed    ASSESSMENT:  78 y.o. male admitted with left lower extremity DVT, GI consulted for diarrhea, requiring FMS.  Patiently currently receiving chemotherapy.  Recent CNS lymphoma with subdural hematoma.  Patient Eliquis was discontinued at that time.  Patient was readmitted due to left lower extremity DVT.  No 
Gastroenterology Progress Note    Km Garcia is a 79 y.o. male patient.  Hospitalization Day:7    Chief C/O: Diarrhea    SUBJECTIVE: Patient denies nausea, vomiting nor abdominal pain.  Patient tolerating regular diet without difficulty.  Patient was hypotensive and had rapid response this a.m.    ROS:  Gastrointestinal ROS: no abdominal pain, change in bowel habits, or black or bloody stools    Physical    VITALS:  BP (!) 86/50   Pulse 94   Temp 98.2 °F (36.8 °C) (Axillary)   Resp 20   Ht 1.854 m (6' 1\")   Wt 101 kg (222 lb 10.6 oz)   SpO2 100%   BMI 29.38 kg/m²   TEMPERATURE:  Current - Temp: 98.2 °F (36.8 °C); Max - Temp  Av.4 °F (36.9 °C)  Min: 97.3 °F (36.3 °C)  Max: 99.3 °F (37.4 °C)    General -no acute distress  Eyes -no icterus, no pallor  Cardiovascular -rhythm irregular  Lungs -clear to auscultation bilaterally  Abdomen - non-distended,  non-tender, no organomegaly, Bowel sounds normal  Extremities - ++ bilateral lower extremity edema  Skin -warm and dry  Neuro: No asterixis     Data    Data Review:    Recent Labs     08/10/24  0541 24  0551 24  0554   WBC 12.1* 10.8 11.6*   HGB 9.4* 8.8* 9.5*   HCT 28.8* 26.2* 28.7*   MCV 94.4* 92.9* 94.1*    346 353     Recent Labs     08/10/24  0541 24  0551 24  0554   * 132* 129*   K 4.3 4.2 5.2*   CL 92* 95 92*   CO2 16* 21 19*   BUN 45* 37* 51*   CREATININE 5.24* 4.56* 5.78*     Recent Labs     08/10/24  0541   AST 9   ALT 6   BILIDIR <0.2   BILITOT <0.2   ALKPHOS 68     No results for input(s): \"LIPASE\", \"AMYLASE\" in the last 72 hours.  No results for input(s): \"PROTIME\", \"INR\" in the last 72 hours.    Radiology Review:  reviewed    ASSESSMENT:  78 y.o. male admitted with left lower extremity DVT, GI consulted for diarrhea, requiring FMS.  Patiently currently receiving chemotherapy.  Recent CNS lymphoma with subdural hematoma.  Patient Eliquis was discontinued at that time.  Patient was readmitted due to left 
Hospitalist Progress Note      PCP: Dawood Darby MD    Date of Admission: 8/2/2024    Chief Complaint:  Dizziness    Subjective:  No acute events overnight. Denies chest pain or shortness of breath. Denies dizziness or lightheadedness.    Medications:  Reviewed    Infusion Medications    sodium chloride 75 mL/hr at 08/07/24 1030    dextrose       Scheduled Medications    midodrine  10 mg Oral TID WC    traMADol  100 mg Oral QAM AC    insulin lispro  0-4 Units SubCUTAneous TID WC    insulin lispro  0-4 Units SubCUTAneous Nightly    valACYclovir  500 mg Oral Daily    metoprolol tartrate  12.5 mg Oral BID    cholestyramine light  4 g Oral BID    diphenoxylate-atropine  1 tablet Oral Daily    sodium bicarbonate  650 mg Oral BID    melatonin  5 mg Oral Nightly     PRN Meds: calcium carbonate, traMADol, cyclobenzaprine, dextrose, dextrose bolus **OR** dextrose bolus, glucagon (rDNA), glucose, heparin (porcine), ondansetron **OR** ondansetron, polyethylene glycol, acetaminophen, diphenoxylate-atropine, psyllium husk-aspartame      Intake/Output Summary (Last 24 hours) at 8/7/2024 1713  Last data filed at 8/7/2024 0820  Gross per 24 hour   Intake 240 ml   Output 125 ml   Net 115 ml       Exam:    BP (!) 114/53   Pulse 72   Temp 97.5 °F (36.4 °C) (Oral)   Resp 16   Ht 1.854 m (6' 1\")   Wt 95.8 kg (211 lb 4.8 oz)   SpO2 100%   BMI 27.88 kg/m²   Physical Exam  Cardiovascular:      Rate and Rhythm: Regular rhythm. Bradycardia present.   Pulmonary:      Effort: Pulmonary effort is normal. No respiratory distress.   Abdominal:      Palpations: Abdomen is soft.      Tenderness: There is no abdominal tenderness.   Musculoskeletal:      Comments: BLE edema present, L>R.    Neurological:      Mental Status: He is alert and oriented to person, place, and time.   Psychiatric:         Mood and Affect: Mood normal.         Behavior: Behavior normal.       Labs:   Recent Labs     08/05/24  0623   WBC 5.7   HGB 8.6*   HCT 26.5* 
Hospitalist Progress Note      PCP: Dawood Darby MD    Date of Admission: 8/2/2024    Chief Complaint:  Dizziness    Subjective:  No acute events overnight. Denies chest pain or shortness of breath. Denies dizziness or lightheadedness.    Medications:  Reviewed    Infusion Medications    sodium chloride 75 mL/hr at 08/07/24 1030    dextrose       Scheduled Medications    midodrine  10 mg Oral TID WC    traMADol  100 mg Oral QAM AC    insulin lispro  0-4 Units SubCUTAneous TID WC    insulin lispro  0-4 Units SubCUTAneous Nightly    valACYclovir  500 mg Oral Daily    metoprolol tartrate  12.5 mg Oral BID    cholestyramine light  4 g Oral BID    diphenoxylate-atropine  1 tablet Oral Daily    sodium bicarbonate  650 mg Oral BID    melatonin  5 mg Oral Nightly     PRN Meds: calcium carbonate, traMADol, cyclobenzaprine, dextrose, dextrose bolus **OR** dextrose bolus, glucagon (rDNA), glucose, heparin (porcine), ondansetron **OR** ondansetron, polyethylene glycol, acetaminophen, diphenoxylate-atropine, psyllium husk-aspartame      Intake/Output Summary (Last 24 hours) at 8/7/2024 1716  Last data filed at 8/7/2024 0820  Gross per 24 hour   Intake 240 ml   Output 125 ml   Net 115 ml       Exam:    BP (!) 114/53   Pulse 72   Temp 97.5 °F (36.4 °C) (Oral)   Resp 16   Ht 1.854 m (6' 1\")   Wt 95.8 kg (211 lb 4.8 oz)   SpO2 100%   BMI 27.88 kg/m²   Physical Exam  Cardiovascular:      Rate and Rhythm: Regular rhythm. Bradycardia present.   Pulmonary:      Effort: Pulmonary effort is normal. No respiratory distress.   Abdominal:      Palpations: Abdomen is soft.      Tenderness: There is no abdominal tenderness.   Musculoskeletal:      Comments: BLE edema present, L>R.    Neurological:      Mental Status: He is alert and oriented to person, place, and time.   Psychiatric:         Mood and Affect: Mood normal.         Behavior: Behavior normal.       Labs:   Recent Labs     08/05/24  0623   WBC 5.7   HGB 8.6*   HCT 26.5* 
Hospitalist Progress Note      PCP: Dawood Darby MD    Date of Admission: 8/2/2024    Chief Complaint:  Dizziness    Subjective:  No acute events overnight. Denies chest pain or shortness of breath. Denies dizziness or lightheadedness. Having significant pain in his BLE with movement.    Medications:  Reviewed    Infusion Medications    sodium chloride      dextrose       Scheduled Medications    heparin (porcine)  5,000 Units SubCUTAneous 3 times per day    furosemide  100 mg IntraVENous Once    metoprolol tartrate  25 mg Oral BID    midodrine  10 mg Oral TID WC    traMADol  100 mg Oral QAM AC    insulin lispro  0-4 Units SubCUTAneous TID WC    insulin lispro  0-4 Units SubCUTAneous Nightly    valACYclovir  500 mg Oral Daily    cholestyramine light  4 g Oral BID    diphenoxylate-atropine  1 tablet Oral Daily    sodium bicarbonate  650 mg Oral BID    melatonin  5 mg Oral Nightly     PRN Meds: sodium chloride, calcium carbonate, traMADol, cyclobenzaprine, dextrose, dextrose bolus **OR** dextrose bolus, glucagon (rDNA), glucose, heparin (porcine), ondansetron **OR** ondansetron, polyethylene glycol, acetaminophen, diphenoxylate-atropine, psyllium husk-aspartame      Intake/Output Summary (Last 24 hours) at 8/8/2024 1635  Last data filed at 8/8/2024 1400  Gross per 24 hour   Intake 1540.69 ml   Output 150 ml   Net 1390.69 ml       Exam:    BP (!) 145/55   Pulse (!) 141   Temp 98.1 °F (36.7 °C) (Oral)   Resp 20   Ht 1.854 m (6' 1\")   Wt 95.8 kg (211 lb 4.8 oz)   SpO2 100%   BMI 27.88 kg/m²   Physical Exam  Cardiovascular:      Rate and Rhythm: Tachycardia present. Rhythm irregular.   Pulmonary:      Effort: Pulmonary effort is normal. No respiratory distress.   Abdominal:      Palpations: Abdomen is soft.      Tenderness: There is no abdominal tenderness.   Musculoskeletal:      Comments: BLE edema present, L>R.    Neurological:      Mental Status: He is alert and oriented to person, place, and time. 
Hospitalist Progress Note      PCP: Dawood Darby MD    Date of Admission: 8/2/2024    Chief Complaint:  Dizziness    Subjective:  No acute events overnight. Denies chest pain or shortness of breath. Denies dizziness or lightheadedness. Having significant pain in his BLE with movement.    Medications:  Reviewed    Infusion Medications    sodium chloride      dextrose       Scheduled Medications    heparin (porcine)  5,000 Units SubCUTAneous 3 times per day    metoprolol tartrate  25 mg Oral BID    midodrine  10 mg Oral TID WC    traMADol  100 mg Oral QAM AC    insulin lispro  0-4 Units SubCUTAneous TID WC    insulin lispro  0-4 Units SubCUTAneous Nightly    valACYclovir  500 mg Oral Daily    cholestyramine light  4 g Oral BID    diphenoxylate-atropine  1 tablet Oral Daily    sodium bicarbonate  650 mg Oral BID    melatonin  5 mg Oral Nightly     PRN Meds: sodium chloride, calcium carbonate, traMADol, cyclobenzaprine, dextrose, dextrose bolus **OR** dextrose bolus, glucagon (rDNA), glucose, heparin (porcine), ondansetron **OR** ondansetron, polyethylene glycol, acetaminophen, diphenoxylate-atropine, psyllium husk-aspartame      Intake/Output Summary (Last 24 hours) at 8/9/2024 1343  Last data filed at 8/9/2024 1255  Gross per 24 hour   Intake 1640 ml   Output 3800 ml   Net -2160 ml       Exam:    /61   Pulse 69   Temp 97.3 °F (36.3 °C)   Resp 18   Ht 1.854 m (6' 1\")   Wt 95.8 kg (211 lb 4.8 oz)   SpO2 99%   BMI 27.88 kg/m²   Physical Exam  Cardiovascular:      Rate and Rhythm: Normal rate. Rhythm irregular.   Pulmonary:      Effort: Pulmonary effort is normal. No respiratory distress.   Abdominal:      Palpations: Abdomen is soft.      Tenderness: There is no abdominal tenderness.   Musculoskeletal:      Right lower leg: Edema (4+) present.      Left lower leg: Edema (4+) present.   Neurological:      Mental Status: He is alert and oriented to person, place, and time.   Psychiatric:         Mood and 
Hospitalist Progress Note      PCP: Dawood Darby MD    Date of Admission: 8/2/2024    Chief Complaint:  Dizziness    Subjective:  No acute events overnight. Pt reporting significant pain in his left leg. Denies chest pain or shortness of breath.     Medications:  Reviewed    Infusion Medications    dilTIAZem 125 mg in sodium chloride 0.9 % 125 mL infusion Stopped (08/10/24 2195)    dextrose       Scheduled Medications    heparin (porcine)  5,000 Units SubCUTAneous 3 times per day    metoprolol tartrate  25 mg Oral BID    midodrine  10 mg Oral TID WC    traMADol  100 mg Oral QAM AC    insulin lispro  0-4 Units SubCUTAneous TID WC    insulin lispro  0-4 Units SubCUTAneous Nightly    valACYclovir  500 mg Oral Daily    cholestyramine light  4 g Oral BID    diphenoxylate-atropine  1 tablet Oral Daily    sodium bicarbonate  650 mg Oral BID    melatonin  5 mg Oral Nightly     PRN Meds: calcium carbonate, traMADol, cyclobenzaprine, dextrose, dextrose bolus **OR** dextrose bolus, glucagon (rDNA), glucose, heparin (porcine), ondansetron **OR** ondansetron, polyethylene glycol, acetaminophen, diphenoxylate-atropine, psyllium husk-aspartame      Intake/Output Summary (Last 24 hours) at 8/11/2024 1332  Last data filed at 8/11/2024 1230  Gross per 24 hour   Intake 860 ml   Output 1850 ml   Net -990 ml     Exam:    BP (!) 114/98   Pulse 88   Temp 97.8 °F (36.6 °C) (Axillary)   Resp 16   Ht 1.854 m (6' 1\")   Wt 95.8 kg (211 lb 4.8 oz)   SpO2 100%   BMI 27.88 kg/m²   Physical Exam  Cardiovascular:      Rate and Rhythm: Normal rate. Rhythm irregular.   Pulmonary:      Effort: Pulmonary effort is normal. No respiratory distress.   Abdominal:      Palpations: Abdomen is soft.      Tenderness: There is no abdominal tenderness.   Musculoskeletal:      Right lower leg: Edema (4+) present.      Left lower leg: Edema (4+) present.   Neurological:      Mental Status: He is alert and oriented to person, place, and time. 
Hospitalist Progress Note      PCP: Dawood Darby MD    Date of Admission: 8/2/2024    Chief Complaint:  Dizziness    Subjective:  No acute events overnight. Pt reports significant pain in his bilateral knees and feet. Denies chest pain or shortness of breath. Denies dizziness or lightheadedness.    Medications:  Reviewed    Infusion Medications    dextrose       Scheduled Medications    midodrine  10 mg Oral TID WC    [START ON 8/6/2024] traMADol  100 mg Oral QAM AC    insulin lispro  0-4 Units SubCUTAneous TID WC    insulin lispro  0-4 Units SubCUTAneous Nightly    tamsulosin  0.4 mg Oral Daily    valACYclovir  500 mg Oral Daily    metoprolol tartrate  12.5 mg Oral BID    cholestyramine light  4 g Oral BID    diphenoxylate-atropine  1 tablet Oral Daily    sodium bicarbonate  650 mg Oral BID    melatonin  5 mg Oral Nightly     PRN Meds: calcium carbonate, traMADol, acetaminophen **OR** acetaminophen, cyclobenzaprine, dextrose, dextrose bolus **OR** dextrose bolus, glucagon (rDNA), glucose, heparin (porcine), ondansetron **OR** ondansetron, polyethylene glycol, acetaminophen, diphenoxylate-atropine, psyllium husk-aspartame      Intake/Output Summary (Last 24 hours) at 8/5/2024 1400  Last data filed at 8/5/2024 0849  Gross per 24 hour   Intake 900 ml   Output 185 ml   Net 715 ml     Exam:    BP (!) 108/45   Pulse (!) 43   Temp 97.7 °F (36.5 °C) (Oral)   Resp 18   Ht 1.854 m (6' 1\")   Wt 95.8 kg (211 lb 4.8 oz)   SpO2 100%   BMI 27.88 kg/m²   Physical Exam  Cardiovascular:      Rate and Rhythm: Regular rhythm. Bradycardia present.   Pulmonary:      Effort: Pulmonary effort is normal. No respiratory distress.   Abdominal:      Palpations: Abdomen is soft.      Tenderness: There is no abdominal tenderness.   Musculoskeletal:      Comments: BLE edema present, L>R. Curry hose on BLE.   Neurological:      Mental Status: He is alert and oriented to person, place, and time.   Psychiatric:         Mood and Affect: Mood 
Hospitalist Progress Note      PCP: Dawood Darby MD    Date of Admission: 8/2/2024    Chief Complaint:  Dizziness    Subjective:  No acute events overnight. Pt resting comfortably in bed with wife at bedside. Will arouse to voice but not significantly interactive.    Medications:  Reviewed    Infusion Medications    norepinephrine 15 mcg/min (08/19/24 0745)    dextrose       Scheduled Medications    heparin (porcine)  5,000 Units SubCUTAneous 3 times per day    mupirocin   Topical BID    Povidone-Iodine   Topical TID    lansoprazole  30 mg Oral QAM AC    amiodarone  200 mg Oral q8h    cefepime  1,000 mg IntraVENous Daily    lactobacillus  1 capsule Oral Daily with breakfast    vancomycin (VANCOCIN) intermittent dosing (placeholder)   Other RX Placeholder    sodium chloride  250 mL IntraVENous Once    midodrine  15 mg Oral TID WC    [Held by provider] metoprolol tartrate  25 mg Oral BID    insulin lispro  0-4 Units SubCUTAneous TID WC    insulin lispro  0-4 Units SubCUTAneous Nightly    valACYclovir  500 mg Oral Daily    cholestyramine light  4 g Oral BID    diphenoxylate-atropine  1 tablet Oral Daily    sodium bicarbonate  650 mg Oral BID    melatonin  5 mg Oral Nightly     PRN Meds: chlorhexidine gluconate, traMADol, heparin (porcine), heparin (porcine) **AND** heparin (porcine), calcium carbonate, cyclobenzaprine, dextrose, dextrose bolus **OR** dextrose bolus, glucagon (rDNA), glucose, ondansetron **OR** ondansetron, polyethylene glycol, acetaminophen, diphenoxylate-atropine, psyllium husk-aspartame      Intake/Output Summary (Last 24 hours) at 8/19/2024 1452  Last data filed at 8/19/2024 0745  Gross per 24 hour   Intake 932.83 ml   Output 500 ml   Net 432.83 ml     Exam:    BP (!) 143/102   Pulse 97   Temp 99.9 °F (37.7 °C) (Axillary)   Resp 22   Ht 1.854 m (6' 1\")   Wt 112.1 kg (247 lb 2.2 oz)   SpO2 100%   BMI 32.61 kg/m²   Physical Exam  Constitutional:       Appearance: He is ill-appearing. 
Infectious Diseases Inpatient Progress Note          HISTORY OF PRESENT ILLNESS:  Follow up septic shock, acute kidney injury in a patient with CNS lymphoma, norovirus enteritis, coag negative staph bacteremia on IV vancomycin and cefepime, well tolerated.  Patient is not able to get his hemodialysis as ordered because of frequent dialysis catheter clotting.  Patient remains to be very weak.  Remains to be on Levophed at 15 mics.   Has persistent diarrhea with Flexi-Seal.   Poor p.o. intake  No fevers  Patient does not want any more dialysis  He is not ready to go with hospice yet  Wife is at bedside    Current Medications:     heparin (porcine)  5,000 Units SubCUTAneous 3 times per day    mupirocin   Topical BID    Povidone-Iodine   Topical TID    lansoprazole  30 mg Oral QAM AC    amiodarone  200 mg Oral q8h    cefepime  1,000 mg IntraVENous Daily    lactobacillus  1 capsule Oral Daily with breakfast    vancomycin (VANCOCIN) intermittent dosing (placeholder)   Other RX Placeholder    sodium chloride  250 mL IntraVENous Once    midodrine  15 mg Oral TID WC    [Held by provider] metoprolol tartrate  25 mg Oral BID    insulin lispro  0-4 Units SubCUTAneous TID WC    insulin lispro  0-4 Units SubCUTAneous Nightly    valACYclovir  500 mg Oral Daily    cholestyramine light  4 g Oral BID    diphenoxylate-atropine  1 tablet Oral Daily    sodium bicarbonate  650 mg Oral BID    melatonin  5 mg Oral Nightly       Allergies:  Celecoxib and Naproxen      Review of Systems  unable to provide ROS because of altered mentation.   Persistent diarrhea with liquid stools  Poor appetite  Severe generalized weakness  Difficulty maintaining dialysis catheter because of frequent clotting  Physical Exam  Vitals:    08/19/24 1215 08/19/24 1230 08/19/24 1245 08/19/24 1300   BP:  120/66  (!) 143/102   Pulse: (!) 102 96 94 97   Resp: 21 24 23 22   Temp:       TempSrc:       SpO2: 98% 99% 100% 100%   Weight:       Height:       Intact right 
Internal Medicine   Hospitalist   Progress Note    8/12/2024   11:56 AM    Name:  Km Garcia  MRN:    [id number]     IP Day: 7     Admit Date: 8/2/2024  7:10 PM  PCP: Dawood Darby MD    Code Status:  Limited    Assessment and Plan:        Active Problems/ diagnosis:       Hypotension/Dizziness: improved.  Negative CT head. Likely due to hypotension. Increase midodrine to 15mg TID to better tolerate afib treatment and dialysis.  Monitor on telemetry.      SILVINA-D: Nephrology following and primarily managing. Dialysis restarted due to persistently worsening renal function. Plan to continue dialysis as an outpatient per nephrology. Outpatient chair has been set up. Monitor I/Os closely.     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  - LLE duplex shows heavy DVT burden  - IVC filter in place, pt poor anticoagulation candidate with history of spontaneous intracranial hemorrhage on eliquis  - heme/onc following, placed pt on prophylactic dose of SQH, may be ok with reduced dose of eliquis if renal function improves per pt's primary oncologist  - pt not a candidate for thrombectomy per cardiology     Afib with RVR  - pt with history of afib  - cardiology following, appreciate their assistance  - continue metoprolol 25mg BID  - discontinued diltiazem gtt due to hypotension     Anemia: multifactorial in the setting of renal disease, poor p.o. intake. Also history of lymphoma.  No signs of blood loss.      Diabetes: FSBG per protocol with SSI. Hypoglycemia protocol     Urine retention: ca in place, followed by urology  Lymphoma: oncology consulted as above  Pain followed by oncology, increase tramadol dose  Dispo: pending SNF acceptance. SW following.     Additional work up or/and treatment plan may be added today or then after based on clinical progression. I am managing a portion of pt care. Some medical issues are handled by other specialists. Additional work up and treatment should be done in out 
Internal Medicine   Hospitalist   Progress Note    8/14/2024   1:48 PM    Name:  Km Garcia  MRN:    [id number]     IP Day: 9     Admit Date: 8/2/2024  7:10 PM  PCP: Dawood Darby MD    Code Status:  Full Code    Assessment and Plan:        Active Problems/ diagnosis:        SILVINA-:  -Initiated on CRRT.  -Discussed patient with nephrologist, critical care and palliative care.    -Management as per nephrology    Hypotension-  -In the setting of poor p.o. intake and worsening renal failure.  He is chronically ill overall.    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  Required cardioversion on 8/14/2024.  - cardiology following, appreciate their assistance  -Management as per cardiologist.     Anemia: multifactorial in the setting of renal disease, poor p.o. intake. Also history of lymphoma.  No signs of blood loss.      Diabetes: FSBG per protocol with SSI. Hypoglycemia protocol     Urine retention: ca in place, followed by urology  Lymphoma: oncology consulted as above  Pain followed by oncology, increase tramadol dose  Dispo: pending SNF acceptance. SW following.     Additional work up or/and treatment plan may be added today or then after based on clinical progression. I am managing a portion of pt care. Some medical issues are handled by other specialists. Additional work up and treatment should be done in out pt setting by pt PCP and other out pt providers.      In addition to examining and evaluating pt, I spent additional time explaining care, normal and abnormal findings, and 
Internal Medicine   Hospitalist   Progress Note    8/16/2024   1:22 PM    Name:  Km Garcia  MRN:    [id number]     IP Day: 11     Admit Date: 8/2/2024  7:10 PM  PCP: Dawood Darby MD    Code Status:  Full Code    Assessment and Plan:        Active Problems/ diagnosis:        SILVINA-:  -Blood pressure is labile and he is not tolerating regular dialysis, back on CRRT.  New right IJ tunneled dialysis catheter placed 8/15 due to complication with previous on  -Discussed patient with nephrologist, critical care and palliative care.    -Management as per nephrology    Hypotension-  -In the setting of poor p.o. intake and worsening renal failure.  He is chronically ill overall.    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  Defer anticoagulation to cardiologist  - pt not a candidate for thrombectomy per cardiology     Afib with RVR  - pt with history of afib.  Required cardioversion on 8/14/2024.  - cardiology following, appreciate their assistance  -Management as per cardiologist.     Anemia: multifactorial in the setting of renal disease, poor p.o. intake. Also history of lymphoma.  No signs of blood loss.      Diabetes: FSBG per protocol with SSI. Hypoglycemia protocol     Urine retention: ca in place, followed by urology  Lymphoma: oncology consulted as above  Pain followed by oncology, increase tramadol dose  Dispo: pending SNF acceptance. SW following.     Additional work up or/and treatment plan may be added today or then after based on clinical progression. I am managing a portion of pt care. Some 
Internal Medicine   Hospitalist   Progress Note    8/17/2024   12:23 PM    Name:  Km Garcia  MRN:    [id number]     IP Day: 12     Admit Date: 8/2/2024  7:10 PM  PCP: Dawood Darby MD    Code Status:  Full Code    Assessment and Plan:        Active Problems/ diagnosis:        SILVINA-: Requiring hemodialysis  -Blood pressure is labile and he is not tolerating intermittent dialysis, back on CRRT.  New right IJ tunneled dialysis catheter placed 8/15 due to complication with previous catheter  -Discussed patient with nephrologist, critical care and palliative care.    -Management as per nephrology    Hypotension-  -In the setting of poor p.o. intake and worsening renal failure.  He is chronically ill overall.  He is on midodrine.    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  Defer anticoagulation to cardiologist---> was restarted on IV heparin by intensivist  - pt not a candidate for thrombectomy per cardiology     Afib with RVR  - pt with history of afib.  Required cardioversion on 8/14/2024.  - cardiology following, appreciate their assistance  -Management as per cardiologist.     Anemia: multifactorial in the setting of renal disease, poor p.o. intake. Also history of lymphoma.  No signs of blood loss.      Diabetes: FSBG per protocol with SSI. Hypoglycemia protocol     Urine retention: ca in place, followed by urology  Lymphoma: oncology consulted as above  Pain followed by oncology, increase tramadol dose  Dispo: pending SNF acceptance. SW following.     Additional work up or/and treatment plan 
Internal Medicine   Hospitalist   Progress Note    8/18/2024   12:59 PM    Name:  Km Garcia  MRN:    [id number]     IP Day: 13     Admit Date: 8/2/2024  7:10 PM  PCP: Dawood Darby MD    Code Status:  Limited    Assessment and Plan:        Active Problems/ diagnosis:        SILVINA-: Requiring hemodialysis  -Blood pressure is labile and he is not tolerating intermittent dialysis, back on CRRT until 8/17 as CRRT stopped working again and goals of care were changed to no escalation, no dialysis also.  New right IJ tunneled dialysis catheter placed 8/15 due to complication with previous catheter  -Discussed patient with nephrologist, critical care and palliative care.    -Management as per nephrology    Goals of care  -Patient decides on no dialysis.  His wife is in agreement.  Discussed with her 8/18/2024 and with the patient.  They did not want blood also as per discussion with critical care and their notes.  No escalation from what is being done at this time.  -Palliative care is following.    Hypotension-  -In the setting of poor p.o. intake and worsening renal failure.  He is chronically ill overall.  He is on midodrine.    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  Defer anticoagulation to cardiologist---> was restarted on IV heparin by intensivist then stopped due to anemia  - pt not a candidate for thrombectomy per cardiology     Afib with RVR  - pt with history of afib.  Required cardioversion on 8/14/2024.  - cardiology following, appreciate their assistance  -Management as per cardiologist.   
Internal Medicine   Hospitalist   Progress Note    8/3/2024   1:12 PM    Name:  Km Garcia  MRN:    [id number]     IP Day: 0     Admit Date: 2024  7:10 PM  PCP: Dawood Darby MD    Code Status:  Full Code    Assessment and Plan:        Active Problems/ diagnosis:       Dizziness-resolved.  Vitals are stable.  Negative repeat CT head.  Recheck orthostatic vitals.  Glucose within appropriate range.  Monitor on telemetry.    SILVINA-managed by nephrologist.  Initially required dialysis however improved.  Dialysis catheter to be removed by Dr. Hu  Diabetes-resume insulin as ordered.  Hypoglycemia protocol  History of DVT/PE status post IVC filter in place  Urine retention-being followed by urology  Lymphoma  Pain followed by oncology  Dispo-will likely need SNF versus return to rehab.       7 pm- 7 am, please contact on call Hospitalist for any needs     Subjective:      no new events.     Physical Examination:      Vitals:  BP (!) 96/47   Pulse 66   Temp 98.1 °F (36.7 °C) (Oral)   Resp 18   Ht 1.854 m (6' 1\")   Wt 95.8 kg (211 lb 4.8 oz)   SpO2 93%   BMI 27.88 kg/m²   Temp (24hrs), Av.6 °F (36.4 °C), Min:97.2 °F (36.2 °C), Max:98.1 °F (36.7 °C)      General appearance: alert, cooperative and no distress  Mental Status: oriented to person, place and time and normal affect  Lungs: clear to auscultation bilaterally, normal effort  Heart: regular rate and rhythm, no murmur  Abdomen: soft, nontender, nondistended, bowel sounds present, no masses  Extremities: no edema, redness, tenderness in the calves  Skin: no gross lesions, rashes    Data:     Labs:  Recent Labs     24  1649 24  0455   WBC 5.7 6.3   HGB 9.1* 8.1*    341     Recent Labs     24  0440 24  0455   * 135   K 3.3* 3.6    106   CO2 16* 17*   BUN 24* 24*   CREATININE 1.87* 2.02*   GLUCOSE 129* 89     No results for input(s): \"AST\", \"ALT\", \"BILITOT\", \"ALKPHOS\" in the last 72 hours.    Invalid 
[facility]  Occupational Therapy        NAME:  Km Garcia  ROOM: IC11/IC11-01  :  1945  DATE: 2024    Pt transferred to ICU for change in medical status. Will confirm with nursing pt's ability to participate in therapy and assess ability to return to treatment plan as able.    Electronically signed by NAOMI Kang on 24 at 11:33 AM EDT  
[facility]  Occupational Therapy        NAME:  Km Garcia  ROOM: W167/W167-01  :  1945  DATE: 2024    Attempted to see Km Garcia at 0905 on this date for:   []  Initial Evaluation   [x]  Treatment       Patient was unable to be seen due to:   [x] Off unit for testing/procedure    [] Patient refused, stating \"    [] Therapy on hold due to     [] Nursing deferred due to    [] Other:      Will attempt again as able.     Electronically signed by CHARLETTE Arceo on 24 at 9:06 AM EDT  
[facility]  Occupational Therapy        NAME:  Km Garcia  ROOM: IC11/IC11-01  :  1945  DATE: 2024    Attempted to see Km Garcia at 1035 on this date for:   [x]  Initial Evaluation   []  Treatment       Patient was unable to be seen due to:   [] Off unit for testing/procedure    [] Patient refused, stating \"    [] Therapy on hold due to     [x] Nursing deferred due to pt with multiple medical issues this AM and not yet ready to participate in therapy at this time.   [] Other:      Discussed with MIRTA Coppola    Electronically signed by MIKA Kang/L on 24 at 11:07 AM EDT  
[facility] OCCUPATIONAL THERAPY EVALUATION - ACUTE     NAME: Km Garcia  : 1945 (78 y.o.)  MRN: [id number]  CODE STATUS: Full Code  [facility]    Date of Service: 2024    Patient Diagnosis(es): Dizziness [R42]   Patient Active Problem List    Diagnosis Date Noted    Syncope 2024    Dizziness 2024    Functional diarrhea 2024    Type 2 diabetes mellitus with chronic kidney disease on chronic dialysis, with long-term current use of insulin ([facility]) 2024    End stage renal disease ([facility]) 2024    HFrEF (heart failure with reduced ejection fraction) ([facility]) 2024    Atrial fibrillation ([facility]) 2024    Poorly controlled diabetes mellitus ([facility]) 2024    Late effect of brain injury ([facility]) 2024    SILVINA (acute kidney injury) ([facility]) 2024    UTI (urinary tract infection) 2024    Syncope and collapse 2024    History of DVT (deep vein thrombosis) 07/15/2024    History of traumatic brain injury 2024    Impaired mobility and activities of daily living 2024    Methotrexate renal toxicity 2024    Hypokalemia 05/10/2024    Methotrexate toxicity 05/10/2024    Primary osteoarthritis of both knees 2024    Cerebrovascular accident (CVA) ([facility]) 2024    Subdural hematoma ([facility]) 2024    Compression of brain ([facility]) 2024    Diffuse large B-cell lymphoma, unspecified site ([facility]) 2024    Dysphagia, oropharyngeal phase 2024    Obstructive and reflux uropathy, unspecified 2024    Primary pulmonary hypertension ([facility]) 2024    Macular degeneration of both eyes 2024    Pancreatic cyst 2024    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 
Nephrology Progress Note    Assessment:  CKD-5 IHD  Hypontremi  S/P left nephrectomy -ccer  Prostate cancer with mets liver  Hx CVA  CAD  Anemi  Metabolic acidosis  Symptomatic hypotension        Plan: dialysis today tr to remove fluids    Patient Active Problem List:     Bladder stones     Surgical aftercare, genitourinary system     Gross hematuria     SOB (shortness of breath)     Brain bleed (HCC)     Syncope and collapse     Athscl heart disease of native coronary artery w/o ang pctrs     Compression of brain (HCC)     Cardiomyopathy (HCC)     Cerebrovascular accident (CVA) (HCC)     Coronary artery disease involving native coronary artery of native heart without angina pectoris     Diffuse large B-cell lymphoma, unspecified site (HCC)     Dysphagia, oropharyngeal phase     History of falling     History of traumatic brain injury     Hypokalemia     Impaired mobility and activities of daily living     Intraoperative floppy iris syndrome (IFIS)     Macular degeneration of both eyes     Mass of brain     Subdural hematoma (HCC)     Methotrexate renal toxicity     Methotrexate toxicity     Obstructive and reflux uropathy, unspecified     Pancreatic cyst     PAT (paroxysmal atrial tachycardia) (HCC)     Primary hypertension     Primary osteoarthritis of both knees     Primary CNS lymphoma (HCC)     Primary pulmonary hypertension (HCC)     Urinary retention     Late effect of brain injury (HCC)     SILVINA (acute kidney injury) (HCC)     UTI (urinary tract infection)     Syncope     HFrEF (heart failure with reduced ejection fraction) (HCC)     Atrial fibrillation (HCC)     Poorly controlled diabetes mellitus (HCC)     End stage renal disease (HCC)     Type 2 diabetes mellitus with chronic kidney disease on chronic dialysis, with long-term current use of insulin (HCC)     History of DVT (deep vein thrombosis)     Functional diarrhea     Dizziness      Subjective:  Admit Date: 8/2/2024    Interval History: dyan 
Nephrology Progress Note    Assessment:  SILVINA better  Hypotension  Lymphoma Dx 3/2024  twoseparate treatments oncology  HFmrEF  Glucose intoerance  Plan: maintain evolemic state  avoid toxcities to kidneys    Patient Active Problem List:     Bladder stones     Surgical aftercare, genitourinary system     Gross hematuria     SOB (shortness of breath)     Brain bleed (HCC)     Syncope and collapse     Athscl heart disease of native coronary artery w/o ang pctrs     Compression of brain (HCC)     Cardiomyopathy (HCC)     Cerebrovascular accident (CVA) (HCC)     Coronary artery disease involving native coronary artery of native heart without angina pectoris     Diffuse large B-cell lymphoma, unspecified site (HCC)     Dysphagia, oropharyngeal phase     History of falling     History of traumatic brain injury     Hypokalemia     Impaired mobility and activities of daily living     Intraoperative floppy iris syndrome (IFIS)     Macular degeneration of both eyes     Mass of brain     Subdural hematoma (HCC)     Methotrexate renal toxicity     Methotrexate toxicity     Obstructive and reflux uropathy, unspecified     Pancreatic cyst     PAT (paroxysmal atrial tachycardia) (HCC)     Primary hypertension     Primary osteoarthritis of both knees     Primary CNS lymphoma (HCC)     Primary pulmonary hypertension (HCC)     Urinary retention     Late effect of brain injury (HCC)     SILVINA (acute kidney injury) (HCC)     UTI (urinary tract infection)     Syncope     HFrEF (heart failure with reduced ejection fraction) (HCC)     Atrial fibrillation (HCC)     Poorly controlled diabetes mellitus (HCC)     End stage renal disease (HCC)     Type 2 diabetes mellitus with chronic kidney disease on chronic dialysis, with long-term current use of insulin (HCC)     History of DVT (deep vein thrombosis)     Functional diarrhea     Dizziness      Subjective:  Admit Date: 8/2/2024    Interval History: no distress    Medications:  Scheduled Meds:   
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%  7/16/2024  Hematoma spleen 7 cm   LLE extensive DVT  Hypotension: on midodrine, does have HFmrEF but not enough to affect his BP as much, no cirrhosis, cortisol wnl, may benefit from florinef however concern for worsening fluid overloaded state  A.fib   MRSA bacteremia      Plan:  - will continue w/ CRRT w/ increased fluid removal as tolerated, adjustments made to pre and post filter %, on heparin gtt now   - holding off on HD for now  - will see how BP improves w/ treatment of bacteremia prior to broaching another goals of care conversation          Patient Active Problem List:     Bladder stones     Surgical aftercare, genitourinary system     Gross hematuria     SOB (shortness of breath)     Brain bleed (HCC)     Syncope and collapse     Athscl heart disease of native coronary artery w/o ang pctrs     Compression of brain (HCC)     Cardiomyopathy (HCC)     Cerebrovascular accident (CVA) (HCC)     Coronary artery disease involving native coronary artery of native heart without angina pectoris     Diffuse large B-cell lymphoma, unspecified site (HCC)     Dysphagia, oropharyngeal phase     History of falling     History of traumatic brain injury     Hypokalemia     Impaired mobility and activities of daily living     Intraoperative floppy iris syndrome (IFIS)     Macular degeneration of both eyes     Mass of brain     Subdural hematoma (HCC)     Methotrexate renal 
Nephrology Progress Note    Assessment:  SILVINA: had nml function on 7/8/24 (Scr 1.08), initially 2/2 possible obstructive component (CT showed non obstructive caliceal stones w/ bilateral pelviectasis w/ mild bilateral hydroureter) +/- hemodynamics, serologies all negative, non-oliguric, now s/p tunneled dialysis catheter placement (7/25), function was improving w/ plan to remove dialysis catheter 8/2 however function worsening due to hypotension so now needing dialysis again   Left Renal stone 1.2 cm  Hx CVA  Hx Lymphoma  Nl GFR 6/21/2024   interstitial dx ?  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%  7/16/2024  Hematoma spleen 7 cm   LLE extensive DVT     Plan:  - continue IHD TTS for now   - will plan for extra UF session Monday  - will try to avoid giving metoprolol prior to HD to avoid dropping his BP which is affecting his fluid removal        Patient Active Problem List:     Bladder stones     Surgical aftercare, genitourinary system     Gross hematuria     SOB (shortness of breath)     Brain bleed (HCC)     Syncope and collapse     Athscl heart disease of native coronary artery w/o ang pctrs     Compression of brain (HCC)     Cardiomyopathy (HCC)     Cerebrovascular accident (CVA) (HCC)     Coronary artery disease involving native coronary artery of native heart without angina pectoris     Diffuse large B-cell lymphoma, unspecified site (HCC)     Dysphagia, oropharyngeal phase     History of falling     History of traumatic brain injury     Hypokalemia     Impaired mobility and activities of daily living     Intraoperative floppy iris syndrome (IFIS)     Macular degeneration of both eyes     Mass of brain     Subdural hematoma (HCC)     Methotrexate renal toxicity     Methotrexate toxicity     Obstructive and reflux uropathy, unspecified     Pancreatic cyst     PAT (paroxysmal atrial tachycardia) (HCC)     Primary hypertension     Primary osteoarthritis of both knees     Primary CNS lymphoma (HCC)     Primary pulmonary hypertension 
Nephrology Progress Note    Assessment:  SILVINA: had nml function on 7/8/24 (Scr 1.08), initially 2/2 possible obstructive component (CT showed non obstructive caliceal stones w/ bilateral pelviectasis w/ mild bilateral hydroureter) +/- hemodynamics, serologies all negative, non-oliguric, now s/p tunneled dialysis catheter placement (7/25), function was improving w/ plan to remove dialysis catheter 8/2 however function worsening due to hypotension so now needing dialysis again   Left Renal stone 1.2 cm  Hx CVA  Hx Lymphoma  Nl GFR 6/21/2024   interstitial dx ?  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%  7/16/2024  Hematoma spleen 7 cm   LLE extensive DVT     Plan:  - continue IHD TTS for now   - will plan for extra UF session Monday  - will try to avoid giving metoprolol prior to HD to avoid dropping his BP which is affecting his fluid removal        Patient Active Problem List:     Bladder stones     Surgical aftercare, genitourinary system     Gross hematuria     SOB (shortness of breath)     Brain bleed (HCC)     Syncope and collapse     Athscl heart disease of native coronary artery w/o ang pctrs     Compression of brain (HCC)     Cardiomyopathy (HCC)     Cerebrovascular accident (CVA) (HCC)     Coronary artery disease involving native coronary artery of native heart without angina pectoris     Diffuse large B-cell lymphoma, unspecified site (HCC)     Dysphagia, oropharyngeal phase     History of falling     History of traumatic brain injury     Hypokalemia     Impaired mobility and activities of daily living     Intraoperative floppy iris syndrome (IFIS)     Macular degeneration of both eyes     Mass of brain     Subdural hematoma (HCC)     Methotrexate renal toxicity     Methotrexate toxicity     Obstructive and reflux uropathy, unspecified     Pancreatic cyst     PAT (paroxysmal atrial tachycardia) (HCC)     Primary hypertension     Primary osteoarthritis of both knees     Primary CNS lymphoma (HCC)     Primary pulmonary hypertension 
Nephrology Progress Note    Assessment:  SILVINA: had nml function on 7/8/24 (Scr 1.08), initially 2/2 possible obstructive component (CT showed non obstructive caliceal stones w/ bilateral pelviectasis w/ mild bilateral hydroureter) +/- hemodynamics, serologies all negative, non-oliguric, now s/p tunneled dialysis catheter placement (7/25), function was improving w/ plan to remove dialysis catheter 8/2 however function worsening due to hypotension so now needing dialysis again   Left Renal stone 1.2 cm  Hx CVA  Hx Lymphoma  Nl GFR 6/21/2024   interstitial dx ?  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%  7/16/2024  Hematoma spleen 7 cm   LLE extensive DVT     Plan:  - continue IHD TTS for now   - will plan for extra UF session tomorrow   - will see if patient responds to lasix        Patient Active Problem List:     Bladder stones     Surgical aftercare, genitourinary system     Gross hematuria     SOB (shortness of breath)     Brain bleed (HCC)     Syncope and collapse     Athscl heart disease of native coronary artery w/o ang pctrs     Compression of brain (HCC)     Cardiomyopathy (HCC)     Cerebrovascular accident (CVA) (HCC)     Coronary artery disease involving native coronary artery of native heart without angina pectoris     Diffuse large B-cell lymphoma, unspecified site (HCC)     Dysphagia, oropharyngeal phase     History of falling     History of traumatic brain injury     Hypokalemia     Impaired mobility and activities of daily living     Intraoperative floppy iris syndrome (IFIS)     Macular degeneration of both eyes     Mass of brain     Subdural hematoma (HCC)     Methotrexate renal toxicity     Methotrexate toxicity     Obstructive and reflux uropathy, unspecified     Pancreatic cyst     PAT (paroxysmal atrial tachycardia) (HCC)     Primary hypertension     Primary osteoarthritis of both knees     Primary CNS lymphoma (HCC)     Primary pulmonary hypertension (HCC)     Urinary retention     Late effect of brain injury (HCC)     
Nephrology Progress Note    Assessment:  SILVINA: had nml function on 7/8/24 (Scr 1.08), initially 2/2 possible obstructive component (CT showed non obstructive caliceal stones w/ bilateral pelviectasis w/ mild bilateral hydroureter) +/- hemodynamics, serologies all negative, non-oliguric, now s/p tunneled dialysis catheter placement (7/25), function was improving w/ plan to remove dialysis catheter 8/2 however function worsening due to hypotension so now needing dialysis again   Left Renal stone 1.2 cm  Hx CVA  Hx Lymphoma  Nl GFR 6/21/2024   interstitial dx ?  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%  7/16/2024  Hematoma spleen 7 cm   LLE extensive DVT     Plan:  - will give fluids and see if has any increased UOP   - continue IHD TTS for now        Patient Active Problem List:     Bladder stones     Surgical aftercare, genitourinary system     Gross hematuria     SOB (shortness of breath)     Brain bleed (HCC)     Syncope and collapse     Athscl heart disease of native coronary artery w/o ang pctrs     Compression of brain (HCC)     Cardiomyopathy (HCC)     Cerebrovascular accident (CVA) (HCC)     Coronary artery disease involving native coronary artery of native heart without angina pectoris     Diffuse large B-cell lymphoma, unspecified site (HCC)     Dysphagia, oropharyngeal phase     History of falling     History of traumatic brain injury     Hypokalemia     Impaired mobility and activities of daily living     Intraoperative floppy iris syndrome (IFIS)     Macular degeneration of both eyes     Mass of brain     Subdural hematoma (HCC)     Methotrexate renal toxicity     Methotrexate toxicity     Obstructive and reflux uropathy, unspecified     Pancreatic cyst     PAT (paroxysmal atrial tachycardia) (HCC)     Primary hypertension     Primary osteoarthritis of both knees     Primary CNS lymphoma (HCC)     Primary pulmonary hypertension (HCC)     Urinary retention     Late effect of brain injury (HCC)     SILVINA (acute kidney injury) (HCC)    
Nephrology Progress Note    Assessment:  SILVINA: had nml function on 7/8/24 (Scr 1.08), initially 2/2 possible obstructive component (CT showed non obstructive caliceal stones w/ bilateral pelviectasis w/ mild bilateral hydroureter) +/- hemodynamics, serologies all negative, non-oliguric, now s/p tunneled dialysis catheter placement (7/25), function was improving w/ plan to remove dialysis catheter 8/2 however function worsening due to hypotension so now needing dialysis again   Left Renal stone 1.2 cm  Hx CVA  Hx Lymphoma  Nl GFR 6/21/2024   interstitial dx ?  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%  7/16/2024  Hematoma spleen 7 cm   LLE extensive DVT  Hypotension: on midodrine      Plan:  - continue IHD TTS for now   - will plan for extra UF session Monday  - will try to avoid giving metoprolol prior to HD to avoid dropping his BP which is affecting his fluid removal        Patient Active Problem List:     Bladder stones     Surgical aftercare, genitourinary system     Gross hematuria     SOB (shortness of breath)     Brain bleed (HCC)     Syncope and collapse     Athscl heart disease of native coronary artery w/o ang pctrs     Compression of brain (HCC)     Cardiomyopathy (HCC)     Cerebrovascular accident (CVA) (HCC)     Coronary artery disease involving native coronary artery of native heart without angina pectoris     Diffuse large B-cell lymphoma, unspecified site (HCC)     Dysphagia, oropharyngeal phase     History of falling     History of traumatic brain injury     Hypokalemia     Impaired mobility and activities of daily living     Intraoperative floppy iris syndrome (IFIS)     Macular degeneration of both eyes     Mass of brain     Subdural hematoma (HCC)     Methotrexate renal toxicity     Methotrexate toxicity     Obstructive and reflux uropathy, unspecified     Pancreatic cyst     PAT (paroxysmal atrial tachycardia) (HCC)     Primary hypertension     Primary osteoarthritis of both knees     Primary CNS lymphoma (HCC)     
Nephrology Progress Note    Assessment:  SILVINA: had nml function on 7/8/24 (Scr 1.08), initially 2/2 possible obstructive component (CT showed non obstructive caliceal stones w/ bilateral pelviectasis w/ mild bilateral hydroureter) +/- hemodynamics, serologies all negative, non-oliguric, now s/p tunneled dialysis catheter placement (7/25), function was improving w/ plan to remove dialysis catheter 8/2 however function worsening due to hypotension so now needing dialysis again   Left Renal stone 1.2 cm  Hx CVA  Hx Lymphoma  Nl GFR 6/21/2024   interstitial dx ?  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%  7/16/2024  Hematoma spleen 7 cm   LLE extensive DVT  Hypotension: on midodrine      Plan:  - continue IHD TTS for now however notably not able to make any headway w/ fluid removal due to his persistent hypotension   - checking cortisol   - continue midodrine   - now DNR-CCA  - unsure if there is anything that can be done from a cardiac standpoint  - will try to avoid giving metoprolol prior to HD to avoid dropping his BP which is affecting his fluid removal however understandably limited by his A.fib        Patient Active Problem List:     Bladder stones     Surgical aftercare, genitourinary system     Gross hematuria     SOB (shortness of breath)     Brain bleed (HCC)     Syncope and collapse     Athscl heart disease of native coronary artery w/o ang pctrs     Compression of brain (HCC)     Cardiomyopathy (HCC)     Cerebrovascular accident (CVA) (HCC)     Coronary artery disease involving native coronary artery of native heart without angina pectoris     Diffuse large B-cell lymphoma, unspecified site (HCC)     Dysphagia, oropharyngeal phase     History of falling     History of traumatic brain injury     Hypokalemia     Impaired mobility and activities of daily living     Intraoperative floppy iris syndrome (IFIS)     Macular degeneration of both eyes     Mass of brain     Subdural hematoma (HCC)     Methotrexate renal toxicity     
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%  7/16/2024  Hematoma spleen 7 cm   LLE extensive DVT  Hypotension: on midodrine, does have HFmrEF but not enough to affect his BP as much, no cirrhosis, cortisol wnl, may benefit from florinef however concern for worsening fluid overloaded state     Plan:  - at this time not making much headway with dialysis and intermittent UF, discussed w/ hospitalist, if family wishes to continue w/ aggressive measures then would recommend ICU transfer for patient to be on pressors and start CRRT  OTHERWISE would recommend goals of care conversation   - continue midodrine        Patient Active Problem List:     Bladder stones     Surgical aftercare, genitourinary system     Gross hematuria     SOB (shortness of breath)     Brain bleed (HCC)     Syncope and collapse     Athscl heart disease of native coronary artery w/o ang pctrs     Compression of brain (HCC)     Cardiomyopathy (HCC)     Cerebrovascular accident (CVA) (HCC)     Coronary artery disease involving native coronary artery of native heart without angina pectoris     Diffuse large B-cell lymphoma, unspecified site (HCC)     Dysphagia, oropharyngeal phase     History of falling     History of traumatic brain injury     Hypokalemia     Impaired mobility and activities of daily living     Intraoperative floppy iris syndrome (IFIS)     Macular degeneration of both eyes     Mass of brain     Subdural hematoma (HCC)     Methotrexate renal 
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%  7/16/2024  Hematoma spleen 7 cm   LLE extensive DVT  Hypotension: on midodrine, does have HFmrEF but not enough to affect his BP as much, no cirrhosis, cortisol wnl, may benefit from florinef however concern for worsening fluid overloaded state  A.fib   MRSA bacteremia      Plan:  - will continue w/ CRRT w/ increased fluid removal as tolerated, adjustments made to pre and post filter %, on heparin gtt now   - holding off on HD for now  - will see how BP improves w/ treatment of bacteremia prior to broaching another goals of care conversation   - will check ASO, ANCA and SPEP          Patient Active Problem List:     Bladder stones     Surgical aftercare, genitourinary system     Gross hematuria     SOB (shortness of breath)     Brain bleed (HCC)     Syncope and collapse     Athscl heart disease of native coronary artery w/o ang pctrs     Compression of brain (HCC)     Cardiomyopathy (HCC)     Cerebrovascular accident (CVA) (HCC)     Coronary artery disease involving native coronary artery of native heart without angina pectoris     Diffuse large B-cell lymphoma, unspecified site (HCC)     Dysphagia, oropharyngeal phase     History of falling     History of traumatic brain injury     Hypokalemia     Impaired mobility and activities of daily living     Intraoperative floppy iris syndrome (IFIS)     Macular degeneration of both 
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%  7/16/2024  Hematoma spleen 7 cm   LLE extensive DVT  Hypotension: on midodrine, does have HFmrEF but not enough to affect his BP as much, no cirrhosis, cortisol wnl, may benefit from florinef however concern for worsening fluid overloaded state  A.fib   CNS bacteremia: 1/2 bcxs  Novovirus infection      Plan:  - patient and wife have decided to no longer have RRT done, no blood products as well  - no plan to escalate care from what is being done now  - hospice/palliative care consulted         Patient Active Problem List:     Bladder stones     Surgical aftercare, genitourinary system     Gross hematuria     SOB (shortness of breath)     Brain bleed (HCC)     Syncope and collapse     Athscl heart disease of native coronary artery w/o ang pctrs     Compression of brain (HCC)     Cardiomyopathy (HCC)     Cerebrovascular accident (CVA) (HCC)     Coronary artery disease involving native coronary artery of native heart without angina pectoris     Diffuse large B-cell lymphoma, unspecified site (HCC)     Dysphagia, oropharyngeal phase     History of falling     History of traumatic brain injury     Hypokalemia     Impaired mobility and activities of daily living     Intraoperative floppy iris syndrome (IFIS)     Macular degeneration of both eyes     Mass of brain     Subdural hematoma (HCC)     Methotrexate renal toxicity    
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%  7/16/2024  Hematoma spleen 7 cm   LLE extensive DVT  Hypotension: on midodrine, does have HFmrEF but not enough to affect his BP as much, no cirrhosis, cortisol wnl, may benefit from florinef however concern for worsening fluid overloaded state  A.fib   CNS bacteremia: 1/2 bcxs  Novovirus infection      Plan:  - plan for hospice   - will give lokelma however to bring down his K         Patient Active Problem List:     Bladder stones     Surgical aftercare, genitourinary system     Gross hematuria     SOB (shortness of breath)     Brain bleed (HCC)     Syncope and collapse     Athscl heart disease of native coronary artery w/o ang pctrs     Compression of brain (HCC)     Cardiomyopathy (HCC)     Cerebrovascular accident (CVA) (HCC)     Coronary artery disease involving native coronary artery of native heart without angina pectoris     Diffuse large B-cell lymphoma, unspecified site (HCC)     Dysphagia, oropharyngeal phase     History of falling     History of traumatic brain injury     Hypokalemia     Impaired mobility and activities of daily living     Intraoperative floppy iris syndrome (IFIS)     Macular degeneration of both eyes     Mass of brain     Subdural hematoma (HCC)     Methotrexate renal toxicity     Methotrexate toxicity     Obstructive and reflux uropathy, unspecified     Pancreatic cyst     PAT 
OCCUPATIONAL THERAPY  INPATIENT REHAB TREATMENT NOTE  [facility]      NAME: Km Garcia  : 1945 (78 y.o.)  MRN: [id number]  CODE STATUS: Full Code  Room: W486/W486-01    Date of Service: 2024      [facility]    Electronically signed by:    Rafael Rosales OT,   2024, 10:01 AM               
Palliative Care Progress Note  Patient: Km Garcia  Gender: male  YOB: 1945  Unit/Bed: IC11/IC11-01  CodeStatus: Full Code  Inpatient Treatment Team: Treatment Team:   Anna Cuenca DO Jeet, Anant, MD Sodeinde, Adedeji, MD Haas, Christopher A, MD Sidloski, Jay E, DO Cho, Donald I, MD Moncada, Ole MATSON, MIRTA Bowman, Alfonso, Contreras Gutierrez, Denita Quintana, APRN - CNP  Vahid Hu, Vahid Weems, Arpita Arthur, MIRTA Archibald, Amisha Wagoner RN  Admit Date:  8/2/2024    Chief Complaint: Dizziness    History of Presenting Illness:      Km Garcia is a 79 y.o. male on hospital day 10 with a history of lymphoma, craniotomy, cardiomyopathy, hypertension diabetes, DVT.    Patient was admitted to acute rehab after a syncope medical admission. Patient had rapid response called today for dizziness. His vitals were stable. Patient is being seen by multiple consultants including nephrology for SILVINA with possible obstructive component. . Patient was admitted in the setting of B/L PE and LLE, dizziness, atrial fibrillation, SILVINA.    Palliative care was consulted by Dr. Cuenca for goals of care.     Upon entering the room I find the patient alert and oriented with periods of confusion. He is attempting to feed himself. Wife is at bedside.  Patient unable to tolerate dialysis, recommend comfort approach or CRRT in ICU with pressor support. Discussed overall poor prognosis. Code status discussion held with hospitalist for limited code with no cpr, no intubation. Wife and patient would like to attempt CRRT with pressor support. 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 [facility]. Per chart review[facility] for now given acute medical complications. Will plan to restart treatment when he is 
Palliative Care Progress Note  Patient: Km Garcia  Gender: male  YOB: 1945  Unit/Bed: IC11/IC11-01  CodeStatus: Limited  Inpatient Treatment Team: Treatment Team:   Temi Cooley MD Jeet, MD Jakob Alicea Adedeji, MD Haas, Christopher A, MD Sidloski, Ezio CALDERÓN, Chong Spaulding, MD Bowman, Alfonso, Contreras Gutierrez, Denita Quintana, APRN - Santiago Lieberman, Le Han MD Bennett, Pattie ACKERMAN, MIRTA Archibald, Amisha Wagoner, MIRTA Carreon, Dorothy, RN  Sai Marsh, ABBY  Admit Date:  8/2/2024    Chief Complaint: Dizziness    History of Presenting Illness:      Km Garcia is a 79 y.o. male on hospital day 15 with a history of lymphoma, craniotomy, cardiomyopathy, hypertension diabetes, DVT.    Patient was admitted to acute rehab after a syncope medical admission. Patient had rapid response called today for dizziness. His vitals were stable. Patient is being seen by multiple consultants including nephrology for SILVINA with possible obstructive component. . Patient was admitted in the setting of B/L PE and LLE, dizziness, atrial fibrillation, SILVINA.    Palliative care was consulted by Dr. Cuenca for goals of care.     Upon entering the room I find the patient alert and oriented with periods of confusion. He is attempting to feed himself. Wife is at bedside.  Patient unable to tolerate dialysis, recommend comfort approach or CRRT in ICU with pressor support. Discussed overall poor prognosis. Code status discussion held with hospitalist for limited code with no cpr, no intubation. Wife and patient would like to attempt CRRT with pressor support. 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 [facility] reports [facility] said patient is in remission due to recent MRI. Per chart review, encounter on 7/16/24 from [facility]ology reports chemotherapy on hold for now given acute medical complications. Will plan to restart treatment when he is 
Patient Name: Km Garcia  Admit Date: 2024  7:10 PM  MR #: [id number]  : 1945     Attending Physician: Temi Cooley MD  Reason for consult:  DVT AF     History of Presenting Illness:       Km Garcia is a 78 y.o. male on hospital day 4 with a history of .   History Obtained From:  patient, electronic medical record     Pt readmitted due to LLE extension of DVT, worse swelling and pain. He has CNS Lymphoma and ChemoRx related CMP EF 35% 2024 at [facility].  EF 50% 3/2024 prior to Chemo.  He received IVCF on 2024 and taken off A/C due to spontaneous SDH from Lymphoma.  SQ Heparin started and there are plans to advance to low dose Eliquis      He has known mild non obstructive CAD. ESRD started HD.      Pt now in AF rate 113 but does spike w exertion. AF is a new Diagnosis for pt.  BP has been low.      He has Mild non obstructive CAD     Pt is seen in HD unit this am. He is tired. He denies CP nor SOB. Tolerating HD well w goal of 3L out.     LLE marked swollen and complains of discomfort.       8-10-24: Significant bilateral lower extremity edema.  denies pain or ulceration.  Hemoglobin is 9.4.  Creatinine of 5.24.  Off of Cardizem drip    2024: No new issues overnight.  GI following now.  Hemoglobin is 8.8    24 Tele AF 80 He was sent back form HD w/o Rx ue to low BP 50-60 Systolic. Upon arriving to 1W a Rapid Response was called for Unresponsiveness. We gave 250 NS Bolus.  He is arouse able but lethargic.     24 Tele   pt is lethargic but wakens and follows commands but readily falls asllep. Denies any pain. Tired.     24 Tele AF 120s but spikes to 170s. Wife in room. Pt is tired but Alert and oriented.     History:       EKG:  Past Medical History        Past Medical History:   Diagnosis Date    AMD (age related macular degeneration)       left eye only, gets steroid shots    Asthma       seasonally, uses inhaler as needed    Athscl heart disease of native 
Patient Name: Km Garcia  Admit Date: 2024  7:10 PM  MR #: [id number]  : 1945     Attending Physician: Temi Cooley MD  Reason for consult:  DVT AF     History of Presenting Illness:       Km Garcia is a 78 y.o. male on hospital day 4 with a history of .   History Obtained From:  patient, electronic medical record     Pt readmitted due to LLE extension of DVT, worse swelling and pain. He has CNS Lymphoma and ChemoRx related CMP EF 35% 2024 at [facility].  EF 50% 3/2024 prior to Chemo.  He received IVCF on 2024 and taken off A/C due to spontaneous SDH from Lymphoma.  SQ Heparin started and there are plans to advance to low dose Eliquis      He has known mild non obstructive CAD. ESRD started HD.      Pt now in AF rate 113 but does spike w exertion. AF is a new Diagnosis for pt.  BP has been low.      He has Mild non obstructive CAD     Pt is seen in HD unit this am. He is tired. He denies CP nor SOB. Tolerating HD well w goal of 3L out.     LLE marked swollen and complains of discomfort.       8-10-24: Significant bilateral lower extremity edema.  denies pain or ulceration.  Hemoglobin is 9.4.  Creatinine of 5.24.  Off of Cardizem drip    2024: No new issues overnight.  GI following now.  Hemoglobin is 8.8    24 Tele AF 80 He was sent back form HD w/o Rx ue to low BP 50-60 Systolic. Upon arriving to 1W a Rapid Response was called for Unresponsiveness. We gave 250 NS Bolus.  He is arouse able but lethargic.     24 Tele   pt is lethargic but wakens and follows commands but readily falls asllep. Denies any pain. Tired.     24 Tele AF 120s but spikes to 170s. Wife in room. Pt is tired but Alert and oriented.     8/15/24 Tele SR 90.  Had very brief AF earlier this AM but otherwise SR since CVN yesterday.  having Brief AF still.  On Levo. Getting CRRT    History:       EKG:  Past Medical History        Past Medical History:   Diagnosis Date    AMD (age related macular 
Patient Name: Km Garcia  Admit Date: 2024  7:10 PM  MR #: [id number]  : 1945     Attending Physician: Temi Cooley MD  Reason for consult:  DVT AF     History of Presenting Illness:       Km Garcia is a 78 y.o. male on hospital day 4 with a history of .   History Obtained From:  patient, electronic medical record     Pt readmitted due to LLE extension of DVT, worse swelling and pain. He has CNS Lymphoma and ChemoRx related CMP EF 35% 2024 at [facility].  EF 50% 3/2024 prior to Chemo.  He received IVCF on 2024 and taken off A/C due to spontaneous SDH from Lymphoma.  SQ Heparin started and there are plans to advance to low dose Eliquis      He has known mild non obstructive CAD. ESRD started HD.      Pt now in AF rate 113 but does spike w exertion. AF is a new Diagnosis for pt.  BP has been low.      He has Mild non obstructive CAD     Pt is seen in HD unit this am. He is tired. He denies CP nor SOB. Tolerating HD well w goal of 3L out.     LLE marked swollen and complains of discomfort.       8-10-24: Significant bilateral lower extremity edema.  denies pain or ulceration.  Hemoglobin is 9.4.  Creatinine of 5.24.  Off of Cardizem drip    2024: No new issues overnight.  GI following now.  Hemoglobin is 8.8    24 Tele AF 80 He was sent back form HD w/o Rx ue to low BP 50-60 Systolic. Upon arriving to 1W a Rapid Response was called for Unresponsiveness. We gave 250 NS Bolus.  He is arouse able but lethargic.     History:       EKG:  Past Medical History        Past Medical History:   Diagnosis Date    AMD (age related macular degeneration)       left eye only, gets steroid shots    Asthma       seasonally, uses inhaler as needed    Athscl heart disease of native coronary artery w/o ang pctrs 2024    Cerebrovascular accident (CVA) (HCC) 2024    Charcot ankle, left       wears brace    Diabetes mellitus (HCC)       takes metformin    History of DVT (deep vein thrombosis) 
Patient Name: Km Garcia  Admit Date: 2024  7:10 PM  MR #: [id number]  : 1945     Attending Physician: Temi Cooley MD  Reason for consult:  DVT AF     History of Presenting Illness:       Km Garcia is a 78 y.o. male on hospital day 4 with a history of .   History Obtained From:  patient, electronic medical record     Pt readmitted due to LLE extension of DVT, worse swelling and pain. 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  EF 50% 3/2024 prior to Chemo.  He received IVCF on 2024 and taken off A/C due to spontaneous SDH from Lymphoma.  SQ Heparin started and there are plans to advance to low dose Eliquis      He has known mild non obstructive CAD. ESRD started HD.      Pt now in AF rate 113 but does spike w exertion. AF is a new Diagnosis for pt.  BP has been low.      He has Mild non obstructive CAD     Pt is seen in HD unit this am. He is tired. He denies CP nor SOB. Tolerating HD well w goal of 3L out.     LLE marked swollen and complains of discomfort.       8-10-24: Significant bilateral lower extremity edema.  denies pain or ulceration.  Hemoglobin is 9.4.  Creatinine of 5.24.  Off of Cardizem drip    2024: No new issues overnight.  GI following now.  Hemoglobin is 8.8    History:       EKG:  Past Medical History        Past Medical History:   Diagnosis Date    AMD (age related macular degeneration)       left eye only, gets steroid shots    Asthma       seasonally, uses inhaler as needed    Athscl heart disease of native coronary artery w/o ang pctrs 2024    Cerebrovascular accident (CVA) (HCC) 2024    Charcot ankle, left       wears brace    Diabetes mellitus (HCC)       takes metformin    History of DVT (deep vein thrombosis) 7/15/2024    Hypertension       on meds > 10 yrs    Osteoarthritis           Past Surgical History         Past Surgical History:   Procedure Laterality Date    COLONOSCOPY         > 30 yrs ago    INVASIVE VASCULAR 
Patient transferred out of ICU to hospice at this time by [facility] transport team. Vital signs stable. Enteral feedings stopped at this time. Patient transporting on 3mcgs of levophed. No patient belongings in room to send with him.   
[facility]  Occupational Therapy        NAME:  Km Garcia  ROOM: W167/W167-01  :  1945  DATE: 8/10/2024    Attempted to see Km Garcia at 1542 on this date for:   []  Initial Evaluation   [x]  Treatment       Patient was unable to be seen due to:   [x] Off unit for testing/procedure    [] Patient refused, stating \"    [] Therapy on hold due to     [] Nursing deferred due to    [] Other:      Will attempt again as able.     Electronically signed by CHARLETTE Arceo on 8/10/24 at 4:15 PM EDT  
Physical Therapy   Facility/Department: [facility] MED SURG IC11/IC11-01    NAME: Km Garcia    : 1945 (79 y.o.)  MRN: [id number]    Account: [id number]  Gender: male      Pt transferred to ICU from 1W. Pt is not appropriate for participation in PT treatment this date d/t medical status. Discussed with interdisciplinary team; will hold PT at this time and follow at a distance for when pt is appropriate to participate.     Electronically signed by Isela Ortega PT on 24 at 1:28 PM EDT    
Physical Therapy  Facility/Department: [facility] MED SURG IC11/IC11-01  Physical Therapy Discharge      NAME: Km Garcia    : 1945 (79 y.o.)  MRN: [id number]    Account: [id number]  Gender: male      Patient has been discharged from acute care hospital. DC patient from current PT program.      Electronically signed by Zoe Musa PT on 24 at 12:39 PM EDT    
Physical Therapy Med Surg Daily Treatment Note  Facility/Department: [address] TELEMETRY  Room: [place] [address]       NAME: Km Garcia  : 1945 (79 y.o.)  MRN: [id number]  CODE STATUS: Full Code    Date of Service: 2024    Patient Diagnosis(es): Dizziness [R42]  SILVINA (acute kidney injury) ([facility]) [N17.9]   No chief complaint on file.    Patient Active Problem List    Diagnosis Date Noted    Syncope 2024    Dizziness 2024    Functional diarrhea 2024    Type 2 diabetes mellitus with chronic kidney disease on chronic dialysis, with long-term current use of insulin ([facility]) 2024    End stage renal disease ([facility]) 2024    HFrEF (heart failure with reduced ejection fraction) ([facility]) 2024    Atrial fibrillation ([facility]) 2024    Poorly controlled diabetes mellitus ([facility]) 2024    Late effect of brain injury ([facility]) 2024    SILVINA (acute kidney injury) ([facility]) 2024    UTI (urinary tract infection) 2024    Syncope and collapse 2024    History of DVT (deep vein thrombosis) 07/15/2024    History of traumatic brain injury 2024    Impaired mobility and activities of daily living 2024    Methotrexate renal toxicity 2024    Hypokalemia 05/10/2024    Methotrexate toxicity 05/10/2024    Primary osteoarthritis of both knees 2024    Cerebrovascular accident (CVA) ([facility]) 2024    Subdural hematoma ([facility]) 2024    Compression of brain ([facility]) 2024    Diffuse large B-cell lymphoma, unspecified site ([facility]) 2024    Dysphagia, oropharyngeal phase 2024    Obstructive and reflux uropathy, unspecified 2024    Primary pulmonary hypertension ([facility]) 2024    Macular degeneration of both eyes 2024    Pancreatic cyst 2024    Primary CNS lymphoma (HCC) 2024    Cardiomyopathy (HCC) 2024    Coronary artery disease involving native coronary artery of native heart without angina pectoris 2024    PAT (paroxysmal 
Physical Therapy Med Surg Daily Treatment Note  Facility/Department: [address] TELEMETRY  Room: [place] [address]       NAME: Km Garcia  : 1945 (79 y.o.)  MRN: [id number]  CODE STATUS: Full Code    Date of Service: 2024    Patient Diagnosis(es): Dizziness [R42]  SILVINA (acute kidney injury) ([facility]) [N17.9]   No chief complaint on file.    Patient Active Problem List    Diagnosis Date Noted    Syncope 2024    Dizziness 2024    Functional diarrhea 2024    Type 2 diabetes mellitus with chronic kidney disease on chronic dialysis, with long-term current use of insulin ([facility]) 2024    End stage renal disease ([facility]) 2024    HFrEF (heart failure with reduced ejection fraction) ([facility]) 2024    Atrial fibrillation ([facility]) 2024    Poorly controlled diabetes mellitus ([facility]) 2024    Late effect of brain injury ([facility]) 2024    SILVINA (acute kidney injury) ([facility]) 2024    UTI (urinary tract infection) 2024    Syncope and collapse 2024    History of DVT (deep vein thrombosis) 07/15/2024    History of traumatic brain injury 2024    Impaired mobility and activities of daily living 2024    Methotrexate renal toxicity 2024    Hypokalemia 05/10/2024    Methotrexate toxicity 05/10/2024    Primary osteoarthritis of both knees 2024    Cerebrovascular accident (CVA) ([facility]) 2024    Subdural hematoma ([facility]) 2024    Compression of brain ([facility]) 2024    Diffuse large B-cell lymphoma, unspecified site ([facility]) 2024    Dysphagia, oropharyngeal phase 2024    Obstructive and reflux uropathy, unspecified 2024    Primary pulmonary hypertension ([facility]) 2024    Macular degeneration of both eyes 2024    Pancreatic cyst 2024    Primary CNS lymphoma (HCC) 2024    Cardiomyopathy (HCC) 2024    Coronary artery disease involving native coronary artery of native heart without angina pectoris 2024    PAT (paroxysmal 
Physical Therapy Med Surg Initial Assessment  Facility/Department: [address] MED SURG UNIT  Room: [place] [address]       NAME: Km Garcia  : 1945 (78 y.o.)  MRN: [id number]  CODE STATUS: Full Code    Date of Service: 2024    Patient Diagnosis(es): Dizziness [R42]  SILVINA (acute kidney injury) ([facility]) [N17.9]   No chief complaint on file.    Patient Active Problem List    Diagnosis Date Noted    Syncope 2024    Dizziness 2024    Functional diarrhea 2024    Type 2 diabetes mellitus with chronic kidney disease on chronic dialysis, with long-term current use of insulin ([facility]) 2024    End stage renal disease ([facility]) 2024    HFrEF (heart failure with reduced ejection fraction) ([facility]) 2024    Atrial fibrillation ([facility]) 2024    Poorly controlled diabetes mellitus ([facility]) 2024    Late effect of brain injury ([facility]) 2024    SILVINA (acute kidney injury) ([facility]) 2024    UTI (urinary tract infection) 2024    Syncope and collapse 2024    History of DVT (deep vein thrombosis) 07/15/2024    History of traumatic brain injury 2024    Impaired mobility and activities of daily living 2024    Methotrexate renal toxicity 2024    Hypokalemia 05/10/2024    Methotrexate toxicity 05/10/2024    Primary osteoarthritis of both knees 2024    Cerebrovascular accident (CVA) ([facility]) 2024    Subdural hematoma ([facility]) 2024    Compression of brain ([facility]) 2024    Diffuse large B-cell lymphoma, unspecified site ([facility]) 2024    Dysphagia, oropharyngeal phase 2024    Obstructive and reflux uropathy, unspecified 2024    Primary pulmonary hypertension ([facility]) 2024    Macular degeneration of both eyes 2024    Pancreatic cyst 2024    Primary CNS lymphoma ([facility]) 2024    Cardiomyopathy ([facility]) 2024    Coronary artery disease involving native coronary artery of native heart without angina pectoris 2024    PAT 
Physical Therapy Med Surg Initial Assessment  Facility/Department: [facility] ICU  Room: [place] [address]       NAME: Km Garcia  : 1945 (79 y.o.)  MRN: [id number]  CODE STATUS: Limited    Date of Service: 2024    Patient Diagnosis(es): Dizziness [R42]  SILVINA (acute kidney injury) ([facility]) [N17.9]   No chief complaint on file.    Patient Active Problem List    Diagnosis Date Noted    Syncope 2024    Palliative care encounter 08/15/2024    Goals of care, counseling/discussion 08/15/2024    Advanced care planning/counseling discussion 08/15/2024    Shock (HCC) 2024    Complication of central venous catheter 2024    Dizziness 2024    Functional diarrhea 2024    Type 2 diabetes mellitus with chronic kidney disease on chronic dialysis, with long-term current use of insulin ([facility]) 2024    End stage renal disease ([facility]) 2024    HFrEF (heart failure with reduced ejection fraction) ([facility]) 2024    Persistent atrial fibrillation with RVR ([facility]) 2024    Poorly controlled diabetes mellitus ([facility]) 2024    Late effect of brain injury ([facility]) 2024    SILVINA (acute kidney injury) ([facility]) 2024    Syncope and collapse 2024    History of DVT (deep vein thrombosis) 07/15/2024    History of traumatic brain injury 2024    Impaired mobility and activities of daily living 2024    Methotrexate renal toxicity 2024    Hypokalemia 05/10/2024    Methotrexate toxicity 05/10/2024    Primary osteoarthritis of both knees 2024    Cerebrovascular accident (CVA) ([facility]) 2024    Subdural hematoma (HCC) 2024    Compression of brain ([facility]) 2024    Diffuse large B-cell lymphoma, unspecified site ([facility]) 2024    Dysphagia, oropharyngeal phase 2024    Obstructive and reflux uropathy, unspecified 2024    Primary pulmonary hypertension (HCC) 2024    Macular degeneration of both eyes 2024    Pancreatic cyst 2024    Primary 
Physical Therapy Missed Treatment   Facility/Department: [facility] MED SURG W167/W167-01    NAME: Km Garcia    : 1945 (78 y.o.)  MRN: [id number]    Account: [id number]  Gender: male    Chart reviewed, attempted PT at 0951. Patient unavailable 2° to:    [] Hold per nsg request    [] Pt declined    [] Nsg notified   [] Other notified    [] Pt.. off floor for test/procedure.     [x] Pt. Unavailable pt agreeable to tx, once mobility initiated pt begins having bowel movement, unable to continue, instructed to call for RN when finished so personal care can be completed.       Will attempt PT treatment again at earliest convenience.      Electronically signed by Grant Barbour PTA on 8/10/24 at 2:06 PM EDT    
Physical Therapy Missed Treatment   Facility/Department: [facility] MED SURG W486/W486-01    NAME: Km Garcia    : 1945 (78 y.o.)  MRN: [id number]    Account: [id number]  Gender: male      Unable to evaluate pt for PT this AM due to pt off floor for dialysis.      Will follow and attempt PT evaluation again at earliest availability.       Estephania Cadena, PT, 24 at 11:39 AM      
Physical Therapy Missed Treatment   Facility/Department: [facility] MED SURG W486/W486-01    NAME: Km Garcia    : 1945 (78 y.o.)  MRN: [id number]    Account: [id number]  Gender: male    Chart reviewed, attempted PT at 1026. Patient unavailable 2° to:    [] Hold per nsg request    [] Pt declined    [] Nsg notified   [] Other notified    [x] Pt.. off floor for test/procedure.      [] Pt. Unavailable       Will attempt PT treatment again at earliest convenience.      Electronically signed by Grant Barbour PTA on 24 at 10:26 AM EDT    
Physical Therapy Missed Treatment   Facility/Department: [facility] MED SURG W167/W167-01    NAME: Km Garcia    : 1945 (79 y.o.)  MRN: [id number]    Account: [id number]  Gender: male    Chart reviewed, attempted PT at 9:00. Patient unavailable 2° to:    [x] Attempted 13:25 - Hold - Pt was hypotensive during dialysis and couldn't tolerate full treatment. Will resume tomorrow.     [] Pt declined     [] Nsg notified   [] Other notified    [x] Pt.. off floor for test/procedure.  Pt at dialysis. Will check back later today.     [] Pt. Unavailable       Will attempt PT treatment again at earliest convenience.      Electronically signed by Suze Silva PTA on 24 at 9:01 AM EDT    
Physical Therapy Missed Treatment   Facility/Department: [facility] MED SURG W167/W167-01    NAME: Km Garcia  Patient Status:   : 1945 (79 y.o.)  MRN: [id number]  Account: [id number]  Gender: male        [] Patient Declines PT Treatment            [x] Patient Unavailable:     Rapid Response called on pt this am. Will hold tx this am and follow up later to see if pt able to be seen.   Will attempt PT Treatment again at earliest convenience.        Electronically signed by Mei Sheikh PTA on 24 at 9:16 AM EDT    
Physical Therapy Missed Treatment   Facility/Department: [facility] MED SURG W486/W486-01    NAME: Km Garcia    : 1945 (78 y.o.)  MRN: [id number]    Account: [id number]  Gender: male                   [x] Patient Unavailable: Patient returned from dialysis after noon. He is eating lunch at this time. Patient's wife states his bp is low and he is awaiting medication. Confirmed we will attempt again tmrw.              Electronically signed by Amisha Thorne PTA on 24 at 2:06 PM EDT     
Physical Therapy Missed Treatment   Facility/Department: [facility] MED SURG W167/W167-01    NAME: Km Garcia    : 1945 (78 y.o.)  MRN: [id number]    Account: [id number]  Gender: male    Chart reviewed, attempted PT at 1305. Patient unavailable 2° to:    [] Hold per nsg request    [] Pt declined    [] Nsg notified   [] Other notified    [x] Pt.. off floor for test/procedure. Dialysis    [] Pt. Unavailable       Will attempt PT treatment again at earliest convenience.      Electronically signed by Grant Barbour PTA on 8/10/24 at 2:08 PM EDT    
Physical Therapy Missed Treatment   Facility/Department: [facility] MED SURG IC11/IC11-01    NAME: Km Garcia    : 1945 (79 y.o.)  MRN: [id number]    Account: [id number]  Gender: male      PT evaluation and treatment orders received. Chart reviewed. Hold per Amisha KIRBY. Pt planned for HD catheter replacement.       Will follow and assess when appropriate.       Zoe Musa, PT, 08/15/24 at 10:06 AM    
Physical Therapy Missed Treatment   Facility/Department: [facility] MED SURG IC11/IC11-01    NAME: Km Garcia  Patient Status:   : 1945 (79 y.o.)  MRN: [id number]  Account: [id number]  Gender: male        [] Patient Declines PT Treatment            [x] Patient Unavailable:     Pt is going to hospice this date per spouse. Will have supervising PT dc from PT.  Will attempt PT Treatment again at earliest convenience.        Electronically signed by Mei Sheikh PTA on 24 at 10:20 AM EDT    
Progress Note  Date:2024       Room:[place] [address]  Patient Name:Km Garcia     YOB: 1945     Age:78 y.o.    Chief complaint uncontrolled type 2 diabetes    Subjective    Subjective:  Symptoms:  Stable.    Diet:  Adequate intake.    Activity level: Impaired due to weakness.    Pain:  He reports no pain.       Review of Systems   Cardiovascular:  Positive for leg swelling.     Objective         Vitals Last 24 Hours:  TEMPERATURE:  Temp  Av.7 °F (36.5 °C)  Min: 97.7 °F (36.5 °C)  Max: 97.7 °F (36.5 °C)  RESPIRATIONS RANGE: Resp  Av  Min: 18  Max: 18  PULSE OXIMETRY RANGE: SpO2  Av %  Min: 100 %  Max: 100 %  PULSE RANGE: Pulse  Av.7  Min: 43  Max: 117  BLOOD PRESSURE RANGE: Systolic (24hrs), Av , Min:65 , Max:150   ; Diastolic (24hrs), Av, Min:26, Max:122    I/O (24Hr):    Intake/Output Summary (Last 24 hours) at 2024 1701  Last data filed at 2024 0849  Gross per 24 hour   Intake 900 ml   Output 185 ml   Net 715 ml     Objective:  General Appearance:  Comfortable and ill-appearing.    Vital signs: (most recent): Blood pressure (!) 108/45, pulse (!) 43, temperature 97.7 °F (36.5 °C), temperature source Oral, resp. rate 18, height 1.854 m (6' 1\"), weight 95.8 kg (211 lb 4.8 oz), SpO2 100 %.    HEENT: Normal HEENT exam.    Lungs:  Normal effort.    Heart: Normal rate.    Abdomen: Abdomen is soft.    Extremities: Normal range of motion.  There is dependent edema.    Neurological: Patient is alert.    Skin:  No rash.     Labs/Imaging/Diagnostics    Labs:  CBC:  Recent Labs     24  0455 24  0650 24  0623   WBC 6.3 5.4 5.7   RBC 2.55* 2.72* 2.81*   HGB 8.1* 8.6* 8.6*   HCT 24.0* 25.4* 26.5*   MCV 94.1* 93.4* 94.3*   RDW 13.1 13.1 13.6    366 282     CHEMISTRIES:  Recent Labs     24  0455 24  0650 24  0623    136 128*   K 3.6 3.2* 4.3    108* 100   CO2 17* 16* 12*   BUN 24* 23 34*   CREATININE 2.02* 2.01* 3.68* 
Progress Note  Date:2024       Room:[place] [address]  Patient Name:Km Garcia     YOB: 1945     Age:79 y.o.    Chief complaint uncontrolled diabetes    Subjective    Subjective:  Symptoms:  Stable.  He reports weakness.    Diet:  Poor intake.    Activity level: Impaired due to weakness.       Review of Systems   Neurological:  Positive for weakness.     Objective         Vitals Last 24 Hours:  TEMPERATURE:  Temp  Av.3 °F (36.3 °C)  Min: 92.7 °F (33.7 °C)  Max: 98.8 °F (37.1 °C)  RESPIRATIONS RANGE: Resp  Av  Min: 16  Max: 24  PULSE OXIMETRY RANGE: SpO2  Av.4 %  Min: 97 %  Max: 100 %  PULSE RANGE: Pulse  Av.8  Min: 54  Max: 111  BLOOD PRESSURE RANGE: Systolic (24hrs), Av , Min:83 , Max:122   ; Diastolic (24hrs), Av, Min:42, Max:56    I/O (24Hr):  No intake or output data in the 24 hours ending 24 1528  Objective:  General Appearance:  Ill-appearing.    Vital signs: (most recent): Blood pressure (!) 91/42, pulse 100, temperature 97.5 °F (36.4 °C), resp. rate 20, height 1.854 m (6' 1\"), weight 101 kg (222 lb 10.6 oz), SpO2 100%.  Vital signs are normal.    HEENT: Normal HEENT exam.    Lungs:  Normal effort.    Heart: Normal rate.    Extremities: Normal range of motion.  There is dependent edema.    Neurological: (Patient drowsy).      Labs/Imaging/Diagnostics    Labs:  CBC:  Recent Labs     24  0551 24  0554 24  0523   WBC 10.8 11.6* 12.3*   RBC 2.82* 3.05* 3.05*   HGB 8.8* 9.5* 9.3*   HCT 26.2* 28.7* 28.7*   MCV 92.9* 94.1* 94.1*   RDW 13.4 13.6 13.8    353 375     CHEMISTRIES:  Recent Labs     24  0551 24  0554 24  0523   * 129* 128*   K 4.2 5.2* 5.0*   CL 95 92* 91*   CO2 21 19* 19*   BUN 37* 51* 63*   CREATININE 4.56* 5.78* 6.29*   GLUCOSE 155* 128* 112*     PT/INR:No results for input(s): \"PROTIME\", \"INR\" in the last 72 hours.  APTT:No results for input(s): \"APTT\" in the last 72 hours.  LIVER PROFILE:  Recent 
Pt alert and oriented. C/o bilateral knee pain and back pain. Medicated with prn Tramadol. Buttocks is red and excoriated. Zinc ointment applied. Urology consulted for ca catheter management. Wife reports urology has to change catheter due to enlarged prostate.     Electronically signed by Cecilia Holder RN on 8/4/2024 at 2:17 PM    
Pt assessment and VS complete. Pt A&O x4, calm and cooperative. Pt returned from dialysis, 2.8 L removed. Pt with no C/Os dizziness, SOB or chest pain. Reg diet tolerated. Contreras catheter with minimal output.     Incontinence care provided, zinc applied to buttocks, excoriation present, skin tear noted to intergluteal area-see LDA. Pt repositioned in bed, heels elevated off bed. Bed alarm on. Safety maintained.    Electronically signed by Corinna Mejia RN on 8/9/2024 at 6:54 PM    
Pt denies any pain, s  
Pt is in bed resting comfortably. He is A/O/ x 4, Lethargic, easily awakened. He takes his po medications whole w/ water. HS BS at 129, no SSIC needed. Last BM on 08/12/2024, medium loose. He has a 18 FR coude, draining yellow, cloudy urine. Contreras care completed. Dialysis pt. Right cath on chest. Dialysis on Tues, Thurs, Sat. Bilat Lung sounds are dim, PO on RA at 100% No c/o SOB, or difficulties breathing. Bilat UE pulses at +2 and palpable. Bilat LE pulse are minimal to +1 palpable. No c/o pan at this time. Call light is w/in reach.   
Pt's HR increased to 170's when attempting to work with PT. Tele showing A-Fib. Pt's heart decreased to 120's at rest. Dr. Cooley made aware via [employer].    Electronically signed by MARIA DEL ROSARIO YOUNG RN on 8/8/24 at 3:37 PM EDT    
Pt. Assessment and VS complete. Pt. Calm and cooperative. Medications given per MAR. Dialysis cathter intact. Pt receives dialysis T, Th, S. Contreras cathter intact, draining, yellow urine.     0936: Pt transported to dialysis.   1400: Per report received from dialysis, 2 L removed, BP stable. Pt to receive dialysis again tomorrow.       1500: Pt back from dialysis, wife at bedside.       Electronically signed by Corinna Mejia RN on 8/8/2024 at 2:16 PM   
Pulmonary & Critical Care Medicine ICU Progress Note    Subjective:     Overnight his CRRT did stop functioning secondary to high pressures.  This has been held at the bedside.  He did receive Lasix yesterday during the day.  His hemoglobin was noted to be 6.8 this morning.  He does remain on Levophed and this dose has been increasing throughout the night as well.    EXAM:  General: No acute distress, resting in bed  HEENT: Normocephalic, atraumatic, NG tube in place  Lungs : Occasional crackle bilaterally, no wheezes, no rhonchi, no acute distress  Heart: Irregular rhythm, atrial fibrillation on monitor  ABD: Soft, positive bowel sounds, nontender to palpation  Extremities : Warm, dry, edema noted  Neuro: Awake, weak, occasional tremor-like activity noted  Skin: No rashes appreciated    IV:   norepinephrine 16 mcg/min (08/18/24 0820)    prismaSATE BGK 4/2.5 500 mL/hr at 08/17/24 1623    prismaSATE BGK 4/2.5 750 mL/hr at 08/17/24 1623    prismaSATE BGK 4/2.5 750 mL/hr at 08/17/24 1623    dextrose         Vitals:  BP (!) 126/37   Pulse 90   Temp 97.8 °F (36.6 °C) (Oral)   Resp 27   Ht 1.854 m (6' 1\")   Wt 115.7 kg (255 lb 1.2 oz)   SpO2 95%   BMI 33.66 kg/m²          Intake/Output Summary (Last 24 hours) at 8/18/2024 0843  Last data filed at 8/18/2024 0820  Gross per 24 hour   Intake 1670.38 ml   Output 1263 ml   Net 407.38 ml       Medications:  Scheduled Meds:   mupirocin   Topical BID    Povidone-Iodine   Topical TID    lansoprazole  30 mg Oral QAM AC    amiodarone  200 mg Oral q8h    cefepime  1,000 mg IntraVENous Daily    lactobacillus  1 capsule Oral Daily with breakfast    vancomycin (VANCOCIN) intermittent dosing (placeholder)   Other RX Placeholder    sodium chloride  250 mL IntraVENous Once    midodrine  15 mg Oral TID WC    [Held by provider] metoprolol tartrate  25 mg Oral BID    insulin lispro  0-4 Units SubCUTAneous TID WC    insulin lispro  0-4 Units SubCUTAneous Nightly    valACYclovir  500 mg 
Pulmonary & Critical Care Medicine ICU Progress Note  Chief complaint : Shock      Subjunctive/24 hour events :   Patient seen and examined during multidisciplinary rounds with RN, charge nurse, RT, pharmacy, dietitian, and social service.       More awake, no complaint, weak, poor oral intake, on amiodarone drip, underwent cardioversion yesterday, blood pressure improved since then.  He is on room air saturation 99%, no fever, urine output   no bowel movement        New information updated in the note today, rest of the examination did not change compared to yesterday.  Social History     Tobacco Use    Smoking status: Former     Current packs/day: 0.00     Types: Cigarettes     Quit date:      Years since quittin.6    Smokeless tobacco: Never   Substance Use Topics    Alcohol use: Yes     Comment: Drinks 1 drink every other day         Problem Relation Age of Onset    No Known Problems Mother     Heart Disease Father     No Known Problems Sister     Other Brother         enlarged prostate, cataracts    Diabetes Brother         borderline       Recent Labs     24  1625   PHART 7.438   DAY0RUI 35   PO2ART 80       MV Settings:     / / /            IV:   heparin (porcine) 5,000 Units in sodium chloride 0.9 % 20 mL infusion 500 mL/hr at 24 0832    sodium chloride      sodium chloride      prismaSATE BGK 4/2.5 500 mL/hr at 24 1843    sodium chloride      prismaSATE BGK 4/2.5 750 mL/hr at 08/15/24 0008    prismaSATE BGK 4/2.5 750 mL/hr at 08/15/24 0005    norepinephrine Stopped (24 0913)    amiodarone 0.5 mg/min (08/15/24 0538)    dextrose         Vitals:  BP (!) 122/33   Pulse 91   Temp 98.5 °F (36.9 °C) (Axillary)   Resp 23   Ht 1.854 m (6' 1\")   Wt 113.8 kg (250 lb 14.1 oz)   SpO2 99%   BMI 33.11 kg/m²    Tmax:        Intake/Output Summary (Last 24 hours) at 8/15/2024 1014  Last data filed at 8/15/2024 0800  Gross per 24 hour   Intake 2497.43 ml   Output 1345 ml   Net 1152.43 
Received report from Gilbert KIRBY at bedside. Skin check completed. Assessments completed, see flow sheets. Patient wakes to name being called, generalized fatigue, is A+O to person, place, and situation. Patient has FMS in place, one episode of large BM around tube, patient bathed and linens changed, tolerated well. CRRT remains in place, see hourly charting. Patient very fidgety with lines, reminded to not pull at anything, redirectable. Remains on heparin, see titration in emar. Remains on levophed, see emar for titrations. Noted no urinary output for this shift, ca in place. Bladder scanned for 0cc in bladder.   
Shift Summary [id number]:    Handoff report received from Amisha KIRBY at bedside, CRRT double verified.     Head to toe assessment completed, see flowsheets for details.     0541-4488: Filter pressure significantly increased, alarms not alarming at this time. Pt repositioned.     Nightly medications administered, pt able to take pills one at a time.     2355: Filter clotting, access pressure high, fluid removal decreased to help with filter.     2356: Filter clotted, when disconnecting venous and arterial lines, clots were pulled from vas cath. Clots removed from vas cath, blood return noted, lines flushed and then heparinized.     0002: Dr. Uri mendiola served regarding CRRT clotting off again and blood unable to be returned to patient due to large clots. No response at this time.    0025: Dr. Uri mendiola served again regarding CRRT.     0027: Per Dr. Grewal, CRRT can be restarted in the morning. Katiuska manager made aware.     Bed bath and linen change completed, pt tolerated well with no s/s of distress noted.    Pt resting in bed with eyes closed, respirations remain equal and unlabored.     AM labs obtained.       
Shift Summary [id number]:    Handoff report received from Viviana KIRBY at bedside, CRRT alarming high pressure alarms. Machine unable to run due to high pressure, blood returned with Viviana KIRBY. Lines flushed, heparinized then capped. Skin assessment completed, stool leaking around FMS. Pt cleaned and paste applied.     Head to toe assessment completed. Pt lethargic and not interactive. Pt unable to verbalize, pt occasionally shakes head \"yes\" or \"no\". Pt not opening eyes to voice. Pt sternal rubbed and only opens eyes for about 10 seconds then drifts back asleep. PERRLA intact. Pt does follow commands when asked but continues to not open eyes. Moderate tremors noted in hands and BUE, sometimes BP inaccurate, BP cuff readjusted multiple times and accurate BP obtained. Pt unable to remain alert for swallow screening, nightly medications administered through corepak.    Anti XA unable to be obtained from CVC line due to infusion of heparin going through CVC. Lab called to obtain anti XA.    Xa results posted, pt therapeutic. 2 therapeutics obtain in a row. Pt now a daily Xa.     Dr. Wolfe notified of altered mental status, ABG obtained. Dr. Wolfe at bedside to assess, no further orders received.     CHG bath completed, pt tolerated well. Mouth care completed and chap stick applied.     Throughout night pt intermittently making a noise such as hiccupping but is not consistent like hiccups. Noise in throat, lung sounds remain clear/diminished during noises. Dr. Wolfe ordered respiratory panel and at bedside, no further orders received.     Pt frequently turned and mouth care provided. At times pt pushing staff away and saying \"no\" but was still able to be turned prior to pt saying \"no\".     AM labs obtained.     Tiffany care completed, bed pad changed. Pt stated \"I'm cold\" temp 97.7, warm blanket placed on patient.                
Shift Summary [id number]:    Handoff report received from Viviana KIRBY at bedside.     Head to toe assessment completed, pt only nodding head yes to name, opens eyes for about 10 seconds and is lethargic. Pt does weakly squeeze hands and wiggles toes. Mouth care attempted, pt shaking head \"no\". Plan of care is to continue the levophed with no increases and no further escalation of care. CVC and vas cath dressing changes completed.     Nightly medications administered, pts temp 101.3, PRN tylenol given.     Bed bath and linen change completed. Pt tolerated well with no s/s of distress.    Throughout night pt shook head \"no\" to being in pain. Pt rested in bed, respirations equal, bed alarm remains on.   
Shift summary:  Overall pt. Lethargic. Pt. Was awake some today when wife was at bedside, wife states pt. Is more awake than what he was has been. Otherwise pt. Sleeping. Pt. Does wake to voice and follows commands. Nods head to questions. Able to state name. Pt. Has frequent hiccups. Pt. also has frequent tremors BUE, noted more in a.m. compared to afternoon. At times, this makes it difficult to distinguish afib vs. Sinus rhythm on monitor, but when rhythm more clear, it does appear to be back and forth between sinus and afib. Rate is controlled. Pt. Is on levo for BP support. See MAR for titrations. Pt. Is on midodrine. Pt. Afebrile, was slightly hypothermic around noon, blankets applied, and pt. Became normothermic. Earlier this morning, pt. Denied pain, but later c/o pain to wife and to this RN. Pt. Given tramadol. Pt. Later denied pain when asked multiple times. Pt. Has poor appetite. Pt. Did not want to eat food for this RN when offered multiple times, but did eat some food/drinks for wife earlier in day. Pt. Does have Tf infusing through corepak. CXR confirmed placement. Pt. Has Contreras catheter in place with no urine output. Flushed with Dr. Rios at bedside, then pt. Bladder scanned showing 77cc, end of shift showing 68cc. To monitor at this time. Continue with bladder scans as needed. Pt. Does have FMS in place with large output. Pt. Leaks around tube at times. Tubing irrigated and placement confirmed, bag also changed as pt. Also has large amount of gas along with the stool. Pt. Cleaned as needed, and was also given full bed bath/CHG this afternoon.     CRRT running without issue earlier in shift. But in early afternoon, was having issues with high access pressures. Despite interventions to reduce pressures, blood had to be returned around 1300. Machine unable to run. Dr. Robert notified. Cath flow ordered. Notified dialysis staff regarding blood returned and cath flow ordered. Cath flow administered by 
Spiritual Health Assessment/Progress Note  [facility]    (P) Follow-up, Rapid Response,  ,      Name: Km Garcia MRN: [id number]    Age: 79 y.o.     Sex: male   Language: English   Uatsdin: Mexican Restorationist   Dizziness     Date: 8/13/2024            Total Time Calculated: (P) 10 min            followed up with pt and spouse. Spouse requested prayer. Pt was aware of prayer and held wife's hand. Pt's spouse was appreciative.       Spiritual Assessment continued in [facility] ICU        Referral/Consult From: Physician   Encounter Overview/Reason: (P) Follow-up  Service Provided For: (P) Patient and family together    Leslee, Belief, Meaning:   Patient identifies as spiritual, is connected with a leslee tradition or spiritual practice, and has beliefs or practices that help with coping during difficult times  Family/Friends identify as spiritual, are connected with a leslee tradition or spiritual practice, and have beliefs or practices that help with coping during difficult times      Importance and Influence:  Patient has spiritual/personal beliefs that influence decisions regarding their health  Family/Friends have spiritual/personal beliefs that influence decisions regarding the patient's health    Community:  Patient is connected with a spiritual community and feels well-supported. Support system includes: Spouse/Partner and Leslee Community  Family/Friends are connected with a spiritual community: and feel well-supported. Support system includes: Spouse/Partner and Leslee Community    Assessment and Plan of Care:     Patient Interventions include: Other: Unable to assess.  Family/Friends Interventions include: Explored spiritual coping/struggle/distress and theological reflection and Affirmed coping skills/support systems    Patient Plan of Care: Spiritual Care available upon further referral  Family/Friends Plan of Care: Spiritual Care available upon further referral    Electronically signed by 
Spiritual Health Assessment/Progress Note  [facility]    (P) Crisis, (P) Rapid Response,  ,      Name: Km Garcia MRN: [id number]    Age: 79 y.o.     Sex: male   Language: English   Bahai: Slovenian Holiness   Dizziness     Date: 8/12/2024            Total Time Calculated: (P) 23 min          Responded rapid response, drop in BP likely due to dialysis treatment. Spouse present and verbally asked Pt about what should happen should Pt's heart stop. Pt was aware and requested a natural passing without medical intervention. Requested spiritual health team to change current directives.  offered prayer.    Spiritual Assessment began in [facility] TELEMET[facility]        Referral/Consult From: Rounding   Encounter Overview/Reason: (P) Crisis  Service Provided For: (P) Patient and family together    Leslee, Belief, Meaning:   Patient identifies as spiritual, is connected with a leslee tradition or spiritual practice, and has beliefs or practices that help with coping during difficult times  Family/Friends identify as spiritual, are connected with a leslee tradition or spiritual practice, and have beliefs or practices that help with coping during difficult times      Importance and Influence:  Patient has spiritual/personal beliefs that influence decisions regarding their health  Family/Friends have spiritual/personal beliefs that influence decisions regarding the patient's health    Community:  Patient is connected with a spiritual community and feels well-supported. Support system includes: Spouse/Partner and Leslee Community  Family/Friends are connected with a spiritual community: and feel well-supported. Support system includes: Spouse/Partner and Leslee Community    Assessment and Plan of Care:     Patient Interventions include: Other: Unable to assess  Family/Friends Interventions include: Affirmed coping skills/support systems and Assisted in Advance Care Planning conversation    Patient Plan of Care: 
Spiritual Health Assessment/Progress Note  [facility]    Initial Encounter,  ,  ,      Name: Km Garcia MRN: [id number]    Age: 78 y.o.     Sex: male   Language: English   Anabaptist: None   Dizziness     Date: 8/5/2024            Total Time Calculated: 25 min              Spiritual Assessment began in [facility]  4W MED SURG UNIT        Referral/Consult From: Rounding   Encounter Overview/Reason: Initial Encounter  Service Provided For: Patient     Intern visited with patient. He expressed frustration that he is not feeling much better. Has had a visit from his  which he found helpful.  Leslee, Belief, Meaning:   Patient is connected with a leslee tradition or spiritual practice  Family/Friends No family/friends present      Importance and Influence:  Patient Other: unknown  Family/Friends no family/friends present    Community:  Patient is connected with a spiritual community  Family/Friends Other: unknown    Assessment and Plan of Care:     Patient Interventions include: Facilitated expression of thoughts and feelings  Family/Friends Interventions include: Other: unknown    Patient Plan of Care: Other: has had a visit from the  at [facility] in [place]  Family/Friends Plan of Care: Other: unknown    Electronically signed by Alyssa Loweryin Parvez on 8/5/2024 at 2:43 PM  
Subjective:      Patient ID: Km Garcia is a 78 y.o. male    HPI78 year old male who's ca catheter is draining a small amount of peña urine. He voices no other urological complaints.        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    Cerebrovascular accident (CVA) (HCC) 2024    Charcot ankle, left     wears brace    Diabetes mellitus (HCC)     takes metformin    History of DVT (deep vein thrombosis) 7/15/2024    Hypertension     on meds > 10 yrs    Osteoarthritis      Past Surgical History:   Procedure Laterality Date    COLONOSCOPY      > 30 yrs ago    INVASIVE VASCULAR N/A 2024    Vena cava filter insertion - cath lab performed by Dev Magallon DO at [facility] CARDIAC CATH LAB    IR NONTUNNELED VASCULAR CATHETER Right 2024    Medcomp 11.5F x 15 cm Raulerson Duo-Flow IJ double lumen catheter (LOT# [id number],  exp ) performed by Dr. Osborne    IR NONTUNNELED VASCULAR CATHETER  2024    IR NONTUNNELED VASCULAR CATHETER 2024 [facility] SPECIAL PROCEDURE    LITHOTRIPSY N/A 2018    WITH HOLMIUM LASER performed by Antonio Petit MD at [facility] OR    VASCULAR SURGERY Right 2024    Ultrasound-guided right internal jugular tunneled hemodialysis catheter. performed by Vahid Hu MD at [facility] OR     Social History     Socioeconomic History    Marital status:      Spouse name: None    Number of children: None    Years of education: None    Highest education level: None   Tobacco Use    Smoking status: Former     Current packs/day: 0.00     Types: Cigarettes     Quit date:      Years since quittin.6    Smokeless tobacco: Never   Vaping Use    Vaping Use: Never used   Substance and Sexual Activity    Alcohol use: Yes     Comment: Drinks 1 drink every other day    Drug 
Subjective:      Patient ID: Km Garcia is a 79 y.o. 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    Cerebrovascular accident (CVA) (HCC) 2024    Charcot ankle, left     wears brace    Diabetes mellitus (HCC)     takes metformin    History of DVT (deep vein thrombosis) 7/15/2024    Hypertension     on meds > 10 yrs    Osteoarthritis      Past Surgical History:   Procedure Laterality Date    COLONOSCOPY      > 30 yrs ago    INVASIVE VASCULAR N/A 2024    Vena cava filter insertion - cath lab performed by Dev Magallon DO at [facility] CARDIAC CATH LAB    IR NONTUNNELED VASCULAR CATHETER Right 2024    Medcomp 11.5F x 15 cm Raulerson Duo-Flow IJ double lumen catheter (LOT# [id number],  exp ) performed by Dr. Osborne    IR NONTUNNELED VASCULAR CATHETER  2024    IR NONTUNNELED VASCULAR CATHETER 2024 [facility] SPECIAL PROCEDURE    LITHOTRIPSY N/A 2018    WITH HOLMIUM LASER performed by Antonio Petit MD at [facility] OR    VASCULAR SURGERY Right 2024    Ultrasound-guided right internal jugular tunneled hemodialysis catheter. performed by Vahid Hu MD at [facility] OR    VASCULAR SURGERY Right 8/15/2024    Replacement of right internal jugular tunneled dialysis catheter performed by Vahid Hu MD at [facility] OR     Social History     Socioeconomic History    Marital status:      Spouse name: None    Number of children: None    Years of education: None    Highest education level: None   Tobacco Use    Smoking status: Former     Current packs/day: 0.00     Types: Cigarettes     Quit date:      Years since quittin.6    Smokeless tobacco: Never   Vaping Use    Vaping status: 
Subjective:      Patient ID: Km Garcia is a 79 y.o. male    HPI78 year old male who's ca catheter is draining a small amount of peña urine. He voices no other urological complaints.           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    Cerebrovascular accident (CVA) (HCC) 2024    Charcot ankle, left     wears brace    Diabetes mellitus (HCC)     takes metformin    History of DVT (deep vein thrombosis) 7/15/2024    Hypertension     on meds > 10 yrs    Osteoarthritis      Past Surgical History:   Procedure Laterality Date    COLONOSCOPY      > 30 yrs ago    INVASIVE VASCULAR N/A 2024    Vena cava filter insertion - cath lab performed by Dev Magallon DO at [facility] [place]    IR NONTUNNELED VASCULAR CATHETER Right 2024    Medcomp 11.5F x 15 cm Raulerson Duo-Flow IJ double lumen catheter (LOT# [id number],  exp ) performed by Dr. Osborne    IR NONTUNNELED VASCULAR CATHETER  2024    IR NONTUNNELED VASCULAR CATHETER 2024 [facility] SPECIAL PROCEDURE    LITHOTRIPSY N/A 2018    WITH HOLMIUM LASER performed by Antonio Petit MD at [facility] [place]    VASCULAR SURGERY Right 2024    Ultrasound-guided right internal jugular tunneled hemodialysis catheter. performed by Vahid Hu MD at [facility] [place]     Social History     Socioeconomic History    Marital status:      Spouse name: None    Number of children: None    Years of education: None    Highest education level: None   Tobacco Use    Smoking status: Former     Current packs/day: 0.00     Types: Cigarettes     Quit date:      Years since quittin.6    Smokeless tobacco: Never   Vaping Use    Vaping status: Never Used   Substance and Sexual Activity    Alcohol use: Yes     Comment: Drinks 1 drink every other day    
Subjective:      Patient ID: Km Garcia is a 79 y.o. male    HPI78 year old male who's ca catheter is draining a small amount of peña urine. He voices no other urological complaints. 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    Cerebrovascular accident (CVA) (HCC) 2024    Charcot ankle, left     wears brace    Diabetes mellitus (HCC)     takes metformin    History of DVT (deep vein thrombosis) 7/15/2024    Hypertension     on meds > 10 yrs    Osteoarthritis      Past Surgical History:   Procedure Laterality Date    COLONOSCOPY      > 30 yrs ago    INVASIVE VASCULAR N/A 2024    Vena cava filter insertion - cath lab performed by Dev Magallon DO at [facility] CARDIAC CATH LAB    IR NONTUNNELED VASCULAR CATHETER Right 2024    Medcomp 11.5F x 15 cm Raulerson Duo-Flow IJ double lumen catheter (LOT# [id number],  exp ) performed by Dr. Osborne    IR NONTUNNELED VASCULAR CATHETER  2024    IR NONTUNNELED VASCULAR CATHETER 2024 [facility] SPECIAL PROCEDURE    LITHOTRIPSY N/A 2018    WITH HOLMIUM LASER performed by Antonio Petit MD at [facility] OR    VASCULAR SURGERY Right 2024    Ultrasound-guided right internal jugular tunneled hemodialysis catheter. performed by Vahid Hu MD at [facility] OR     Social History     Socioeconomic History    Marital status:      Spouse name: None    Number of children: None    Years of education: None    Highest education level: None   Tobacco Use    Smoking status: Former     Current packs/day: 0.00     Types: Cigarettes     Quit date:      Years since quittin.6    Smokeless tobacco: Never   Vaping Use    Vaping status: Never Used   Substance and Sexual Activity    Alcohol use: Yes     Comment: Drinks 1 drink 
Subjective:  The patient complains of severe acute on chronic progressive fatigue and generalized weakness partially relieved by rest, PT, OT and meds and hemodialysis and exacerbated by exertion and recent complicated cardiopulmonary illness with renal failure.  Patient was transferred off of rehab floor on Friday when his functional status declined.  CAT scan of the brain was negative.     He was recently found to have a large clot in his left lower extremity.  He has been off his Lovenox due to his renal failure.   He has an IVC filter in place.  He continues to need hemodialysis.    I am concerned about patient’s medical complexities including:  Principal Problem:    Dizziness  Active Problems:    Impaired mobility and activities of daily living    SILVINA (acute kidney injury) (HCC)    Atrial fibrillation (HCC)  Resolved Problems:    * No resolved hospital problems. *      .    Reviewed recent nursing note and discussed current status and planned care with acute care providers, \"78 year old male who's ca catheter is draining a small amount of peña urine. He voices no other urological complaints...Maintain ca catheter, Continue flomax \".  Note he is no longer on the Flomax due to his low blood pressure.    ROS x10:  The patient also complains of severely impaired mobility and activities of daily living.  Otherwise no new problems with vision, hearing, nose, mouth, throat, dermal, cardiovascular, GI, , pulmonary, musculoskeletal, psychiatric or neurological.        Vital signs:  BP (!) 114/53   Pulse 72   Temp 97.5 °F (36.4 °C) (Oral)   Resp 16   Ht 1.854 m (6' 1\")   Wt 95.8 kg (211 lb 4.8 oz)   SpO2 100%   BMI 27.88 kg/m²   I/O:   PO/Intake:    fair PO intake, continue to monitor closely for dehydration    Bowel/Bladder:   Ca catheter --changed on 8/5/2024 continent,    General:  Patient is well developed, adequately nourished, and    well kempt.     HEENT:    PERRLA, hearing intact to loud voice, 
Subjective:  The patient complains of severe acute on chronic progressive fatigue and generalized weakness partially relieved by rest, PT, OT and meds and hemodialysis and exacerbated by exertion and recent complicated cardiopulmonary illness with renal failure.  Patient was transferred off of rehab floor on Friday when his functional status declined.  CAT scan of the brain was negative.     He was recently found to have a large clot in his left lower extremity.  He has been off his Lovenox due to his renal failure.   He is now started on heparin.  He has an IVC filter in place.  He continues to need hemodialysis.    I am concerned about patient’s medical complexities including:  Principal Problem:    Dizziness  Active Problems:    Impaired mobility and activities of daily living    SILVINA (acute kidney injury) (HCC)    Atrial fibrillation (HCC)  Resolved Problems:    * No resolved hospital problems. *      .    Reviewed recent nursing note and discussed current status and planned care with acute care providers, \"78 year old male who's ca catheter is draining a small amount of peña urine. He voices no other urological complaints...Maintain ca catheter, Continue flomax \".  Note he is no longer on the Flomax due to his low blood pressure.    He is noted to be incontinent of malodorous urine.  Patient is getting hemodialysis today.  He is rarely able to have a conversation with me he is so tired.  Today he is able to tell me that he does not feel that there is any way he could participate in acute level rehab.    ROS x10:  The patient also complains of severely impaired mobility and activities of daily living.  Otherwise no new problems with vision, hearing, nose, mouth, throat, dermal, cardiovascular, GI, , pulmonary, musculoskeletal, psychiatric or neurological.        Vital signs:  BP (!) 94/51   Pulse 94   Temp 98.1 °F (36.7 °C)   Resp 18   Ht 1.854 m (6' 1\")   Wt 95.8 kg (211 lb 4.8 oz)   SpO2 99%   
Subjective:  The patient complains of severe acute on chronic progressive fatigue and generalized weakness partially relieved by rest, PT, OT and meds and hemodialysis and exacerbated by exertion and recent complicated cardiopulmonary illness with renal failure.  Patient was transferred off of rehab floor on Friday when his functional status declined.  CAT scan of the brain was negative.     He was recently found to have a large clot in his left lower extremity.  He has been off his Lovenox due to his renal failure.   He is now started on heparin.  He has an IVC filter in place.  He continues to need hemodialysis.    I am concerned about patient’s medical complexities including:  Principal Problem:    Dizziness  Active Problems:    Impaired mobility and activities of daily living    SILVINA (acute kidney injury) (HCC)    Atrial fibrillation (HCC)  Resolved Problems:    * No resolved hospital problems. *      .    Reviewed recent nursing note and discussed current status and planned care with acute care providers, \"78 year old male who's ca catheter is draining a small amount of peña urine. He voices no other urological complaints...Maintain ca catheter, Continue flomax \".  Note he is no longer on the Flomax due to his low blood pressure.    ROS x10:  The patient also complains of severely impaired mobility and activities of daily living.  Otherwise no new problems with vision, hearing, nose, mouth, throat, dermal, cardiovascular, GI, , pulmonary, musculoskeletal, psychiatric or neurological.        Vital signs:  BP (!) 125/46   Pulse 90   Temp 97.9 °F (36.6 °C) (Oral)   Resp 16   Ht 1.854 m (6' 1\")   Wt 95.8 kg (211 lb 4.8 oz)   SpO2 100%   BMI 27.88 kg/m²   I/O:   PO/Intake:    fair PO intake, continue to monitor closely for dehydration    Bowel/Bladder:   Ca catheter --changed on 8/5/2024 continent,    General:  Patient is well developed, adequately nourished, and    well kempt.     HEENT:  
Subjective:  The patient complains of severe acute on chronic progressive fatigue and generalized weakness partially relieved by rest, PT, OT and meds and hemodialysis and exacerbated by exertion and recent complicated cardiopulmonary illness with renal failure.  Patient was transferred off of rehab floor on Friday when his functional status declined.  CAT scan of the brain was negative.     He was recently found to have a large clot in his left lower extremity.  He has been off his Lovenox due to his renal failure.   He is now started on heparin.  He has an IVC filter in place.  He continues to need hemodialysis.  He is now needing it daily.  Unfortunately he struggled with low blood pressures he had to return to the floor because of low blood pressures partially responsive to fluids.  A rapid response was called he is stabilizing medically.  He recently changed his CODE STATUS to DNR CCA though it does not look like it was updated in the chart.    I am concerned about patient’s medical complexities including:  Principal Problem:    Dizziness  Active Problems:    Impaired mobility and activities of daily living    SILVINA (acute kidney injury) (HCC)    Atrial fibrillation (HCC)  Resolved Problems:    * No resolved hospital problems. *      .    Reviewed recent nursing note and discussed current status and planned care with acute care providers, \" Afib on monitor  \".       His blood pressure is low he continues to have difficulty with occasional loose stools from norovirus.    ROS x10:  The patient also complains of severely impaired mobility and activities of daily living.  Otherwise no new problems with vision, hearing, nose, mouth, throat, dermal, cardiovascular, GI, , pulmonary, musculoskeletal, psychiatric or neurological.        Vital signs:  BP (!) 72/46   Pulse 52   Temp 97.3 °F (36.3 °C) (Axillary)   Resp 16   Ht 1.854 m (6' 1\")   Wt 101 kg (222 lb 10.6 oz)   SpO2 100%   BMI 29.38 kg/m² 
Subjective:  The patient complains of severe acute on chronic progressive fatigue and generalized weakness partially relieved by rest, PT, OT and meds and hemodialysis and exacerbated by exertion and recent complicated cardiopulmonary illness with renal failure.  Patient was transferred off of rehab floor on Friday when his functional status declined.  CAT scan of the brain was negative.     He was recently found to have a large clot in his left lower extremity.  He has been off his Lovenox due to his renal failure.   He is now started on heparin.  He has an IVC filter in place.  He continues to need hemodialysis.  He is now needing it daily.  Unfortunately he struggled with low blood pressures he had to return to the floor because of low blood pressures partially responsive to fluids.  A rapid response was called he is stabilizing medically.  He recently changed his CODE STATUS to DNR CCA though it does not look like it was updated in the chart.    I am concerned about patient’s medical complexities including:  Principal Problem:    Dizziness  Active Problems:    Impaired mobility and activities of daily living    SILVINA (acute kidney injury) ([facility])    Persistent atrial fibrillation with RVR ([facility])    Shock ([facility])    Palliative care encounter    Goals of care, counseling/discussion    Advanced care planning/counseling discussion  Resolved Problems:    * No resolved hospital problems. *      .    Reviewed recent nursing note and discussed current status and planned care with acute care providers, \"Report from Virginie KIRBY. Alert delayed responses. VSS MP-SR.Amnio @ 0.5/16.7cc/hr. CRRT off at this time. NPO for surgery. Ben to CD. Dialysis cath site dry and intact. L IJ CVC dressing dry intact \".       He is still in the ICU-his wife and he have decided on palliative care and they have stopped his hemodialysis.  He is really weak and I gave emotional support to both of them.  This is really been a rough road for 
U/S called reports vasc duplex positive DVT FROM GROIN ALL THE WAY DOWN LLE. Dr Cooley notified via perfect serve. Per Dr Cooley will assess for possible blood thinner.   
Vancomycin consult update    Patient likely transfer to hospice per ICU rounds info.  Sr Cr has been increasing, no HD or RRT to be done at this time.  Last vancomycin level therapeutic.   IF not go to hospice,  needs vanco level and possible redose 8/21.  Will follow.    Wilfrido DeL a Cruz [facility]  08/20/24  10:12 AM    
Wound Ostomy Continence Nurse  Consult Note       NAME:  Km Garcia  MEDICAL RECORD NUMBER:  [id number]  AGE: 79 y.o.   GENDER: male  : 1945  TODAY'S DATE:  2024    Subjective   Reason for WOC Nurse Evaluation and Assessment: buttock      Km Garcia is a 79 y.o. male referred by:   [] Physician  [x] Nursing  [] Other:     Wound Identification:  Wound Type: pressure and moisture  Contributing Factors: chronic pressure, decreased mobility, incontinence of stool, and incontinence of urine    Wound History: Patient admitted to [facility] on 2024 with wound to gluteal cleft documented in head to toe flowsheets by unit nursing. Wound ostomy consult placed on 2024.   Current Wound Care Treatment:  Recommending 1) continue pressure injury prevention interventions including low air loss mattress 2) zinc BID and prn 3) consider fecal management system if stool continues to be frequent and loose     Patient Goal of Care:  [x] Wound Healing  [] Odor Control  [] Palliative Care  [] Pain Control   [] Other:         PAST MEDICAL HISTORY        Diagnosis Date    AMD (age related macular degeneration)     left eye only, gets steroid shots    Asthma     seasonally, uses inhaler as needed    Athscl heart disease of native coronary artery w/o ang pctrs 2024    Cerebrovascular accident (CVA) (HCC) 2024    Charcot ankle, left     wears brace    Diabetes mellitus (HCC)     takes metformin    History of DVT (deep vein thrombosis) 7/15/2024    Hypertension     on meds > 10 yrs    Osteoarthritis        PAST SURGICAL HISTORY    Past Surgical History:   Procedure Laterality Date    COLONOSCOPY      > 30 yrs ago    INVASIVE VASCULAR N/A 2024    Vena cava filter insertion - cath lab performed by Dev Magallon DO at [facility] [place], [place] CARDIAC CATH LAB    IR NONTUNNELED VASCULAR CATHETER Right 2024    Medcomp 11.5F x 15 cm Jose Duo-Flow IJ double lumen catheter (LOT# [id number],  exp ) 
    PT/INR:No results for input(s): \"PROTIME\", \"INR\" in the last 72 hours.  APTT:No results for input(s): \"APTT\" in the last 72 hours.  LIVER PROFILE:No results for input(s): \"AST\", \"ALT\", \"BILIDIR\", \"BILITOT\", \"ALKPHOS\" in the last 72 hours.    Imaging Last 24 Hours:  No results found.  Assessment//Plan           Hospital Problems             Last Modified POA    * (Principal) Dizziness 8/3/2024 Yes    Impaired mobility and activities of daily living 8/5/2024 Yes    SILVINA (acute kidney injury) (HCC) 8/5/2024 Yes    Atrial fibrillation (HCC) 8/5/2024 Yes     Assessment:    Condition: In stable condition.  Unchanged.   (Uncontrolled diabetes blood sugars stable on low-dose Humalog coverage  Variable p.o. 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     Plan:    (Continue low-dose Humalog coverage endocrinology will see as needed okay to discharge patient from endocrinology total time spent 25 minutes).       Electronically signed by Nixon Arredondo MD on 8/8/24 at 10:19 AM EDT    
(HCC)     Primary hypertension     Primary osteoarthritis of both knees     Primary CNS lymphoma (HCC)     Primary pulmonary hypertension (HCC)     Urinary retention     Late effect of brain injury (HCC)     SILVINA (acute kidney injury) (HCC)     UTI (urinary tract infection)     Syncope     HFrEF (heart failure with reduced ejection fraction) (HCC)     Atrial fibrillation (HCC)     Poorly controlled diabetes mellitus (HCC)     End stage renal disease (HCC)     Type 2 diabetes mellitus with chronic kidney disease on chronic dialysis, with long-term current use of insulin (HCC)     History of DVT (deep vein thrombosis)      Subjective:  Admit Date: 8/2/2024    Interval History: off CRRT now, continues to have catheter issues, discussed w/ wife and hospitalist at bedside, BP elevated currently, was however low this AM     Medications:  Scheduled Meds:   vancomycin (VANCOCIN) intermittent dosing (placeholder)   Other RX Placeholder    piperacillin-tazobactam  3,375 mg IntraVENous Q8H    vancomycin  1,000 mg IntraVENous Q12H    sodium chloride  250 mL IntraVENous Once    sodium chloride flush  5-40 mL IntraVENous 2 times per day    sodium chloride flush  5-40 mL IntraVENous 2 times per day    sodium chloride flush  5-40 mL IntraVENous 2 times per day    midodrine  15 mg Oral TID WC    heparin (porcine)  5,000 Units SubCUTAneous 3 times per day    [Held by provider] metoprolol tartrate  25 mg Oral BID    traMADol  100 mg Oral QAM AC    insulin lispro  0-4 Units SubCUTAneous TID WC    insulin lispro  0-4 Units SubCUTAneous Nightly    valACYclovir  500 mg Oral Daily    cholestyramine light  4 g Oral BID    diphenoxylate-atropine  1 tablet Oral Daily    sodium bicarbonate  650 mg Oral BID    melatonin  5 mg Oral Nightly     Continuous Infusions:   heparin (porcine) 5,000 Units in sodium chloride 0.9 % 20 mL infusion 500 mL/hr at 08/14/24 0832    sodium chloride      sodium chloride      prismaSATE BGK 4/2.5 500 mL/hr at 
-encephalopathic  Eyes - pupils equal and reactive,    Nose - normal and patent, nasal cannula.  Corpak in place.  Neck - supple, no significant adenopathy  Chest -diminished bilaterally to auscultation, no wheezes, rales or rhonchi, symmetric air entry  Heart - normal rate, regular rhythm, normal S1, S2   Abdomen - soft, nontender, nondistended   Rectal - deferred, not clinically indicated  Neurological -encephalopathic.  Nonfocal.  Musculoskeletal - no joint tenderness, deformity or swelling  Extremities - peripheral pulses normal, no pedal edema   Skin - normal coloration and turgor, no rashes               BMP:    Recent Labs     08/17/24  1749 08/17/24  2248 08/18/24  0641 08/19/24  0545 08/20/24  0516   *  --  133* 133* 135   K 4.9   < > 5.2*  5.2* 5.8* 6.6*   CL 98  --  99 99 98   CO2 21  --  22 18* 16*   BUN 45*  --  51* 64* 82*   CREATININE 3.61*   < > 3.98* 4.79* 5.92*   GLUCOSE 210*  --  237* 224* 247*   PHOS 4.2  --  4.5  --   --    MG 2.4  --  2.5* 2.7* 3.1*    < > = values in this interval not displayed.    .  MG:3,PHOS:3)@  Ionized Calcium: No components found for: \"IONCA\"  CBC:   Recent Labs     08/19/24  0545 08/20/24  0516   WBC 11.6* 13.8*   HGB 7.3* 8.0*   * 129*      ABG: No results for input(s): \"PH\", \"PCO2\", \"PO2\" in the last 72 hours.        Assessment and Plan:       Impression:    - Septic shock requiring vasopressors.  Continues to be on 15 mcg of Levophed.  - MRSA bacteremia.  - Metabolic encephalopathy.  Now better.  - Acute on chronic anemia.  - Hyperglycemia.  - Acute kidney injury.  Has been on CRRT.  - Lower extremity DVT.  Status post IVC filter.  -Hyperkalemia    Recommendations:    - Continue current care in the ICU for hemodynamic and neuro monitoring.  - Continue vasopressors.  Goal MAP over 65.  - Continue antibiotics   -Transfuse for hemoglobin below 7.  -Treat hyperkalemia with D50, IV insulin and calcium gluconate.  -Strict intake and output measurement.  Watch 
Labs     08/03/24  0455 08/04/24  0650   WBC 6.3 5.4   HGB 8.1* 8.6*    366     Recent Labs     08/03/24  0455 08/04/24  0650    136   K 3.6 3.2*    108*   CO2 17* 16*   BUN 24* 23   CREATININE 2.02* 2.01*   GLUCOSE 89 102*     No results for input(s): \"AST\", \"ALT\", \"BILITOT\", \"ALKPHOS\" in the last 72 hours.    Invalid input(s): \"ALB\"    Current Facility-Administered Medications   Medication Dose Route Frequency Provider Last Rate Last Admin    midodrine (PROAMATINE) tablet 5 mg  5 mg Oral TID WC Bang, Yazid R, DO   5 mg at 08/04/24 1225    acetaminophen (TYLENOL) tablet 650 mg  650 mg Oral Q6H PRN Bang, Yazid R, DO   650 mg at 08/02/24 2148    Or    acetaminophen (TYLENOL) suppository 650 mg  650 mg Rectal Q6H PRN Bang, Yazid R, DO        calcium carbonate (TUMS) chewable tablet 500 mg  500 mg Oral TID PRN Bang, Yazid R, DO        cyclobenzaprine (FLEXERIL) tablet 5 mg  5 mg Oral TID PRN Bang, Yazid R, DO   5 mg at 08/03/24 2129    dextrose 10 % infusion   IntraVENous Continuous PRN Bang, Yazid R, DO        dextrose bolus 10% 125 mL  125 mL IntraVENous PRN Bang, Yazid R, DO        Or    dextrose bolus 10% 250 mL  250 mL IntraVENous PRN Bang, Yazid R, DO        glucagon injection 1 mg  1 mg SubCUTAneous PRN Bang, Yazid R, DO        glucose chewable tablet 16 g  4 tablet Oral PRN Bang, Yazid R, DO        heparin (porcine) injection 2,000 Units  2,000 Units IntraVENous PRN Bang, Yazid R, DO        insulin lispro (HUMALOG,ADMELOG) injection vial 0-4 Units  0-4 Units SubCUTAneous TID  Bang, Yazid R, DO        insulin lispro (HUMALOG,ADMELOG) injection vial 0-4 Units  0-4 Units SubCUTAneous Nightly Bang, Yazid R, DO        ondansetron (ZOFRAN-ODT) disintegrating tablet 4 mg  4 mg Oral Q8H PRN Anna Cuenca DO        Or    ondansetron (ZOFRAN) injection 4 mg  4 mg IntraVENous Q6H PRN Bang, Anna R, DO        polyethylene glycol (GLYCOLAX) packet 17 g  17 g 
Oral BID    diphenoxylate-atropine  1 tablet Oral Daily    sodium bicarbonate  650 mg Oral BID    melatonin  5 mg Oral Nightly       Labs:   CBC:   Recent Labs     08/15/24  0533 08/16/24  0538 08/17/24  0621   WBC 14.5* 16.8* 16.8*   HGB 7.4* 7.5* 7.3*   HCT 23.4* 23.3* 23.5*   MCV 96.7* 98.3* 100.0*    230 206     BMP:   Recent Labs     08/16/24  0540 08/16/24  1857 08/17/24  0621   * 134* 133*   K 4.8  4.8 5.2* 5.1*  5.1*   CL 94* 99 98   CO2 21 21 20   PHOS 5.2* 5.1* 4.5   BUN 50* 52* 45*   CREATININE 4.44* 4.39* 3.80*     LIVER PROFILE:   Recent Labs     08/16/24  0540 08/16/24  1857 08/17/24  0621   AST 9 10 9   ALT <5 <5 <5   BILITOT 0.3 <0.2 0.3   ALKPHOS 80 81 85     PT/INR:   Recent Labs     08/16/24  0815   PROTIME 19.8*   INR 1.7     APTT:   Recent Labs     08/16/24  0815   APTT 38.2*       Radiology:    CXR: 2-view: No results found for this or any previous visit.              Portable: Results for orders placed during the hospital encounter of 08/02/24    XR CHEST PORTABLE    Narrative  EXAMINATION:  ONE XRAY VIEW OF THE CHEST    8/16/2024 11:23 am    COMPARISON:  08/15/2024    HISTORY:  ORDERING SYSTEM PROVIDED HISTORY: corpak placement  TECHNOLOGIST PROVIDED HISTORY:  Reason for exam:->corpak placement  What reading provider will be dictating this exam?->CRC    FINDINGS:  Single frontal view of the chest demonstrates a small caliber orogastric tube  consistent with a Corpak catheter which is forming a tight J loop in the  distal portion of the esophagus with the tip lying in the proximal esophagus.    The lungs remain clear.    There is a dialysis catheter in position with the left-sided IJ catheter.    Impression  Corpak catheter forming a tight J loop in the distal portion of the esophagus  with the tip lying in the proximal esophagus.      Assessment/Plan:    Septic shock-patient is now back on Levophed.  This had been able to be weaned off earlier this week, but remains in place at 
Status: He is alert and oriented to person, place, and time.   Psychiatric:         Mood and Affect: Mood normal.         Behavior: Behavior normal.       Labs:   Recent Labs     08/08/24  0638 08/09/24  0548 08/10/24  0541   WBC 6.0 7.6 12.1*   HGB 7.6* 8.6* 9.4*   HCT 22.7* 26.3* 28.8*    323 359     Recent Labs     08/08/24  0638 08/09/24  0548 08/10/24  0541   * 132* 127*   K 4.0 3.8 4.3   CL 98 95 92*   CO2 17* 21 16*   BUN 43* 32* 45*   CREATININE 4.94* 4.15* 5.24*   CALCIUM 7.9* 7.5* 8.3*     Recent Labs     08/10/24  0541   AST 9   ALT 6   BILIDIR <0.2   BILITOT <0.2   ALKPHOS 68     No results for input(s): \"INR\" in the last 72 hours.  No results for input(s): \"CKTOTAL\", \"TROPONINI\" in the last 72 hours.    Urinalysis:      Lab Results   Component Value Date/Time    NITRU Negative 07/29/2024 05:04 PM    WBCUA >100 07/29/2024 05:04 PM    BACTERIA Negative 07/29/2024 05:04 PM    RBCUA 6-10 07/29/2024 05:04 PM    BLOODU SMALL 07/29/2024 05:04 PM    SPECGRAV 1.010 06/25/2019 08:17 AM    GLUCOSEU Negative 07/29/2024 05:04 PM     Radiology:  Vascular duplex lower extremity venous left   Final Result   Extensive acute clot throughout the left lower extremity venous system.      The results were communicated to the ordering service           Assessment/Plan:    Active Hospital Problems    Diagnosis Date Noted    Dizziness [R42] 08/03/2024    Atrial fibrillation (HCC) [I48.91] 07/22/2024    SILVINA (acute kidney injury) (HCC) [N17.9] 07/17/2024    Impaired mobility and activities of daily living [Z74.09, Z78.9] 05/22/2024     Dizziness: improved.  Negative CT head. Likely due to hypotension. Continue midodrine 10mg TID.  Monitor on telemetry.      SILVINA-D: Nephrology following and primarily managing. Dialysis restarted due to persistently worsening renal function. Plan to continue dialysis as an outpatient per nephrology. Outpatient chair has been set up. Monitor I/Os closely.     LLE DVT with history of 
    BMP:   Recent Labs     08/15/24  0533 08/15/24  1738 08/16/24  0540   * 131* 130*   K 4.8 5.0* 4.8  4.8   CL 97 95 94*   CO2 23 21 21   PHOS  --  5.3* 5.2*   BUN 47* 54* 50*   CREATININE 4.15* 4.47* 4.44*     LIVER PROFILE:   Recent Labs     08/15/24  0533 08/15/24  1738 08/16/24  0540   AST 10 10 9   ALT <5 <5 <5   BILITOT <0.2 <0.2 0.3   ALKPHOS 74 79 80     PT/INR:   Recent Labs     08/16/24 0815   PROTIME 19.8*   INR 1.7     APTT:   Recent Labs     08/16/24 0815   APTT 38.2*     UA:No results for input(s): \"NITRITE\", \"COLORU\", \"PHUR\", \"LABCAST\", \"WBCUA\", \"RBCUA\", \"MUCUS\", \"TRICHOMONAS\", \"YEAST\", \"BACTERIA\", \"CLARITYU\", \"SPECGRAV\", \"LEUKOCYTESUR\", \"UROBILINOGEN\", \"BILIRUBINUR\", \"BLOODU\", \"GLUCOSEU\", \"AMORPHOUS\" in the last 72 hours.    Invalid input(s): \"KETONESU\"    Cultures:  Norovirus, GI  Films:  CXR films reviewed by me and it showed chest x-ray from July no infiltrate      Assessment:  This is a critically ill patient at risk of deterioration / death , needing close ICU monitoring and intervention due to below noted problems       Shock physiology resolved  Sepsis, secondary to norovirus enterocolitis  Acute encephalopathy secondary to above  Hyponatremia  Norovirus enterocolitis  History of COPD, no signs of exacerbation  DVT status post IVC filter  History of intracranial bleed     Recommendation  Continue Levophed target mean arterial pressure 65-70  Might need repeat cardiovert  Started CRRT, frequent problem with clotting and functional issues, can consider regular dialysis if okay with nephrology, will adjust Levophed based on the need  Procalcitonin increasing with worsening leukocytosis, will change antibiotic to cefepime and vancomycin, will obtain CT abdomen later today when off CRRT  Watch blood sugar target 1 40-1 80  Monitor electrolyte, replace as needed  Intubate if unable to protect his airway      Due to the immediate potential for life-threatening deterioration due to shock I 
   sodium chloride 0.9 % infusion             heparin (porcine) injection 5,000 Units  5,000 Units SubCUTAneous 3 times per day Ezio Milan, DO   5,000 Units at 08/09/24 0531    metoprolol tartrate (LOPRESSOR) tablet 25 mg  25 mg Oral BID Temi Cooley MD   25 mg at 08/09/24 0749    calcium carbonate (TUMS) chewable tablet 1,000 mg  1,000 mg Oral TID PRN Temi Cooley MD   1,000 mg at 08/09/24 0954    midodrine (PROAMATINE) tablet 10 mg  10 mg Oral TID  Temi Cooley MD   10 mg at 08/09/24 0749    traMADol (ULTRAM) tablet 100 mg  100 mg Oral QAM AC Temi Cooley MD   100 mg at 08/09/24 0601    traMADol (ULTRAM) tablet 100 mg  100 mg Oral Q8H PRN Temi Cooley MD   100 mg at 08/07/24 2034    cyclobenzaprine (FLEXERIL) tablet 5 mg  5 mg Oral TID PRN Bang, Yazid R, DO   5 mg at 08/03/24 2129    dextrose 10 % infusion   IntraVENous Continuous PRN Bang, Yazid R, DO        dextrose bolus 10% 125 mL  125 mL IntraVENous PRN Bang, Yazid R, DO        Or    dextrose bolus 10% 250 mL  250 mL IntraVENous PRN Bang, Yazid R, DO        glucagon injection 1 mg  1 mg SubCUTAneous PRN Bang, Yazid R, DO        glucose chewable tablet 16 g  4 tablet Oral PRN Bang, Yazid R, DO        heparin (porcine) injection 2,000 Units  2,000 Units IntraVENous PRN Bang, Yazid R, DO   2,000 Units at 08/06/24 1213    insulin lispro (HUMALOG,ADMELOG) injection vial 0-4 Units  0-4 Units SubCUTAneous TID  Bang, Yazid R, DO   1 Units at 08/08/24 1754    insulin lispro (HUMALOG,ADMELOG) injection vial 0-4 Units  0-4 Units SubCUTAneous Nightly Bang, Yazid R, DO        ondansetron (ZOFRAN-ODT) disintegrating tablet 4 mg  4 mg Oral Q8H PRN Bang, Yazid R, DO        Or    ondansetron (ZOFRAN) injection 4 mg  4 mg IntraVENous Q6H PRN Anna Cuenca R,         polyethylene glycol (GLYCOLAX) packet 17 g  17 g Oral Daily PRN Anna Cuenca R,         valACYclovir (VALTREX) tablet 500 mg  500 mg Oral Daily 
  Orientation  Overall Orientation Status: Impaired  Orientation Level: Oriented to person;Oriented to situation;Oriented to place  Cognition  Following Commands: Follows one step commands consistently  Initiation: Requires cues for all  Sequencing: Requires cues for all    Bed mobility  Rolling to Left: Maximum assistance  Rolling to Right: Maximum assistance  Supine to Sit: Maximum assistance;2 Person assistance  Sit to Supine: Dependent/Total;2 Person assistance  Bed Mobility Comments: Bed flat with use of hand rails; hand over hand assist for initiation, sequencing and task completion. Pt attempts to participate once moving however it's minimal and requires Max/Dep A +2    Transfers  Comment: NT - Not safe at this time. Pt keeps head down and eyes closes. Frequent cuing to corect with poor carryover.                        PT Exercises  Dynamic Sitting Balance Exercises: A/P and lateral trunk rocking with Mod <>CGA to maintain balance. cuing for posture and balance throughout.          Activity Tolerance  Activity Tolerance: Patient limited by fatigue;Patient limited by endurance          ASSESSMENT   Assessment: Pt sat EOB with varying levels of support. He required cuing for balance safety and to keep head and eyes open. Pt sat EOB and performed balance activity.     Discharge Recommendations:  Continue to assess pending progress         Goals  Long Term Goals  Long Term Goal 1: Pt will demonstrate bed mobility min A  Long Term Goal 2: Pt will demonstrate transfers mod A with safest AD  Long Term Goal 3: Pt will demonstrate amb >/= 50ft min A with safest AD  Long Term Goal 4: Pt will demonstrate TUG </= 19 sec for decreased risk for falls    PLAN    General Plan: 1 time a day 3-6 times a week  Safety Devices  Type of Devices: All fall risk precautions in place, Bed alarm in place, Call light within reach, Left in bed     AMPAC (6 CLICK) BASIC MOBILITY  AM-PAC Inpatient Mobility Raw Score : 7      Therapy 
  Recent Labs     08/17/24  0621 08/17/24  2248 08/18/24  0640   WBC 16.8*  --  13.3*   HGB 7.3* 7.5* 6.8*     --  211     CMP:    Recent Labs     08/17/24  0621 08/17/24  1749 08/17/24  2248 08/18/24  0641   * 131*  --  133*   K 5.1*  5.1* 4.9  --  5.2*  5.2*   CL 98 98  --  99   CO2 20 21  --  22   BUN 45* 45*  --  51*   CREATININE 3.80* 3.61* 3.5* 3.98*   GLUCOSE 228* 210*  --  237*   CALCIUM 7.5* 7.5*  --  7.3*   LABGLOM 15.3* 16.3* 17* 14.5*     Troponin: No results for input(s): \"TROPONINI\" in the last 72 hours.  BNP: No results for input(s): \"BNP\" in the last 72 hours.  INR:   Recent Labs     08/16/24  0815   INR 1.7     Lipids: No results for input(s): \"CHOL\", \"LDLDIRECT\", \"TRIG\", \"HDL\", \"AMYLASE\", \"LIPASE\" in the last 72 hours.  Liver:   Recent Labs     08/18/24  0641   AST 10   ALT <5   ALKPHOS 76   BILITOT <0.2       Iron:  No results for input(s): \"FERRITIN\" in the last 72 hours.    Invalid input(s): \"IRONS\", \"LABIRONS\"  Urinalysis: No results for input(s): \"UA\" in the last 72 hours.    Objective:  Vitals: BP (!) 157/125   Pulse (!) 102   Temp 97.4 °F (36.3 °C) (Oral)   Resp 21   Ht 1.854 m (6' 1\")   Wt 115.7 kg (255 lb 1.2 oz)   SpO2 (!) 89%   BMI 33.66 kg/m²    Wt Readings from Last 3 Encounters:   08/18/24 115.7 kg (255 lb 1.2 oz)   08/14/24 113.4 kg (250 lb)   08/01/24 100.3 kg (221 lb 3.2 oz)      24HR INTAKE/OUTPUT:    Intake/Output Summary (Last 24 hours) at 8/18/2024 1306  Last data filed at 8/18/2024 1220  Gross per 24 hour   Intake 1375.14 ml   Output 558 ml   Net 817.14 ml       General: lethargic, in no apparent distress, tired   HEENT: normocephalic, atraumatic, anicteric  Lungs: non-labored respirations, clear to auscultation bilaterally  Heart: irregular rhythm, tachycardic   Abdomen: soft, non-tender, non-distended  Ext: no cyanosis, ++++ BLE peripheral edema  Neuro: lethargic, follows commands       Electronically signed by Hugh Robert MD              
54*   CREATININE 5.78* 6.29* 5.0* 5.05*   GLUCOSE 128* 112*  --  187*   CALCIUM 8.2* 8.0*  --  7.6*   LABGLOM 9.3* 8.4* 11* 10.9*     Troponin: No results for input(s): \"TROPONINI\" in the last 72 hours.  BNP: No results for input(s): \"BNP\" in the last 72 hours.  INR: No results for input(s): \"INR\" in the last 72 hours.  Lipids: No results for input(s): \"CHOL\", \"LDLDIRECT\", \"TRIG\", \"HDL\", \"AMYLASE\", \"LIPASE\" in the last 72 hours.  Liver:   Recent Labs     08/13/24  0523   AST 13   ALT 6   ALKPHOS 74   BILITOT <0.2       Iron:  No results for input(s): \"FERRITIN\" in the last 72 hours.    Invalid input(s): \"IRONS\", \"LABIRONS\"  Urinalysis: No results for input(s): \"UA\" in the last 72 hours.    Objective:  Vitals: BP (!) 109/54   Pulse (!) 121   Temp 98.5 °F (36.9 °C) (Oral)   Resp 21   Ht 1.854 m (6' 1\")   Wt 113.8 kg (250 lb 14.1 oz)   SpO2 100%   BMI 33.10 kg/m²    Wt Readings from Last 3 Encounters:   08/14/24 113.8 kg (250 lb 14.1 oz)   08/01/24 100.3 kg (221 lb 3.2 oz)   07/24/24 124.8 kg (275 lb 2.2 oz)      24HR INTAKE/OUTPUT:    Intake/Output Summary (Last 24 hours) at 8/14/2024 1023  Last data filed at 8/14/2024 1000  Gross per 24 hour   Intake 1244.56 ml   Output 303 ml   Net 941.56 ml         General: lethargic, in no apparent distress, tired   HEENT: normocephalic, atraumatic, anicteric  Lungs: non-labored respirations, clear to auscultation bilaterally  Heart: regular rate and rhythm, no murmurs or rubs  Abdomen: soft, non-tender, non-distended  Ext: no cyanosis, ++++ BLE peripheral edema  Neuro: alert, follows commands       Electronically signed by Hugh Robert MD              
Questionnaire     Feeling of Stress : Only a little   Social Connections: Moderately Integrated (5/17/2024)    Received from [facility]    Social Connection and Isolation Panel [NHANES]     Frequency of Communication with Friends and Family: More than three times a week     Frequency of Social Gatherings with Friends and Family: More than three times a week     Attends [employer] Services: More than 4 times per year     Active Member of Clubs or Organizations: No     Attends Club or Organization Meetings: Never     Marital Status:    Intimate Partner Violence: Not At Risk (5/17/2024)    Received from [facility]    Humiliation, Afraid, Rape, and Kick questionnaire     Fear of Current or Ex-Partner: No     Emotionally Abused: No     Physically Abused: No     Sexually Abused: No   Housing Stability: Low Risk  (8/10/2024)    Housing Stability Vital Sign     Unable to Pay for Housing in the Last Year: No     Number of Times Moved in the Last Year: 0     Homeless in the Last Year: No     Family History   Problem Relation Age of Onset    No Known Problems Mother     Heart Disease Father     No Known Problems Sister     Other Brother         enlarged prostate, cataracts    Diabetes Brother         borderline     Current Facility-Administered Medications   Medication Dose Route Frequency Provider Last Rate Last Admin    norepinephrine (LEVOPHED) 16 mg in sodium chloride 0.9 % 250 mL infusion  1-100 mcg/min IntraVENous Continuous Contreras Doll MD 4.7 mL/hr at 08/15/24 1238 5 mcg/min at 08/15/24 1238    heparin (porcine) injection 1,100-1,900 Units  1,100-1,900 Units IntraCATHeter PRN Vahid Hu MD        And    heparin (porcine) injection 1,100-1,900 Units  1,100-1,900 Units IntraCATHeter PRN Vahid Hu MD        heparin (porcine) 5,000 Units in sodium chloride 0.9 % 20 mL infusion   Dialysis Continuous Vahid Hu  mL/hr at 08/14/24 0832 New Bag at 
hours.    Objective:  Vitals: BP (!) 89/52   Pulse (!) 43   Temp 97.7 °F (36.5 °C) (Oral)   Resp 18   Ht 1.854 m (6' 1\")   Wt 95.8 kg (211 lb 4.8 oz)   SpO2 100%   BMI 27.88 kg/m²    Wt Readings from Last 3 Encounters:   08/03/24 95.8 kg (211 lb 4.8 oz)   08/01/24 100.3 kg (221 lb 3.2 oz)   07/24/24 124.8 kg (275 lb 2.2 oz)      24HR INTAKE/OUTPUT:    Intake/Output Summary (Last 24 hours) at 8/5/2024 1158  Last data filed at 8/5/2024 0849  Gross per 24 hour   Intake 1380 ml   Output 185 ml   Net 1195 ml         General: alert, in no apparent distress  HEENT: normocephalic, atraumatic, anicteric  Lungs: non-labored respirations, clear to auscultation bilaterally  Heart: regular rate and rhythm, no murmurs or rubs  Abdomen: soft, non-tender, non-distended  Ext: no cyanosis, trace peripheral edema  Neuro: alert, follows commands       Electronically signed by Hugh Robert MD              
input(s): \"PROTIME\", \"INR\" in the last 72 hours.  APTT: No results for input(s): \"APTT\" in the last 72 hours.  UA:No results for input(s): \"NITRITE\", \"COLORU\", \"PHUR\", \"LABCAST\", \"WBCUA\", \"RBCUA\", \"MUCUS\", \"TRICHOMONAS\", \"YEAST\", \"BACTERIA\", \"CLARITYU\", \"SPECGRAV\", \"LEUKOCYTESUR\", \"UROBILINOGEN\", \"BILIRUBINUR\", \"BLOODU\", \"GLUCOSEU\", \"AMORPHOUS\" in the last 72 hours.    Invalid input(s): \"KETONESU\"    Cultures:  Norovirus, GI  Films:  CXR films reviewed by me and it showed chest x-ray from July no infiltrate      Assessment:  This is a critically ill patient at risk of deterioration / death , needing close ICU monitoring and intervention due to below noted problems       Shock, etiology is not clear, rule out septic, cardiogenic, or obstructive  Acute encephalopathy secondary to above  Hyponatremia  Norovirus enterocolitis  History of COPD, no signs of exacerbation  DVT status post IVC filter  History of intracranial bleed     Recommendation  CRRT  Levophed to maintain mean arterial pressure 65-70  Limited echo evaluate RV  Empiric Zosyn  Procalcitonin  Watch blood sugar target 1 40-1 80  Monitor electrolyte, replace as needed  Intubate if unable to protect his airway  ABG  Hold metoprolol  DC Cardizem  A-fib rate is not controlled, patient already on amiodarone  need to consider JEANNIE and cardioversion  CODE STATUS changed to full code patient is agreeable with intubation if needed for procedure  Central line if needed           Due to the immediate potential for life-threatening deterioration due to shock I spent 35  minutes providing critical care.  This time is excluding time spent performing procedures.          Electronically signed by Contreras Doll MD,  [facility] ,on 8/14/2024 at 10:13 AM    
little   Social Connections: Moderately Integrated (5/17/2024)    Received from [facility]    Social Connection and Isolation Panel [NHANES]     Frequency of Communication with Friends and Family: More than three times a week     Frequency of Social Gatherings with Friends and Family: More than three times a week     Attends Sabian[employer] Services: More than 4 times per year     Active Member of Clubs or Organizations: No     Attends Club or Organization Meetings: Never     Marital Status:    Intimate Partner Violence: Not At Risk (5/17/2024)    Received from [facility]    Humiliation, Afraid, Rape, and Kick questionnaire     Fear of Current or Ex-Partner: No     Emotionally Abused: No     Physically Abused: No     Sexually Abused: No   Housing Stability: Low Risk  (8/10/2024)    Housing Stability Vital Sign     Unable to Pay for Housing in the Last Year: No     Number of Times Moved in the Last Year: 0     Homeless in the Last Year: No     Family History   Problem Relation Age of Onset    No Known Problems Mother     Heart Disease Father     No Known Problems Sister     Other Brother         enlarged prostate, cataracts    Diabetes Brother         borderline     Current Facility-Administered Medications   Medication Dose Route Frequency Provider Last Rate Last Admin    sodium chloride 0.9 % bolus 250 mL  250 mL IntraVENous Once Chong Obando MD        midodrine (PROAMATINE) tablet 15 mg  15 mg Oral TID  Temi Cooley MD   15 mg at 08/11/24 1658    [Held by provider] dilTIAZem 125 mg in sodium chloride 0.9 % 125 mL infusion  2.5-15 mg/hr IntraVENous Continuous Sonam Hazel DO   Paused at 08/10/24 0458    heparin (porcine) injection 5,000 Units  5,000 Units SubCUTAneous 3 times per day Ezio Milan DO   5,000 Units at 08/12/24 0602    metoprolol tartrate (LOPRESSOR) tablet 25 mg  25 mg Oral BID Temi Cooley MD   25 mg at 08/12/24 0602    calcium carbonate (TUMS) chewable tablet 1,000 mg  1,000 
redness, tenderness in the calves  Skin: no gross lesions, rashes  Neurologically intact throughout.  Nonfocal.    Data:     Labs:  Recent Labs     08/12/24  0554 08/13/24  0523   WBC 11.6* 12.3*   HGB 9.5* 9.3*    375     Recent Labs     08/12/24  0554 08/13/24  0523   * 128*   K 5.2* 5.0*   CL 92* 91*   CO2 19* 19*   BUN 51* 63*   CREATININE 5.78* 6.29*   GLUCOSE 128* 112*     No results for input(s): \"AST\", \"ALT\", \"BILITOT\", \"ALKPHOS\" in the last 72 hours.    Invalid input(s): \"ALB\"      Current Facility-Administered Medications   Medication Dose Route Frequency Provider Last Rate Last Admin    sodium chloride 0.9 % infusion             sodium chloride 0.9 % infusion             sodium chloride 0.9 % bolus 250 mL  250 mL IntraVENous Once Chong Obando MD        midodrine (PROAMATINE) tablet 15 mg  15 mg Oral TID  Temi Cooley MD   15 mg at 08/13/24 1220    [Held by provider] dilTIAZem 125 mg in sodium chloride 0.9 % 125 mL infusion  2.5-15 mg/hr IntraVENous Continuous Sonam Hazel DO   Paused at 08/10/24 0458    heparin (porcine) injection 5,000 Units  5,000 Units SubCUTAneous 3 times per day Ezio Milan DO   5,000 Units at 08/13/24 0548    metoprolol tartrate (LOPRESSOR) tablet 25 mg  25 mg Oral BID Anna Cuenca DO   25 mg at 08/13/24 0548    calcium carbonate (TUMS) chewable tablet 1,000 mg  1,000 mg Oral TID PRN Temi Cooley MD   1,000 mg at 08/09/24 0954    traMADol (ULTRAM) tablet 100 mg  100 mg Oral QAM AC Temi Cooley MD   100 mg at 08/13/24 0558    traMADol (ULTRAM) tablet 100 mg  100 mg Oral Q8H PRN Temi Cooley MD   100 mg at 08/12/24 1430    cyclobenzaprine (FLEXERIL) tablet 5 mg  5 mg Oral TID PRN Bang, Yazid R, DO   5 mg at 08/03/24 2129    dextrose 10 % infusion   IntraVENous Continuous PRN Bang, Yazid R, DO        dextrose bolus 10% 125 mL  125 mL IntraVENous PRN Bang, Yazid R, DO        Or    dextrose bolus 10% 250 mL  250 mL IntraVENous 
ulcer: [] PPI Agent [] H2RA [] Sucralfate [] Other:   VTE: [] Enoxaparin  [] SC Heparin  [] SCD      Limited      Excluding procedures, the total critical care time caring for this patient with life threatening, unstable organ failure, including direct patient contact, review of medical record, management of life support systems, review of data including imaging and labs, discussions with other team members, patient's family and physicians at least 31 minutes so far today.        Electronically signed by Le Fregoso MD on 8/19/2024 at 9:40 AM           
PRN Purvi Rios DO        lansoprazole (PREVACID SOLUTAB) disintegrating tablet 30 mg  30 mg Oral QAM AC Purvi Rios DO   30 mg at 08/19/24 0856    heparin (porcine) injection 2,200-2,300 Units  2,200-2,300 Units IntraVENous PRN Vahid Hu MD        heparin (porcine) injection 2,200-2,300 Units  2,200-2,300 Units IntraCATHeter PRN Hugh Robert MD   2,300 Units at 08/17/24 1912    And    heparin (porcine) injection 2,200-2,300 Units  2,200-2,300 Units IntraCATHeter PRN Hugh Robert MD   2,200 Units at 08/17/24 1913    amiodarone (CORDARONE) tablet 200 mg  200 mg Oral q8h hCong Obando MD   200 mg at 08/19/24 0856    cefepime (MAXIPIME) 1,000 mg in sodium chloride 0.9 % 50 mL IVPB (mini-bag)  1,000 mg IntraVENous Daily Contreras Doll MD 12.5 mL/hr at 08/19/24 0917 1,000 mg at 08/19/24 0917    norepinephrine (LEVOPHED) 16 mg in sodium chloride 0.9 % 250 mL infusion  1-100 mcg/min IntraVENous Continuous Contreras Doll MD 14.1 mL/hr at 08/19/24 0745 15 mcg/min at 08/19/24 0745    lactobacillus (CULTURELLE) capsule 1 capsule  1 capsule Oral Daily with breakfast Jyoti Ruiz, APRN - CNP   1 capsule at 08/19/24 0856    vancomycin (VANCOCIN) intermittent dosing (placeholder)   Other RX Placeholder Vahid Hu MD        sodium chloride 0.9 % bolus 250 mL  250 mL IntraVENous Once Vahid Hu MD        midodrine (PROAMATINE) tablet 15 mg  15 mg Oral TID WC Vahid Hu MD   15 mg at 08/19/24 0856    [Held by provider] metoprolol tartrate (LOPRESSOR) tablet 25 mg  25 mg Oral BID Vahid Hu MD   25 mg at 08/13/24 0548    calcium carbonate (TUMS) chewable tablet 1,000 mg  1,000 mg Oral TID PRN Vahid Hu MD   1,000 mg at 08/09/24 0954    cyclobenzaprine (FLEXERIL) tablet 5 mg  5 mg Oral TID PRN Vahid Hu MD   5 mg at 08/03/24 2129    dextrose 10 % infusion   IntraVENous Continuous PRN Vahid Hu MD        dextrose 
Vahid Hu  mL/hr at 08/14/24 1843 Rate Verify at 08/14/24 1843    traMADol (ULTRAM) tablet 100 mg  100 mg Oral Daily PRN Vahid Hu MD        prismaSATE BGK 4/2.5 dialysis solution   Dialysis Continuous Vahid Hu  mL/hr at 08/15/24 0008 New Bag at 08/15/24 0008    prismaSATE BGK 4/2.5 dialysis solution   Dialysis Continuous Vahid Hu  mL/hr at 08/15/24 0005 New Bag at 08/15/24 0005    heparin (porcine) injection 1,100-1,900 Units  1,100-1,900 Units IntraCATHeter PRN Vahid Hu MD        And    heparin (porcine) injection 1,100-1,900 Units  1,100-1,900 Units IntraCATHeter PRN Vahid Hu MD        amiodarone (CORDARONE) 450 mg in dextrose 5 % 250 mL infusion  0.5 mg/min IntraVENous Continuous Vahid Hu MD 16.7 mL/hr at 08/15/24 0538 0.5 mg/min at 08/15/24 0538    midodrine (PROAMATINE) tablet 15 mg  15 mg Oral TID WC Vahid Hu MD   15 mg at 08/15/24 0759    heparin (porcine) injection 5,000 Units  5,000 Units SubCUTAneous 3 times per day Vahid Hu MD   5,000 Units at 08/14/24 6985    [Held by provider] metoprolol tartrate (LOPRESSOR) tablet 25 mg  25 mg Oral BID Vahid Hu MD   25 mg at 08/13/24 0548    calcium carbonate (TUMS) chewable tablet 1,000 mg  1,000 mg Oral TID PRN Vahid Hu MD   1,000 mg at 08/09/24 0954    traMADol (ULTRAM) tablet 100 mg  100 mg Oral QAM AC Vahid Hu MD   100 mg at 08/15/24 0652    cyclobenzaprine (FLEXERIL) tablet 5 mg  5 mg Oral TID PRN Vahid Hu MD   5 mg at 08/03/24 2129    dextrose 10 % infusion   IntraVENous Continuous PRVahid Alexander MD        dextrose bolus 10% 125 mL  125 mL IntraVENous Vahid Turner MD        Or    dextrose bolus 10% 250 mL  250 mL IntraVENous Vahid Turner MD        glucagon injection 1 mg  1 mg SubCUTAneous Vahid Turner MD        glucose chewable 
acetaminophen (TYLENOL) tablet 650 mg  650 mg Oral Q4H PRN Bang, Yazid R, DO        metoprolol tartrate (LOPRESSOR) tablet 12.5 mg  12.5 mg Oral BID Bang, Yazid R, DO   12.5 mg at 08/04/24 2136    cholestyramine light packet 4 g  4 g Oral BID Bang, Yazid R, DO   4 g at 08/05/24 2108    diphenoxylate-atropine (LOMOTIL) 2.5-0.025 MG per tablet 1 tablet  1 tablet Oral BID PRN Bang, Yazid R, DO   1 tablet at 08/03/24 2354    psyllium husk-aspartame (METAMUCIL FIBER) packet 1 packet  1 packet Oral Daily PRN Bang, Yazid R, DO        diphenoxylate-atropine (LOMOTIL) 2.5-0.025 MG per tablet 1 tablet  1 tablet Oral Daily Bang, Yazid R, DO   1 tablet at 08/05/24 2108    sodium bicarbonate tablet 650 mg  650 mg Oral BID Bang, Yazid R, DO   650 mg at 08/05/24 2108    melatonin disintegrating tablet 5 mg  5 mg Oral Nightly Bang, Yazid R, DO   5 mg at 08/05/24 2108     Celecoxib and Naproxen  reviewed      Review of Systems   Constitutional:  Negative for fever.   HENT:  Negative for congestion.    Respiratory:  Negative for apnea.    Genitourinary:  Negative for hematuria.         Objective:   Physical Exam  HENT:      Mouth/Throat:      Mouth: Mucous membranes are moist.   Pulmonary:      Effort: Pulmonary effort is normal.   Skin:     General: Skin is warm.   Neurological:      Mental Status: He is alert and oriented to person, place, and time.          Assessment:      78 year old male who's ca catheter is draining a small amount of yellow urine. He voices no other urological complaints.         Plan:      Maintain ca catheter  Continue flomax        Wero Dhillon PA-C  
by Hugh Robert MD              
tablet 500 mg  500 mg Oral Daily Bang, Yazid R, DO   500 mg at 08/06/24 1730    acetaminophen (TYLENOL) tablet 650 mg  650 mg Oral Q4H PRN Bang, Yazid R, DO        metoprolol tartrate (LOPRESSOR) tablet 12.5 mg  12.5 mg Oral BID Bang, Yazid R, DO   12.5 mg at 08/07/24 0944    cholestyramine light packet 4 g  4 g Oral BID Bang, Yazid R, DO   4 g at 08/07/24 0944    diphenoxylate-atropine (LOMOTIL) 2.5-0.025 MG per tablet 1 tablet  1 tablet Oral BID PRN Bang, Yazid R, DO   1 tablet at 08/03/24 2354    psyllium husk-aspartame (METAMUCIL FIBER) packet 1 packet  1 packet Oral Daily PRN Bang, Yazid R, DO        diphenoxylate-atropine (LOMOTIL) 2.5-0.025 MG per tablet 1 tablet  1 tablet Oral Daily Bang, Yazid R, DO   1 tablet at 08/06/24 2204    sodium bicarbonate tablet 650 mg  650 mg Oral BID Bang, Yazid R, DO   650 mg at 08/07/24 0944    melatonin disintegrating tablet 5 mg  5 mg Oral Nightly Bang, Yazid R, DO   5 mg at 08/06/24 2202     Celecoxib and Naproxen  reviewed      Review of Systems   Constitutional:  Negative for fever.   HENT:  Negative for congestion.    Respiratory:  Negative for apnea.    Genitourinary:  Negative for hematuria.   Neurological:  Negative for seizures.         Objective:   Physical Exam  HENT:      Mouth/Throat:      Mouth: Mucous membranes are moist.   Pulmonary:      Effort: Pulmonary effort is normal.   Neurological:      Mental Status: He is alert and oriented to person, place, and time.          Assessment:      78 year old male who's ca catheter is draining a small amount of peña urine. He voices no other urological complaints.          Plan:      Maintain ca catheter  Continue flomax        Wero Dhillon PA-C  
107 mEq/L    CO2 21 20 - 31 mEq/L    Anion Gap 15 9 - 15 mEq/L    Glucose 197 (H) 70 - 99 mg/dL    BUN 50 (H) 8 - 23 mg/dL    Creatinine 4.44 (H) 0.70 - 1.20 mg/dL    Est, Glom Filt Rate 12.7 (L) >60    Calcium 7.7 (L) 8.5 - 9.9 mg/dL    Total Protein 4.8 (L) 6.3 - 8.0 g/dL    Albumin 2.4 (L) 3.5 - 4.6 g/dL    Total Bilirubin 0.3 0.2 - 0.7 mg/dL    Alkaline Phosphatase 80 35 - 104 U/L    ALT <5 0 - 41 U/L    AST 9 0 - 40 U/L    Globulin 2.4 2.3 - 3.5 g/dL   Magnesium    Collection Time: 08/16/24  5:40 AM   Result Value Ref Range    Magnesium 2.4 1.7 - 2.4 mg/dL   Phosphorus    Collection Time: 08/16/24  5:40 AM   Result Value Ref Range    Phosphorus 5.2 (H) 2.3 - 4.8 mg/dL   Procalcitonin    Collection Time: 08/16/24  5:40 AM   Result Value Ref Range    Procalcitonin 1.66 (H) 0.00 - 0.15 ng/mL   Vancomycin Level, Random    Collection Time: 08/16/24  5:40 AM   Result Value Ref Range    Vancomycin Rm 23.9 10.0 - 40.0 ug/mL     Previous extensive, complex labs, notes and diagnostics reviewed and analyzed.     ALLERGIES:    Allergies as of 08/02/2024 - Reviewed 08/02/2024   Allergen Reaction Noted    Celecoxib  11/14/2012    Naproxen  11/14/2012      (please also verify by checking MAR)     Complex Physical Medicine & Rehab Issues Assess & Plan:   Severe abnormality of gait and mobility and impaired self-care and ADL's secondary to cardiopulmonary debility.  Updated functional and medical status reassessed regarding patient’s ability to participate in therapies and patient found to be able to participate in:       skilled rehabilitation level of care.  It is my opinion that they will NOT be able to tolerate and benefit from 3 hours of therapy a day.  I reviewed the various options re: levels of care with the patient and family.         Will continue to follow to attempt to get patient to the most efficient but most effective level of care will be in their best interest.  Continue to focus on energy conservation heart 
Alkaline Phosphatase 85 35 - 104 U/L    ALT <5 0 - 41 U/L    AST 9 0 - 40 U/L    Globulin 2.2 (L) 2.3 - 3.5 g/dL   Magnesium    Collection Time: 08/17/24  6:21 AM   Result Value Ref Range    Magnesium 2.4 1.7 - 2.4 mg/dL   Phosphorus    Collection Time: 08/17/24  6:21 AM   Result Value Ref Range    Phosphorus 4.5 2.3 - 4.8 mg/dL   CBC with Auto Differential    Collection Time: 08/17/24  6:21 AM   Result Value Ref Range    WBC 16.8 (H) 4.8 - 10.8 K/uL    RBC 2.35 (L) 4.70 - 6.10 M/uL    Hemoglobin 7.3 (L) 14.0 - 18.0 g/dL    Hematocrit 23.5 (L) 42.0 - 52.0 %    .0 (H) 79.0 - 92.2 fL    MCH 31.1 27.0 - 31.3 pg    MCHC 31.1 (L) 33.0 - 37.0 %    RDW 15.1 (H) 11.5 - 14.5 %    Platelets 206 130 - 400 K/uL    PLATELET SLIDE REVIEW Adequate     SLIDE REVIEW see below     Neutrophils % 66.0 %    Lymphocytes % 8.0 %    Monocytes % 13.7 %    Eosinophils % 0.3 %    Basophils % 0.2 %    Neutrophils Absolute 13.3 (H) 1.4 - 6.5 K/uL    Lymphocytes Absolute 1.3 1.0 - 4.8 K/uL    Monocytes Absolute 2.4 (H) 0.2 - 0.8 K/uL    Eosinophils Absolute 0.0 0.0 - 0.7 K/uL    Basophils Absolute 0.0 0.0 - 0.2 K/uL    Bands Relative 3 %    Myelocyte Percent 10 %    nRBC 7 /100 WBC    Dohle Bodies 1+     Anisocytosis 1+     Macrocytes 1+     Polychromasia 1+     Poikilocytes 1+     Ovalocytes 1+    POCT Glucose    Collection Time: 08/17/24  6:57 AM   Result Value Ref Range    POC Glucose 212 (H) 70 - 99 mg/dl    Performed on ACCU-CHEK    POCT Glucose    Collection Time: 08/17/24  7:42 AM   Result Value Ref Range    POC Glucose 226 (H) 70 - 99 mg/dl    Performed on ACCU-CHEK      Previous extensive, complex labs, notes and diagnostics reviewed and analyzed.     ALLERGIES:    Allergies as of 08/02/2024 - Reviewed 08/02/2024   Allergen Reaction Noted    Celecoxib  11/14/2012    Naproxen  11/14/2012      (please also verify by checking MAR)     Complex Physical Medicine & Rehab Issues Assess & Plan:   Severe abnormality of gait and mobility 
Poikilocytes 2+     Celeste Cells 1+      Previous extensive, complex labs, notes and diagnostics reviewed and analyzed.     ALLERGIES:    Allergies as of 08/02/2024 - Reviewed 08/02/2024   Allergen Reaction Noted    Celecoxib  11/14/2012    Naproxen  11/14/2012      (please also verify by checking MAR)     Complex Physical Medicine & Rehab Issues Assess & Plan:   Severe abnormality of gait and mobility and impaired self-care and ADL's secondary to cardiopulmonary debility.  Updated functional and medical status reassessed regarding patient’s ability to participate in therapies and patient found to be able to participate in:       skilled rehabilitation level of care.  It is my opinion that they will NOT be able to tolerate and benefit from 3 hours of therapy a day.  I reviewed the various options re: levels of care with the patient and family.             Will continue to follow to attempt to get patient to the most efficient but most effective level of care will be in their best interest.  Continue to focus on energy conservation heart rate and blood pressure monitoring before during and after therapy endurance and consistency of function.      Bowel constipation and Bladder dysfunction-retention, neurogenic bladder:  frequent toileting, ambulate to bathroom with assistance, check post void residuals.  Check for C.difficile x1 if >2 loose stools in 24 hours, continue bowel & bladder program.  Monitor for UTI symptoms including lethargy and confusion-Note he is no longer on the Flomax due to his low blood pressure.  Patient however has a Contreras catheter in place and it is likely no longer needed at this time.    Moderate to severe back pain and generalized OA pain: reassess pain every shift and prior to and after each therapy session, give prn Tylenol   and consider scheduled Tylenol, modalities prn in therapy, consider Lidoderm, K-pad prn.     Skin healing breakdown   risk:  continue pressure relief program.  Daily 
abnormality of gait and mobility and impaired self-care and ADL's secondary to cardiopulmonary debility.  Updated functional and medical status reassessed regarding patient’s ability to participate in therapies and patient found to be able to participate in:       skilled rehabilitation level of care.  It is my opinion that they will NOT be able to tolerate and benefit from 3 hours of therapy a day.  I reviewed the various options re: levels of care with the patient and family.             Will continue to follow to attempt to get patient to the most efficient but most effective level of care will be in their best interest.  Continue to focus on energy conservation heart rate and blood pressure monitoring before during and after therapy endurance and consistency of function.      Bowel constipation and Bladder dysfunction-retention, neurogenic bladder:  frequent toileting, ambulate to bathroom with assistance, check post void residuals.  Check for C.difficile x1 if >2 loose stools in 24 hours, continue bowel & bladder program.  Monitor for UTI symptoms including lethargy and confusion-Note he is no longer on the Flomax due to his low blood pressure.  Patient however has a Contreras catheter in place and it is likely no longer needed at this time.    Moderate to severe back pain and generalized OA pain: reassess pain every shift and prior to and after each therapy session, give prn Tylenol   and consider scheduled Tylenol, modalities prn in therapy, consider Lidoderm, K-pad prn.     Skin healing breakdown   risk:  continue pressure relief program.  Daily skin exams and reports from nursing.    Severe fatigue due to immobility and nutritional deficits: continue to monitor closely for dehydration   Add vitamin B12 vitamin D and CoQ10 titrate dosing and add protein supplementation with low carb content.    Complex discharge planning:   Discussed with care team-last 24 hour events noted.  I will continue to follow along and 
and diagnostics reviewed and analyzed.     ALLERGIES:    Allergies as of 08/02/2024 - Reviewed 08/02/2024   Allergen Reaction Noted    Celecoxib  11/14/2012    Naproxen  11/14/2012      (please also verify by checking MAR)     Complex Physical Medicine & Rehab Issues Assess & Plan:   Severe abnormality of gait and mobility and impaired self-care and ADL's secondary to cardiopulmonary debility.  Updated functional and medical status reassessed regarding patient’s ability to participate in therapies and patient found to be able to participate in:   Palliative care consider hospice.        Moderate to severe back pain and generalized OA pain: reassess pain every shift and prior to and after each therapy session, give prn Tylenol   and consider scheduled Tylenol, modalities prn in therapy, consider Lidoderm, K-pad prn.     Skin healing breakdown risk:  continue pressure relief program.  Daily skin exams and reports from nursing.  Side-to-side.  Continue low-air-loss mattress plexus type- staph in blood--I ordered Hibiclens body wash to prevent infection    Severe fatigue due to immobility and nutritional deficits: continue to monitor closely for dehydration     vitamin B12 vitamin D and CoQ10 titrate dosing and add protein supplementation with low carb content.  NG feeds        Complex Active General Medical Issues that complicate care:     1. Principal Problem:    Dizziness  Active Problems:    Impaired mobility and activities of daily living    SILVINA (acute kidney injury) (HCC)    Persistent atrial fibrillation with RVR (HCC)    Shock (HCC)    Palliative care encounter    Goals of care, counseling/discussion    Advanced care planning/counseling discussion  Resolved Problems:    * No resolved hospital problems. *         I agree with palliative care and support hospice services as patient is not having dialysis he has an elevated temp has sepsis and is on Vanco for MRSA bacteremia      Estephania Mao D.O., 
Patient however has a Contreras catheter in place and it is likely no longer needed at this time.    Moderate to severe back pain and generalized OA pain: reassess pain every shift and prior to and after each therapy session, give prn Tylenol   and consider scheduled Tylenol, modalities prn in therapy, consider Lidoderm, K-pad prn.     Skin healing breakdown   risk:  continue pressure relief program.  Daily skin exams and reports from nursing.    Severe fatigue due to immobility and nutritional deficits: continue to monitor closely for dehydration   Add vitamin B12 vitamin D and CoQ10 titrate dosing and add protein supplementation with low carb content.    Complex discharge planning:   Discussed with care team-last 24 hour events noted.  I will continue to follow along and reassess functional and medical status as we strive to improve patient's functional and medical outcomes progressing to the most efficient and lowest level of care.       Complex Active General Medical Issues that complicate care:     1. Principal Problem:    Dizziness  Active Problems:    Impaired mobility and activities of daily living    SILVINA (acute kidney injury) (HCC)    Persistent atrial fibrillation with RVR (HCC)    Shock (HCC)  Resolved Problems:    * No resolved hospital problems. *    Of note he had been getting daily dialysis now transitioning to- Dialysis on Tues, Thurs, Sat.       Events and functional changes in the past 24 hours reviewed decline in functional status noted and is of concern      I will continue to monitor from a distance to see what can be done to help ease him into some type of rehabilitation program-pain seems to be under control as he is less active due to the edema from his renal failure.  I agree with transitioning to skilled and they are starting to work on [facility].      Estephania Mao D.O., FAAPMR  PM&R     Attending    [phone]   [facility]      
Recommendations:  OT D/C RECOMMENDATIONS  REQUIRES OT FOLLOW-UP: Yes    Equipment Recommendations:  OT Equipment Recommendations  Other: continue to assess    OT Education:   Patient Education  Education Given To: Patient;Family  Education Provided: Role of Therapy;Plan of Care  Education Method: Verbal  Barriers to Learning: Other (Comment) (Pt alertness)  Education Outcome: Continued education needed    OT Follow Up:   OT D/C RECOMMENDATIONS  REQUIRES OT FOLLOW-UP: Yes       Assessment/Discharge Disposition:  Assessment: Pt is a 78 year old man from home with spouse seen for re-evaluation post transfer to ICU with decreased ability to participate with mobility. Pt significantly limited d/t weakness, fatigue and decreased alertness. Pt would benefit from continued OT to maximize independence and safety with ADL tasks, if pt more able to participate. Will initiate program to pt tolerance.  Performance deficits / Impairments: Decreased functional mobility , Decreased balance, Decreased posture, Decreased ADL status, Decreased endurance, Decreased strength, Decreased high-level IADLs, Decreased cognition, Decreased safe awareness, Decreased fine motor control, Decreased coordination, Decreased vision/visual deficit  Prognosis: Fair  Discharge Recommendations: Continue to assess pending progress  Decision Making: High Complexity     History: Pt's medical history is complex  Exam: Pt has 12 performance deficits  Assistance / Modification: Pt requires total A    AMPAC (Six Click) Self care Score   How much help is needed for putting on and taking off regular lower body clothing?: Total  How much help is needed for bathing (which includes washing, rinsing, drying)?: Total  How much help is needed for toileting (which includes using toilet, bedpan, or urinal)?: Total  How much help is needed for putting on and taking off regular upper body clothing?: Total  How much help is needed for taking care of personal grooming?: 
dictating this exam?->CRC FINDINGS: BRAIN/VENTRICLES: There is no acute intracranial hemorrhage, mass effect or midline shift.  No abnormal extra-axial fluid collection.  The gray-white differentiation is maintained without evidence of an acute infarct.  There is no evidence of hydrocephalus. Insert old brain Note is made of encephalomalacia within the right frontal lobe consistent old subcortical infarct. ORBITS: The visualized portion of the orbits demonstrate no acute abnormality. SINUSES: The visualized paranasal sinuses and mastoid air cells demonstrate no acute abnormality. SOFT TISSUES/SKULL:  No acute abnormality of the visualized skull or soft tissues.      1.  There is no acute intracranial abnormality.  Specifically, there is no intracranial hemorrhage. 2. Atrophy and periventricular leukomalacia, 3. 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 .      Cardiac procedure     Result Date: 7/31/2024    Status post successful Cook Celect IVC filter placement,      FLUORO FOR SURGICAL PROCEDURES     Result Date: 7/25/2024  EXAMINATION: SPOT FLUOROSCOPIC IMAGES 7/25/2024 10:39 am TECHNIQUE: Fluoroscopy was provided by the radiology department for procedure. Radiologist was not present during examination. FLUOROSCOPY DOSE AND TYPE: Radiation Exposure Index: 0.97 mGy, COMPARISON: None HISTORY: ORDERING SYSTEM PROVIDED HISTORY: pain TECHNOLOGIST PROVIDED HISTORY: Reason for exam:->pain What reading provider will be dictating this exam?->CRC Intraprocedural imaging. FINDINGS: Intraoperative fluoroscopy utilized for port placement.  Distal tip of the central venous catheter appears at level of the superior vena cava.      Intraprocedural fluoroscopic spot images as above.  See separate procedure report for more information.      XR CHEST (SINGLE VIEW FRONTAL)     Result Date: 7/25/2024  EXAMINATION: ONE XRAY VIEW OF THE CHEST 7/25/2024 12:58 pm COMPARISON: Comparison study of July 15 HISTORY: ORDERING 
year?: Yes     ADL Assistance: Needs assistance (grooming and eating indep)     Homemaking Assistance: Needs assistance (share with wife- \"wife does most\")     Homemaking Responsibilities: Yes     Ambulation Assistance: Independent (uses [employer])     Transfer Assistance: Independent     Active : No     Patient's  Info: wife     Additional Comments: wife assists with getting pt in and out of bed and with toileting; household distances only            Social Determinants of Health           Financial Resource Strain: Low Risk  (5/17/2024)     Received from [employer]     Overall Financial Resource Strain (CARDIA)      Difficulty of Paying Living Expenses: Not hard at all   Food Insecurity: No Food Insecurity (8/2/2024)     Hunger Vital Sign      Worried About Running Out of Food in the Last Year: Never true      Ran Out of Food in the Last Year: Never true   Transportation Needs: No Transportation Needs (8/2/2024)     PRAPARE - Transportation      Lack of Transportation (Medical): No      Lack of Transportation (Non-Medical): No   Physical Activity: Inactive (5/17/2024)     Received from [employer]     Exercise Vital Sign      Days of Exercise per Week: 0 days      Minutes of Exercise per Session: 0 min   Stress: No Stress Concern Present (5/17/2024)     Received from [employer]     [employer] [place] [employer] - Occupational Stress Questionnaire      Feeling of Stress : Only a little   Social Connections: Moderately Integrated (5/17/2024)     Received from [employer]     Social Connection and Isolation Panel [NHANES]      Frequency of Communication with Friends and Family: More than three times a week      Frequency of Social Gatherings with Friends and Family: More than three times a week      Attends Anabaptism Services: More than 4 times per year      Active Member of Clubs or Organizations: No      Attends Club or Organization Meetings: 
Index: 0.97 mGy, COMPARISON: None HISTORY: ORDERING SYSTEM PROVIDED HISTORY: pain TECHNOLOGIST PROVIDED HISTORY: Reason for exam:->pain What reading provider will be dictating this exam?->CRC Intraprocedural imaging. FINDINGS: Intraoperative fluoroscopy utilized for port placement.  Distal tip of the central venous catheter appears at level of the superior vena cava.      Intraprocedural fluoroscopic spot images as above.  See separate procedure report for more information.      XR CHEST (SINGLE VIEW FRONTAL)     Result Date: 7/25/2024  EXAMINATION: ONE XRAY VIEW OF THE CHEST 7/25/2024 12:58 pm COMPARISON: Comparison study of July 15 HISTORY: ORDERING SYSTEM PROVIDED HISTORY: Dialysis catheter placement TECHNOLOGIST PROVIDED HISTORY: Reason for exam:->Dialysis catheter placement What reading provider will be dictating this exam?->CRC FINDINGS: Right internal jugular central venous catheter tip in right atrium. No pneumothorax on the right or on the left.  Heart is enlarged.  CTR: 20.4/33.5 cm. Mediastinum demonstrate mild tortuous for the thoracic aorta. Lungs are normally expanded.  No infiltrates consolidations or pleural effusions are seen. There is no perihilar vascular congestion.      Right internal jugular central venous catheter tip in the right atrium. No pneumothorax on the right on the left. Cardiomegaly. No acute pulmonary parenchymal process.  Presently no signs of volume overload.      IR NONTUNNELED VASCULAR CATHETER > 5 YEARS     1.    Successful placement of a temporary central venous dialysis catheter in the right internal jugular vein for dialysis. Note that this is a temporary dialysis catheter which must be removed or converted to a tunneled dialysis catheter prior to patient discharge. Radiation dose: 3.12 mGy Additional clinical data: Patient with acute kidney failure now requiring dialysis Procedure: 1.   Ultrasound guidance for vascular access. 2.   Fluoroscopic guidance for placement of a 
HISTORY: Reason for exam:->heather What reading provider will be dictating this exam?->CRC FINDINGS: The right kidney measures 13.1 cm in length and the left kidney measures 13.0 cm in length.  Mild dilatation seen in the renal collecting systems bilaterally. There is a cystic structure identified on the right kidney measuring 5.3 cm. 2 cystic structures identified left kidney the largest measuring 2.6 cm.  No evidence of stones identified within either kidney. Incidental sludge layering in the gallbladder.  No evidence of wall thickening.      Simple cyst on the kidneys bilaterally.  There is mild dilatation seen in the renal pelvis sees bilaterally.  No evidence of renal stones. Incidental sludge identified within the gallbladder.      Vascular duplex lower extremity venous bilateral     Result Date: 7/18/2024  EXAMINATION: DUPLEX VENOUS ULTRASOUND OF THE BILATERAL LOWER EXTREMITIES7/18/2024 9:08 am TECHNIQUE: Duplex ultrasound using B-mode/gray scaled imaging and Doppler spectral analysis and color flow was obtained of the deep venous structures of the bilateral extremities. COMPARISON: None. HISTORY: ORDERING SYSTEM PROVIDED HISTORY: DVT of proximal lower extremity FINDINGS: The visualized veins of the bilateral lower extremities are patent and free of echogenic thrombus. The veins demonstrate good compressibility with normal color flow study and spectral analysis.  Calf veins bilaterally are segmentally seen and appear unremarkable with Doppler signal and color flow. In the left popliteal fossa there is a complex Baker's cyst measuring 5.3 x 1.5 cm.      1. No evidence of DVT in the bilateral lower extremities. 2. Complex Baker's cyst in the left popliteal fossa.      Echo (TTE) complete (PRN contrast/bubble/strain/3D)     Result Date: 7/16/2024    Left Ventricle: Low normal left ventricular systolic function with a visually estimated EF of 45 - 50%. Left ventricle size is normal. Mildly increased wall thickness. 
cm.  No evidence of stones identified within either kidney. Incidental sludge layering in the gallbladder.  No evidence of wall thickening.      Simple cyst on the kidneys bilaterally.  There is mild dilatation seen in the renal pelvis sees bilaterally.  No evidence of renal stones. Incidental sludge identified within the gallbladder.      Vascular duplex lower extremity venous bilateral     Result Date: 7/18/2024  EXAMINATION: DUPLEX VENOUS ULTRASOUND OF THE BILATERAL LOWER EXTREMITIES7/18/2024 9:08 am TECHNIQUE: Duplex ultrasound using B-mode/gray scaled imaging and Doppler spectral analysis and color flow was obtained of the deep venous structures of the bilateral extremities. COMPARISON: None. HISTORY: ORDERING SYSTEM PROVIDED HISTORY: DVT of proximal lower extremity FINDINGS: The visualized veins of the bilateral lower extremities are patent and free of echogenic thrombus. The veins demonstrate good compressibility with normal color flow study and spectral analysis.  Calf veins bilaterally are segmentally seen and appear unremarkable with Doppler signal and color flow. In the left popliteal fossa there is a complex Baker's cyst measuring 5.3 x 1.5 cm.      1. No evidence of DVT in the bilateral lower extremities. 2. Complex Baker's cyst in the left popliteal fossa.      Echo (TTE) complete (PRN contrast/bubble/strain/3D)     Result Date: 7/16/2024    Left Ventricle: Low normal left ventricular systolic function with a visually estimated EF of 45 - 50%. Left ventricle size is normal. Mildly increased wall thickness. Normal wall motion.   Right Ventricle: Normal systolic function.   Tricuspid Valve: Mild regurgitation. Normal RVSP. The estimated RVSP is 25 mmHg.   Pericardium: No pericardial effusion.   Image quality is suboptimal.      CT Head W/O Contrast     Result Date: 7/15/2024  EXAMINATION: CT OF THE HEAD WITHOUT CONTRAST  7/15/2024 8:18 pm TECHNIQUE: CT of the head was performed without the administration 
possible. 6. Bilobar hepatic fluid density lesions, not further characterize the resembling cysts. 7. Cardiomegaly with bilateral pleural effusions and small pericardial effusion The findings were sent to the Radiology Results Communication Center at 1:59 pm on 7/19/2024 to be communicated to a licensed caregiver.      US RETROPERITONEAL LIMITED     Result Date: 7/18/2024  EXAMINATION: ULTRASOUND OF THE KIDNEYS 7/18/2024 8:07 am COMPARISON: None. HISTORY: ORDERING SYSTEM PROVIDED HISTORY: heather TECHNOLOGIST PROVIDED HISTORY: Reason for exam:->heather What reading provider will be dictating this exam?->CRC FINDINGS: The right kidney measures 13.1 cm in length and the left kidney measures 13.0 cm in length.  Mild dilatation seen in the renal collecting systems bilaterally. There is a cystic structure identified on the right kidney measuring 5.3 cm. 2 cystic structures identified left kidney the largest measuring 2.6 cm.  No evidence of stones identified within either kidney. Incidental sludge layering in the gallbladder.  No evidence of wall thickening.      Simple cyst on the kidneys bilaterally.  There is mild dilatation seen in the renal pelvis sees bilaterally.  No evidence of renal stones. Incidental sludge identified within the gallbladder.      Vascular duplex lower extremity venous bilateral     Result Date: 7/18/2024  EXAMINATION: DUPLEX VENOUS ULTRASOUND OF THE BILATERAL LOWER EXTREMITIES7/18/2024 9:08 am TECHNIQUE: Duplex ultrasound using B-mode/gray scaled imaging and Doppler spectral analysis and color flow was obtained of the deep venous structures of the bilateral extremities. COMPARISON: None. HISTORY: ORDERING SYSTEM PROVIDED HISTORY: DVT of proximal lower extremity FINDINGS: The visualized veins of the bilateral lower extremities are patent and free of echogenic thrombus. The veins demonstrate good compressibility with normal color flow study and spectral analysis.  Calf veins bilaterally are segmentally 
extremity FINDINGS: The visualized veins of the bilateral lower extremities are patent and free of echogenic thrombus. The veins demonstrate good compressibility with normal color flow study and spectral analysis.  Calf veins bilaterally are segmentally seen and appear unremarkable with Doppler signal and color flow. In the left popliteal fossa there is a complex Baker's cyst measuring 5.3 x 1.5 cm.      1. No evidence of DVT in the bilateral lower extremities. 2. Complex Baker's cyst in the left popliteal fossa.      Echo (TTE) complete (PRN contrast/bubble/strain/3D)     Result Date: 7/16/2024    Left Ventricle: Low normal left ventricular systolic function with a visually estimated EF of 45 - 50%. Left ventricle size is normal. Mildly increased wall thickness. Normal wall motion.   Right Ventricle: Normal systolic function.   Tricuspid Valve: Mild regurgitation. Normal RVSP. The estimated RVSP is 25 mmHg.   Pericardium: No pericardial effusion.   Image quality is suboptimal.      CT Head W/O Contrast     Result Date: 7/15/2024  EXAMINATION: CT OF THE HEAD WITHOUT CONTRAST  7/15/2024 8:18 pm TECHNIQUE: CT of the head was performed without the administration of intravenous contrast. Automated exposure control, iterative reconstruction, and/or weight based adjustment of the mA/kV was utilized to reduce the radiation dose to as low as reasonably achievable. COMPARISON: None. HISTORY: ORDERING SYSTEM PROVIDED HISTORY: syncope TECHNOLOGIST PROVIDED HISTORY: Reason for exam:->syncope Has a \"code stroke\" or \"stroke alert\" been called?->No Decision Support Exception - unselect if not a suspected or confirmed emergency medical condition->Emergency Medical Condition (MA) What reading provider will be dictating this exam?->CRC FINDINGS: BRAIN/VENTRICLES: There is no acute intracranial hemorrhage, mass effect or midline shift.  No abnormal extra-axial fluid collection.  The gray-white differentiation is maintained without 
TISSUES/SKULL:  No acute abnormality of the visualized skull or soft tissues.      No acute intracranial abnormality.      XR CHEST PORTABLE     Result Date: 7/15/2024  EXAMINATION: ONE XRAY VIEW OF THE CHEST 7/15/2024 7:28 pm COMPARISON: 04/06/2024 HISTORY: ORDERING SYSTEM PROVIDED HISTORY: syncope TECHNOLOGIST PROVIDED HISTORY: Reason for exam:->syncope What reading provider will be dictating this exam?->CRC FINDINGS: The lungs are without acute focal process.  There is no effusion or pneumothorax.  Stable heart size.  The osseous structures are without acute process.      No acute process.               Active Hospital Problems     Diagnosis Date Noted    Dizziness [R42] 08/03/2024       Priority: Low    Atrial fibrillation (HCC) [I48.91] 07/22/2024       Priority: Low    SILVINA (acute kidney injury) (HCC) [N17.9] 07/17/2024       Priority: Low    Impaired mobility and activities of daily living [Z74.09, Z78.9] 05/22/2024       Priority: Low         Impression/Plan:   B/L PE and LLE DVT 7/2024.  S/p IVCF 7/25.  LLE DVT burden appears to have extended.  Not candidate for left lower extremity DVT thrombectomy.  Patient with worsening renal function, asymptomatic, high risk of intracranial hemorrhage.  Has IVC filter in place on heparin drip  Marked Hypotension - reamins on Levo  CNS Lymphoma w recent Spontaneous SDH. - apparently Lymphoma responded well to Chemo and at lower risk of further Cerebral bleed.   Mild CAD - no CP.  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 Difficult due to clotting.  Pt refused further Rx.   AF -rate uncontrolled.  Heparin drip stopped.  We will resume SQ Heparin - discussed with wife.  There is always a chance for further Cerebral bleed.  Discussed this with wife as well.  Close Neuro monitoring.  Continue Amio p.o.  ECG post CVN procedure with Anterior and Lateral infarct pattern which is new. Can be related to lead position.  Repeat

## 2024-08-24 LAB — ECHO BSA: 2.42 M2

## (undated) DEVICE — MINOR ENT: Brand: MEDLINE INDUSTRIES, INC.

## (undated) DEVICE — TOWEL,OR,DSP,ST,BLUE,STD,4/PK,20PK/CS: Brand: MEDLINE

## (undated) DEVICE — LABEL MED MINI W/ MARKER

## (undated) DEVICE — GOWN,SIRUS,POLYRNF,BRTHSLV,XLN/XL,20/CS: Brand: MEDLINE

## (undated) DEVICE — CATHETER URETH 18FR 2CC BLLN SIL ELASTMR F 2 W BARDX

## (undated) DEVICE — BAG DRNGE 19OZ VYN LEG FLIP-FLO VLV FAB STRP DISPOZ-A-BG

## (undated) DEVICE — SUTURE NONABSORBABLE MONOFILAMENT 3-0 PS-1 18 IN BLK ETHILON 1663H

## (undated) DEVICE — SONY PRINTER PAPER

## (undated) DEVICE — NEEDLE HYPO 25GA L1.5IN BLU POLYPR HUB S STL REG BVL STR

## (undated) DEVICE — CONTAINER,SPECIMEN,OR STERILE,4OZ: Brand: MEDLINE

## (undated) DEVICE — SUTURE MONOCRYL SZ 4-0 L27IN ABSRB UD L19MM PS-2 1/2 CIR PRIM Y426H

## (undated) DEVICE — PACK PROCEDURE SURG SURG CARDIAC CATH

## (undated) DEVICE — INTRODUCER SHTH 0.018 IN 4 FRX70 CM 21 GAX7 CM KT MIC VSI

## (undated) DEVICE — GUIDEWIRE URO L150CM DIA0.035IN TAPR 8CM STR TIP STD SHFT

## (undated) DEVICE — EVACUATOR URO BLDR W/ ADPT UROVAC

## (undated) DEVICE — ANGLED TIP URETERAL CATHETER WITH BENTSON PTFE WIRE GUIDE: Brand: ANGLED TIP

## (undated) DEVICE — SYRINGE MED 10ML LUERLOCK TIP W/O SFTY DISP

## (undated) DEVICE — BAG DRAINAGE URIN LIGEMAN W/ ADPT SUCTION HOSE CYSTO URO

## (undated) DEVICE — GAUZE,SPONGE,4"X4",16PLY,XRAY,STRL,LF: Brand: MEDLINE

## (undated) DEVICE — RESTRAINT EXTREMITY LIMB HOLDER SFT FOAM SINGLE STRP DISP

## (undated) DEVICE — GEL US 20GM NONIRRITATING OVERWRAPPED FILE PCH TRNSMIT

## (undated) DEVICE — COVER FT SWCH W15XL17IN GRY ALL OEC SYS

## (undated) DEVICE — C-ARM: Brand: UNBRANDED

## (undated) DEVICE — 3M™ TEGADERM™ TRANSPARENT FILM DRESSING FRAME STYLE, 1626W, 4 IN X 4-3/4 IN (10 CM X 12 CM), 50/CT 4CT/CASE: Brand: 3M™ TEGADERM™

## (undated) DEVICE — TUR/ENDOSCOPIC CABLE, 10' (3.05 M): Brand: CONMED

## (undated) DEVICE — CATHETER URETH 20FR 5CC BLLN SIL F 3 W SPEC INF CTRL

## (undated) DEVICE — PROVE COVER: Brand: UNBRANDED

## (undated) DEVICE — 3M™ PICC/ CVC SECUREMENT DEVICE + TEGADERM™ CHG DRESSING 1877-2100: Brand: TEGADERM™

## (undated) DEVICE — GOWN,SIRUS,NONRNF,SETINSLV,XL,20/CS: Brand: MEDLINE

## (undated) DEVICE — GLOVE ORANGE PI 7 1/2   MSG9075

## (undated) DEVICE — GUIDEWIRE VASC L260CM DIA0.035IN TIP L3MM STD EXCHG PTFE J

## (undated) DEVICE — KIT MFLD ISOLATN NACL CNTRST PRT TBNG SPIK W/ PRSS TRNSDUC

## (undated) DEVICE — COUNTER NDL 40 COUNT HLD 70 FOAM BLK ADH W/ MAG

## (undated) DEVICE — SHEET,DRAPE,53X77,STERILE: Brand: MEDLINE

## (undated) DEVICE — GRASPING FORCEPS: Brand: COOK

## (undated) DEVICE — DBD-PACK,CYSTOSCOPY,PK VI,AURORA: Brand: MEDLINE

## (undated) DEVICE — TRAY PREP DRY W/ PREM GLV 2 APPL 6 SPNG 2 UNDPD 1 OVERWRAP

## (undated) DEVICE — BANDAGE ADH W0.75XL3IN UNIV WVN FAB NAT GEN USE STRP N ADH

## (undated) DEVICE — SYRINGE IRRIG 60ML SFT PLIABLE BLB EZ TO GRP 1 HND USE W/

## (undated) DEVICE — CATHETER HEMO DYLS OR HD 15.5FR L28CM CHRONIC BASIC KT W/

## (undated) DEVICE — Device

## (undated) DEVICE — SINGLE ACTION PUMPING SYSTEM

## (undated) DEVICE — LUBRICANT SURG JELLY ST BACTER TUBE 4.25OZ

## (undated) DEVICE — APPLICATOR MEDICATED 10.5 CC SOLUTION HI LT ORNG CHLORAPREP

## (undated) DEVICE — GLOVE SURG SZ 8 L12IN FNGR THK83MIL CRM POLYISOPRENE

## (undated) DEVICE — TAPE RAD L500MM RADPQ FOR MEAS LESION LEN MRK LESION

## (undated) DEVICE — Z CONVERTED USE 2271043 CONTAINER SPEC COLL 4OZ SCR ON LID PEEL PCH

## (undated) DEVICE — KIT ANGIO W/ AT P65 PREM HND CTRL FOR CNTRST DEL ANGIOTOUCH

## (undated) DEVICE — GOWN,AURORA,NONREINFORCED,LARGE: Brand: MEDLINE

## (undated) DEVICE — CYSTO/BLADDER IRRIGATION SET, REGULATING CLAMP

## (undated) DEVICE — ELECTRODE ES 2FR L105CM FULG LNG TIP MPLR BUGBEE